# Patient Record
Sex: FEMALE | Race: BLACK OR AFRICAN AMERICAN | Employment: OTHER | URBAN - METROPOLITAN AREA
[De-identification: names, ages, dates, MRNs, and addresses within clinical notes are randomized per-mention and may not be internally consistent; named-entity substitution may affect disease eponyms.]

---

## 2017-01-04 ENCOUNTER — OFFICE VISIT (OUTPATIENT)
Dept: INTERNAL MEDICINE CLINIC | Age: 52
End: 2017-01-04

## 2017-01-04 VITALS
HEART RATE: 78 BPM | RESPIRATION RATE: 18 BRPM | SYSTOLIC BLOOD PRESSURE: 125 MMHG | WEIGHT: 202 LBS | TEMPERATURE: 98.1 F | BODY MASS INDEX: 29.92 KG/M2 | OXYGEN SATURATION: 97 % | DIASTOLIC BLOOD PRESSURE: 81 MMHG | HEIGHT: 69 IN

## 2017-01-04 DIAGNOSIS — K29.50 OTHER CHRONIC GASTRITIS WITHOUT HEMORRHAGE: ICD-10-CM

## 2017-01-04 DIAGNOSIS — R31.9 HEMATURIA: ICD-10-CM

## 2017-01-04 DIAGNOSIS — K59.01 SLOW TRANSIT CONSTIPATION: ICD-10-CM

## 2017-01-04 DIAGNOSIS — F41.9 ANXIETY: ICD-10-CM

## 2017-01-04 DIAGNOSIS — R30.0 DYSURIA: Primary | ICD-10-CM

## 2017-01-04 LAB
BILIRUB UR QL STRIP: NEGATIVE
GLUCOSE UR-MCNC: NEGATIVE MG/DL
KETONES P FAST UR STRIP-MCNC: NEGATIVE MG/DL
PH UR STRIP: 5 [PH] (ref 4.6–8)
PROT UR QL STRIP: NEGATIVE MG/DL
SP GR UR STRIP: 1.02 (ref 1–1.03)
UA UROBILINOGEN AMB POC: NORMAL (ref 0.2–1)
URINALYSIS CLARITY POC: CLEAR
URINALYSIS COLOR POC: YELLOW
URINE BLOOD POC: NORMAL
URINE LEUKOCYTES POC: NEGATIVE
URINE NITRITES POC: NEGATIVE

## 2017-01-04 RX ORDER — PANTOPRAZOLE SODIUM 40 MG/1
40 TABLET, DELAYED RELEASE ORAL DAILY
Qty: 30 TAB | Refills: 5 | Status: SHIPPED | OUTPATIENT
Start: 2017-01-04 | End: 2017-01-31

## 2017-01-04 RX ORDER — POLYETHYLENE GLYCOL 3350 17 G/17G
17 POWDER, FOR SOLUTION ORAL DAILY
Qty: 510 G | Refills: 5 | Status: SHIPPED | OUTPATIENT
Start: 2017-01-04 | End: 2017-04-03

## 2017-01-04 RX ORDER — ALPRAZOLAM 0.5 MG/1
0.5 TABLET ORAL
Qty: 15 TAB | Refills: 0 | OUTPATIENT
Start: 2017-01-04 | End: 2017-04-21

## 2017-01-04 NOTE — PROGRESS NOTES
Room # 3    Chief Complaint   Patient presents with    Abdominal Pain     burning in lower and L side of abd. Had gallbladder removed in Oct and still has \"same sx\"    Needs refill on alprazolam, request sent to Dr. Kera Almazan   protonix does not help with heartburn    There are no preventive care reminders to display for this patient. Coordination of Care Questions    1. Have you been to the ER, urgent care clinic since your last visit? No       Hospitalized since your last visit? No    2. Have you seen or consulted any other health care providers outside of the [address] [place] [address] since your last visit? Include any pap smears or colon screening.  No

## 2017-01-04 NOTE — PROGRESS NOTES
ACUTE VISIT     HPI:   Particia Celeste is a 45 y.o. female, she presents today for:     \"I had my gallbladder removed in October but I am still having the same symptoms\"  Lower abdominal apin with burning in left side. Worsened by lemonade. \"It's like acid keep building up\". Reports that she is constipated. Drinks miralax. Reports she had colonoscopy and egd doesn't remember being told to come back. (review of record shows gastritis on EGD_ Advised take PPI. And sometimes when I pee I feel a litte burning. And I have a uterine prolapse, but I saw my gynecologist about this. I stopped taking ranitidine and pantoprazole ~ 1 week ago. Has not noticed any difference since stopping medication. \"No I don't drink any alcohol, I stopped in [facility]". Denies caffeine as well    ROS: no fever, no chest pian, no dyspnea. No dizziness, no blood in stool. Current Outpatient Prescriptions on File Prior to Visit   Medication Sig    polyethylene glycol (MIRALAX) 17 gram/dose powder Take 17 g by mouth daily. Constipation    VITAMIN D3 1,000 unit cap TAKE 3 CAPSULES DAILY    azelastine (ASTELIN) 137 mcg (0.1 %) nasal spray 1 [place] by Both Nostrils route as needed for Rhinitis. Use in each nostril as directed    fluticasone (FLONASE) 50 mcg/actuation nasal spray 2 Sprays by Both Nostrils route as needed for Rhinitis.  albuterol (PROVENTIL HFA, VENTOLIN HFA, PROAIR HFA) 90 mcg/actuation inhaler Take 2 Puffs by inhalation as needed for Wheezing.  SYNTHROID 100 mcg tablet TAKE 1 TABLET BY MOUTH EVERY DAY BEFORE BREAKFAST ON A EMPTY STOMACH    pravastatin (PRAVACHOL) 20 mg tablet TAKE 1 TAB BY MOUTH DAILY. (Patient taking differently: TAKE 1 TAB BY MOUTH @HS)    ALPRAZolam (XANAX) 0.5 mg tablet Take 1 Tab by mouth two (2) times daily as needed for Anxiety.  sertraline (ZOLOFT) 100 mg tablet Take 1 Tab by mouth daily.  (Patient taking differently: Take 100 mg by mouth daily as needed.)    pantoprazole (PROTONIX) 40 mg tablet Take 1 Tab by mouth daily. No current facility-administered medications on file prior to visit. No Known Allergies    PMH/PSH/FH: reviewed and updated    Sochx:   reports that she has never smoked. She has never used smokeless tobacco. She reports that she does not drink alcohol or use illicit drugs. PE:  Blood pressure 125/81, pulse 78, temperature 98.1 °F (36.7 °C), temperature source Oral, resp. rate 18, height 5' 9\" (1.753 m), weight 202 lb (91.6 kg), last menstrual period 09/08/2016, SpO2 97 %. Body mass index is 29.83 kg/(m^2). Physical Exam   Constitutional: She is oriented to person, place, and time. She appears well-developed and well-nourished. No distress. HENT:   Head: Normocephalic. Mouth/Throat: Oropharynx is clear and moist.   Eyes: Conjunctivae and EOM are normal.   Neck: Neck supple. Cardiovascular: Normal rate, regular rhythm and normal heart sounds. Pulmonary/Chest: Effort normal and breath sounds normal.   Abdominal: Soft. She exhibits no distension and no mass. There is no tenderness. Neurological: She is alert and oriented to person, place, and time. Skin: Skin is warm and dry. Psychiatric: She has a normal mood and affect. Nursing note and vitals reviewed. Labs:  No results found for any visits on 01/04/17. 1+ blood    A/P  Shazia Soni was seen today for had concerns including Abdominal Pain. .  The diagnosis and plan was discussed including:        ICD-10-CM ICD-9-CM    1. Dysuria R30.0 788.1 AMB POC URINALYSIS DIP STICK AUTO W/O MICRO   2. Slow transit constipation K59.01 564.01 polyethylene glycol (MIRALAX) 17 gram/dose powder   3. Other chronic gastritis without hemorrhage K29.50  pantoprazole (PROTONIX) 40 mg tablet   4. Hematuria R31.9 599.70 URINALYSIS W/ RFLX MICROSCOPIC     Non-specific abdominal pain. No sign of urine infection. Will send urine to lab to verify if hematuria accurate.     - treat constipation more agressively with bid miralax   - resume ppi   - follow-up with GI for gastritis seen on EGD   - since patient has stopped drinking hopeful that gastritis is resolved. - I advised her to call back or return to office if symptoms worsen/change/persist.  - She was given AVS and expressed understanding with the diagnosis and plan as discussed.

## 2017-01-04 NOTE — MR AVS SNAPSHOT
Visit Information Date & Time Provider Department Dept. Phone Encounter #  
 1/4/2017  8:45 AM Steve Rivera MD [facility] [phone] [id number] Follow-up Instructions Return if symptoms worsen or fail to improve. And in 1-2 months with Dr. aMrk Canseco for general follow-up. Upcoming Health Maintenance Date Due  
 BREAST CANCER SCRN MAMMOGRAM 6/26/2018 PAP AKA CERVICAL CYTOLOGY 3/17/2019 DTaP/Tdap/Td series (2 - Td) 6/17/2024 COLONOSCOPY 9/13/2026 Allergies as of 1/4/2017  Review Complete On: 1/4/2017 By: Steve Rivera MD  
 No Known Allergies Current Immunizations  Reviewed on 10/27/2016 Name Date Tdap 6/17/2014 Not reviewed this visit You Were Diagnosed With   
  
 Codes Comments Dysuria    -  Primary ICD-10-CM: R30.0 ICD-9-CM: 788.1 Slow transit constipation     ICD-10-CM: K59.01 
ICD-9-CM: 564.01 Other chronic gastritis without hemorrhage     ICD-10-CM: K29.50 Hematuria     ICD-10-CM: R31.9 ICD-9-CM: 599.70 Vitals BP Pulse Temp Resp Height(growth percentile) Weight(growth percentile) 125/81 (BP 1 Location: Right arm, BP Patient Position: Sitting) 78 98.1 °F (36.7 °C) (Oral) 18 5' 9\" (1.753 m) 202 lb (91.6 kg) LMP SpO2 BMI OB Status Smoking Status 09/08/2016 (Approximate) 97% 29.83 kg/m2 Having regular periods Never Smoker Vitals History BMI and BSA Data Body Mass Index Body Surface Area  
 29.83 kg/m 2 2.11 m 2 Preferred Pharmacy [facility]acy Name Phone [id number] [address] [phone] Your Updated Medication List  
  
   
This list is accurate as of: 1/4/17  9:43 AM.  Always use your most recent med list.  
  
  
  
  
 albuterol 90 mcg/actuation inhaler Commonly known as:  PROVENTIL HFA, VENTOLIN HFA, PROAIR HFA Take 2 Puffs by inhalation as needed for Wheezing. ALPRAZolam 0.5 mg tablet Commonly known as:  Nisha Fell Take 1 Tab by mouth two (2) times daily as needed for Anxiety. ASTELIN 137 mcg (0.1 %) nasal spray Generic drug:  azelastine 1 Spray by Both Nostrils route as needed for Rhinitis. Use in each nostril as directed FLONASE 50 mcg/actuation nasal spray Generic drug:  fluticasone 2 Sprays by Both Nostrils route as needed for Rhinitis. pantoprazole 40 mg tablet Commonly known as:  PROTONIX Take 1 Tab by mouth daily. polyethylene glycol 17 gram/dose powder Commonly known as:  Tonna Renae Take 17 g by mouth daily. Constipation  
  
 pravastatin 20 mg tablet Commonly known as:  PRAVACHOL  
TAKE 1 TAB BY MOUTH DAILY. sertraline 100 mg tablet Commonly known as:  ZOLOFT Take 1 Tab by mouth daily. SYNTHROID 100 mcg tablet Generic drug:  levothyroxine TAKE 1 TABLET BY MOUTH EVERY DAY BEFORE BREAKFAST ON A EMPTY STOMACH  
  
 VITAMIN D3 1,000 unit Cap Generic drug:  cholecalciferol TAKE 3 CAPSULES DAILY Prescriptions Sent to Pharmacy Refills  
 polyethylene glycol (MIRALAX) 17 gram/dose powder 5 Sig: Take 17 g by mouth daily. Constipation Class: Normal  
 Pharmacy: [id number] Ph #: [phone] Route: Oral  
 pantoprazole (PROTONIX) 40 mg tablet 5 Sig: Take 1 Tab by mouth daily. Class: Normal  
 Pharmacy: [facility] #[id number] Ph #: [phone] Route: Oral  
  
Follow-up Instructions Return if symptoms worsen or fail to improve. And in 1-2 months with Dr. Nadine Devi for general follow-up. Patient Instructions Please call and request follow-up with Dr. Christine Clay [phone] Continue pantoprazole. Gastritis: Care Instructions Your Care Instructions Gastritis is a sore and upset stomach.  It happens when something irritates the stomach lining. Many things can cause it. These include an infection such as the flu or something you ate or drank. Medicines or a sore on the lining of the stomach (ulcer) also can cause it. Your belly may bloat and ache. You may belch, vomit, and feel sick to your stomach. You should be able to relieve the problem by taking medicine. And it may help to change your diet. If gastritis lasts, your doctor may prescribe medicine. Follow-up care is a key part of your treatment and safety. Be sure to make and go to all appointments, and call your doctor if you are having problems. It's also a good idea to know your test results and keep a list of the medicines you take. How can you care for yourself at home? · If your doctor prescribed antibiotics, take them as directed. Do not stop taking them just because you feel better. You need to take the full course of antibiotics. · Be safe with medicines. If your doctor prescribed medicine to decrease stomach acid, take it as directed. Call your doctor if you think you are having a problem with your medicine. · Do not take any other medicine, including over-the-counter pain relievers, without talking to your doctor first. 
· If your doctor recommends over-the-counter medicine to reduce stomach acid, such as Pepcid AC, Prilosec, Tagamet HB, or Zantac 75, follow the directions on the label. · Drink plenty of fluids (enough so that your urine is light yellow or clear like water) to prevent dehydration. Choose water and other caffeine-free clear liquids. If you have kidney, heart, or liver disease and have to limit fluids, talk with your doctor before you increase the amount of fluids you drink. · Limit how much alcohol you drink. · Avoid coffee, tea, cola drinks, chocolate, and other foods with caffeine. They increase stomach acid. When should you call for help? Call 911 anytime you think you may need emergency care. For example, call if: · You vomit blood or what looks like coffee grounds. · You pass maroon or very bloody stools. Call your doctor now or seek immediate medical care if: 
· You start breathing fast and have not produced urine in the last 8 hours. · You cannot keep fluids down. Watch closely for changes in your health, and be sure to contact your doctor if: 
· You do not get better as expected. Where can you learn more? Go to http://viviane-misael.info/. Enter [id number] in the search box to learn more about \"Gastritis: Care Instructions. \" Current as of: August 9, 2016 Content Version: 11.1 © 1683-9357 [employer]. Care instructions adapted under license by [employer] (which disclaims liability or warranty for this information). If you have questions about a medical condition or this instruction, always ask your healthcare professional. [place] [address] any warranty or liability for your use of this information. Introducing [facility]! Tootie Forde introduces [employer] patient portal. Now you can access parts of your medical record, email your doctor's office, and request medication refills online. 1. In your internet browser, go to [email or web address] [email or web address] 2. Click on the First Time User? Click Here link in the Sign In box. You will see the New Member Sign Up page. 3. Enter your [employer] Access Code exactly as it appears below. You will not need to use this code after youve completed the sign-up process. If you do not sign up before the expiration date, you must request a new code. · [employer] Access Code: [email or web address] Expires: 2/8/2017  8:17 PM 
 
4. Enter the last four digits of your Social Security Number (xxxx) and Date of Birth (mm/dd/yyyy) as indicated and click Submit. You will be taken to the next sign-up page. 5. Create a W[employer] ID.  This will be your [employer] login ID and cannot be changed, so think of one that is secure and easy to remember. 6. Create a O[employer]asis password. You can change your password at any time. 7. Enter your Password Reset Question and Answer. This can be used at a later time if you forget your password. 8. Enter your e-mail address. You will receive e-mail notification when new information is available in [address] 9. Click Sign Up. You can now view and download portions of your medical record. 10. Click the Download Summary menu link to download a portable copy of your medical information. If you have questions, please visit the Frequently Asked Questions section of the [employer] website. Remember, [employer] is NOT to be used for urgent needs. For medical emergencies, dial 911. Now available from your iPhone and Android! Please provide this summary of care documentation to your next provider. Your primary care clinician is listed as [address] If you have any questions after today's visit, please call [phone].

## 2017-01-04 NOTE — PATIENT INSTRUCTIONS
Please call and request follow-up with Dr. Norberto Klein [phone]    Continue pantoprazole. Gastritis: Care Instructions  Your Care Instructions    Gastritis is a sore and upset stomach. It happens when something irritates the stomach lining. Many things can cause it. These include an infection such as the flu or something you ate or drank. Medicines or a sore on the lining of the stomach (ulcer) also can cause it. Your belly may bloat and ache. You may belch, vomit, and feel sick to your stomach. You should be able to relieve the problem by taking medicine. And it may help to change your diet. If gastritis lasts, your doctor may prescribe medicine. Follow-up care is a key part of your treatment and safety. Be sure to make and go to all appointments, and call your doctor if you are having problems. It's also a good idea to know your test results and keep a list of the medicines you take. How can you care for yourself at home? · If your doctor prescribed antibiotics, take them as directed. Do not stop taking them just because you feel better. You need to take the full course of antibiotics. · Be safe with medicines. If your doctor prescribed medicine to decrease stomach acid, take it as directed. Call your doctor if you think you are having a problem with your medicine. · Do not take any other medicine, including over-the-counter pain relievers, without talking to your doctor first.  · If your doctor recommends over-the-counter medicine to reduce stomach acid, such as Pepcid AC, Prilosec, Tagamet HB, or Zantac 75, follow the directions on the label. · Drink plenty of fluids (enough so that your urine is light yellow or clear like water) to prevent dehydration. Choose water and other caffeine-free clear liquids. If you have kidney, heart, or liver disease and have to limit fluids, talk with your doctor before you increase the amount of fluids you drink. · Limit how much alcohol you drink.   · Avoid coffee, tea, cola drinks, chocolate, and other foods with caffeine. They increase stomach acid. When should you call for help? Call 911 anytime you think you may need emergency care. For example, call if:  · You vomit blood or what looks like coffee grounds. · You pass maroon or very bloody stools. Call your doctor now or seek immediate medical care if:  · You start breathing fast and have not produced urine in the last 8 hours. · You cannot keep fluids down. Watch closely for changes in your health, and be sure to contact your doctor if:  · You do not get better as expected. Where can you learn more? Go to http://viviane-misael.info/. Enter [id number] in the search box to learn more about \"Gastritis: Care Instructions. \"  Current as of: August 9, 2016  Content Version: 11.1  © 3652-7129 [employer]. Care instructions adapted under license by [employer] (which disclaims liability or warranty for this information). If you have questions about a medical condition or this instruction, always ask your healthcare professional. [address] any warranty or liability for your use of this information.

## 2017-01-05 LAB
APPEARANCE UR: ABNORMAL
BACTERIA #/AREA URNS HPF: ABNORMAL /[HPF]
BILIRUB UR QL STRIP: NEGATIVE
CASTS URNS QL MICRO: ABNORMAL /LPF
COLOR UR: YELLOW
EPI CELLS #/AREA URNS HPF: >10 /HPF
GLUCOSE UR QL: NEGATIVE
HGB UR QL STRIP: ABNORMAL
KETONES UR QL STRIP: NEGATIVE
LEUKOCYTE ESTERASE UR QL STRIP: NEGATIVE
MICRO URNS: ABNORMAL
MUCOUS THREADS URNS QL MICRO: PRESENT
NITRITE UR QL STRIP: NEGATIVE
PH UR STRIP: 5.5 [PH] (ref 5–7.5)
PROT UR QL STRIP: NEGATIVE
RBC #/AREA URNS HPF: ABNORMAL /HPF
SP GR UR: 1.02 (ref 1–1.03)
UROBILINOGEN UR STRIP-MCNC: 0.2 MG/DL (ref 0.2–1)
WBC #/AREA URNS HPF: ABNORMAL /HPF

## 2017-01-05 NOTE — PROGRESS NOTES
Follow-up test negative for blood in urine with < 2 RBC per hpf. Will send lab letter to advise of normal result.

## 2017-01-31 ENCOUNTER — TELEPHONE (OUTPATIENT)
Dept: INTERNAL MEDICINE CLINIC | Age: 52
End: 2017-01-31

## 2017-01-31 DIAGNOSIS — K27.9 PUD (PEPTIC ULCER DISEASE): Primary | ICD-10-CM

## 2017-01-31 RX ORDER — PHENOL/SODIUM PHENOLATE
20 AEROSOL, SPRAY (ML) MUCOUS MEMBRANE DAILY
Qty: 30 TAB | Refills: 2 | Status: SHIPPED | OUTPATIENT
Start: 2017-01-31 | End: 2017-02-12 | Stop reason: CLARIF

## 2017-01-31 NOTE — TELEPHONE ENCOUNTER
Pantoprazole 40mg prescribed, but requiring PA per [facility].     List of approved drugs on formulary:  GASTRIC ACID SECRETION REDUCERS  Carafate Suspension  Famotidine oral/injection  Famot/Calcium Carb/Mag  Omeprazole OTC  Lansoprazole OTC  Misoprostol (PA required)  Nexium (OTC)  Nizatidine  Prevacid 24 HRS OTC  Ranitidine  Sucralfate  Sucralfate Suspension

## 2017-01-31 NOTE — TELEPHONE ENCOUNTER
Patient advised that omeprazole rx has been sent instead, but if not covered to purchase OTC.  Pt confirmed and has already follow up with GI.

## 2017-01-31 NOTE — TELEPHONE ENCOUNTER
Please advise patient to take omeprazole OTC instead of pantoprazole. Please ask if she has scheduled follow-up with GI? If not I recommend doing this ASAP as they may need to recommend a more powerful medication.      Bailey Domínguez MD

## 2017-02-09 DIAGNOSIS — E78.5 HYPERLIPIDEMIA, UNSPECIFIED HYPERLIPIDEMIA TYPE: Primary | ICD-10-CM

## 2017-02-09 NOTE — TELEPHONE ENCOUNTER
[facility] pharmacy called stating that the patient insurance company does not cover Omeprazole rx. Per pharmacist, the insurance company will only cover Nexium over the counter. Please advise. # [phone]

## 2017-02-10 RX ORDER — ESOMEPRAZOLE MAGNESIUM 20 MG/1
1 TABLET, DELAYED RELEASE ORAL DAILY
OUTPATIENT
Start: 2017-02-10

## 2017-02-10 NOTE — TELEPHONE ENCOUNTER
Pantoprazole not covered    Per [employer] formulary:    GASTRIC ACID SECRETION REDUCERS  Carafate Suspension  Famotidine oral/injection  Famot/Calcium Carb/Mag  Omeprazole OTC  Lansoprazole OTC  Misoprostol (PA required)  Nexium (OTC)  Nizatidine  Prevacid 24 HRS OTC  Ranitidine  Sucralfate  Sucralfate Suspension

## 2017-02-12 RX ORDER — ESOMEPRAZOLE MAGNESIUM 20 MG/1
1 TABLET, DELAYED RELEASE ORAL DAILY
Qty: 30 TAB | Refills: 5 | Status: SHIPPED | OUTPATIENT
Start: 2017-02-12 | End: 2017-04-03

## 2017-03-01 ENCOUNTER — APPOINTMENT (OUTPATIENT)
Dept: ULTRASOUND IMAGING | Age: 52
End: 2017-03-01
Attending: EMERGENCY MEDICINE
Payer: MEDICAID

## 2017-03-01 ENCOUNTER — HOSPITAL ENCOUNTER (EMERGENCY)
Age: 52
Discharge: HOME | End: 2017-03-01
Attending: EMERGENCY MEDICINE
Payer: MEDICAID

## 2017-03-01 VITALS
HEART RATE: 98 BPM | BODY MASS INDEX: 29.94 KG/M2 | TEMPERATURE: 98.9 F | WEIGHT: 202.16 LBS | SYSTOLIC BLOOD PRESSURE: 111 MMHG | DIASTOLIC BLOOD PRESSURE: 84 MMHG | RESPIRATION RATE: 18 BRPM | HEIGHT: 69 IN | OXYGEN SATURATION: 98 %

## 2017-03-01 DIAGNOSIS — R10.2 ACUTE PELVIC PAIN: Primary | ICD-10-CM

## 2017-03-01 DIAGNOSIS — N30.00 ACUTE CYSTITIS WITHOUT HEMATURIA: ICD-10-CM

## 2017-03-01 LAB
ALBUMIN SERPL BCP-MCNC: 4 G/DL (ref 3.5–5)
ALBUMIN/GLOB SERPL: 1 {RATIO} (ref 1.1–2.2)
ALP SERPL-CCNC: 88 U/L (ref 45–117)
ALT SERPL-CCNC: 59 U/L (ref 12–78)
ANION GAP BLD CALC-SCNC: 8 MMOL/L (ref 5–15)
APPEARANCE UR: CLEAR
AST SERPL W P-5'-P-CCNC: 32 U/L (ref 15–37)
BACTERIA URNS QL MICRO: ABNORMAL /HPF
BASOPHILS # BLD AUTO: 0 K/UL (ref 0–0.1)
BASOPHILS # BLD: 0 % (ref 0–1)
BILIRUB SERPL-MCNC: 0.6 MG/DL (ref 0.2–1)
BILIRUB UR QL: NEGATIVE
BUN SERPL-MCNC: 10 MG/DL (ref 6–20)
BUN/CREAT SERPL: 10 (ref 12–20)
CALCIUM SERPL-MCNC: 9.1 MG/DL (ref 8.5–10.1)
CHLORIDE SERPL-SCNC: 105 MMOL/L (ref 97–108)
CLUE CELLS VAG QL WET PREP: NORMAL
CO2 SERPL-SCNC: 27 MMOL/L (ref 21–32)
COLOR UR: ABNORMAL
CREAT SERPL-MCNC: 0.96 MG/DL (ref 0.55–1.02)
EOSINOPHIL # BLD: 0.3 K/UL (ref 0–0.4)
EOSINOPHIL NFR BLD: 4 % (ref 0–7)
EPITH CASTS URNS QL MICRO: ABNORMAL /LPF
ERYTHROCYTE [DISTWIDTH] IN BLOOD BY AUTOMATED COUNT: 13.6 % (ref 11.5–14.5)
ERYTHROCYTE [DISTWIDTH] IN BLOOD BY AUTOMATED COUNT: 13.7 % (ref 11.5–14.5)
GLOBULIN SER CALC-MCNC: 4 G/DL (ref 2–4)
GLUCOSE SERPL-MCNC: 97 MG/DL (ref 65–100)
GLUCOSE UR STRIP.AUTO-MCNC: NEGATIVE MG/DL
HCT VFR BLD AUTO: 43.1 % (ref 35–47)
HCT VFR BLD AUTO: 43.3 % (ref 35–47)
HGB BLD-MCNC: 14.4 G/DL (ref 11.5–16)
HGB BLD-MCNC: 14.5 G/DL (ref 11.5–16)
HGB UR QL STRIP: ABNORMAL
KETONES UR QL STRIP.AUTO: NEGATIVE MG/DL
KOH PREP SPEC: NORMAL
LEUKOCYTE ESTERASE UR QL STRIP.AUTO: ABNORMAL
LIPASE SERPL-CCNC: 158 U/L (ref 73–393)
LYMPHOCYTES # BLD AUTO: 18 % (ref 12–49)
LYMPHOCYTES # BLD: 1.2 K/UL (ref 0.8–3.5)
MCH RBC QN AUTO: 29.9 PG (ref 26–34)
MCH RBC QN AUTO: 30 PG (ref 26–34)
MCHC RBC AUTO-ENTMCNC: 33.4 G/DL (ref 30–36.5)
MCHC RBC AUTO-ENTMCNC: 33.5 G/DL (ref 30–36.5)
MCV RBC AUTO: 89.5 FL (ref 80–99)
MCV RBC AUTO: 89.6 FL (ref 80–99)
MONOCYTES # BLD: 0.5 K/UL (ref 0–1)
MONOCYTES NFR BLD AUTO: 7 % (ref 5–13)
MUCOUS THREADS URNS QL MICRO: ABNORMAL /LPF
NEUTS SEG # BLD: 4.7 K/UL (ref 1.8–8)
NEUTS SEG NFR BLD AUTO: 71 % (ref 32–75)
NITRITE UR QL STRIP.AUTO: POSITIVE
PH UR STRIP: 5.5 [PH] (ref 5–8)
PLATELET # BLD AUTO: 186 K/UL (ref 150–400)
PLATELET # BLD AUTO: 192 K/UL (ref 150–400)
POTASSIUM SERPL-SCNC: 3.8 MMOL/L (ref 3.5–5.1)
PROT SERPL-MCNC: 8 G/DL (ref 6.4–8.2)
PROT UR STRIP-MCNC: NEGATIVE MG/DL
RBC # BLD AUTO: 4.81 M/UL (ref 3.8–5.2)
RBC # BLD AUTO: 4.84 M/UL (ref 3.8–5.2)
RBC #/AREA URNS HPF: ABNORMAL /HPF (ref 0–5)
SERVICE CMNT-IMP: NORMAL
SODIUM SERPL-SCNC: 140 MMOL/L (ref 136–145)
SP GR UR REFRACTOMETRY: 1.01 (ref 1–1.03)
T VAGINALIS VAG QL WET PREP: NORMAL
UROBILINOGEN UR QL STRIP.AUTO: 0.2 EU/DL (ref 0.2–1)
WBC # BLD AUTO: 6.7 K/UL (ref 3.6–11)
WBC # BLD AUTO: 6.8 K/UL (ref 3.6–11)
WBC URNS QL MICRO: ABNORMAL /HPF (ref 0–4)

## 2017-03-01 PROCEDURE — 81001 URINALYSIS AUTO W/SCOPE: CPT | Performed by: EMERGENCY MEDICINE

## 2017-03-01 PROCEDURE — 83690 ASSAY OF LIPASE: CPT | Performed by: EMERGENCY MEDICINE

## 2017-03-01 PROCEDURE — 85025 COMPLETE CBC W/AUTO DIFF WBC: CPT | Performed by: EMERGENCY MEDICINE

## 2017-03-01 PROCEDURE — 87210 SMEAR WET MOUNT SALINE/INK: CPT | Performed by: EMERGENCY MEDICINE

## 2017-03-01 PROCEDURE — 99284 EMERGENCY DEPT VISIT MOD MDM: CPT

## 2017-03-01 PROCEDURE — 87491 CHLMYD TRACH DNA AMP PROBE: CPT | Performed by: EMERGENCY MEDICINE

## 2017-03-01 PROCEDURE — 80053 COMPREHEN METABOLIC PANEL: CPT | Performed by: EMERGENCY MEDICINE

## 2017-03-01 PROCEDURE — 76830 TRANSVAGINAL US NON-OB: CPT

## 2017-03-01 PROCEDURE — 36415 COLL VENOUS BLD VENIPUNCTURE: CPT | Performed by: EMERGENCY MEDICINE

## 2017-03-01 PROCEDURE — 85027 COMPLETE CBC AUTOMATED: CPT | Performed by: EMERGENCY MEDICINE

## 2017-03-01 RX ORDER — CEPHALEXIN 500 MG/1
500 CAPSULE ORAL 4 TIMES DAILY
Qty: 28 CAP | Refills: 0 | Status: SHIPPED | OUTPATIENT
Start: 2017-03-01 | End: 2017-04-28 | Stop reason: ALTCHOICE

## 2017-03-01 RX ORDER — HYDROCODONE BITARTRATE AND ACETAMINOPHEN 5; 325 MG/1; MG/1
1 TABLET ORAL
Qty: 12 TAB | Refills: 0 | Status: SHIPPED | OUTPATIENT
Start: 2017-03-01 | End: 2017-04-21

## 2017-03-01 NOTE — ED PROVIDER NOTES
HPI Comments: Tyron Mcrae is a 47 y.o. Female with hx of PUD and GERD who presents ambulatory to [facility] ED with CC of B/L lower ABD pain since (6/2016), becoming progressively worse today. Pt reports her pain is exacerbated by PO intake, noting she has been unable to tolerate PO x ~3 days. She also c/o dysuria since onset of her symptoms. Pt reports she has had normal colonoscopy and normal EGD since onset of her symptoms. She notes hx of prolapsed uterus, as well as hx of cholecystectomy. Pt denies hx of kidney stones. She specifically denies fever, chills, and vaginal discharge. PCP: Rocio Adams MD    There are no other complaints, changes, or physical findings at this time. The history is provided by the patient.         Past Medical History:   Diagnosis Date    Acid reflux     Adverse effect of anesthesia     woke up coughing    Allergic rhinitis     Anxiety     Arthritis     Asthma     ENVIRONMENTAL, DUST/COLD WEATHER    GERD (gastroesophageal reflux disease)     History of nonchemical tubal occlusion     Esure devices    Hyperlipidemia 2/20/2014    Hypothyroidism     HYPO    IGT (impaired glucose tolerance)     Ill-defined condition     prolapse uterus/states thus her intestines has collpased a little bit    SHERON (obstructive sleep apnea)     off CPAP after weight loss/intentional wt loss    PUD (peptic ulcer disease)     no EGD    Routine gynecological examination     [facility]    Sinus disorder     Tinea pedis        Past Surgical History:   Procedure Laterality Date    COLONOSCOPY N/A 9/13/2016    COLONOSCOPY / EGD  performed by Ephraim Smart MD at [address] HX GYN  10/12/2012    Esure    HX LAP CHOLECYSTECTOMY  10/06/2016    AL REMOVAL OF OVARIAN CYST(S)           Family History:   Problem Relation Age of Onset    Hypertension Mother     Diabetes Mother     Heart Disease Mother     Heart Attack Mother     Cancer Father      lung    Other Sister cholecystectomy    Cancer Brother      lung    Diabetes Maternal Aunt     Cancer Paternal Uncle      lung    Diabetes Maternal Aunt     Diabetes Maternal Aunt     Diabetes Maternal Aunt     Diabetes Maternal Aunt     Diabetes Maternal Aunt     Heart Attack Daughter 32    Other Son      narcolepsy/GERD (part of esophagus removed d/t this)    Cancer Paternal Uncle      lung    Anesth Problems Neg Hx        Social History     Social History    Marital status: SINGLE     Spouse name: N/A    Number of children: N/A    Years of education: N/A     Occupational History    Not on file. Social History Main Topics    Smoking status: Never Smoker    Smokeless tobacco: Never Used    Alcohol use No    Drug use: No    Sexual activity: Yes     Partners: Male     Birth control/ protection: Implant     Other Topics Concern    Not on file     Social History Narrative         ALLERGIES: Review of patient's allergies indicates no known allergies. Review of Systems   Constitutional: Negative. Negative for chills and fever. HENT: Negative. Negative for congestion and rhinorrhea. Respiratory: Negative. Negative for cough, chest tightness and wheezing. Cardiovascular: Negative. Negative for chest pain and palpitations. Gastrointestinal: Positive for abdominal pain (B/L lower). Negative for constipation, nausea and vomiting. Endocrine: Negative. Genitourinary: Positive for dysuria. Negative for decreased urine volume, flank pain, hematuria, pelvic pain and vaginal discharge. Musculoskeletal: Negative. Negative for back pain and neck pain. Skin: Negative. Negative for color change, pallor and rash. Neurological: Negative. Negative for dizziness, seizures, weakness, numbness and headaches. Hematological: Negative. Negative for adenopathy. Psychiatric/Behavioral: Negative. All other systems reviewed and are negative.       Patient Vitals for the past 12 hrs:   Temp Pulse Resp BP SpO2   03/01/17 1547 - 98 18 111/84 98 %   03/01/17 1156 - 100 18 116/73 100 %   03/01/17 1041 98.9 °F (37.2 °C) 100 20 138/84 97 %            Physical Exam   Constitutional: She is oriented to person, place, and time. She appears well-developed and well-nourished. No distress. HENT:   Head: Normocephalic and atraumatic. Mouth/Throat: No oropharyngeal exudate. Eyes: Conjunctivae and EOM are normal. Pupils are equal, round, and reactive to light. No scleral icterus. Neck: Normal range of motion. Neck supple. Cardiovascular: Normal rate, regular rhythm, normal heart sounds and intact distal pulses. Exam reveals no gallop and no friction rub. No murmur heard. Pulmonary/Chest: Effort normal and breath sounds normal. No respiratory distress. She has no wheezes. She has no rales. She exhibits no tenderness. Abdominal: Soft. Bowel sounds are normal. She exhibits no distension and no mass. There is no hepatosplenomegaly. There is tenderness in the suprapubic area. There is no rebound, no guarding and no CVA tenderness. No hernia. Neurological: She is alert and oriented to person, place, and time. Skin: Skin is warm. No rash noted. She is not diaphoretic. No erythema. No pallor. Nursing note and vitals reviewed. MDM  Number of Diagnoses or Management Options  Acute cystitis without hematuria:   Acute pelvic pain:   Diagnosis management comments: DDx: PID, ovarian cyst, UTI, renal colic, reflux    Impression/Plan: Pt presents with 1 year on intermittent burning pelvic pain that is worsened with eating. Clinically no surgical signs. Will obtain labs, US, and pelvic exam, and make final disposition pending results.        Amount and/or Complexity of Data Reviewed  Clinical lab tests: ordered and reviewed  Tests in the radiology section of CPT®: ordered and reviewed  Obtain history from someone other than the patient: yes  Review and summarize past medical records: yes  Independent visualization of images, tracings, or specimens: yes    Risk of Complications, Morbidity, and/or Mortality  Presenting problems: moderate  Diagnostic procedures: low  Management options: low      ED Course       Pelvic Exam  Date/Time: 3/1/2017 2:15 PM  Performed by: PA  Procedure duration:  15 minutes. Documented by:  Margarito Hernández PA-C. Exam assisted by:  Mitch Alvarado RN. Type of exam performed: bimanual and speculum. External genitalia appearance: normal.    Vaginal exam:  normal.    Cervical exam:  no cervical motion tenderness and os closed. Specimen(s) collected:  chlamydia and GC. Bimanual exam:  normal.    Patient tolerance: Patient tolerated the procedure well with no immediate complications            LABORATORY TESTS:  Recent Results (from the past 12 hour(s))   CBC W/O DIFF    Collection Time: 03/01/17 11:54 AM   Result Value Ref Range    WBC 6.8 3.6 - 11.0 K/uL    RBC 4.84 3.80 - 5.20 M/uL    HGB 14.5 11.5 - 16.0 g/dL    HCT 43.3 35.0 - 47.0 %    MCV 89.5 80.0 - 99.0 FL    MCH 30.0 26.0 - 34.0 PG    MCHC 33.5 30.0 - 36.5 g/dL    RDW 13.7 11.5 - 14.5 %    PLATELET 427 556 - 347 K/uL   METABOLIC PANEL, COMPREHENSIVE    Collection Time: 03/01/17 11:54 AM   Result Value Ref Range    Sodium 140 136 - 145 mmol/L    Potassium 3.8 3.5 - 5.1 mmol/L    Chloride 105 97 - 108 mmol/L    CO2 27 21 - 32 mmol/L    Anion gap 8 5 - 15 mmol/L    Glucose 97 65 - 100 mg/dL    BUN 10 6 - 20 MG/DL    Creatinine 0.96 0.55 - 1.02 MG/DL    BUN/Creatinine ratio 10 (L) 12 - 20      GFR est AA >60 >60 ml/min/1.73m2    GFR est non-AA >60 >60 ml/min/1.73m2    Calcium 9.1 8.5 - 10.1 MG/DL    Bilirubin, total 0.6 0.2 - 1.0 MG/DL    ALT (SGPT) 59 12 - 78 U/L    AST (SGOT) 32 15 - 37 U/L    Alk.  phosphatase 88 45 - 117 U/L    Protein, total 8.0 6.4 - 8.2 g/dL    Albumin 4.0 3.5 - 5.0 g/dL    Globulin 4.0 2.0 - 4.0 g/dL    A-G Ratio 1.0 (L) 1.1 - 2.2     CBC WITH AUTOMATED DIFF    Collection Time: 03/01/17 11:54 AM   Result Value Ref Range WBC 6.7 3.6 - 11.0 K/uL    RBC 4.81 3.80 - 5.20 M/uL    HGB 14.4 11.5 - 16.0 g/dL    HCT 43.1 35.0 - 47.0 %    MCV 89.6 80.0 - 99.0 FL    MCH 29.9 26.0 - 34.0 PG    MCHC 33.4 30.0 - 36.5 g/dL    RDW 13.6 11.5 - 14.5 %    PLATELET 861 198 - 506 K/uL    NEUTROPHILS 71 32 - 75 %    LYMPHOCYTES 18 12 - 49 %    MONOCYTES 7 5 - 13 %    EOSINOPHILS 4 0 - 7 %    BASOPHILS 0 0 - 1 %    ABS. NEUTROPHILS 4.7 1.8 - 8.0 K/UL    ABS. LYMPHOCYTES 1.2 0.8 - 3.5 K/UL    ABS. MONOCYTES 0.5 0.0 - 1.0 K/UL    ABS. EOSINOPHILS 0.3 0.0 - 0.4 K/UL    ABS. BASOPHILS 0.0 0.0 - 0.1 K/UL   LIPASE    Collection Time: 03/01/17 11:54 AM   Result Value Ref Range    Lipase 158 73 - 393 U/L   URINALYSIS W/ RFLX MICROSCOPIC    Collection Time: 03/01/17 12:37 PM   Result Value Ref Range    Color DARK YELLOW      Appearance CLEAR CLEAR      Specific gravity 1.011 1.003 - 1.030      pH (UA) 5.5 5.0 - 8.0      Protein NEGATIVE  NEG mg/dL    Glucose NEGATIVE  NEG mg/dL    Ketone NEGATIVE  NEG mg/dL    Bilirubin NEGATIVE  NEG      Blood TRACE (A) NEG      Urobilinogen 0.2 0.2 - 1.0 EU/dL    Nitrites POSITIVE (A) NEG      Leukocyte Esterase TRACE (A) NEG      WBC 0-4 0 - 4 /hpf    RBC 0-5 0 - 5 /hpf    Epithelial cells MODERATE (A) FEW /lpf    Bacteria 1+ (A) NEG /hpf    Mucus TRACE (A) NEG /lpf   WET PREP    Collection Time: 03/01/17  2:22 PM   Result Value Ref Range    Clue cells CLUE CELLS ABSENT      Wet prep NO TRICHOMONAS SEEN     KOH, OTHER SOURCES    Collection Time: 03/01/17  2:22 PM   Result Value Ref Range    Special Requests: NO SPECIAL REQUESTS      KOH NO YEAST SEEN         IMAGING RESULTS:  US TRANSVAGINAL   Final Result   EXAM: US TRANSVAGINAL     INDICATION: Increasing chronic pelvic pain since July, 2016.     COMPARISON: CT pelvis on 9/26/2016.     TECHNIQUE: Endovaginal ultrasound of the female pelvis.  Transvaginal scanning  was performed for better evaluation of all structures.     FINDINGS: Transabdominal ultrasound:     Distended bladder. Visible uterus. Nondiagnostic.     Endovaginal ultrasound:      Urinary bladder: Nondistended.     Uterus: Retroverted. Measures 6.8 x 4.8 x 4.7 cm, which is within normal limits. There is no sonographic myometrial abnormality. Cervix is within normal limits.     Endometrium: 0.5 centimeters in thickness. Homogeneous.     Ovaries: Obscured by bowel gas and body habitus. No evidence of adnexal mass or  cyst.     Free fluid: Physiologic.     IMPRESSION  IMPRESSION:   Normal uterus and endometrium. No adnexal mass or cyst.       IMPRESSION:  1. Acute pelvic pain    2. Acute cystitis without hematuria        PLAN:  1. Discharge for follow up with PCP    Current Discharge Medication List      START taking these medications    Details   cephALEXin (KEFLEX) 500 mg capsule Take 1 Cap by mouth four (4) times daily for 7 days. Qty: 28 Cap, Refills: 0      HYDROcodone-acetaminophen (NORCO) 5-325 mg per tablet Take 1 Tab by mouth every four (4) hours as needed for Pain. Max Daily Amount: 6 Tabs. Qty: 12 Tab, Refills: 0         CONTINUE these medications which have NOT CHANGED    Details   polyethylene glycol (MIRALAX) 17 gram/dose powder Take 17 g by mouth daily. Constipation  Qty: 510 g, Refills: 5    Associated Diagnoses: Slow transit constipation      VITAMIN D3 1,000 unit cap TAKE 3 CAPSULES DAILY  Qty: 90 Cap, Refills: 11    Associated Diagnoses: Vitamin D deficiency      SYNTHROID 100 mcg tablet TAKE 1 TABLET BY MOUTH EVERY DAY BEFORE BREAKFAST ON A EMPTY STOMACH  Qty: 30 Tab, Refills: 5      pravastatin (PRAVACHOL) 20 mg tablet TAKE 1 TAB BY MOUTH DAILY. Qty: 30 Tab, Refills: 5      esomeprazole magnesium (NEXIUM 24HR) 20 mg TbEC Take 1 Tab by mouth daily. Qty: 30 Tab, Refills: 5      ALPRAZolam (XANAX) 0.5 mg tablet Take 1 Tab by mouth two (2) times daily as needed for Anxiety.   Qty: 15 Tab, Refills: 0    Associated Diagnoses: Anxiety      azelastine (ASTELIN) 137 mcg (0.1 %) nasal spray 1 Spray by Both Nostrils route as needed for Rhinitis. Use in each nostril as directed      fluticasone (FLONASE) 50 mcg/actuation nasal spray 2 Sprays by Both Nostrils route as needed for Rhinitis. albuterol (PROVENTIL HFA, VENTOLIN HFA, PROAIR HFA) 90 mcg/actuation inhaler Take 2 Puffs by inhalation as needed for Wheezing. sertraline (ZOLOFT) 100 mg tablet Take 1 Tab by mouth daily. Qty: 30 Tab, Refills: 5           2. Follow-up Information     Follow up With Details Comments Via Glendy Maloney MD Call in 1 day  [address] [facility] [address] [facility]  [phone]      [facility] EMERGENCY DEPT  If symptoms worsen [address]  [address] [facility]  [phone]        Return to ED if worse     DISCHARGE NOTE:  4:05 PM  The patient is ready for discharge. The patients signs, symptoms, diagnosis, and instructions for discharge have been discussed and the pt has conveyed their understanding. The patient is to follow up as recommended with PCP or return to the ER should their symptoms worsen. Plan has been discussed and patient has conveyed their agreement. This note is prepared by Jesus Templeton, acting as Scribe for Colleen Medina MD.    Colleen Medina MD: The scribe's documentation has been prepared under my direction and personally reviewed by me in its entirety. I confirm that the note above accurately reflects all work, treatment, procedures, and medical decision making performed by me.

## 2017-03-01 NOTE — ED NOTES
Pelvic exam done by ANDRESSA Yanez with this nurse Mitch Alvarado RN present as chaperone. Pt tolerated well.

## 2017-03-01 NOTE — ED NOTES
Pt resting in position of comfort with sister at bedside. Drinking diet ginger ale. Pending test results. Call bell within reach.

## 2017-03-01 NOTE — DISCHARGE INSTRUCTIONS
Pelvic Pain: Care Instructions  Your Care Instructions    Pelvic pain, or pain in the lower belly, can have many causes. Often pelvic pain is not serious and gets better in a few days. If your pain continues or gets worse, you may need tests and treatment. Tell your doctor about any new symptoms. These may be signs of a serious problem. Follow-up care is a key part of your treatment and safety. Be sure to make and go to all appointments, and call your doctor if you are having problems. It's also a good idea to know your test results and keep a list of the medicines you take. How can you care for yourself at home? · Rest until you feel better. Lie down, and raise your legs by placing a pillow under your knees. · Drink plenty of fluids. You may find that small, frequent sips are easier on your stomach than if you drink a lot at once. Avoid drinks with carbonation or caffeine, such as soda pop, tea, or coffee. · Try eating several small meals instead of 2 or 3 large ones. Eat mild foods, such as rice, dry toast or crackers, bananas, and applesauce. Avoid fatty and spicy foods, other fruits, and alcohol until 48 hours after your symptoms have gone away. · Take an over-the-counter pain medicine, such as acetaminophen (Tylenol), ibuprofen (Advil, Motrin), or naproxen (Aleve). Read and follow all instructions on the label. · Do not take two or more pain medicines at the same time unless the doctor told you to. Many pain medicines have acetaminophen, which is Tylenol. Too much acetaminophen (Tylenol) can be harmful. · You can put a heating pad, a warm cloth, or moist heat on your belly to relieve pain. When should you call for help? Call 911 anytime you think you may need emergency care. For example, call if:  · You passed out (lost consciousness). Call your doctor now or seek immediate medical care if:  · Your pain is getting worse. · Your pain becomes focused in one area of your belly.   · You have severe vaginal bleeding. Severe means that you are soaking through your usual pads or tampons every hour for 2 or more hours and passing clots of blood. · You have new symptoms such as fever, urinary problems or unexpected vaginal bleeding. · You are dizzy or lightheaded, or you feel like you may faint. Watch closely for changes in your health, and be sure to contact your doctor if:  · You do not get better as expected. Where can you learn more? Go to http://viviane-misael.info/. Enter [phone] in the search box to learn more about \"Pelvic Pain: Care Instructions. \"  Current as of: February 25, 2016  Content Version: 11.1  © 4907-6739 [employer]. Care instructions adapted under license by [employer] (which disclaims liability or warranty for this information). If you have questions about a medical condition or this instruction, always ask your healthcare professional. [place] [address] any warranty or liability for your use of this information. Urinary Tract Infection in Women: Care Instructions  Your Care Instructions    A urinary tract infection, or UTI, is a general term for an infection anywhere between the kidneys and the urethra (where urine comes out). Most UTIs are bladder infections. They often cause pain or burning when you urinate. UTIs are caused by bacteria and can be cured with antibiotics. Be sure to complete your treatment so that the infection goes away. Follow-up care is a key part of your treatment and safety. Be sure to make and go to all appointments, and call your doctor if you are having problems. It's also a good idea to know your test results and keep a list of the medicines you take. How can you care for yourself at home? · Take your antibiotics as directed. Do not stop taking them just because you feel better. You need to take the full course of antibiotics. · Drink extra water and other fluids for the next day or two.  This may help wash out the bacteria that are causing the infection. (If you have kidney, heart, or liver disease and have to limit fluids, talk with your doctor before you increase your fluid intake.)  · Avoid drinks that are carbonated or have caffeine. They can irritate the bladder. · Urinate often. Try to empty your bladder each time. · To relieve pain, take a hot bath or lay a heating pad set on low over your lower belly or genital area. Never go to sleep with a heating pad in place. To prevent UTIs  · Drink plenty of water each day. This helps you urinate often, which clears bacteria from your system. (If you have kidney, heart, or liver disease and have to limit fluids, talk with your doctor before you increase your fluid intake.)  · Consider adding cranberry juice to your diet. · Urinate when you need to. · Urinate right after you have sex. · Change sanitary pads often. · Avoid douches, bubble baths, feminine hygiene sprays, and other feminine hygiene products that have deodorants. · After going to the bathroom, wipe from front to back. When should you call for help? Call your doctor now or seek immediate medical care if:  · Symptoms such as fever, chills, nausea, or vomiting get worse or appear for the first time. · You have new pain in your back just below your rib cage. This is called flank pain. · There is new blood or pus in your urine. · You have any problems with your antibiotic medicine. Watch closely for changes in your health, and be sure to contact your doctor if:  · You are not getting better after taking an antibiotic for 2 days. · Your symptoms go away but then come back. Where can you learn more? Go to http://viviane-misael.info/. Enter G101 in the search box to learn more about \"Urinary Tract Infection in Women: Care Instructions. \"  Current as of: August 12, 2016  Content Version: 11.1  © 8355-6274 [employer].  Care instructions adapted under license by [employer] (which disclaims liability or warranty for this information). If you have questions about a medical condition or this instruction, always ask your healthcare professional. [address] any warranty or liability for your use of this information. Appoet Activation    Thank you for requesting access to App[employer]et. Please follow the instructions below to securely access and download your online medical record. App[employer]et allows you to send messages to your doctor, view your test results, renew your prescriptions, schedule appointments, and more. How Do I Sign Up? 1. In your internet browser, go to [email or web address]  2. Click on the First Time User? Click Here link in the Sign In box. You will be redirect to the New Member Sign Up page. 3. Enter your Appoet Access Code exactly as it appears below. You will not need to use this code after youve completed the sign-up process. If you do not sign up before the expiration date, you must request a new code. Appoet Access Code: [email or web address]  Expires: 2017 11:05 AM (This is the date your Appoet access code will )    4. Enter the last four digits of your Social Security Number (xxxx) and Date of Birth (mm/dd/yyyy) as indicated and click Submit. You will be taken to the next sign-up page. 5. Create a Appoet ID. This will be your Appoet login ID and cannot be changed, so think of one that is secure and easy to remember. 6. Create a Appoet password. You can change your password at any time. 7. Enter your Password Reset Question and Answer. This can be used at a later time if you forget your password. 8. Enter your e-mail address. You will receive e-mail notification when new information is available in [address] 9. Click Sign Up. You can now view and download portions of your medical record.   10. Click the Download Summary menu link to download a portable copy of your medical information. Additional Information    If you have questions, please visit the Frequently Asked Questions section of the [employer] website at [email or web address] Remember, [employer] is NOT to be used for urgent needs. For medical emergencies, dial 911.

## 2017-03-01 NOTE — ED NOTES
Dr Alfred Cedillo visited pt. Discharge orders and instructions noted. Discharge instructions, follow up care and prescriptions reviewed with pt and understanding verbalized. Opportunity for questions and clarifications provided. Pt left via ambulation with sister. In no acute distress.

## 2017-03-06 LAB
C TRACH DNA SPEC QL NAA+PROBE: NEGATIVE
N GONORRHOEA DNA SPEC QL NAA+PROBE: NEGATIVE
SAMPLE TYPE: NORMAL
SERVICE CMNT-IMP: NORMAL
SPECIMEN SOURCE: NORMAL

## 2017-03-29 RX ORDER — LEVOTHYROXINE SODIUM 100 UG/1
TABLET ORAL
Qty: 30 TAB | Refills: 5 | Status: SHIPPED | OUTPATIENT
Start: 2017-03-29 | End: 2017-05-04 | Stop reason: SDUPTHER

## 2017-04-01 ENCOUNTER — HOSPITAL ENCOUNTER (EMERGENCY)
Age: 52
Discharge: HOME | End: 2017-04-01
Attending: EMERGENCY MEDICINE
Payer: MEDICAID

## 2017-04-01 ENCOUNTER — APPOINTMENT (OUTPATIENT)
Dept: GENERAL RADIOLOGY | Age: 52
End: 2017-04-01
Attending: PHYSICIAN ASSISTANT
Payer: MEDICAID

## 2017-04-01 VITALS
OXYGEN SATURATION: 96 % | BODY MASS INDEX: 30.6 KG/M2 | WEIGHT: 206.57 LBS | TEMPERATURE: 98 F | HEIGHT: 69 IN | HEART RATE: 69 BPM | RESPIRATION RATE: 12 BRPM | SYSTOLIC BLOOD PRESSURE: 185 MMHG | DIASTOLIC BLOOD PRESSURE: 111 MMHG

## 2017-04-01 DIAGNOSIS — M51.36 DDD (DEGENERATIVE DISC DISEASE), LUMBAR: Primary | ICD-10-CM

## 2017-04-01 DIAGNOSIS — M54.31 BILATERAL SCIATICA: ICD-10-CM

## 2017-04-01 DIAGNOSIS — M54.32 BILATERAL SCIATICA: ICD-10-CM

## 2017-04-01 PROCEDURE — 99283 EMERGENCY DEPT VISIT LOW MDM: CPT

## 2017-04-01 PROCEDURE — 72100 X-RAY EXAM L-S SPINE 2/3 VWS: CPT

## 2017-04-01 PROCEDURE — 74011250637 HC RX REV CODE- 250/637: Performed by: PHYSICIAN ASSISTANT

## 2017-04-01 RX ORDER — OXYCODONE AND ACETAMINOPHEN 5; 325 MG/1; MG/1
1 TABLET ORAL
Qty: 10 TAB | Refills: 0 | Status: SHIPPED | OUTPATIENT
Start: 2017-04-01 | End: 2017-04-21

## 2017-04-01 RX ORDER — ONDANSETRON 4 MG/1
4 TABLET, ORALLY DISINTEGRATING ORAL
Status: COMPLETED | OUTPATIENT
Start: 2017-04-01 | End: 2017-04-01

## 2017-04-01 RX ORDER — OXYCODONE AND ACETAMINOPHEN 5; 325 MG/1; MG/1
1 TABLET ORAL
Status: COMPLETED | OUTPATIENT
Start: 2017-04-01 | End: 2017-04-01

## 2017-04-01 RX ORDER — DIAZEPAM 5 MG/1
5 TABLET ORAL
Status: COMPLETED | OUTPATIENT
Start: 2017-04-01 | End: 2017-04-01

## 2017-04-01 RX ORDER — DIAZEPAM 5 MG/1
5 TABLET ORAL
Qty: 10 TAB | Refills: 0 | Status: SHIPPED | OUTPATIENT
Start: 2017-04-01 | End: 2017-04-03

## 2017-04-01 RX ADMIN — OXYCODONE HYDROCHLORIDE AND ACETAMINOPHEN 1 TABLET: 5; 325 TABLET ORAL at 09:28

## 2017-04-01 RX ADMIN — DIAZEPAM 5 MG: 5 TABLET ORAL at 09:28

## 2017-04-01 RX ADMIN — ONDANSETRON 4 MG: 4 TABLET, ORALLY DISINTEGRATING ORAL at 09:28

## 2017-04-01 NOTE — ED PROVIDER NOTES
HPI Comments: Fartun Felder, 47 y.o. Female with PMHx of PUD, GERD, arthritis, and asthma presents ambulatory to the ED with cc of lower back pain radiating down LLE x 1 day. She denies any recent injuries. She has not taken anything for pain. She has no known medication allergies. Pain is exacerbated by bending legs. She does not have a hx of HTN, DM, cardiac, pulmonary or kidney issues. She denies any fevers, chills, nausea, vomiting, diarrhea, change in urinary frequency, saddle anesthesia, fecal or urinary incontinence. PCP: Darylene Re, MD    Social history significant for: - Tobacco, - EtOH, - Illicit Drug Use  PSHx: cholecystectomy     There are no other complaints, changes, or physical findings at this time. Written by ABBEY Mcmanusibchanda, as dictated by Zoila Wang. The history is provided by the patient. No  was used.         Past Medical History:   Diagnosis Date    Acid reflux     Adverse effect of anesthesia     woke up coughing    Allergic rhinitis     Anxiety     Arthritis     Asthma     ENVIRONMENTAL, DUST/COLD WEATHER    GERD (gastroesophageal reflux disease)     History of nonchemical tubal occlusion     Esure devices    Hyperlipidemia 2/20/2014    Hypothyroidism     HYPO    IGT (impaired glucose tolerance)     Ill-defined condition     prolapse uterus/states thus her intestines has collpased a little bit    SHERON (obstructive sleep apnea)     off CPAP after weight loss/intentional wt loss    PUD (peptic ulcer disease)     no EGD    Routine gynecological examination     [facility]    Sinus disorder     Tinea pedis        Past Surgical History:   Procedure Laterality Date    COLONOSCOPY N/A 9/13/2016    COLONOSCOPY / EGD  performed by Mabeline Rubinstein, MD at [address] [facility] G[facility]  10/12/2012    [facility]    HX LAP CHOLECYSTECTOMY  10/06/2016    ID REMOVAL OF OVARIAN CYST(S)           Family History:   Problem Relation Age of Onset    Hypertension Mother     Diabetes Mother     Heart Disease Mother     Heart Attack Mother     Cancer Father      lung    Other Sister      cholecystectomy    Cancer Brother      lung    Diabetes Maternal Aunt     Cancer Paternal Uncle      lung    Diabetes Maternal Aunt     Diabetes Maternal Aunt     Diabetes Maternal Aunt     Diabetes Maternal Aunt     Diabetes Maternal Aunt     Heart Attack Daughter 32    Other Son      narcolepsy/GERD (part of esophagus removed d/t this)    Cancer Paternal Uncle      lung    Anesth Problems Neg Hx        Social History     Social History    Marital status: SINGLE     Spouse name: N/A    Number of children: N/A    Years of education: N/A     Occupational History    Not on file. Social History Main Topics    Smoking status: Never Smoker    Smokeless tobacco: Never Used    Alcohol use No    Drug use: No    Sexual activity: Yes     Partners: Male     Birth control/ protection: Implant     Other Topics Concern    Not on file     Social History Narrative         ALLERGIES: Review of patient's allergies indicates no known allergies. Review of Systems   Constitutional: Negative for chills and fever. HENT: Negative for congestion, rhinorrhea and sore throat. Respiratory: Negative for cough and shortness of breath. Cardiovascular: Negative for chest pain and palpitations. Gastrointestinal: Negative for abdominal pain, diarrhea, nausea and vomiting. Genitourinary: Negative for dysuria and hematuria. (-) fecal or urinary incontinence    Musculoskeletal: Positive for back pain and myalgias (LLE). Negative for neck pain and neck stiffness. Skin: Negative for rash and wound. Neurological: Negative for dizziness and headaches. Psychiatric/Behavioral: Negative for agitation and confusion. All other systems reviewed and are negative.       Patient Vitals for the past 12 hrs:   Temp Pulse Resp BP SpO2   04/01/17 0923 98 °F (36.7 °C) 69 12 (!) 185/111 96 %       Physical Exam   Constitutional: She is oriented to person, place, and time. She appears well-developed and well-nourished. No distress. HENT:   Head: Normocephalic and atraumatic. Right Ear: External ear normal.   Left Ear: External ear normal.   Nose: Nose normal.   Mouth/Throat: Oropharynx is clear and moist. No oropharyngeal exudate. Eyes: Conjunctivae and EOM are normal. Pupils are equal, round, and reactive to light. Right eye exhibits no discharge. Left eye exhibits no discharge. No scleral icterus. Neck: Normal range of motion. Neck supple. No JVD present. No tracheal deviation present. Cardiovascular: Normal rate, regular rhythm, normal heart sounds and intact distal pulses. Exam reveals no gallop and no friction rub. No murmur heard. Pulmonary/Chest: Effort normal and breath sounds normal. No respiratory distress. She has no wheezes. She has no rales. She exhibits no tenderness. Abdominal: Soft. Bowel sounds are normal. She exhibits no distension and no mass. There is no tenderness. There is no rebound and no guarding. Musculoskeletal: She exhibits no edema. Tender lower back   (+) SLR  NVI throughout    Lymphadenopathy:     She has no cervical adenopathy. Neurological: She is alert and oriented to person, place, and time. She has normal reflexes. No cranial nerve deficit. She exhibits normal muscle tone. Coordination normal.   Skin: Skin is warm and dry. She is not diaphoretic. Psychiatric: She has a normal mood and affect. Her behavior is normal. Judgment and thought content normal.   Nursing note and vitals reviewed.        MDM  Number of Diagnoses or Management Options  Bilateral sciatica:   DDD (degenerative disc disease), lumbar:   Diagnosis management comments: DDx: sciatica, DDD, DJD       Amount and/or Complexity of Data Reviewed  Tests in the radiology section of CPT®: ordered and reviewed  Review and summarize past medical records: yes    Patient Progress  Patient progress: stable    ED Course       Procedures    9:32 AM  Pt has had multiple normal XR's and CT's of back. Written by ABBEY Escalera, as dictated by Otilia Burnham. 10:30 AM  Reviewed XR results with the pt  Written by ABBEY Escalera, as dictated by Otilia Burnham. LABORATORY TESTS:  No results found for this or any previous visit (from the past 12 hour(s)). IMAGING RESULTS:  XR SPINE LUMB 2 OR 3 V   Final Result   EXAM: XR SPINE LUMB 2 OR 3 V     INDICATION: Back Pain     COMPARISON: None.     FINDINGS: AP, lateral and spot lateral views of the lumbar spine demonstrate  normal bone mineralization and vertebral body height. There is anterolisthesis  at L4-5 measuring 7 mm. Mild L4-5 and L5-S1 disc space narrowing is shown. The  other disc heights are normal. Tiny anterior marginal osteophytes are present at  L1-2 through L3-4 as well as ossification of the anterior longitudinal ligament  in the lower thoracic spine. Moderate bilateral facet osteoarthrosis is present  at L4-5 and probably L5-S1. .      IMPRESSION  IMPRESSION: Degenerative findings in lower lumbar spine with grade 1  anterolisthesis at L4-5. MEDICATIONS GIVEN:  Medications   oxyCODONE-acetaminophen (PERCOCET) 5-325 mg per tablet 1 Tab (1 Tab Oral Given 4/1/17 0928)   diazePAM (VALIUM) tablet 5 mg (5 mg Oral Given 4/1/17 0928)   ondansetron (ZOFRAN ODT) tablet 4 mg (4 mg Oral Given 4/1/17 0928)       IMPRESSION:  1. DDD (degenerative disc disease), lumbar    2. Bilateral sciatica        PLAN:  1. Discharge Medication List as of 4/1/2017  9:29 AM      START taking these medications    Details   oxyCODONE-acetaminophen (PERCOCET) 5-325 mg per tablet Take 1 Tab by mouth every six (6) hours as needed for Pain. Max Daily Amount: 4 Tabs., Print, Disp-10 Tab, R-0      diazePAM (VALIUM) 5 mg tablet Take 1 Tab by mouth every twelve (12) hours as needed (spasm).  Max Daily Amount: 10 mg., Print, Disp-10 Tab, R-0         CONTINUE these medications which have NOT CHANGED    Details   SYNTHROID 100 mcg tablet TAKE 1 TABLET BY MOUTH EVERY DAY BEFORE BREAKFAST ON A EMPTY STOMACH, Normal, Disp-30 Tab, R-5      pravastatin (PRAVACHOL) 20 mg tablet TAKE 1 TAB BY MOUTH DAILY., Normal, Disp-30 Tab, R-5      HYDROcodone-acetaminophen (NORCO) 5-325 mg per tablet Take 1 Tab by mouth every four (4) hours as needed for Pain. Max Daily Amount: 6 Tabs., Print, Disp-12 Tab, R-0      esomeprazole magnesium (NEXIUM 24HR) 20 mg TbEC Take 1 Tab by mouth daily. , Normal, Disp-30 Tab, R-5      ALPRAZolam (XANAX) 0.5 mg tablet Take 1 Tab by mouth two (2) times daily as needed for Anxiety. , Phone In, Disp-15 Tab, R-0      polyethylene glycol (MIRALAX) 17 gram/dose powder Take 17 g by mouth daily. Constipation, Normal, Disp-510 g, R-5      VITAMIN D3 1,000 unit cap TAKE 3 CAPSULES DAILY, Normal, Disp-90 Cap, R-11      azelastine (ASTELIN) 137 mcg (0.1 %) nasal spray 1 [place] by Both Nostrils route as needed for Rhinitis. Use in each nostril as directed, Historical Med      fluticasone (FLONASE) 50 mcg/actuation nasal spray 2 Sprays by Both Nostrils route as needed for Rhinitis., Historical Med      albuterol (PROVENTIL HFA, VENTOLIN HFA, PROAIR HFA) 90 mcg/actuation inhaler Take 2 Puffs by inhalation as needed for Wheezing., Historical Med      sertraline (ZOLOFT) 100 mg tablet Take 1 Tab by mouth daily. , Normal, Disp-30 Tab, R-5           2. Follow-up Information     Follow up With Details Comments [address], [place] In 2 days As needed [id number] [facility]  [address] [place] [address]  [phone]          Return to ED if worse     Discharge Note:  10:40 AM  The pt is ready for discharge. The pt's signs, symptoms, diagnosis, and discharge instructions have been discussed and pt has conveyed their understanding.  The pt is to follow up as recommended or return to ER should their symptoms worsen. Plan has been discussed and pt is in agreement. This note is prepared by Catherne Simmonds, acting as a Scribe for [employer] LEYDA Alvarez: The scribe's documentation has been prepared under my direction and personally reviewed by me in its entirety. I confirm that the notes above accurately reflects all work, treatment, procedures, and medical decision making performed by me.

## 2017-04-01 NOTE — DISCHARGE INSTRUCTIONS
Back Pain: Care Instructions  Your Care Instructions    Back pain has many possible causes. It is often related to problems with muscles and ligaments of the back. It may also be related to problems with the nerves, discs, or bones of the back. Moving, lifting, standing, sitting, or sleeping in an awkward way can strain the back. Sometimes you don't notice the injury until later. Arthritis is another common cause of back pain. Although it may hurt a lot, back pain usually improves on its own within several weeks. Most people recover in 12 weeks or less. Using good home treatment and being careful not to stress your back can help you feel better sooner. Follow-up care is a key part of your treatment and safety. Be sure to make and go to all appointments, and call your doctor if you are having problems. Its also a good idea to know your test results and keep a list of the medicines you take. How can you care for yourself at home? · Sit or lie in positions that are most comfortable and reduce your pain. Try one of these positions when you lie down:  ¨ Lie on your back with your knees bent and supported by large pillows. ¨ Lie on the floor with your legs on the seat of a sofa or chair. Damari Men on your side with your knees and hips bent and a pillow between your legs. ¨ Lie on your stomach if it does not make pain worse. · Do not sit up in bed, and avoid soft couches and twisted positions. Bed rest can help relieve pain at first, but it delays healing. Avoid bed rest after the first day of back pain. · Change positions every 30 minutes. If you must sit for long periods of time, take breaks from sitting. Get up and walk around, or lie in a comfortable position. · Try using a heating pad on a low or medium setting for 15 to 20 minutes every 2 or 3 hours. Try a warm shower in place of one session with the heating pad. · You can also try an ice pack for 10 to 15 minutes every 2 to 3 hours.  Put a thin cloth between the ice pack and your skin. · Take pain medicines exactly as directed. ¨ If the doctor gave you a prescription medicine for pain, take it as prescribed. ¨ If you are not taking a prescription pain medicine, ask your doctor if you can take an over-the-counter medicine. · Take short walks several times a day. You can start with 5 to 10 minutes, 3 or 4 times a day, and work up to longer walks. Walk on level surfaces and avoid hills and stairs until your back is better. · Return to work and other activities as soon as you can. Continued rest without activity is usually not good for your back. · To prevent future back pain, do exercises to stretch and strengthen your back and stomach. Learn how to use good posture, safe lifting techniques, and proper body mechanics. When should you call for help? Call your doctor now or seek immediate medical care if:  · You have new or worsening numbness in your legs. · You have new or worsening weakness in your legs. (This could make it hard to stand up.)  · You lose control of your bladder or bowels. Watch closely for changes in your health, and be sure to contact your doctor if:  · Your pain gets worse. · You are not getting better after 2 weeks. Where can you learn more? Go to http://viviane-misael.info/. Enter G641 in the search box to learn more about \"Back Pain: Care Instructions. \"  Current as of: May 23, 2016  Content Version: 11.2  © 9145-2736 [email or web address]. Care instructions adapted under license by [employer] (which disclaims liability or warranty for this information). If you have questions about a medical condition or this instruction, always ask your healthcare professional. [place] [address] any warranty or liability for your use of this information.          Sciatica: Care Instructions  Your Care Instructions    Sciatica (say \"rlp-ME-qb-kuh\") is an irritation of one of the sciatic nerves, which come from the spinal cord in the lower back. The sciatic nerves and their branches extend down through the buttock to the foot. Sciatica can develop when an injured disc in the back presses against a spinal nerve root. Its main symptom is pain, numbness, or weakness that is often worse in the leg or foot than in the back. Sciatica often will improve and go away with time. Early treatment usually includes medicines and exercises to relieve pain. Follow-up care is a key part of your treatment and safety. Be sure to make and go to all appointments, and call your doctor if you are having problems. It's also a good idea to know your test results and keep a list of the medicines you take. How can you care for yourself at home? · Take pain medicines exactly as directed. ¨ If the doctor gave you a prescription medicine for pain, take it as prescribed. ¨ If you are not taking a prescription pain medicine, ask your doctor if you can take an over-the-counter medicine. · Use heat or ice to relieve pain. ¨ To apply heat, put a warm water bottle, heating pad set on low, or warm cloth on your back. Do not go to sleep with a heating pad on your skin. ¨ To use ice, put ice or a cold pack on the area for 10 to 20 minutes at a time. Put a thin cloth between the ice and your skin. · Avoid sitting if possible, unless it feels better than standing. · Alternate lying down with short walks. Increase your walking distance as you are able to without making your symptoms worse. · Do not do anything that makes your symptoms worse. When should you call for help? Call 911 anytime you think you may need emergency care. For example, call if:  · You are unable to move a leg at all. Call your doctor now or seek immediate medical care if:  · You have new or worse symptoms in your legs or buttocks. Symptoms may include:  ¨ Numbness or tingling. ¨ Weakness. ¨ Pain. · You lose bladder or bowel control.   Watch closely for changes in your health, and be sure to contact your doctor if:  · You are not getting better as expected. Where can you learn more? Go to http://viviane-misael.info/. Enter [id number] in the search box to learn more about \"Sciatica: Care Instructions. \"  Current as of: May 23, 2016  Content Version: 11.2  © 5836-5143 [employer]. Care instructions adapted under license by [employer] (which disclaims liability or warranty for this information). If you have questions about a medical condition or this instruction, always ask your healthcare professional. [place] [address] any warranty or liability for your use of this information.

## 2017-04-03 ENCOUNTER — HOSPITAL ENCOUNTER (OUTPATIENT)
Dept: PREADMISSION TESTING | Age: 52
Discharge: HOME | End: 2017-04-03
Attending: OBSTETRICS & GYNECOLOGY
Payer: MEDICAID

## 2017-04-03 VITALS
TEMPERATURE: 98.2 F | HEIGHT: 69 IN | DIASTOLIC BLOOD PRESSURE: 69 MMHG | OXYGEN SATURATION: 100 % | HEART RATE: 59 BPM | WEIGHT: 209.22 LBS | BODY MASS INDEX: 30.99 KG/M2 | RESPIRATION RATE: 18 BRPM | SYSTOLIC BLOOD PRESSURE: 116 MMHG

## 2017-04-03 LAB
ALBUMIN SERPL BCP-MCNC: 3.7 G/DL (ref 3.5–5)
ALBUMIN/GLOB SERPL: 1 {RATIO} (ref 1.1–2.2)
ALP SERPL-CCNC: 92 U/L (ref 45–117)
ALT SERPL-CCNC: 98 U/L (ref 12–78)
ANION GAP BLD CALC-SCNC: 9 MMOL/L (ref 5–15)
APPEARANCE UR: ABNORMAL
AST SERPL W P-5'-P-CCNC: 37 U/L (ref 15–37)
BACTERIA URNS QL MICRO: ABNORMAL /HPF
BASOPHILS # BLD AUTO: 0 K/UL (ref 0–0.1)
BASOPHILS # BLD: 1 % (ref 0–1)
BILIRUB SERPL-MCNC: 0.6 MG/DL (ref 0.2–1)
BILIRUB UR QL: NEGATIVE
BUN SERPL-MCNC: 8 MG/DL (ref 6–20)
BUN/CREAT SERPL: 9 (ref 12–20)
CALCIUM SERPL-MCNC: 9.3 MG/DL (ref 8.5–10.1)
CHLORIDE SERPL-SCNC: 109 MMOL/L (ref 97–108)
CO2 SERPL-SCNC: 23 MMOL/L (ref 21–32)
COLOR UR: ABNORMAL
CREAT SERPL-MCNC: 0.91 MG/DL (ref 0.55–1.02)
EOSINOPHIL # BLD: 0.2 K/UL (ref 0–0.4)
EOSINOPHIL NFR BLD: 4 % (ref 0–7)
EPITH CASTS URNS QL MICRO: ABNORMAL /LPF
ERYTHROCYTE [DISTWIDTH] IN BLOOD BY AUTOMATED COUNT: 14.5 % (ref 11.5–14.5)
GLOBULIN SER CALC-MCNC: 3.8 G/DL (ref 2–4)
GLUCOSE SERPL-MCNC: 83 MG/DL (ref 65–100)
GLUCOSE UR STRIP.AUTO-MCNC: NEGATIVE MG/DL
HCT VFR BLD AUTO: 42.1 % (ref 35–47)
HGB BLD-MCNC: 13.6 G/DL (ref 11.5–16)
HGB UR QL STRIP: NEGATIVE
HYALINE CASTS URNS QL MICRO: ABNORMAL /LPF (ref 0–5)
KETONES UR QL STRIP.AUTO: NEGATIVE MG/DL
LEUKOCYTE ESTERASE UR QL STRIP.AUTO: NEGATIVE
LYMPHOCYTES # BLD AUTO: 42 % (ref 12–49)
LYMPHOCYTES # BLD: 2.3 K/UL (ref 0.8–3.5)
MCH RBC QN AUTO: 29.3 PG (ref 26–34)
MCHC RBC AUTO-ENTMCNC: 32.3 G/DL (ref 30–36.5)
MCV RBC AUTO: 90.7 FL (ref 80–99)
MONOCYTES # BLD: 0.3 K/UL (ref 0–1)
MONOCYTES NFR BLD AUTO: 5 % (ref 5–13)
NEUTS SEG # BLD: 2.7 K/UL (ref 1.8–8)
NEUTS SEG NFR BLD AUTO: 48 % (ref 32–75)
NITRITE UR QL STRIP.AUTO: NEGATIVE
PH UR STRIP: 7 [PH] (ref 5–8)
PLATELET # BLD AUTO: 188 K/UL (ref 150–400)
POTASSIUM SERPL-SCNC: 3.9 MMOL/L (ref 3.5–5.1)
PROT SERPL-MCNC: 7.5 G/DL (ref 6.4–8.2)
PROT UR STRIP-MCNC: NEGATIVE MG/DL
RBC # BLD AUTO: 4.64 M/UL (ref 3.8–5.2)
RBC #/AREA URNS HPF: ABNORMAL /HPF (ref 0–5)
SODIUM SERPL-SCNC: 141 MMOL/L (ref 136–145)
SP GR UR REFRACTOMETRY: 1.02 (ref 1–1.03)
UA: UC IF INDICATED,UAUC: ABNORMAL
UROBILINOGEN UR QL STRIP.AUTO: 0.2 EU/DL (ref 0.2–1)
WBC # BLD AUTO: 5.6 K/UL (ref 3.6–11)
WBC URNS QL MICRO: ABNORMAL /HPF (ref 0–4)

## 2017-04-03 PROCEDURE — 36415 COLL VENOUS BLD VENIPUNCTURE: CPT | Performed by: OBSTETRICS & GYNECOLOGY

## 2017-04-03 PROCEDURE — 85025 COMPLETE CBC W/AUTO DIFF WBC: CPT | Performed by: OBSTETRICS & GYNECOLOGY

## 2017-04-03 PROCEDURE — 80053 COMPREHEN METABOLIC PANEL: CPT | Performed by: OBSTETRICS & GYNECOLOGY

## 2017-04-03 PROCEDURE — 87086 URINE CULTURE/COLONY COUNT: CPT | Performed by: OBSTETRICS & GYNECOLOGY

## 2017-04-03 PROCEDURE — 86900 BLOOD TYPING SEROLOGIC ABO: CPT | Performed by: OBSTETRICS & GYNECOLOGY

## 2017-04-03 PROCEDURE — 81001 URINALYSIS AUTO W/SCOPE: CPT | Performed by: OBSTETRICS & GYNECOLOGY

## 2017-04-03 RX ORDER — OMEPRAZOLE 20 MG/1
20 CAPSULE, DELAYED RELEASE ORAL DAILY
COMMUNITY
End: 2017-05-05 | Stop reason: SDUPTHER

## 2017-04-03 NOTE — PERIOP NOTES
[facility]  Preoperative Instructions        Surgery Date 04/17/17      Time of Arrival 730am    1. On the day of your surgery, please report to the Surgical Services Registration Desk and sign in at your designated time. The Surgery Center is located to the right of the Emergency Room. 2. You must have someone with you to drive you home. You should not drive a car for 24 hours following surgery. Please make arrangements for a friend or family member to stay with you for the first 24 hours after your surgery. 3. Do not have anything to eat or drink (including water, gum, mints, coffee, juice) after midnight 04/16/17            . This may not apply to medications prescribed by your physician. Please note special instructions, if applicable. If you are currently taking Plavix, Coumadin, or other blood-thinning agents, contact your surgeon for instructions. 4. We recommend you do not drink any alcoholic beverages for 24 hours before and after your surgery. 5. Have a list of all current medications, including vitamins, herbal supplements and any other over the counter medications. Stop all Aspirin and non-steroidal anti-inflammatory drugs (I.e. Advil, Aleve), as directed by your surgeon's office. Stop all vitamins and herbal supplements seven days prior to your surgery. 6. Wear comfortable clothes. Wear glasses instead of contacts. Do not bring any money or jewelry. Please bring picture ID, insurance card, and any prearranged co-payment or hospital payment. Do not wear make-up, particularly mascara the morning of your surgery. Do not wear nail polish, particularly if you are having foot /hand surgery. Wear your hair loose or down, no ponytails, buns, glenny pins or clips. All body piercings must be removed.   Please shower with antibacterial soap for three consecutive days before and on the morning of surgery, but do not apply any lotions, powders or deodorants after the shower on the day of surgery. Please use a fresh towels after each shower. Please sleep in clean clothes and change bed linens the night before surgery. Please do not shave for 48 hours prior to surgery. Shaving of the face is acceptable. 7. You should understand that if you do not follow these instructions your surgery may be cancelled. If your physical condition changes (I.e. fever, cold or flu) please contact your surgeon as soon as possible. 8. It is important that you be on time. If a situation occurs where you may be late, please call [phone] (OR Holding Area). 9. If you have any questions and or problems, please call [phone] (Pre-admission Testing). 10. Your surgery time may be subject to change. You will receive a phone call the evening prior if your time changes. 11.  If having outpatient surgery, you must have someone to drive you here, stay with you during the duration of your stay, and to drive you home at time of discharge. Special Instructions:Bring to hospital and use morning of surgery albuterol inhaler. May use nasal sprays morning of surgery. MEDICATIONS TO TAKE THE MORNING OF SURGERY WITH A SIP OF WATER:xanax if needed,prilosec,zoloft,synthroid. percocet if needed or norco.      I understand a pre-operative phone call will be made to verify my surgery time. In the event that I am not available, I give permission for a message to be left on my answering service and/or with another person?   Yes [phone]         ___________________      __________   _________    (Signature of Patient)             (Witness)                (Date and Time)

## 2017-04-04 LAB
BACTERIA SPEC CULT: NORMAL
CC UR VC: NORMAL
SERVICE CMNT-IMP: NORMAL

## 2017-04-06 NOTE — PERIOP NOTES
Received call from Gabi at [facility] [place] [facility]\"" office that there was no treatment for urine culture result.

## 2017-04-16 LAB
ABO + RH BLD: NORMAL
BLOOD GROUP ANTIBODIES SERPL: NORMAL
SPECIMEN EXP DATE BLD: NORMAL

## 2017-04-17 ENCOUNTER — HOSPITAL ENCOUNTER (OUTPATIENT)
Age: 52
Discharge: HOME | End: 2017-04-18
Attending: OBSTETRICS & GYNECOLOGY | Admitting: OBSTETRICS & GYNECOLOGY
Payer: MEDICAID

## 2017-04-17 ENCOUNTER — ANESTHESIA EVENT (OUTPATIENT)
Dept: SURGERY | Age: 52
End: 2017-04-17
Payer: MEDICAID

## 2017-04-17 ENCOUNTER — ANESTHESIA (OUTPATIENT)
Dept: SURGERY | Age: 52
End: 2017-04-17
Payer: MEDICAID

## 2017-04-17 LAB
GLUCOSE BLD STRIP.AUTO-MCNC: 88 MG/DL (ref 65–100)
SERVICE CMNT-IMP: NORMAL

## 2017-04-17 PROCEDURE — 74011250636 HC RX REV CODE- 250/636

## 2017-04-17 PROCEDURE — 77030003029 HC SUT VCRL J&J -B: Performed by: OBSTETRICS & GYNECOLOGY

## 2017-04-17 PROCEDURE — 51798 US URINE CAPACITY MEASURE: CPT

## 2017-04-17 PROCEDURE — 77030012961 HC IRR KT CYSTO/TUR ICUM -A: Performed by: OBSTETRICS & GYNECOLOGY

## 2017-04-17 PROCEDURE — 77030011640 HC PAD GRND REM COVD -A: Performed by: OBSTETRICS & GYNECOLOGY

## 2017-04-17 PROCEDURE — 77030002880 HC SUT CAPIO BSC -B: Performed by: OBSTETRICS & GYNECOLOGY

## 2017-04-17 PROCEDURE — 74011000272 HC RX REV CODE- 272: Performed by: OBSTETRICS & GYNECOLOGY

## 2017-04-17 PROCEDURE — 77030026438 HC STYL ET INTUB CARD -A: Performed by: ANESTHESIOLOGY

## 2017-04-17 PROCEDURE — 74011250636 HC RX REV CODE- 250/636: Performed by: ANESTHESIOLOGY

## 2017-04-17 PROCEDURE — 74011000250 HC RX REV CODE- 250

## 2017-04-17 PROCEDURE — 77030005538 HC CATH URETH FOL44 BARD -B

## 2017-04-17 PROCEDURE — 82962 GLUCOSE BLOOD TEST: CPT

## 2017-04-17 PROCEDURE — 76210000017 HC OR PH I REC 1.5 TO 2 HR: Performed by: OBSTETRICS & GYNECOLOGY

## 2017-04-17 PROCEDURE — 77030010011 HC RNG RETRCTR STAY COOP -B: Performed by: OBSTETRICS & GYNECOLOGY

## 2017-04-17 PROCEDURE — 77030018846 HC SOL IRR STRL H20 ICUM -A: Performed by: OBSTETRICS & GYNECOLOGY

## 2017-04-17 PROCEDURE — 77030019908 HC STETH ESOPH SIMS -A: Performed by: ANESTHESIOLOGY

## 2017-04-17 PROCEDURE — 77030008684 HC TU ET CUF COVD -B: Performed by: ANESTHESIOLOGY

## 2017-04-17 PROCEDURE — 74011250636 HC RX REV CODE- 250/636: Performed by: OBSTETRICS & GYNECOLOGY

## 2017-04-17 PROCEDURE — 74011250637 HC RX REV CODE- 250/637: Performed by: OBSTETRICS & GYNECOLOGY

## 2017-04-17 PROCEDURE — 76010000134 HC OR TIME 3.5 TO 4 HR: Performed by: OBSTETRICS & GYNECOLOGY

## 2017-04-17 PROCEDURE — 77030032490 HC SLV COMPR SCD KNE COVD -B: Performed by: OBSTETRICS & GYNECOLOGY

## 2017-04-17 PROCEDURE — 77030008064 HC RNG RETRCTR COOP -B: Performed by: OBSTETRICS & GYNECOLOGY

## 2017-04-17 PROCEDURE — 74011000250 HC RX REV CODE- 250: Performed by: OBSTETRICS & GYNECOLOGY

## 2017-04-17 PROCEDURE — 76060000038 HC ANESTHESIA 3.5 TO 4 HR: Performed by: OBSTETRICS & GYNECOLOGY

## 2017-04-17 PROCEDURE — 88307 TISSUE EXAM BY PATHOLOGIST: CPT | Performed by: OBSTETRICS & GYNECOLOGY

## 2017-04-17 PROCEDURE — 77030031139 HC SUT VCRL2 J&J -A: Performed by: OBSTETRICS & GYNECOLOGY

## 2017-04-17 PROCEDURE — 77030034849: Performed by: OBSTETRICS & GYNECOLOGY

## 2017-04-17 PROCEDURE — 77030020782 HC GWN BAIR PAWS FLX 3M -B

## 2017-04-17 RX ORDER — KETOROLAC TROMETHAMINE 30 MG/ML
INJECTION, SOLUTION INTRAMUSCULAR; INTRAVENOUS AS NEEDED
Status: DISCONTINUED | OUTPATIENT
Start: 2017-04-17 | End: 2017-04-17 | Stop reason: HOSPADM

## 2017-04-17 RX ORDER — HYDROMORPHONE HYDROCHLORIDE 1 MG/ML
INJECTION, SOLUTION INTRAMUSCULAR; INTRAVENOUS; SUBCUTANEOUS AS NEEDED
Status: DISCONTINUED | OUTPATIENT
Start: 2017-04-17 | End: 2017-04-17 | Stop reason: HOSPADM

## 2017-04-17 RX ORDER — FENTANYL CITRATE 50 UG/ML
INJECTION, SOLUTION INTRAMUSCULAR; INTRAVENOUS AS NEEDED
Status: DISCONTINUED | OUTPATIENT
Start: 2017-04-17 | End: 2017-04-17 | Stop reason: HOSPADM

## 2017-04-17 RX ORDER — KETOROLAC TROMETHAMINE 30 MG/ML
30 INJECTION, SOLUTION INTRAMUSCULAR; INTRAVENOUS
Status: DISCONTINUED | OUTPATIENT
Start: 2017-04-17 | End: 2017-04-18 | Stop reason: HOSPADM

## 2017-04-17 RX ORDER — HYDROMORPHONE HYDROCHLORIDE 1 MG/ML
1 INJECTION, SOLUTION INTRAMUSCULAR; INTRAVENOUS; SUBCUTANEOUS
Status: DISCONTINUED | OUTPATIENT
Start: 2017-04-17 | End: 2017-04-18 | Stop reason: HOSPADM

## 2017-04-17 RX ORDER — NALOXONE HYDROCHLORIDE 0.4 MG/ML
0.4 INJECTION, SOLUTION INTRAMUSCULAR; INTRAVENOUS; SUBCUTANEOUS AS NEEDED
Status: DISCONTINUED | OUTPATIENT
Start: 2017-04-17 | End: 2017-04-18 | Stop reason: HOSPADM

## 2017-04-17 RX ORDER — SODIUM CHLORIDE, SODIUM LACTATE, POTASSIUM CHLORIDE, CALCIUM CHLORIDE 600; 310; 30; 20 MG/100ML; MG/100ML; MG/100ML; MG/100ML
100 INJECTION, SOLUTION INTRAVENOUS CONTINUOUS
Status: DISCONTINUED | OUTPATIENT
Start: 2017-04-17 | End: 2017-04-18 | Stop reason: HOSPADM

## 2017-04-17 RX ORDER — SODIUM CHLORIDE, SODIUM LACTATE, POTASSIUM CHLORIDE, CALCIUM CHLORIDE 600; 310; 30; 20 MG/100ML; MG/100ML; MG/100ML; MG/100ML
25 INJECTION, SOLUTION INTRAVENOUS CONTINUOUS
Status: DISCONTINUED | OUTPATIENT
Start: 2017-04-17 | End: 2017-04-17 | Stop reason: HOSPADM

## 2017-04-17 RX ORDER — LIDOCAINE HYDROCHLORIDE 10 MG/ML
0.1 INJECTION, SOLUTION EPIDURAL; INFILTRATION; INTRACAUDAL; PERINEURAL AS NEEDED
Status: DISCONTINUED | OUTPATIENT
Start: 2017-04-17 | End: 2017-04-17 | Stop reason: HOSPADM

## 2017-04-17 RX ORDER — DEXAMETHASONE SODIUM PHOSPHATE 4 MG/ML
INJECTION, SOLUTION INTRA-ARTICULAR; INTRALESIONAL; INTRAMUSCULAR; INTRAVENOUS; SOFT TISSUE AS NEEDED
Status: DISCONTINUED | OUTPATIENT
Start: 2017-04-17 | End: 2017-04-17 | Stop reason: HOSPADM

## 2017-04-17 RX ORDER — LIDOCAINE HYDROCHLORIDE 20 MG/ML
INJECTION, SOLUTION EPIDURAL; INFILTRATION; INTRACAUDAL; PERINEURAL AS NEEDED
Status: DISCONTINUED | OUTPATIENT
Start: 2017-04-17 | End: 2017-04-17 | Stop reason: HOSPADM

## 2017-04-17 RX ORDER — ALBUTEROL SULFATE 90 UG/1
2 AEROSOL, METERED RESPIRATORY (INHALATION)
Status: DISCONTINUED | OUTPATIENT
Start: 2017-04-17 | End: 2017-04-18 | Stop reason: HOSPADM

## 2017-04-17 RX ORDER — GLYCOPYRROLATE 0.2 MG/ML
INJECTION INTRAMUSCULAR; INTRAVENOUS AS NEEDED
Status: DISCONTINUED | OUTPATIENT
Start: 2017-04-17 | End: 2017-04-17 | Stop reason: HOSPADM

## 2017-04-17 RX ORDER — ONDANSETRON 2 MG/ML
4 INJECTION INTRAMUSCULAR; INTRAVENOUS AS NEEDED
Status: DISCONTINUED | OUTPATIENT
Start: 2017-04-17 | End: 2017-04-17 | Stop reason: HOSPADM

## 2017-04-17 RX ORDER — DIPHENHYDRAMINE HYDROCHLORIDE 50 MG/ML
12.5 INJECTION, SOLUTION INTRAMUSCULAR; INTRAVENOUS AS NEEDED
Status: DISCONTINUED | OUTPATIENT
Start: 2017-04-17 | End: 2017-04-17 | Stop reason: HOSPADM

## 2017-04-17 RX ORDER — ONDANSETRON 2 MG/ML
4 INJECTION INTRAMUSCULAR; INTRAVENOUS
Status: DISCONTINUED | OUTPATIENT
Start: 2017-04-17 | End: 2017-04-18 | Stop reason: HOSPADM

## 2017-04-17 RX ORDER — FENTANYL CITRATE 50 UG/ML
INJECTION, SOLUTION INTRAMUSCULAR; INTRAVENOUS
Status: COMPLETED
Start: 2017-04-17 | End: 2017-04-17

## 2017-04-17 RX ORDER — NEOSTIGMINE METHYLSULFATE 1 MG/ML
INJECTION INTRAVENOUS AS NEEDED
Status: DISCONTINUED | OUTPATIENT
Start: 2017-04-17 | End: 2017-04-17 | Stop reason: HOSPADM

## 2017-04-17 RX ORDER — PANTOPRAZOLE SODIUM 40 MG/1
20 GRANULE, DELAYED RELEASE ORAL
Status: DISCONTINUED | OUTPATIENT
Start: 2017-04-18 | End: 2017-04-18 | Stop reason: HOSPADM

## 2017-04-17 RX ORDER — OXYCODONE AND ACETAMINOPHEN 5; 325 MG/1; MG/1
1 TABLET ORAL
Status: DISCONTINUED | OUTPATIENT
Start: 2017-04-17 | End: 2017-04-18 | Stop reason: HOSPADM

## 2017-04-17 RX ORDER — MIDAZOLAM HYDROCHLORIDE 1 MG/ML
INJECTION, SOLUTION INTRAMUSCULAR; INTRAVENOUS AS NEEDED
Status: DISCONTINUED | OUTPATIENT
Start: 2017-04-17 | End: 2017-04-17 | Stop reason: HOSPADM

## 2017-04-17 RX ORDER — HYDROMORPHONE HYDROCHLORIDE 1 MG/ML
.2-.5 INJECTION, SOLUTION INTRAMUSCULAR; INTRAVENOUS; SUBCUTANEOUS
Status: DISCONTINUED | OUTPATIENT
Start: 2017-04-17 | End: 2017-04-17 | Stop reason: HOSPADM

## 2017-04-17 RX ORDER — MIDAZOLAM HYDROCHLORIDE 1 MG/ML
1 INJECTION, SOLUTION INTRAMUSCULAR; INTRAVENOUS AS NEEDED
Status: DISCONTINUED | OUTPATIENT
Start: 2017-04-17 | End: 2017-04-17 | Stop reason: HOSPADM

## 2017-04-17 RX ORDER — SUCCINYLCHOLINE CHLORIDE 20 MG/ML
INJECTION INTRAMUSCULAR; INTRAVENOUS AS NEEDED
Status: DISCONTINUED | OUTPATIENT
Start: 2017-04-17 | End: 2017-04-17 | Stop reason: HOSPADM

## 2017-04-17 RX ORDER — ALPRAZOLAM 0.5 MG/1
0.5 TABLET ORAL
Status: DISCONTINUED | OUTPATIENT
Start: 2017-04-17 | End: 2017-04-18 | Stop reason: HOSPADM

## 2017-04-17 RX ORDER — SODIUM CHLORIDE 0.9 % (FLUSH) 0.9 %
5-10 SYRINGE (ML) INJECTION EVERY 8 HOURS
Status: DISCONTINUED | OUTPATIENT
Start: 2017-04-17 | End: 2017-04-18 | Stop reason: HOSPADM

## 2017-04-17 RX ORDER — FENTANYL CITRATE 50 UG/ML
25 INJECTION, SOLUTION INTRAMUSCULAR; INTRAVENOUS
Status: DISCONTINUED | OUTPATIENT
Start: 2017-04-17 | End: 2017-04-17 | Stop reason: HOSPADM

## 2017-04-17 RX ORDER — ONDANSETRON 2 MG/ML
INJECTION INTRAMUSCULAR; INTRAVENOUS AS NEEDED
Status: DISCONTINUED | OUTPATIENT
Start: 2017-04-17 | End: 2017-04-17 | Stop reason: HOSPADM

## 2017-04-17 RX ORDER — CEFAZOLIN SODIUM IN 0.9 % NACL 2 G/100 ML
2 PLASTIC BAG, INJECTION (ML) INTRAVENOUS ONCE
Status: COMPLETED | OUTPATIENT
Start: 2017-04-17 | End: 2017-04-17

## 2017-04-17 RX ORDER — FENTANYL CITRATE 50 UG/ML
50 INJECTION, SOLUTION INTRAMUSCULAR; INTRAVENOUS AS NEEDED
Status: DISCONTINUED | OUTPATIENT
Start: 2017-04-17 | End: 2017-04-17 | Stop reason: HOSPADM

## 2017-04-17 RX ORDER — SODIUM CHLORIDE 0.9 % (FLUSH) 0.9 %
5-10 SYRINGE (ML) INJECTION AS NEEDED
Status: DISCONTINUED | OUTPATIENT
Start: 2017-04-17 | End: 2017-04-18 | Stop reason: HOSPADM

## 2017-04-17 RX ORDER — FENTANYL CITRATE 50 UG/ML
INJECTION, SOLUTION INTRAMUSCULAR; INTRAVENOUS
Status: DISPENSED
Start: 2017-04-17 | End: 2017-04-18

## 2017-04-17 RX ORDER — ROCURONIUM BROMIDE 10 MG/ML
INJECTION, SOLUTION INTRAVENOUS AS NEEDED
Status: DISCONTINUED | OUTPATIENT
Start: 2017-04-17 | End: 2017-04-17 | Stop reason: HOSPADM

## 2017-04-17 RX ORDER — LEVOTHYROXINE SODIUM 100 UG/1
100 TABLET ORAL
Status: DISCONTINUED | OUTPATIENT
Start: 2017-04-18 | End: 2017-04-18 | Stop reason: HOSPADM

## 2017-04-17 RX ORDER — PROPOFOL 10 MG/ML
INJECTION, EMULSION INTRAVENOUS AS NEEDED
Status: DISCONTINUED | OUTPATIENT
Start: 2017-04-17 | End: 2017-04-17 | Stop reason: HOSPADM

## 2017-04-17 RX ORDER — BUPIVACAINE HYDROCHLORIDE 2.5 MG/ML
INJECTION, SOLUTION EPIDURAL; INFILTRATION; INTRACAUDAL AS NEEDED
Status: DISCONTINUED | OUTPATIENT
Start: 2017-04-17 | End: 2017-04-17 | Stop reason: HOSPADM

## 2017-04-17 RX ADMIN — HYDROMORPHONE HYDROCHLORIDE 1 MG: 1 INJECTION, SOLUTION INTRAMUSCULAR; INTRAVENOUS; SUBCUTANEOUS at 21:33

## 2017-04-17 RX ADMIN — DEXAMETHASONE SODIUM PHOSPHATE 5 MG: 4 INJECTION, SOLUTION INTRA-ARTICULAR; INTRALESIONAL; INTRAMUSCULAR; INTRAVENOUS; SOFT TISSUE at 09:44

## 2017-04-17 RX ADMIN — FENTANYL CITRATE 25 MCG: 50 INJECTION, SOLUTION INTRAMUSCULAR; INTRAVENOUS at 13:28

## 2017-04-17 RX ADMIN — FENTANYL CITRATE 25 MCG: 50 INJECTION, SOLUTION INTRAMUSCULAR; INTRAVENOUS at 13:23

## 2017-04-17 RX ADMIN — HYDROMORPHONE HYDROCHLORIDE 1 MG: 1 INJECTION, SOLUTION INTRAMUSCULAR; INTRAVENOUS; SUBCUTANEOUS at 11:20

## 2017-04-17 RX ADMIN — GLYCOPYRROLATE 0.2 MG: 0.2 INJECTION INTRAMUSCULAR; INTRAVENOUS at 12:27

## 2017-04-17 RX ADMIN — KETOROLAC TROMETHAMINE 30 MG: 30 INJECTION, SOLUTION INTRAMUSCULAR at 21:33

## 2017-04-17 RX ADMIN — SODIUM CHLORIDE, SODIUM LACTATE, POTASSIUM CHLORIDE, AND CALCIUM CHLORIDE 25 ML/HR: 600; 310; 30; 20 INJECTION, SOLUTION INTRAVENOUS at 08:32

## 2017-04-17 RX ADMIN — OXYCODONE HYDROCHLORIDE AND ACETAMINOPHEN 1 TABLET: 5; 325 TABLET ORAL at 15:28

## 2017-04-17 RX ADMIN — Medication 10 ML: at 15:32

## 2017-04-17 RX ADMIN — FENTANYL CITRATE 25 MCG: 50 INJECTION, SOLUTION INTRAMUSCULAR; INTRAVENOUS at 14:26

## 2017-04-17 RX ADMIN — ROCURONIUM BROMIDE 10 MG: 10 INJECTION, SOLUTION INTRAVENOUS at 10:13

## 2017-04-17 RX ADMIN — ALPRAZOLAM 0.5 MG: 0.5 TABLET ORAL at 21:31

## 2017-04-17 RX ADMIN — NEOSTIGMINE METHYLSULFATE 2.5 MG: 1 INJECTION INTRAVENOUS at 12:27

## 2017-04-17 RX ADMIN — HYDROMORPHONE HYDROCHLORIDE 1 MG: 1 INJECTION, SOLUTION INTRAMUSCULAR; INTRAVENOUS; SUBCUTANEOUS at 10:33

## 2017-04-17 RX ADMIN — MIDAZOLAM HYDROCHLORIDE 3 MG: 1 INJECTION, SOLUTION INTRAMUSCULAR; INTRAVENOUS at 09:23

## 2017-04-17 RX ADMIN — MIDAZOLAM HYDROCHLORIDE 2 MG: 1 INJECTION, SOLUTION INTRAMUSCULAR; INTRAVENOUS at 09:28

## 2017-04-17 RX ADMIN — FENTANYL CITRATE 25 MCG: 50 INJECTION, SOLUTION INTRAMUSCULAR; INTRAVENOUS at 14:10

## 2017-04-17 RX ADMIN — LIDOCAINE HYDROCHLORIDE 80 MG: 20 INJECTION, SOLUTION EPIDURAL; INFILTRATION; INTRACAUDAL; PERINEURAL at 09:32

## 2017-04-17 RX ADMIN — HYDROMORPHONE HYDROCHLORIDE 1 MG: 1 INJECTION, SOLUTION INTRAMUSCULAR; INTRAVENOUS; SUBCUTANEOUS at 17:32

## 2017-04-17 RX ADMIN — SUCCINYLCHOLINE CHLORIDE 120 MG: 20 INJECTION INTRAMUSCULAR; INTRAVENOUS at 09:32

## 2017-04-17 RX ADMIN — SODIUM CHLORIDE, SODIUM LACTATE, POTASSIUM CHLORIDE, AND CALCIUM CHLORIDE 100 ML/HR: 600; 310; 30; 20 INJECTION, SOLUTION INTRAVENOUS at 21:25

## 2017-04-17 RX ADMIN — CEFAZOLIN 2 G: 10 INJECTION, POWDER, FOR SOLUTION INTRAVENOUS; PARENTERAL at 09:44

## 2017-04-17 RX ADMIN — FENTANYL CITRATE 100 MCG: 50 INJECTION, SOLUTION INTRAMUSCULAR; INTRAVENOUS at 09:32

## 2017-04-17 RX ADMIN — ONDANSETRON 4 MG: 2 INJECTION INTRAMUSCULAR; INTRAVENOUS at 11:55

## 2017-04-17 RX ADMIN — ROCURONIUM BROMIDE 5 MG: 10 INJECTION, SOLUTION INTRAVENOUS at 10:31

## 2017-04-17 RX ADMIN — ROCURONIUM BROMIDE 30 MG: 10 INJECTION, SOLUTION INTRAVENOUS at 09:36

## 2017-04-17 RX ADMIN — ROCURONIUM BROMIDE 5 MG: 10 INJECTION, SOLUTION INTRAVENOUS at 09:32

## 2017-04-17 RX ADMIN — PROPOFOL 150 MG: 10 INJECTION, EMULSION INTRAVENOUS at 09:32

## 2017-04-17 RX ADMIN — FENTANYL CITRATE 50 MCG: 50 INJECTION, SOLUTION INTRAMUSCULAR; INTRAVENOUS at 12:12

## 2017-04-17 RX ADMIN — KETOROLAC TROMETHAMINE 30 MG: 30 INJECTION, SOLUTION INTRAMUSCULAR; INTRAVENOUS at 11:55

## 2017-04-17 RX ADMIN — FENTANYL CITRATE 25 MCG: 50 INJECTION, SOLUTION INTRAMUSCULAR; INTRAVENOUS at 13:38

## 2017-04-17 RX ADMIN — Medication 5 ML: at 22:00

## 2017-04-17 NOTE — ANESTHESIA POSTPROCEDURE EVALUATION
Post-Anesthesia Evaluation and Assessment    Patient: Satnam Gallegos MRN: [id number]  SSN: [id number]    YOB: 1965  Age: 47 y.o. Sex: female       Cardiovascular Function/Vital Signs  Visit Vitals    /72 (BP 1 Location: Right arm, BP Patient Position: At rest)    Pulse 71    Temp 36.9 °C (98.4 °F)    Resp 14    Ht 5' 9\" (1.753 m)    Wt 93.7 kg (206 lb 9.1 oz)    SpO2 99%    BMI 30.51 kg/m2       Patient is status post general anesthesia for Procedure(s):  TRANSVAGINAL TOTAL HYSTERECTOMY, LEFT SALPINGECTOMY, SACROSPINOUSL LIGAMENT SUSPENSION, PERINEAL REPAIR, CYSTOSCOPY. Nausea/Vomiting: None    Postoperative hydration reviewed and adequate. Pain:  Pain Scale 1: Numeric (0 - 10) (04/17/17 1400)  Pain Intensity 1: 4 (04/17/17 1400)   Managed    Neurological Status:   Neuro (WDL): Exceptions to WDL (04/17/17 1309)  Neuro  Neurologic State: Drowsy; Eyes open spontaneously (04/17/17 1309)  Orientation Level: Oriented to person (04/17/17 1309)  Cognition: Follows commands (04/17/17 1309)  Speech: Clear;Delayed responses (04/17/17 1309)  LUE Motor Response: Spontaneous ;Weak;Purposeful (04/17/17 1309)  LLE Motor Response: Spontaneous ;Weak;Purposeful (04/17/17 1309)  RUE Motor Response: Spontaneous ;Weak;Purposeful (04/17/17 1309)  RLE Motor Response: Spontaneous ;Weak;Purposeful (04/17/17 1309)   At baseline    Mental Status and Level of Consciousness: Arousable    Pulmonary Status:   O2 Device: Room air (04/17/17 1400)   Adequate oxygenation and airway patent    Complications related to anesthesia: None    Post-anesthesia assessment completed.  No concerns    Signed By: Yaniv Duncan MD     April 17, 2017

## 2017-04-17 NOTE — PROGRESS NOTES
Primary Nurse Flaco Abebe and Wyatt RN performed a dual skin assessment on this patient No impairment noted  Carlos score is 21  Tattoos left wrist, left buttock, and right foot

## 2017-04-17 NOTE — IP AVS SNAPSHOT
[address] [facility] 
[phone] Patient: Kev Neri MRN: [id number] :1965 You are allergic to the following No active allergies Recent Documentation Height Weight BMI OB Status Smoking Status 1.753 m 93.7 kg 30.51 kg/m2 Menopause Never Smoker Emergency Contacts Name Discharge Info Relation Home Work Mobile Virgie Fournier CAREGIVER [3] Sister [23] [phone] Tasha De  Sister [23] [phone] About your hospitalization You were admitted on:  2017 You last received care in the:  [facility] You were discharged on:  2017 Unit phone number:  [phone] Why you were hospitalized Your primary diagnosis was:  Not on File Providers Seen During Your Hospitalizations Provider Role Specialty Primary office phone Katie Ricks MD Attending Provider Female Pelvic Medicine and Reconstructive Surgery [phone] Your Primary Care Physician (PCP) Primary Care Physician Office Phone Office Fax Leonardo Parra [phone] Follow-up Information Follow up With Details Comments Contact Info Roosevelt Henry MD   [address] [facility] [address] [place] [address] [phone] Current Discharge Medication List  
  
CONTINUE these medications which have CHANGED Dose & Instructions Dispensing Information Comments Morning Noon Evening Bedtime * oxyCODONE-acetaminophen 5-325 mg per tablet Commonly known as:  PERCOCET What changed:  Another medication with the same name was added. Make sure you understand how and when to take each. Your last dose was: Your next dose is:    
   
   
 Dose:  1 Tab Take 1 Tab by mouth every six (6) hours as needed for Pain. Max Daily Amount: 4 Tabs. Quantity:  10 Tab Refills:  0  
     
   
   
   
  
 * oxyCODONE-acetaminophen 5-325 mg per tablet Commonly known as:  PERCOCET What changed: You were already taking a medication with the same name, and this prescription was added. Make sure you understand how and when to take each. Your last dose was: Your next dose is:    
   
   
 Dose:  1 Tab Take 1 Tab by mouth every four (4) hours as needed for Pain. Max Daily Amount: 6 Tabs. Quantity:  20 Tab Refills:  0  
     
   
   
   
  
 sertraline 100 mg tablet Commonly known as:  ZOLOFT What changed:   
- when to take this 
- reasons to take this Your last dose was: Your next dose is:    
   
   
 Dose:  100 mg Take 1 Tab by mouth daily. Quantity:  30 Tab Refills:  5  
     
   
   
   
  
 * Notice: This list has 2 medication(s) that are the same as other medications prescribed for you. Read the directions carefully, and ask your doctor or other care provider to review them with you. CONTINUE these medications which have NOT CHANGED Dose & Instructions Dispensing Information Comments Morning Noon Evening Bedtime  
 albuterol 90 mcg/actuation inhaler Commonly known as:  PROVENTIL HFA, VENTOLIN HFA, PROAIR HFA Your last dose was: Your next dose is:    
   
   
 Dose:  2 Puff Take 2 Puffs by inhalation as needed for Wheezing. Refills:  0 ALPRAZolam 0.5 mg tablet Commonly known as:  Sydnie Dubose Your last dose was: Your next dose is:    
   
   
 Dose:  0.5 mg Take 1 Tab by mouth two (2) times daily as needed for Anxiety. Quantity:  15 Tab Refills:  0 ASTELIN 137 mcg (0.1 %) nasal spray Generic drug:  azelastine Your last dose was: Your next dose is:    
   
   
 Dose:  1 Spray 1 [place] by Both Nostrils route as needed for Rhinitis. Use in each nostril as directed Refills:  0 FLONASE 50 mcg/actuation nasal spray Generic drug:  fluticasone Your last dose was: Your next dose is:    
   
   
 Dose:  2 Spray 2 Sprays by Both Nostrils route as needed for Rhinitis. Refills:  0 HYDROcodone-acetaminophen 5-325 mg per tablet Commonly known as:  Zander Tatah Your last dose was: Your next dose is:    
   
   
 Dose:  1 Tab Take 1 Tab by mouth every four (4) hours as needed for Pain. Max Daily Amount: 6 Tabs. Quantity:  12 Tab Refills:  0  
     
   
   
   
  
 omeprazole 20 mg capsule Commonly known as:  PRILOSEC Your last dose was: Your next dose is:    
   
   
 Dose:  20 mg Take 20 mg by mouth daily. Refills:  0  
     
   
   
   
  
 pravastatin 20 mg tablet Commonly known as:  PRAVACHOL Your last dose was: Your next dose is: TAKE 1 TAB BY MOUTH DAILY. Quantity:  30 Tab Refills:  5 SYNTHROID 100 mcg tablet Generic drug:  levothyroxine Your last dose was: Your next dose is: TAKE 1 TABLET BY MOUTH EVERY DAY BEFORE BREAKFAST ON A EMPTY STOMACH Quantity:  30 Tab Refills:  5 VITAMIN D3 1,000 unit Cap Generic drug:  cholecalciferol Your last dose was: Your next dose is: TAKE 3 CAPSULES DAILY Quantity:  90 Cap Refills:  11 Where to Get Your Medications Information on where to get these meds will be given to you by the nurse or doctor. ! Ask your nurse or doctor about these medications  
  oxyCODONE-acetaminophen 5-325 mg per tablet Discharge Instructions None Discharge Orders None Introducing [facility]! [facility] introduces [employer] patient portal. Now you can access parts of your medical record, email your doctor's office, and request medication refills online. 1. In your internet browser, go to [email or web address] 2. Click on the First Time User? Click Here link in the Sign In box. You will see the New Member Sign Up page. 3. Enter your [employer] Access Code exactly as it appears below. You will not need to use this code after youve completed the sign-up process. If you do not sign up before the expiration date, you must request a new code. · [employer] Access Code: [email or web address] Expires: 5/30/2017 12:05 PM 
 
4. Enter the last four digits of your Social Security Number (xxxx) and Date of Birth (mm/dd/yyyy) as indicated and click Submit. You will be taken to the next sign-up page. 5. Create a [employer] ID. This will be your [employer] login ID and cannot be changed, so think of one that is secure and easy to remember. 6. Create a [employer] password. You can change your password at any time. 7. Enter your Password Reset Question and Answer. This can be used at a later time if you forget your password. 8. Enter your e-mail address. You will receive e-mail notification when new information is available in [address] 9. Click Sign Up. You can now view and download portions of your medical record. 10. Click the Download Summary menu link to download a portable copy of your medical information. If you have questions, please visit the Frequently Asked Questions section of the [employer] website. Remember, [employer] is NOT to be used for urgent needs. For medical emergencies, dial 911. Now available from your iPhone and Android! General Information Please provide this summary of care documentation to your next provider. Patient Signature:  ____________________________________________________________ Date:  ____________________________________________________________  
  
Jhonny Has Provider Signature:  ____________________________________________________________ Date:  ____________________________________________________________

## 2017-04-17 NOTE — PERIOP NOTES
1408 Attempted to call family to update but no one was available at this time. \"My sister Shawn Crockett had orientation for her new job. I bet she left for that. \"    1450 TRANSFER - OUT REPORT:    Verbal report given to [address] RN(name) on Benigno You  being transferred to 213(unit) for routine post - op       Report consisted of patients Situation, Background, Assessment and   Recommendations(SBAR). Information from the following report(s) SBAR, Kardex, OR Summary, Procedure Summary, Intake/Output, MAR, Accordion, Recent Results, Med Rec Status, Cardiac Rhythm NSR and Alarm Parameters  was reviewed with the receiving nurse. Lines:   Peripheral IV 04/17/17 Right Arm (Active)   Site Assessment Clean, dry, & intact 4/17/2017  2:45 PM   Phlebitis Assessment 0 4/17/2017  2:45 PM   Infiltration Assessment 0 4/17/2017  2:45 PM   Dressing Status Clean, dry, & intact 4/17/2017  2:45 PM   Dressing Type Transparent;Tape 4/17/2017  2:45 PM   Hub Color/Line Status Pink; Infusing 4/17/2017  2:45 PM        Opportunity for questions and clarification was provided.       Patient transported with:   Other Machine

## 2017-04-17 NOTE — PERIOP NOTES
Dr Sage Garcia and Dr Arturo Brown in, pt states has been having some burning in chest over last week when lying down. States feels like heartburn. States has not had any for last 2 days.

## 2017-04-17 NOTE — IP AVS SNAPSHOT
Current Discharge Medication List  
  
CONTINUE these medications which have CHANGED Dose & Instructions Dispensing Information Comments Morning Noon Evening Bedtime * oxyCODONE-acetaminophen 5-325 mg per tablet Commonly known as:  PERCOCET What changed:  Another medication with the same name was added. Make sure you understand how and when to take each. Your last dose was: Your next dose is:    
   
   
 Dose:  1 Tab Take 1 Tab by mouth every six (6) hours as needed for Pain. Max Daily Amount: 4 Tabs. Quantity:  10 Tab Refills:  0  
     
   
   
   
  
 * oxyCODONE-acetaminophen 5-325 mg per tablet Commonly known as:  PERCOCET What changed: You were already taking a medication with the same name, and this prescription was added. Make sure you understand how and when to take each. Your last dose was: Your next dose is:    
   
   
 Dose:  1 Tab Take 1 Tab by mouth every four (4) hours as needed for Pain. Max Daily Amount: 6 Tabs. Quantity:  20 Tab Refills:  0  
     
   
   
   
  
 sertraline 100 mg tablet Commonly known as:  ZOLOFT What changed:   
- when to take this 
- reasons to take this Your last dose was: Your next dose is:    
   
   
 Dose:  100 mg Take 1 Tab by mouth daily. Quantity:  30 Tab Refills:  5  
     
   
   
   
  
 * Notice: This list has 2 medication(s) that are the same as other medications prescribed for you. Read the directions carefully, and ask your doctor or other care provider to review them with you. CONTINUE these medications which have NOT CHANGED Dose & Instructions Dispensing Information Comments Morning Noon Evening Bedtime  
 albuterol 90 mcg/actuation inhaler Commonly known as:  PROVENTIL HFA, VENTOLIN HFA, PROAIR HFA Your last dose was: Your next dose is:    
   
   
 Dose:  2 Puff Take 2 Puffs by inhalation as needed for Wheezing. Refills:  0 ALPRAZolam 0.5 mg tablet Commonly known as:  Ezzie Odilon Your last dose was: Your next dose is:    
   
   
 Dose:  0.5 mg Take 1 Tab by mouth two (2) times daily as needed for Anxiety. Quantity:  15 Tab Refills:  0 ASTELIN 137 mcg (0.1 %) nasal spray Generic drug:  azelastine Your last dose was: Your next dose is:    
   
   
 Dose:  1 Spray 1 Novelty by Both Nostrils route as needed for Rhinitis. Use in each nostril as directed Refills:  0  
     
   
   
   
  
 FLONASE 50 mcg/actuation nasal spray Generic drug:  fluticasone Your last dose was: Your next dose is:    
   
   
 Dose:  2 Spray 2 Sprays by Both Nostrils route as needed for Rhinitis. Refills:  0 HYDROcodone-acetaminophen 5-325 mg per tablet Commonly known as:  Jerl Ards Your last dose was: Your next dose is:    
   
   
 Dose:  1 Tab Take 1 Tab by mouth every four (4) hours as needed for Pain. Max Daily Amount: 6 Tabs. Quantity:  12 Tab Refills:  0  
     
   
   
   
  
 omeprazole 20 mg capsule Commonly known as:  PRILOSEC Your last dose was: Your next dose is:    
   
   
 Dose:  20 mg Take 20 mg by mouth daily. Refills:  0  
     
   
   
   
  
 pravastatin 20 mg tablet Commonly known as:  PRAVACHOL Your last dose was: Your next dose is: TAKE 1 TAB BY MOUTH DAILY. Quantity:  30 Tab Refills:  5 SYNTHROID 100 mcg tablet Generic drug:  levothyroxine Your last dose was: Your next dose is: TAKE 1 TABLET BY MOUTH EVERY DAY BEFORE BREAKFAST ON A EMPTY STOMACH Quantity:  30 Tab Refills:  5 VITAMIN D3 1,000 unit Cap Generic drug:  cholecalciferol Your last dose was: Your next dose is: TAKE 3 CAPSULES DAILY Quantity:  90 Cap Refills:  11 Where to Get Your Medications Information on where to get these meds will be given to you by the nurse or doctor. ! Ask your nurse or doctor about these medications  
  oxyCODONE-acetaminophen 5-325 mg per tablet

## 2017-04-17 NOTE — PERIOP NOTES
[address] from Operating Room to PACU    Report received from [id number] [place] Vikas  and Faustine Last regarding Leah Maynard. Surgeon(s):  MD Hien Calle MD  And Procedure(s) (LRB):  TRANSVAGINAL TOTAL HYSTERECTOMY, LEFT SALPINGECTOMY, SACROSPINOUSL LIGAMENT SUSPENSION, PERINEAL REPAIR, CYSTOSCOPY (N/A)  confirmed   with allergies and dressings discussed. Anesthesia type, drugs, patient history, complications, estimated blood loss, vital signs, intake and output, and last pain medication, lines, reversal medications and temperature were reviewed.

## 2017-04-17 NOTE — PROGRESS NOTES
End of Shift Nursing Note    [facility] by Riddhi Bernard (offgoing nurse). Report included the following information SBAR. [facility] Phone:   [phone]    Significant changes during shift:    Bland removed 1545   Non-emergent issues for physician to address:        Number times ambulated in hallway past shift: 0      Number of times OOB to chair past shift: 0    POD #:      Vital Signs:    Temp: 97.7 °F (36.5 °C)     Pulse (Heart Rate): 97     BP: 148/85     Resp Rate: 16     O2 Sat (%): 99 %    Lines & Drains:     Urinary Catheter? No   Placement Date:    Medical Necessity:   Central Line? No   Placement Date:    Medical Necessity:   PICC Line? No   Placement Date:    Medical Necessity:     NG tube [] in [] removed [] not applicable   Drains [] in [] removed [] not applicable     Skin Integrity:      Wounds: no   Dressings Present: no    Wound Concerns: no      GI:    Current diet:  DIET REGULAR    Nausea: NO  Vomiting: NO  Bowel Sounds: YES  Flatus: YES  Last Bowel Movement: today   Appearance:     Respiratory:  Supplemental O2: No      Device:    via  Liters/min     Incentive Spirometer: YES  Volume: 600    Coughing and Deep Breathing: NO  Oral Care: YES  Understanding (patient/family education): YES   Getting out of bed: YES  Head of bed elevation: YES    Patient Safety:    Falls Score: 1  Mobility Score: 1  Bed Alarm On? No  Sitter? No      Opportunity for questions and clarification was given to oncoming nurse. Patient bed is in low position, side rails are up x 2, door & observation blinds open as needed, call bell within reach and patient not in distress.     Levon Dias

## 2017-04-17 NOTE — PERIOP NOTES
1334: Call placed to Dr. Prince Riggins office for admission order and IV fluid orders, awaiting callback.

## 2017-04-17 NOTE — PROGRESS NOTES
TRANSFER - IN REPORT:    Verbal report received from [facility] RN(name) on Parag Cabrera  being received from PACU(unit) for routine progression of care      Report consisted of patients Situation, Background, Assessment and   Recommendations(SBAR). Information from the following report(s) SBAR was reviewed with the receiving nurse. Opportunity for questions and clarification was provided. Assessment completed upon patients arrival to unit and care assumed.

## 2017-04-17 NOTE — OP NOTES
Gynecologic Procedure Note    Patient ID:     Name: Sharalyn Kehr    Medical Record Number: [id number]    YOB: 1965      Indications: This is a 47 y.o. female who presents with signs and symptoms of uterovaginal prolapse. She was positive for Stage 3 uterovaginal prolapse and desires surgical management     Preoperative Diagnosis:   PROLAPSE    Postoperative Diagnosis: PROLAPSE    Surgeon: Brenda Regan MD    Assistant: Ascencion Spurling, MD    Anesthesia: General    Procedure: 1. Sacrospinous ligament suspension 2. Anterior colporrhaphy 3. Cystourethroscopy 4. Perineorrhaphy      Procedure in Detail:  The patient was taken to the operating room where she was placed in the supine position. After undergoing adequate general endotracheal anesthesia, she was placed up in the [facility] laparoscopy stirrups in the dorsal lithotomy position. The patient was then prepped and draped in the usual fashion and the castellanos catheter was placed into the bladder. Please see Dr. Ascencion Spurling operative note for total vaginal hysterectomy portion of the procedure. The anterior vaginal all was grasped with allis clamps at the most proximal and distal aspects of the prolapse. The anterior vaginal wall was infiltrated with marcaine for hystodissection and hemostasis. A vertical incision was darshan between the Allis clamps and the underlying fibromucular tissue was dissected from the overlying mucosa. The dissection on the right was carried down to the ischial spine and the right uterosacral ligament was cleared off bluntly. Two sutures, on 0 PDS and one 0 Prolene, were placed through the ligament using the Capio needle . The anterior vaginal fibromucular tissue was plicaed using 2-0 vicryl, reducing the prolapse. The Sacrospinous sutures were then brought through the proximal fibromuscular tissue and tied down, drawing the apex of the vagina to the sacrospinous ligament.  The vaginal mucosa was closed with 2-0 vicryl in a running fashion. Cystoscopy was performed with bilateral ureteral efflux noted and no suture within the bladder. Two Allis clamps were placed on the posterior hymenal remnant and a stephie of tissue was excised from the posterior vaginal wall and perineum. The perineal body was rebuilt using 2-0 PDS in an interrupted fashion. The perineal skin was closed with 2-0 vicryl in a subcuticular fashion. . All instruments were removed from the vagina. The patient was awakened, extubated and taken to the recovery room in good condition. Estimated Blood Loss: 250mL    Pathology /Specimens: uterus, cervix, left fallopian tube     Complications: none    Findings: Excellent reduction of prolapse, bilateral ureteral efflux and no suture noted on cystoscopy.      Signed By: Katie Ricks MD

## 2017-04-17 NOTE — ANESTHESIA PREPROCEDURE EVALUATION
Anesthetic History   No history of anesthetic complications            Review of Systems / Medical History  Patient summary reviewed, nursing notes reviewed and pertinent labs reviewed    Pulmonary        Sleep apnea (resolved with weight loss)    Asthma : well controlled       Neuro/Psych         Psychiatric history     Cardiovascular  Within defined limits                Exercise tolerance: >4 METS     GI/Hepatic/Renal     GERD      PUD     Endo/Other    Diabetes: type 2  Hypothyroidism  Arthritis     Other Findings              Physical Exam    Airway  Mallampati: II  TM Distance: 4 - 6 cm  Neck ROM: normal range of motion   Mouth opening: Normal     Cardiovascular    Rhythm: regular  Rate: normal         Dental    Dentition: Lower dentition intact and Upper dentition intact     Pulmonary  Breath sounds clear to auscultation               Abdominal  GI exam deferred       Other Findings            Anesthetic Plan    ASA: 2  Anesthesia type: general          Induction: Intravenous  Anesthetic plan and risks discussed with: Patient

## 2017-04-18 VITALS
TEMPERATURE: 98.1 F | WEIGHT: 206.57 LBS | HEART RATE: 81 BPM | RESPIRATION RATE: 18 BRPM | BODY MASS INDEX: 30.6 KG/M2 | OXYGEN SATURATION: 99 % | HEIGHT: 69 IN | SYSTOLIC BLOOD PRESSURE: 123 MMHG | DIASTOLIC BLOOD PRESSURE: 66 MMHG

## 2017-04-18 LAB
HCT VFR BLD AUTO: 31.6 % (ref 35–47)
HGB BLD-MCNC: 10.6 G/DL (ref 11.5–16)

## 2017-04-18 PROCEDURE — 74011250637 HC RX REV CODE- 250/637: Performed by: OBSTETRICS & GYNECOLOGY

## 2017-04-18 PROCEDURE — 36415 COLL VENOUS BLD VENIPUNCTURE: CPT | Performed by: OBSTETRICS & GYNECOLOGY

## 2017-04-18 PROCEDURE — 85018 HEMOGLOBIN: CPT | Performed by: OBSTETRICS & GYNECOLOGY

## 2017-04-18 PROCEDURE — 74011250636 HC RX REV CODE- 250/636: Performed by: OBSTETRICS & GYNECOLOGY

## 2017-04-18 RX ORDER — IBUPROFEN 600 MG/1
600 TABLET ORAL
Qty: 40 TAB | Refills: 1 | Status: SHIPPED | OUTPATIENT
Start: 2017-04-18 | End: 2017-05-23 | Stop reason: CLARIF

## 2017-04-18 RX ORDER — OXYCODONE AND ACETAMINOPHEN 5; 325 MG/1; MG/1
1 TABLET ORAL
Qty: 20 TAB | Refills: 0 | Status: SHIPPED | OUTPATIENT
Start: 2017-04-18 | End: 2017-05-23 | Stop reason: CLARIF

## 2017-04-18 RX ADMIN — Medication 10 ML: at 06:36

## 2017-04-18 RX ADMIN — LEVOTHYROXINE SODIUM 100 MCG: 100 TABLET ORAL at 06:36

## 2017-04-18 RX ADMIN — OXYCODONE HYDROCHLORIDE AND ACETAMINOPHEN 1 TABLET: 5; 325 TABLET ORAL at 03:39

## 2017-04-18 RX ADMIN — SODIUM CHLORIDE, SODIUM LACTATE, POTASSIUM CHLORIDE, AND CALCIUM CHLORIDE 100 ML/HR: 600; 310; 30; 20 INJECTION, SOLUTION INTRAVENOUS at 06:37

## 2017-04-18 NOTE — OP NOTES
[address]   OP NOTE       Name:  Job Jacob   MR#:  [id number]   :  1965   Account #:  [de-identified]    Surgery Date:  2017   Date of Adm:  2017       PREOPERATIVE DIAGNOSIS:   Pelvic prolapse. POSTOPERATIVE DIAGNOSIS:   Pelvic prolapse. PROCEDURES PERFORMED:     1. Transvaginal hysterectomy,left salpingectomy ,anterior repair,sacrospinous ligament suspention. ESTIMATED BLOOD LOSS: 200 mL. SPECIMENS REMOVED:  Cervix, uterus, left fallopian tube. ANESTHESIA:   general anesthesia. SURGEON:  Stewart Esqueda MD    ASSISTANT:Dr Denisse Benjamin     . PRESENTATION: The patient is a 67-year-old lady with a grade 3 uterine   prolapse,  unable to hold a pessary. DESCRIPTION OF PROCEDURE: The patient was taken to the   operating room where she was prepped and draped in sterile fashion. Bland catheter was placed. Initially unable to get in to the cul-de sac posteriorly ,we     ligated the cardinal ligament lateraly . Eventually the bladder reflection was reached and posterior cul-de-sac   reached. The uterosacral ligament were greatly attenuated and a decision was made to perform a   sacrospinous ligament fixation rather than USLF. The rest of the hysterectomy   was completed  and securing each pedicle   with a 0 Vicryl. The ovaries and tubes were visibly normal. As she had   requested that the fallopian tubes be removed if possible, we were   able to get the patient's left fallopian tube out, but not the right. The   posterior cul-de-sac was then closed with a pursestring suture, vaginal cuff   closed with a series of figure-of-eight sutures. The case was then   handed over to Dr. Denisse Benjamin and I assisted for the remainder of the case   where she did an anterior repair, sacrospinous ligament fixation and   perineoplasty with my assistance. The patient returned to the recovery   room in good condition. All instruments, sponge and needles were accounted for.         MD Hema Limon   D:  04/17/2017   16:41   T:  04/18/2017   01:46   Job #:  [id number]

## 2017-04-18 NOTE — PROGRESS NOTES
Dr Aurelia Perez notified of urinary retention after removal of castellanos catheter. Castellanos reinserted per MD order. Pt tolerated procedure well.

## 2017-04-18 NOTE — DISCHARGE INSTRUCTIONS
SURGERY DISCHARGE ORDERS    PATIENT NAME:    Lucio Chávez           PROCEDURE: total vaginal hysterectomy, left salpingectomy, sacrospinous ligament suspension, anterior colporrhaphy, cystoscopy, perineorrhaphy               After discharge, please follow these guidelines:  1. You may shower. Please avoid baths for 4 weeks. 2. Avoid sexual intercourse for 6 weeks. 3.  Avoid driving for 3 days. You may resume driving at that point if you are no longer taking prescription pain medications and feel that you are physically able to react appropriately to potential road hazards. 4. You may resume a regular diet. 5. You may resume your regular medications. 6. You may return to work in 1-2 weeks, if no lifting or physical activity is required. Some patients may require more time prior to returning to work. 7.  A pain medication (Name Percocet ). Please take this if needed. 8. If you experience constipation, please use a stool softener, which may be purchased at your pharmacy. Please ask your pharmacist for help if you have any questions. After your surgery, you may experience:  1. Small amounts of vaginal bleeding. This may persist intermittently for up to several weeks after surgery. 2. Small discomfort and stiffness to inner groin. These are normal and should resolve. If any of the following occur, please contact you physician:  1. Fever of 100.5 or greater. 2. Pain unrelieved by medication. 3. Persistent bleeding that does not resolve within 72 hours or a large presence of blood in your urine. FOLLOW-UP:  1. At 6-weeks after surgery. Dr. Meagan Alexander will discuss with you how you are doing and perform a pelvic exam to check the surgery site. Please call Dr. Aimee Bond office if you have any questions regarding your appointments ([phone]). After regular office hours and on weekends, there is always a [facility] physician available on call.     You can reach the physician by calling [phone]. If you feel that you are experiencing an emergency, or you are unable to reach the physician on call, please go to the nearest emergency department for evaluation.

## 2017-04-18 NOTE — DISCHARGE SUMMARY
Gynecology Discharge Summary     Patient ID:  Shiloh Clayton  [id number]  21 y.o.  1965    Admit date: 4/17/2017    Discharge date: 4/18/17    Admission Diagnoses:    Patient Active Problem List   Diagnosis Code    Hypothyroidism E03.9    Osteoarthritis M19.90    Anxiety F41.9    Tinnitus H93.19    PUD (peptic ulcer disease) K27.9    IGT (impaired glucose tolerance) R73.02    Vitamin D deficiency E55.9    Lumbar radiculopathy M54.16    Hyperlipidemia E78.5    Gallstones K80.20       Discharge Diagnoses: Pelvic organ prolapse   Patient Active Problem List   Diagnosis Code    Hypothyroidism E03.9    Osteoarthritis M19.90    Anxiety F41.9    Tinnitus H93.19    PUD (peptic ulcer disease) K27.9    IGT (impaired glucose tolerance) R73.02    Vitamin D deficiency E55.9    Lumbar radiculopathy M54.16    Hyperlipidemia E78.5    Gallstones K80.20       Procedures for this admission: Procedure(s):  TRANSVAGINAL TOTAL HYSTERECTOMY, LEFT SALPINGECTOMY, SACROSPINOUSL LIGAMENT SUSPENSION, PERINEAL REPAIR, CYSTOSCOPY    Disposition: Home or self care    Discharged Condition: stable    Active Problems:    * No active hospital problems.  *    Past Medical History:   Diagnosis Date    Acid reflux     Adverse effect of anesthesia     woke up coughing    Allergic rhinitis     Anxiety     Arthritis     Asthma     ENVIRONMENTAL, DUST/COLD WEATHER    GERD (gastroesophageal reflux disease)     History of nonchemical tubal occlusion     Esure devices    Hyperlipidemia 2/20/2014    Hypothyroidism     HYPO    IGT (impaired glucose tolerance)     Ill-defined condition     prolapse uterus/states thus her intestines has collpased a little bit    SHERON (obstructive sleep apnea)     off CPAP after weight loss/intentional wt loss    Psychiatric disorder     anxiety and depression    PUD (peptic ulcer disease)     no EGD    [facility] gynecological [facility]     [facility]    Sinus disorder     Tinea pedis       Family History   Problem Relation Age of Onset    Hypertension Mother     Diabetes Mother     Heart Disease Mother     Heart Attack Mother     Cancer Father      lung    Other Sister      cholecystectomy    Cancer Brother      lung    Diabetes Maternal Aunt     Cancer Paternal Uncle      lung    Diabetes Maternal Aunt     Diabetes Maternal Aunt     Diabetes Maternal Aunt     Diabetes Maternal Aunt     Diabetes Maternal Aunt     Heart Attack Daughter 32    Other Son      narcolepsy/GERD (part of esophagus removed d/t this)    Cancer Paternal Uncle      lung    Anesth Problems Neg Hx       Social History   Substance Use Topics    Smoking status: Never Smoker    Smokeless tobacco: Never Used    Alcohol use No     Past Surgical History:   Procedure Laterality Date    COLONOSCOPY N/A 9/13/2016    COLONOSCOPY / EGD  performed by Zackery Wise MD at [address] [facility]  10/12/2012    [facility]    HX LAP CHOLECYSTECTOMY  10/06/2016    MT REMOVAL OF OVARIAN CYST(S)        Prior to Admission medications    Medication Sig Start Date End Date Taking? Authorizing Provider   oxyCODONE-acetaminophen (PERCOCET) 5-325 mg per tablet Take 1 Tab by mouth every four (4) hours as needed for Pain. Max Daily Amount: 6 Tabs. 4/18/17  Yes Kris Dia MD   ibuprofen (MOTRIN) 600 mg tablet Take 1 Tab by mouth every six (6) hours as needed for Pain. 4/18/17  Yes Kris Dia MD   omeprazole (PRILOSEC) 20 mg capsule Take 20 mg by mouth daily. Yes Historical Provider   SYNTHROID 100 mcg tablet TAKE 1 TABLET BY MOUTH EVERY DAY BEFORE BREAKFAST ON A EMPTY STOMACH 3/29/17  Yes Dontrell Chan MD   pravastatin (PRAVACHOL) 20 mg tablet TAKE 1 TAB BY MOUTH DAILY. 3/6/17  Yes Dontrell Chan MD   oxyCODONE-acetaminophen (PERCOCET) 5-325 mg per tablet Take 1 Tab by mouth every six (6) hours as needed for Pain. Max Daily Amount: 4 Tabs.  4/1/17   ANDRESSA Judge HYDROcodone-acetaminophen (NORCO) 5-325 mg per tablet Take 1 Tab by mouth every four (4) hours as needed for Pain. Max Daily Amount: 6 Tabs. 3/1/17   Bridget Montoya MD   ALPRAZolam Donnel Motto) 0.5 mg tablet Take 1 Tab by mouth two (2) times daily as needed for Anxiety. 1/4/17   Amanda Ruiz MD   VITAMIN D3 1,000 unit cap TAKE 3 CAPSULES DAILY 9/29/16   Amanda Ruiz MD   azelastine (ASTELIN) 137 mcg (0.1 %) nasal spray 1 [place] by Both Nostrils route as needed for Rhinitis. Use in each nostril as directed    Historical Provider   fluticasone (FLONASE) 50 mcg/actuation nasal spray 2 Sprays by Both Nostrils route as needed for Rhinitis. Historical Provider   albuterol (PROVENTIL HFA, VENTOLIN HFA, PROAIR HFA) 90 mcg/actuation inhaler Take 2 Puffs by inhalation as needed for Wheezing. Historical Provider   sertraline (ZOLOFT) 100 mg tablet Take 1 Tab by mouth daily. Patient taking differently: Take 100 mg by mouth daily as needed. 7/20/16   Amanda Ruiz MD     No Known Allergies     Discharge Exam:  alert, cooperative, no distress, appears stated age  Abdomen soft and nontender without distention or masses    Recent Results (from the past 24 hour(s))   GLUCOSE, POC    Collection Time: 04/17/17  8:39 AM   Result Value Ref Range    Glucose (POC) 88 65 - 100 mg/dL    Performed by Ila Han    HGB & HCT    Collection Time: 04/18/17  4:09 AM   Result Value Ref Range    HGB 10.6 (L) 11.5 - 16.0 g/dL    HCT 31.6 (L) 35.0 - 47.0 %         Patient Instructions:   Current Discharge Medication List      START taking these medications    Details   !! oxyCODONE-acetaminophen (PERCOCET) 5-325 mg per tablet Take 1 Tab by mouth every four (4) hours as needed for Pain. Max Daily Amount: 6 Tabs. Qty: 20 Tab, Refills: 0      ibuprofen (MOTRIN) 600 mg tablet Take 1 Tab by mouth every six (6) hours as needed for Pain. Qty: 40 Tab, Refills: 1       !! - Potential duplicate medications found.  Please discuss with provider. CONTINUE these medications which have NOT CHANGED    Details   omeprazole (PRILOSEC) 20 mg capsule Take 20 mg by mouth daily. SYNTHROID 100 mcg tablet TAKE 1 TABLET BY MOUTH EVERY DAY BEFORE BREAKFAST ON A EMPTY STOMACH  Qty: 30 Tab, Refills: 5      pravastatin (PRAVACHOL) 20 mg tablet TAKE 1 TAB BY MOUTH DAILY. Qty: 30 Tab, Refills: 5      !! oxyCODONE-acetaminophen (PERCOCET) 5-325 mg per tablet Take 1 Tab by mouth every six (6) hours as needed for Pain. Max Daily Amount: 4 Tabs. Qty: 10 Tab, Refills: 0      HYDROcodone-acetaminophen (NORCO) 5-325 mg per tablet Take 1 Tab by mouth every four (4) hours as needed for Pain. Max Daily Amount: 6 Tabs. Qty: 12 Tab, Refills: 0      ALPRAZolam (XANAX) 0.5 mg tablet Take 1 Tab by mouth two (2) times daily as needed for Anxiety. Qty: 15 Tab, Refills: 0    Associated Diagnoses: Anxiety      VITAMIN D3 1,000 unit cap TAKE 3 CAPSULES DAILY  Qty: 90 Cap, Refills: 11    Associated Diagnoses: Vitamin D deficiency      azelastine (ASTELIN) 137 mcg (0.1 %) nasal spray 1 [place] by Both Nostrils route as needed for Rhinitis. Use in each nostril as directed      fluticasone (FLONASE) 50 mcg/actuation nasal spray 2 Sprays by Both Nostrils route as needed for Rhinitis. albuterol (PROVENTIL HFA, VENTOLIN HFA, PROAIR HFA) 90 mcg/actuation inhaler Take 2 Puffs by inhalation as needed for Wheezing. sertraline (ZOLOFT) 100 mg tablet Take 1 Tab by mouth daily. Qty: 30 Tab, Refills: 5       !! - Potential duplicate medications found. Please discuss with provider. Activity: Activity as tolerated  Diet: Regular Diet  Wound Care: Keep wound clean and dry and None needed    Follow-up with Dr. Pati Jimenez in 6 weeks.     Signed:  Herbie Estevez MD  4/18/2017  7:29 AM

## 2017-04-18 NOTE — PROGRESS NOTES
General Daily Progress Note    Admit Date: 4/17/2017    Subjective:    Patient was without subjective complaint. Minimal pain controlled with oral pain medicines. No extremity neuropathic complaints. Reported ambulating without difficulty. Reported tolerating a diet without issue. Denies chest pain, shortness of breath, neurologic symptoms, vaginal bleeding. Doing well. Objective:     Patient Vitals for the past 8 hrs:   BP Temp Pulse Resp SpO2   04/18/17 0401 122/75 98 °F (36.7 °C) 72 18 100 %   04/17/17 2342 114/56 99.1 °F (37.3 °C) 80 16 96 %        04/16 1901 - 04/18 0700  In: 2721.7 [I.V.:2721.7]  Out: 0512 [Urine:2400]    Physical Exam:   Gen: NAD, A+O  Resp: Breathing easily  Abdomen: Soft, appropriate post-surgical tenderness, non-distended, no peritoneal symptoms  : Bland catheter out previously by nursing, no significant vaginal bleeding            Assessment:     1. POD #1 s/p TVH/SSLS/left salpingectomy/cystoscopy/perineorrhaphy    Plan:   1. DC Planning. Patient doing well POD1. Patient is due to void. Full discharge instructions, appointments, and prescriptions are detailed to patient and provided in chart.

## 2017-04-18 NOTE — PROGRESS NOTES
End of Shift Nursing Note    Bedside shift change report given to [facility] RN (oncoming nurse) by Dori Pantoja RN (offgoing nurse). Report included the following information SBAR, Kardex, OR Summary, Intake/Output, MAR and Recent Results. Zone Phone:       Significant changes during shift:    Bland reinserted, 2300cc urine drained over 2 hour period after bladder scanner showed 426ml   Non-emergent issues for physician to address:   none     Number times ambulated in hallway past shift: 0      Number of times OOB to chair past shift: 0    POD #: 1     Vital Signs:    Temp: 99.1 °F (37.3 °C)     Pulse (Heart Rate): 80     BP: 114/56     Resp Rate: 16     O2 Sat (%): 96 %    Lines & Drains:     Urinary Catheter? Yes   Placement Date: 4/17   Medical Necessity: needs to be d/c'd this am POD # 1      NG tube [] in [] removed [x] not applicable   Drains [] in [] removed [x] not applicable     Skin Integrity:      Wounds: yes   Dressings Present: yes  peripad  Wound Concerns: no      GI:    Current diet:  DIET REGULAR    Nausea: NO  Vomiting: NO  Bowel Sounds: YES  Flatus:no  Last Bowel Movement: preop     Appearance:     Respiratory:  Supplemental O2: No       Incentive Spirometer: YES  Volume: 1200  Coughing and Deep Breathing: YES  Oral Care: NO  Understanding (patient/family education): YES   Getting out of bed: YES  Head of bed elevation: YES    Patient Safety:    Falls Score: 2  Mobility Score: 1  Bed Alarm On? No  Sitter? No      Opportunity for questions and clarification was given to oncoming nurse. Patient bed is in low position, side rails are up x 3, door & observation blinds open as needed, call bell within reach and patient not in distress.     Ashley Burnett RN

## 2017-04-21 ENCOUNTER — HOSPITAL ENCOUNTER (EMERGENCY)
Age: 52
Discharge: HOME | End: 2017-04-21
Attending: EMERGENCY MEDICINE
Payer: MEDICAID

## 2017-04-21 VITALS
SYSTOLIC BLOOD PRESSURE: 157 MMHG | DIASTOLIC BLOOD PRESSURE: 92 MMHG | RESPIRATION RATE: 20 BRPM | TEMPERATURE: 97.8 F | OXYGEN SATURATION: 91 %

## 2017-04-21 DIAGNOSIS — Z90.710 STATUS POST HYSTERECTOMY: Primary | ICD-10-CM

## 2017-04-21 DIAGNOSIS — Z76.0 MEDICATION REFILL: ICD-10-CM

## 2017-04-21 DIAGNOSIS — E87.6 HYPOKALEMIA: ICD-10-CM

## 2017-04-21 DIAGNOSIS — F41.9 ANXIETY: ICD-10-CM

## 2017-04-21 LAB
ALBUMIN SERPL BCP-MCNC: 3.8 G/DL (ref 3.5–5)
ALBUMIN/GLOB SERPL: 1 {RATIO} (ref 1.1–2.2)
ALP SERPL-CCNC: 77 U/L (ref 45–117)
ALT SERPL-CCNC: 45 U/L (ref 12–78)
ANION GAP BLD CALC-SCNC: 11 MMOL/L (ref 5–15)
APPEARANCE UR: ABNORMAL
AST SERPL W P-5'-P-CCNC: 12 U/L (ref 15–37)
BACTERIA URNS QL MICRO: ABNORMAL /HPF
BASOPHILS # BLD AUTO: 0 K/UL (ref 0–0.1)
BASOPHILS # BLD: 0 % (ref 0–1)
BILIRUB SERPL-MCNC: 0.6 MG/DL (ref 0.2–1)
BILIRUB UR QL: NEGATIVE
BUN SERPL-MCNC: 7 MG/DL (ref 6–20)
BUN/CREAT SERPL: 8 (ref 12–20)
CALCIUM SERPL-MCNC: 9.9 MG/DL (ref 8.5–10.1)
CHLORIDE SERPL-SCNC: 107 MMOL/L (ref 97–108)
CO2 SERPL-SCNC: 25 MMOL/L (ref 21–32)
COLOR UR: ABNORMAL
CREAT SERPL-MCNC: 0.89 MG/DL (ref 0.55–1.02)
EOSINOPHIL # BLD: 0.3 K/UL (ref 0–0.4)
EOSINOPHIL NFR BLD: 4 % (ref 0–7)
EPITH CASTS URNS QL MICRO: ABNORMAL /LPF
ERYTHROCYTE [DISTWIDTH] IN BLOOD BY AUTOMATED COUNT: 14.4 % (ref 11.5–14.5)
GLOBULIN SER CALC-MCNC: 3.8 G/DL (ref 2–4)
GLUCOSE SERPL-MCNC: 107 MG/DL (ref 65–100)
GLUCOSE UR STRIP.AUTO-MCNC: NEGATIVE MG/DL
HCT VFR BLD AUTO: 33.7 % (ref 35–47)
HGB BLD-MCNC: 11.2 G/DL (ref 11.5–16)
HGB UR QL STRIP: ABNORMAL
KETONES UR QL STRIP.AUTO: NEGATIVE MG/DL
LEUKOCYTE ESTERASE UR QL STRIP.AUTO: ABNORMAL
LYMPHOCYTES # BLD AUTO: 22 % (ref 12–49)
LYMPHOCYTES # BLD: 1.7 K/UL (ref 0.8–3.5)
MCH RBC QN AUTO: 29.6 PG (ref 26–34)
MCHC RBC AUTO-ENTMCNC: 33.2 G/DL (ref 30–36.5)
MCV RBC AUTO: 89.2 FL (ref 80–99)
MONOCYTES # BLD: 0.4 K/UL (ref 0–1)
MONOCYTES NFR BLD AUTO: 5 % (ref 5–13)
NEUTS SEG # BLD: 5.2 K/UL (ref 1.8–8)
NEUTS SEG NFR BLD AUTO: 69 % (ref 32–75)
NITRITE UR QL STRIP.AUTO: NEGATIVE
PH UR STRIP: 6.5 [PH] (ref 5–8)
PLATELET # BLD AUTO: 190 K/UL (ref 150–400)
POTASSIUM SERPL-SCNC: 3.2 MMOL/L (ref 3.5–5.1)
PROT SERPL-MCNC: 7.6 G/DL (ref 6.4–8.2)
PROT UR STRIP-MCNC: NEGATIVE MG/DL
RBC # BLD AUTO: 3.78 M/UL (ref 3.8–5.2)
RBC #/AREA URNS HPF: ABNORMAL /HPF (ref 0–5)
SODIUM SERPL-SCNC: 143 MMOL/L (ref 136–145)
SP GR UR REFRACTOMETRY: <1.005 (ref 1–1.03)
UA: UC IF INDICATED,UAUC: ABNORMAL
UROBILINOGEN UR QL STRIP.AUTO: 0.2 EU/DL (ref 0.2–1)
WBC # BLD AUTO: 7.6 K/UL (ref 3.6–11)
WBC URNS QL MICRO: ABNORMAL /HPF (ref 0–4)

## 2017-04-21 PROCEDURE — 85025 COMPLETE CBC W/AUTO DIFF WBC: CPT | Performed by: PHYSICIAN ASSISTANT

## 2017-04-21 PROCEDURE — 87186 SC STD MICRODIL/AGAR DIL: CPT | Performed by: PHYSICIAN ASSISTANT

## 2017-04-21 PROCEDURE — 96374 THER/PROPH/DIAG INJ IV PUSH: CPT

## 2017-04-21 PROCEDURE — 80053 COMPREHEN METABOLIC PANEL: CPT | Performed by: PHYSICIAN ASSISTANT

## 2017-04-21 PROCEDURE — 74011250637 HC RX REV CODE- 250/637: Performed by: PHYSICIAN ASSISTANT

## 2017-04-21 PROCEDURE — 96361 HYDRATE IV INFUSION ADD-ON: CPT

## 2017-04-21 PROCEDURE — 87077 CULTURE AEROBIC IDENTIFY: CPT | Performed by: PHYSICIAN ASSISTANT

## 2017-04-21 PROCEDURE — 99284 EMERGENCY DEPT VISIT MOD MDM: CPT

## 2017-04-21 PROCEDURE — 36415 COLL VENOUS BLD VENIPUNCTURE: CPT | Performed by: PHYSICIAN ASSISTANT

## 2017-04-21 PROCEDURE — 81001 URINALYSIS AUTO W/SCOPE: CPT | Performed by: PHYSICIAN ASSISTANT

## 2017-04-21 PROCEDURE — 74011250636 HC RX REV CODE- 250/636: Performed by: PHYSICIAN ASSISTANT

## 2017-04-21 PROCEDURE — 87086 URINE CULTURE/COLONY COUNT: CPT | Performed by: PHYSICIAN ASSISTANT

## 2017-04-21 RX ORDER — LORAZEPAM 2 MG/ML
1 INJECTION INTRAMUSCULAR
Status: COMPLETED | OUTPATIENT
Start: 2017-04-21 | End: 2017-04-21

## 2017-04-21 RX ORDER — POTASSIUM CHLORIDE 20 MEQ/1
40 TABLET, EXTENDED RELEASE ORAL
Status: COMPLETED | OUTPATIENT
Start: 2017-04-21 | End: 2017-04-21

## 2017-04-21 RX ORDER — ALPRAZOLAM 0.5 MG/1
0.5 TABLET ORAL
Qty: 15 TAB | Refills: 0 | Status: SHIPPED | OUTPATIENT
Start: 2017-04-21 | End: 2017-04-28 | Stop reason: SDUPTHER

## 2017-04-21 RX ORDER — POTASSIUM CHLORIDE 750 MG/1
10 TABLET, FILM COATED, EXTENDED RELEASE ORAL DAILY
Qty: 10 TAB | Refills: 0 | Status: SHIPPED | OUTPATIENT
Start: 2017-04-21 | End: 2017-05-01

## 2017-04-21 RX ORDER — SODIUM CHLORIDE 0.9 % (FLUSH) 0.9 %
5-10 SYRINGE (ML) INJECTION EVERY 8 HOURS
Status: DISCONTINUED | OUTPATIENT
Start: 2017-04-21 | End: 2017-04-21 | Stop reason: HOSPADM

## 2017-04-21 RX ORDER — SODIUM CHLORIDE 0.9 % (FLUSH) 0.9 %
5-10 SYRINGE (ML) INJECTION AS NEEDED
Status: DISCONTINUED | OUTPATIENT
Start: 2017-04-21 | End: 2017-04-21 | Stop reason: HOSPADM

## 2017-04-21 RX ADMIN — LORAZEPAM 1 MG: 2 INJECTION INTRAMUSCULAR; INTRAVENOUS at 11:21

## 2017-04-21 RX ADMIN — POTASSIUM CHLORIDE 40 MEQ: 20 TABLET, EXTENDED RELEASE ORAL at 12:18

## 2017-04-21 RX ADMIN — SODIUM CHLORIDE 1000 ML: 900 INJECTION, SOLUTION INTRAVENOUS at 11:20

## 2017-04-21 NOTE — DISCHARGE INSTRUCTIONS
Hypokalemia: Care Instructions  Your Care Instructions  Hypokalemia (say \"nq-px-ols-CHELSIE-darshana-uh\") is a low level of potassium. The heart, muscles, kidneys, and nervous system all need potassium to work well. This problem has many different causes. Kidney problems, diet, and medicines like diuretics and laxatives can cause it. So can vomiting or diarrhea. In some cases, cancer is the cause. Your doctor may do tests to find the cause of your low potassium levels. You may need medicines to bring your potassium levels back to normal. You may also need regular blood tests to check your potassium. If you have very low potassium, you may need intravenous (IV) medicines. You also may need tests to check the electrical activity of your heart. Heart problems caused by low potassium levels can be very serious. Follow-up care is a key part of your treatment and safety. Be sure to make and go to all appointments, and call your doctor if you are having problems. It's also a good idea to know your test results and keep a list of the medicines you take. How can you care for yourself at home? · If your doctor recommends it, eat foods that have a lot of potassium. These include fresh fruits, juices, and vegetables. They also include nuts, beans, and milk. · Be safe with medicines. If your doctor prescribes medicines or potassium supplements, take them exactly as directed. Call your doctor if you have any problems with your medicines. · Get your potassium levels tested as often as your doctor tells you. When should you call for help? Call 911 anytime you think you may need emergency care. For example, call if:  · You feel like your heart is missing beats. Heart problems caused by low potassium can cause death. · You passed out (lost consciousness). · You have a seizure. Call your doctor now or seek immediate medical care if:  · You feel weak or unusually tired. · You have severe arm or leg cramps.   · You have tingling or numbness. · You feel sick to your stomach, or you vomit. · You have belly cramps. · You feel bloated or constipated. · You have to urinate a lot. · You feel very thirsty most of the time. · You are dizzy or lightheaded, or you feel like you may faint. · You feel depressed, or you lose touch with reality. Watch closely for changes in your health, and be sure to contact your doctor if:  · You do not get better as expected. Where can you learn more? Go to http://vivianeCodeRytemisael.info/. Enter G358 in the search box to learn more about \"Hypokalemia: Care Instructions. \"  Current as of: July 28, 2016  Content Version: 11.2  © 9559-3589 [employer]. Care instructions adapted under license by [employer] (which disclaims liability or warranty for this information). If you have questions about a medical condition or this instruction, always ask your healthcare professional. [place] [address] any warranty or liability for your use of this information. Learning About Anxiety Disorders  What are anxiety disorders? Anxiety disorders are a type of medical problem. They cause severe anxiety. When you feel anxious, you feel that something bad is about to happen. This feeling interferes with your life. These disorders include:  · Generalized anxiety disorder. You feel worried and stressed about many everyday events and activities. This goes on for several months and disrupts your life on most days. · Panic disorder. You have repeated panic attacks. A panic attack is a sudden, intense fear or anxiety. It may make you feel short of breath. Your heart may pound. · Social anxiety disorder. You feel very anxious about what you will say or do in front of people. For example, you may be scared to talk or eat in public. This problem affects your daily life. · Phobias. You are very scared of a specific object, situation, or activity.  For example, you may fear spiders, high places, or small spaces. What are the symptoms? Generalized anxiety disorder  Symptoms may include:  · Feeling worried and stressed about many things almost every day. · Feeling tired or irritable. You may have a hard time concentrating. · Having headaches or muscle aches. · Having a hard time swallowing. · Feeling shaky, sweating, or having hot flashes. Panic disorder  You may have repeated panic attacks when there is no reason for feeling afraid. You may change your daily activities because you worry that you will have another attack. Symptoms may include:  · Intense fear, terror, or anxiety. · Trouble breathing or very fast breathing. · Chest pain or tightness. · A heartbeat that races or is not regular. Social anxiety disorder  Symptoms may include:  · Fear about a social situation, such as eating in front of others or speaking in public. You may worry a lot. Or you may be afraid that something bad will happen. · Anxiety that can cause you to blush, sweat, and feel shaky. · A heartbeat that is faster than normal.  · A hard time focusing. Phobias  Symptoms may include:  · More fear than most people of being around an object, being in a situation, or doing an activity. You might also be stressed about the chance of being around the thing you fear. · Worry about losing control, panicking, fainting, or having physical symptoms like a faster heartbeat when you are around the situation or object. How are these disorders treated? Anxiety disorders can be treated with medicines or counseling. A combination of both may be used. Medicines may include:  · Antidepressants. These may help your symptoms by keeping chemicals in your brain in balance. · Benzodiazepines. These may give you short-term relief of your symptoms. Some people use cognitive-behavioral therapy. A therapist helps you learn to change stressful or bad thoughts into helpful thoughts.   Lead a healthy lifestyle  A healthy lifestyle may help you feel better. · Get at least 30 minutes of exercise on most days of the week. Walking is a good choice. · Eat a healthy diet. Include fruits, vegetables, lean proteins, and whole grains in your diet each day. · Try to go to bed at the same time every night. Try for 8 hours of sleep a night. · Find ways to manage stress. Try relaxation exercises. · Avoid alcohol and illegal drugs. Follow-up care is a key part of your treatment and safety. Be sure to make and go to all appointments, and call your doctor if you are having problems. It's also a good idea to know your test results and keep a list of the medicines you take. Where can you learn more? Go to http://viviane-misael.info/. Enter U203 in the search box to learn more about \"Learning About Anxiety Disorders. \"  Current as of: July 26, 2016  Content Version: 11.2  © 0725-9599 [employer]. Care instructions adapted under license by [employer] (which disclaims liability or warranty for this information). If you have questions about a medical condition or this instruction, always ask your healthcare professional. [place] [address] any warranty or liability for your use of this information.

## 2017-04-21 NOTE — ED NOTES
Pt arrives via EMS for \"anxious\" and \"lightheaded. \" Pt states she ran out of her anxiety medications 1 week ago States she had vaginal hysterectomy on Monday and states post her surgery she began having \"anxiety\" Pt states she stayed overnight Monday night and was discharged home Tues and states she just \"hasn't felt right. \" Pt reports lightheaded and \"anxious\" Pt denies any nausea/vomiting Denies any abd pain Denies any vaginal pain/bleeding/discharge. Pt placed on monitor x 2 Updated on plan with pending evaluation by provider.

## 2017-04-21 NOTE — Clinical Note
Rest, push fluids, follow up with your surgeon for recheck. Follow up with your primary care for recheck/refill of anxiety medications. Return to the Emergency Dept for any concerns.

## 2017-04-21 NOTE — ED NOTES
Pt discharged by ANDRESSA Yanez. Pt provided with discharge instructions Rx and instructions on follow up care. Pt out of ED under own power with steady gait accompanied by family member.

## 2017-04-21 NOTE — ED PROVIDER NOTES
HPI Comments: Ricky Cota is a 47 y.o. female, pmhx significant for anxiety/depression, adverse effects of anesthesia, asthma, and SHERON, who presents ambulatory with friend to the ED c/o feeling anxious, and lightheaded s/p total hysterectomy x 5 days. Pt states that she awoke from anesthesia feeling anxious, stating that she could not fall asleep after waking up from anesthesia. Pt states that she feels her \"feet and hands sweating,\" and has \"not felt right. \" Pt states that she has been on xanex, prescribed by her PCP, for her anxiety, but has ran out of medication x 1 week. Pt additionally endorses concern of constipation, but reports taking Miralax as directed and having regular bowel movements. Pt reports taht she was discharged with Percocet and 600mg ibuprofen, and states that her last dose of Percocet was last night. Pt denies any complications during her hysterectomy. Pt also denies vaginal discharge/bleeding, fever, chills, N/V/D, abdominal pain, CP, and SOB. PCP: Abhijeet Oliver MD  Hysterectomy surgeon: Angel Jackman. Tyesha Mcdaniels MD    PSHx: Significant for removal of ovarian cysts, cholecystectomy, total hysterectomy  Social Hx: (-) tobacco, (-) EtOH,  (-) Illicit Drugs    There are no other complaints, changes, or physical findings at this time. The history is provided by the patient. No  was used.         Past Medical History:   Diagnosis Date    Acid reflux     Adverse effect of anesthesia     woke up coughing    Allergic rhinitis     Anxiety     Arthritis     Asthma     ENVIRONMENTAL, DUST/COLD WEATHER    GERD (gastroesophageal reflux disease)     History of nonchemical tubal occlusion     Esure devices    Hyperlipidemia 2/20/2014    Hypothyroidism     HYPO    IGT (impaired glucose tolerance)     Ill-defined condition     prolapse uterus/states thus her intestines has collpased a little bit    SHERON (obstructive sleep apnea)     off CPAP after weight loss/intentional wt loss    Psychiatric disorder     anxiety and depression    PUD (peptic ulcer disease)     no EGD    Routine gynecological examination     [facility]    Sinus disorder     Tinea pedis        Past Surgical History:   Procedure Laterality Date    COLONOSCOPY N/A 9/13/2016    COLONOSCOPY / EGD  performed by Bulmaro Garcia MD at [address] HX GYN  10/12/2012    [facility]    HX LAP CHOLECYSTECTOMY  10/06/2016    HX TOTAL VAGINAL HYSTERECTOMY      [place] REMOVAL OF OVARIAN CYST(S)           Family History:   Problem Relation Age of Onset    Hypertension Mother     Diabetes Mother     Heart Disease Mother     Heart Attack Mother    Alleen Royalty Father      lung    Other Sister      cholecystectomy    Cancer Brother      lung    Diabetes Maternal Aunt     Cancer Paternal Uncle      lung    Diabetes Maternal Aunt     Diabetes Maternal Aunt     Diabetes Maternal Aunt     Diabetes Maternal Aunt     Diabetes Maternal Aunt     Heart Attack Daughter 32    Other Son      narcolepsy/GERD (part of esophagus removed d/t this)    Cancer Paternal Uncle      lung    Anesth Problems Neg Hx        Social History     Social History    Marital status: SINGLE     Spouse name: N/A    Number of children: N/A    Years of education: N/A     Occupational History    Not on file. Social History Main Topics    Smoking status: Never Smoker    Smokeless tobacco: Never Used    Alcohol use No    Drug use: No    Sexual activity: Yes     Partners: Male     Birth control/ protection: Implant     Other Topics Concern    Not on file     Social History Narrative         ALLERGIES: Review of patient's allergies indicates no known allergies. Review of Systems   Constitutional: Negative for chills and fever. HENT: Negative for congestion, rhinorrhea and sore throat. Respiratory: Negative for cough and shortness of breath. Cardiovascular: Negative for chest pain and palpitations. Gastrointestinal: Negative for abdominal pain, diarrhea, nausea and vomiting. Genitourinary: Negative for dysuria and hematuria. Musculoskeletal: Negative for neck pain and neck stiffness. Skin: Negative for rash and wound. Neurological: Positive for light-headedness. Negative for dizziness and headaches. Psychiatric/Behavioral: Negative for agitation and confusion. The patient is nervous/anxious. Patient Vitals for the past 12 hrs:   Temp Resp BP SpO2   04/21/17 1102 - 20 (!) 157/92 91 %   04/21/17 1021 97.8 °F (36.6 °C) 22 172/75 100 %     Physical Exam   Constitutional: She is oriented to person, place, and time. She appears well-developed and well-nourished. No distress. HENT:   Head: Normocephalic and atraumatic. Nose: Nose normal.   Mouth/Throat: Mucous membranes are dry. No oropharyngeal exudate. Eyes: Conjunctivae and EOM are normal. Right eye exhibits no discharge. Left eye exhibits no discharge. No scleral icterus. Neck: Normal range of motion. Neck supple. No JVD present. No tracheal deviation present. No thyromegaly present. Cardiovascular: Normal rate, regular rhythm and normal heart sounds. Pulmonary/Chest: Effort normal and breath sounds normal. No respiratory distress. She has no wheezes. Abdominal: Soft. There is no tenderness. Musculoskeletal: Normal range of motion. She exhibits no edema. Lymphadenopathy:     She has no cervical adenopathy. Neurological: She is alert and oriented to person, place, and time. She exhibits normal muscle tone. Coordination normal.   Skin: Skin is warm and dry. She is not diaphoretic. Psychiatric: Her behavior is normal. Judgment normal. Her mood appears anxious. Constantly pulling at hair  Talking fast   Nursing note and vitals reviewed.        MDM  Number of Diagnoses or Management Options  Diagnosis management comments:   DDx: dehydration, electrolyte dysfunction, anxiety, medication refill       Amount and/or Complexity of Data Reviewed  Clinical lab tests: reviewed and ordered  Review and summarize past medical records: yes    Patient Progress  Patient progress: stable    ED Course       Procedures    PROGRESS NOTE   11:09 AM  Will obtain baseline labs to reassure pt. Will also give pt IV fluids as she has dry oral mucosa. Written by Wilton Wan ED Scribe, as dictated by [employer]. PROGRESS NOTE:  12:02 PM  Discussed workup results/findings, and counseled pt regarding her diagnosis. Pt has been encouraged will follow-up as instructed. All questions have been answered, and pt conveyed understanding. Written by Wilton Wan ED Scribe, as dictated by Griselda Matias. LABORATORY TESTS:  Recent Results (from the past 12 hour(s))   CBC WITH AUTOMATED DIFF    Collection Time: 04/21/17 11:09 AM   Result Value Ref Range    WBC 7.6 3.6 - 11.0 K/uL    RBC 3.78 (L) 3.80 - 5.20 M/uL    HGB 11.2 (L) 11.5 - 16.0 g/dL    HCT 33.7 (L) 35.0 - 47.0 %    MCV 89.2 80.0 - 99.0 FL    MCH 29.6 26.0 - 34.0 PG    MCHC 33.2 30.0 - 36.5 g/dL    RDW 14.4 11.5 - 14.5 %    PLATELET 790 416 - 365 K/uL    NEUTROPHILS 69 32 - 75 %    LYMPHOCYTES 22 12 - 49 %    MONOCYTES 5 5 - 13 %    EOSINOPHILS 4 0 - 7 %    BASOPHILS 0 0 - 1 %    ABS. NEUTROPHILS 5.2 1.8 - 8.0 K/UL    ABS. LYMPHOCYTES 1.7 0.8 - 3.5 K/UL    ABS. MONOCYTES 0.4 0.0 - 1.0 K/UL    ABS. EOSINOPHILS 0.3 0.0 - 0.4 K/UL    ABS.  BASOPHILS 0.0 0.0 - 0.1 K/UL   METABOLIC PANEL, COMPREHENSIVE    Collection Time: 04/21/17 11:09 AM   Result Value Ref Range    Sodium 143 136 - 145 mmol/L    Potassium 3.2 (L) 3.5 - 5.1 mmol/L    Chloride 107 97 - 108 mmol/L    CO2 25 21 - 32 mmol/L    Anion gap 11 5 - 15 mmol/L    Glucose 107 (H) 65 - 100 mg/dL    BUN 7 6 - 20 MG/DL    Creatinine 0.89 0.55 - 1.02 MG/DL    BUN/Creatinine ratio 8 (L) 12 - 20      GFR est AA >60 >60 ml/min/1.73m2    GFR est non-AA >60 >60 ml/min/1.73m2    Calcium 9.9 8.5 - 10.1 MG/DL    Bilirubin, total 0.6 0.2 - 1.0 MG/DL    ALT (SGPT) 45 12 - 78 U/L    AST (SGOT) 12 (L) 15 - 37 U/L    Alk. phosphatase 77 45 - 117 U/L    Protein, total 7.6 6.4 - 8.2 g/dL    Albumin 3.8 3.5 - 5.0 g/dL    Globulin 3.8 2.0 - 4.0 g/dL    A-G Ratio 1.0 (L) 1.1 - 2.2         MEDICATIONS GIVEN:  Medications   sodium chloride (NS) flush 5-10 mL (not administered)   sodium chloride (NS) flush 5-10 mL (not administered)   sodium chloride 0.9 % bolus infusion 1,000 mL (1,000 mL IntraVENous New Bag 4/21/17 1120)   potassium chloride (K-DUR, KLOR-CON) SR tablet 40 mEq (not administered)   LORazepam (ATIVAN) injection 1 mg (1 mg IntraVENous Given 4/21/17 1121)       IMPRESSION:  1. Status post hysterectomy    2. Hypokalemia    3. Anxiety    4. Medication refill        PLAN:  1. Discharge home. Current Discharge Medication List      START taking these medications    Details   potassium chloride SR (KLOR-CON 10) 10 mEq tablet Take 1 Tab by mouth daily for 10 days. Qty: 10 Tab, Refills: 0      ALPRAZolam (XANAX) 0.5 mg tablet Take 1 Tab by mouth every eight (8) hours as needed for Anxiety for up to 15 doses. Max Daily Amount: 1.5 mg.  Qty: 15 Tab, Refills: 0           2. Follow-up Information     Follow up With Details Comments Via [address], [address]  [address] [facility]  [phone]      [facility] EMERGENCY DEPT  If symptoms worsen [address]  [address]  [phone]        3. Return to ED if worse       DISCHARGE NOTE:  12:02 PM  Patient's results have been reviewed with them. Patient and/or family have verbally conveyed their understanding and agreement of the patient's signs, symptoms, diagnosis, treatment and prognosis and additionally agree to follow up as recommended or return to the Emergency Room should their condition change prior to follow-up.  Discharge instructions have also been provided to the patient with some educational information regarding their diagnosis as well a list of reasons why they would want to return to the ER prior to their follow-up appointment should their condition change. This note is prepared by Patrick Easley, acting as Scribe for [employer]: The scribe's documentation has been prepared under my direction and personally reviewed by me in its entirety. I confirm that the note above accurately reflects all work, treatment, procedures, and medical decision making performed by me. This note will not be viewable in [address]

## 2017-04-23 LAB
BACTERIA SPEC CULT: ABNORMAL
CC UR VC: ABNORMAL
SERVICE CMNT-IMP: ABNORMAL

## 2017-04-28 ENCOUNTER — OFFICE VISIT (OUTPATIENT)
Dept: INTERNAL MEDICINE CLINIC | Age: 52
End: 2017-04-28

## 2017-04-28 VITALS
HEIGHT: 69 IN | SYSTOLIC BLOOD PRESSURE: 106 MMHG | HEART RATE: 65 BPM | TEMPERATURE: 97.8 F | WEIGHT: 208 LBS | DIASTOLIC BLOOD PRESSURE: 71 MMHG | BODY MASS INDEX: 30.81 KG/M2 | RESPIRATION RATE: 16 BRPM | OXYGEN SATURATION: 100 %

## 2017-04-28 DIAGNOSIS — T83.511A URINARY TRACT INFECTION ASSOCIATED WITH CATHETERIZATION OF URINARY TRACT, UNSPECIFIED INDWELLING URINARY CATHETER TYPE, INITIAL ENCOUNTER (HCC): Primary | ICD-10-CM

## 2017-04-28 DIAGNOSIS — E78.5 HYPERLIPIDEMIA, UNSPECIFIED HYPERLIPIDEMIA TYPE: ICD-10-CM

## 2017-04-28 DIAGNOSIS — R73.02 IGT (IMPAIRED GLUCOSE TOLERANCE): ICD-10-CM

## 2017-04-28 DIAGNOSIS — E03.4 HYPOTHYROIDISM DUE TO ACQUIRED ATROPHY OF THYROID: ICD-10-CM

## 2017-04-28 DIAGNOSIS — R42 DIZZINESS: ICD-10-CM

## 2017-04-28 DIAGNOSIS — E87.6 HYPOKALEMIA: ICD-10-CM

## 2017-04-28 DIAGNOSIS — N39.0 URINARY TRACT INFECTION ASSOCIATED WITH CATHETERIZATION OF URINARY TRACT, UNSPECIFIED INDWELLING URINARY CATHETER TYPE, INITIAL ENCOUNTER (HCC): Primary | ICD-10-CM

## 2017-04-28 DIAGNOSIS — E55.9 VITAMIN D DEFICIENCY: ICD-10-CM

## 2017-04-28 DIAGNOSIS — F41.9 ANXIETY: ICD-10-CM

## 2017-04-28 DIAGNOSIS — M54.16 LUMBAR RADICULOPATHY: ICD-10-CM

## 2017-04-28 RX ORDER — NITROFURANTOIN 25; 75 MG/1; MG/1
100 CAPSULE ORAL 2 TIMES DAILY
Qty: 14 CAP | Refills: 0 | Status: SHIPPED | OUTPATIENT
Start: 2017-04-28 | End: 2017-05-05

## 2017-04-28 RX ORDER — ALPRAZOLAM 0.5 MG/1
0.5 TABLET ORAL
Qty: 15 TAB | Refills: 0 | Status: SHIPPED | OUTPATIENT
Start: 2017-04-28 | End: 2017-11-20 | Stop reason: SDUPTHER

## 2017-04-28 RX ORDER — SERTRALINE HYDROCHLORIDE 100 MG/1
100 TABLET, FILM COATED ORAL DAILY
Qty: 30 TAB | Refills: 5 | Status: SHIPPED | OUTPATIENT
Start: 2017-04-28 | End: 2017-10-03 | Stop reason: SDUPTHER

## 2017-04-28 NOTE — PATIENT INSTRUCTIONS
Learning About Living [place]  What is a living will? A living will is a legal form you use to write down the kind of care you want at the end of your life. It is used by the health professionals who will treat you if you aren't able to decide for yourself. If you put your wishes in writing, your loved ones and others will know what kind of care you want. They won't need to guess. This can ease your mind and be helpful to others. A living will is not the same as an estate or property will. An estate will explains what you want to happen with your money and property after you die. Is a living will a legal document? A living will is a legal document. Each state has its own laws about living [place]. If you move to another state, make sure that your living will is legal in the state where you now live. Or you might use a universal form that has been approved by many states. This kind of form can sometimes be completed and stored online. Your electronic copy will then be available wherever you have a connection to the Internet. In most cases, doctors will respect your wishes even if you have a form from a different state. · You don't need an  to complete a living will. But legal advice can be helpful if your state's laws are unclear, your health history is complicated, or your family can't agree on what should be in your living will. · You can change your living will at any time. Some people find that their wishes about end-of-life care change as their health changes. · In addition to making a living will, think about completing a medical power of  form. This form lets you name the person you want to make end-of-life treatment decisions for you (your \"health care agent\") if you're not able to. Many hospitals and nursing homes will give you the forms you need to complete a living will and a medical power of .   · Your living will is used only if you can't make or communicate decisions for yourself anymore. If you become able to make decisions again, you can accept or refuse any treatment, no matter what you wrote in your living will. · Your state may offer an online registry. This is a place where you can store your living will online so the doctors and nurses who need to treat you can find it right away. What should you think about when creating a living will? Talk about your end-of-life wishes with your family members and your doctor. Let them know what you want. That way the people making decisions for you won't be surprised by your choices. Think about these questions as you make your living will:  · Do you know enough about life support methods that might be used? If not, talk to your doctor so you know what might be done if you can't breathe on your own, your heart stops, or you're unable to swallow. · What things would you still want to be able to do after you receive life-support methods? Would you want to be able to walk? To speak? To eat on your own? To live without the help of machines? · If you have a choice, where do you want to be cared for? In your home? At a hospital or nursing home? · Do you want certain [employer] practices performed if you become very ill? · If you have a choice at the end of your life, where would you prefer to die? At home? In a hospital or nursing home? Somewhere else? · Would you prefer to be buried or cremated? · Do you want your organs to be donated after you die? What should you do with your living will? · Make sure that your family members and your health care agent have copies of your living will. · Give your doctor a copy of your living will to keep in your medical record. If you have more than one doctor, make sure that each one has a copy. · You may want to put a copy of your living will where it can be easily found. Where can you learn more? Go to http://viviane-misael.info/.   Enter O395 in the search box to learn more about \"Learning About Living [place] [employer]. \"  Current as of: February 24, 2016  Content Version: 11.2  © 8313-8716 [employer]. Care instructions adapted under license by [employer] (which disclaims liability or warranty for this information). If you have questions about a medical condition or this instruction, always ask your healthcare professional. [address] any warranty or liability for your use of this information.

## 2017-04-28 NOTE — PROGRESS NOTES
HISTORY OF PRESENT ILLNESS  Paul Marie is a 47 y.o. female. HPI  Presents for f/u ER visit    Pt reports anxiety after hysterectomy    Then, felt dizziness, faint, and weak  Seen in ER and dx dehydration, hypokalemia, and anxiety  Given IVF, KCl    No known bleeding    GYN surgeon Rx'd flagyl yest for +/- post surgical BV    Pt acknowledged mild urinary sx at this time  Mild dysuria    Pt had back pain  Seen in the ER  Referred to back specialist - scheduled May 9th     Off zoloft x 1 yr  But, increased anxiety since surgery  Took xanax bid in the past week  But, restarted zoloft since surgery  Mood a little better at this point    Past medical, Social, and Family history reviewed  Medications reviewed and updated. ROS  Complete ROS reviewed and negative or stable except as noted in HPI. Physical Exam   Constitutional: She is oriented to person, place, and time. She appears well-nourished. No distress. HENT:   Head: Normocephalic and atraumatic. Eyes: EOM are normal. Pupils are equal, round, and reactive to light. No scleral icterus. Neck: Normal range of motion. Neck supple. No JVD present. Cardiovascular: Normal rate, regular rhythm and normal heart sounds. Exam reveals no gallop and no friction rub. No murmur heard. Pulmonary/Chest: Effort normal and breath sounds normal. No respiratory distress. She has no wheezes. She has no rales. Abdominal: Soft. She exhibits no distension. There is no tenderness. Musculoskeletal: Normal range of motion. She exhibits no edema. Lymphadenopathy:     She has no cervical adenopathy. Neurological: She is alert and oriented to person, place, and time. She exhibits normal muscle tone. Skin: Skin is warm. No rash noted. Psychiatric: She has a normal mood and affect. Nursing note and vitals reviewed. Prior labs reviewed. Reviewed ER notes  Reviewed prior imaging reports    ASSESSMENT and PLAN    ICD-10-CM ICD-9-CM    1.  Urinary tract infection associated with catheterization of urinary tract, unspecified indwelling urinary catheter type, initial encounter ([facility] [address] T83.511A 996.64 nitrofurantoin, macrocrystal-monohydrate, (MACROBID) 100 mg capsule    N39.0 599.0 CBC WITH AUTOMATED DIFF   2. Dizziness R42 780.4    3. Vitamin D deficiency E55.9 268.9 VITAMIN D, 25 HYDROXY   4. IGT (impaired glucose tolerance) R73.02 790.22 HEMOGLOBIN A1C WITH EAG      METABOLIC PANEL, COMPREHENSIVE   5. Hypothyroidism due to acquired atrophy of thyroid E03.4 244.8 T4, FREE     246.8 TSH 3RD GENERATION   6. Anxiety F41.9 300.00    7. Hyperlipidemia, unspecified hyperlipidemia type E78.5 272.4 CK      LIPID PANEL      METABOLIC PANEL, COMPREHENSIVE   8. Lumbar radiculopathy M54.16 724.4    9. Hypokalemia E87.6 276.8 MAGNESIUM      METABOLIC PANEL, COMPREHENSIVE     Follow-up Disposition:  Return in about 1 month (around 5/28/2017) for mood, cholesterol.   results and schedule of future studies reviewed with patient  reviewed diet, exercise and weight    cardiovascular risk and specific lipid/LDL goals reviewed  reviewed medications and side effects in detail   macrobid x 7 days  Complete flagyl too  Encouraged to continue zoloft  Limit xanax  Return for fasting labs to include K and mag measurement

## 2017-04-28 NOTE — PROGRESS NOTES
RM 13    Chief Complaint   Patient presents with   [facility] Follow Up     ED [facility] 4/21/17       1. Have you been to the ER, urgent care clinic since your last visit? Hospitalized since your last visit? Yes Where: ED [facility]    2. Have you seen or consulted any other health care providers outside of the [address] [place] [address] since your last visit? Include any pap smears or colon screening. There are no preventive care reminders to display for this patient.   Living Will sent to pt [employer]

## 2017-04-28 NOTE — MR AVS SNAPSHOT
Visit Information Date & Time Provider Department Dept. Phone Encounter #  
 4/28/2017  4:15 PM Mikayla Peter, [address] [place] [address] and Internal Medicine [phone] [id number] Follow-up Instructions Return in about 1 month (around 5/28/2017) for mood, cholesterol. Upcoming Health Maintenance Date Due  
 BREAST CANCER SCRN MAMMOGRAM 6/26/2018 PAP AKA CERVICAL CYTOLOGY 3/17/2019 DTaP/Tdap/Td series (2 - Td) 6/17/2024 COLONOSCOPY 9/13/2026 Allergies as of 4/28/2017  Review Complete On: 4/28/2017 By: Mikayla Peter MD  
 No Known Allergies Current Immunizations  Reviewed on 10/27/2016 Name Date Tdap 6/17/2014 Not reviewed this visit You Were Diagnosed With   
  
 Codes Comments Urinary tract infection associated with catheterization of urinary tract, unspecified indwelling urinary catheter type, initial encounter ([facility]    -  Primary ICD-10-CM: T83.511A, N39.0 ICD-9-CM: 996.64, 599.0 Dizziness     ICD-10-CM: W48 ICD-9-CM: 780.4 Vitamin D deficiency     ICD-10-CM: E55.9 ICD-9-CM: 268.9 IGT (impaired glucose tolerance)     ICD-10-CM: R73.02 
ICD-9-CM: 790.22 Hypothyroidism due to acquired atrophy of thyroid     ICD-10-CM: E03.4 ICD-9-CM: 244.8, 246.8 Anxiety     ICD-10-CM: F41.9 ICD-9-CM: 300.00 Hyperlipidemia, unspecified hyperlipidemia type     ICD-10-CM: E78.5 ICD-9-CM: 272.4 Lumbar radiculopathy     ICD-10-CM: M54.16 
ICD-9-CM: 724.4 Hypokalemia     ICD-10-CM: E87.6 ICD-9-CM: 276.8 Vitals BP Pulse Temp Resp Height(growth percentile) Weight(growth percentile) 106/71 (BP 1 Location: Left arm, BP Patient Position: Sitting) 65 97.8 °F (36.6 °C) (Oral) 16 5' 9\" (1.753 m) 208 lb (94.3 kg) LMP SpO2 BMI OB Status Smoking Status 09/08/2016 (Approximate) 100% 30.72 kg/m2 Hysterectomy Never Smoker BMI and BSA Data Body Mass Index Body Surface Area  30.72 kg/m 2 2.14 m 2  
  
  
 Preferred Pharmacy Pharmacy Name Phone [facility]/PHARMACY #[id number] [address] [phone] Your Updated Medication List  
  
   
This list is accurate as of: 4/28/17  5:36 PM.  Always use your most recent med list.  
  
  
  
  
 albuterol 90 mcg/actuation inhaler Commonly known as:  PROVENTIL HFA, VENTOLIN HFA, PROAIR HFA Take 2 Puffs by inhalation as needed for Wheezing. ALPRAZolam 0.5 mg tablet Commonly known as:  Sheldon Barks Take 1 Tab by mouth every eight (8) hours as needed for Anxiety for up to 15 doses. Max Daily Amount: 1.5 mg. ASTELIN 137 mcg (0.1 %) nasal spray Generic drug:  azelastine 1 Spray by Both Nostrils route as needed for Rhinitis. Use in each nostril as directed FLONASE 50 mcg/actuation nasal spray Generic drug:  fluticasone 2 Sprays by Both Nostrils route as needed for Rhinitis. ibuprofen 600 mg tablet Commonly known as:  MOTRIN Take 1 Tab by mouth every six (6) hours as needed for Pain. nitrofurantoin (macrocrystal-monohydrate) 100 mg capsule Commonly known as:  MACROBID Take 1 Cap by mouth two (2) times a day for 7 days. omeprazole 20 mg capsule Commonly known as:  PRILOSEC Take 20 mg by mouth daily. oxyCODONE-acetaminophen 5-325 mg per tablet Commonly known as:  PERCOCET Take 1 Tab by mouth every four (4) hours as needed for Pain. Max Daily Amount: 6 Tabs. potassium chloride SR 10 mEq tablet Commonly known as:  KLOR-CON 10 Take 1 Tab by mouth daily for 10 days. pravastatin 20 mg tablet Commonly known as:  PRAVACHOL  
TAKE 1 TAB BY MOUTH DAILY. sertraline 100 mg tablet Commonly known as:  ZOLOFT Take 1 Tab by mouth daily. SYNTHROID 100 mcg tablet Generic drug:  levothyroxine TAKE 1 TABLET BY MOUTH EVERY DAY BEFORE BREAKFAST ON A EMPTY STOMACH  
  
 VITAMIN D3 1,000 unit Cap Generic drug:  cholecalciferol TAKE 3 CAPSULES DAILY Prescriptions Printed Refills ALPRAZolam (XANAX) 0.5 mg tablet 0 Sig: Take 1 Tab by mouth every eight (8) hours as needed for Anxiety for up to 15 doses. Max Daily Amount: 1.5 mg.  
 Class: Print Route: Oral  
  
Prescriptions Sent to Pharmacy Refills  
 nitrofurantoin, macrocrystal-monohydrate, (MACROBID) 100 mg capsule 0 Sig: Take 1 Cap by mouth two (2) times a day for 7 days. Class: Normal  
 Pharmacy: [facility]/pharmacy #[id number] Ph #: [phone] Route: Oral  
 sertraline (ZOLOFT) 100 mg tablet 5 Sig: Take 1 Tab by mouth daily. Class: Normal  
 [facility] Ph #: [phone] Route: Oral  
  
Follow-up Instructions Return in about 1 month (around 5/28/2017) for mood, cholesterol. To-Do List   
 Around 05/01/2017 Lab:  CBC WITH AUTOMATED DIFF Around 05/01/2017 Lab:  CK Around 05/01/2017 Lab:  HEMOGLOBIN A1C WITH EAG Around 05/01/2017 Lab:  LIPID PANEL Around 05/01/2017 Lab:  MAGNESIUM Around 05/01/2017 Lab:  METABOLIC PANEL, COMPREHENSIVE Around 05/01/2017 Lab:  T4, FREE Around 05/01/2017 Lab:  TSH 3RD GENERATION Around 05/01/2017 Lab:  VITAMIN D, 25 HYDROXY Patient Instructions Barbara Eagle 4[address] What is a living will? A living will is a legal form you use to write down the kind of care you want at the end of your life. It is used by the health professionals who will treat you if you aren't able to decide for yourself. If you put your wishes in writing, your loved ones and others will know what kind of care you want. They won't need to guess. This can ease your mind and be helpful to others. A living will is not the same as an estate or property will.  An estate will explains what you want to happen with your money and property after you die. Is a living will a legal document? A living will is a legal document. Each state has its own laws about living [place]. If you move to another state, make sure that your living will is legal in the state where you now live. Or you might use a universal form that has been approved by many states. This kind of form can sometimes be completed and stored online. Your electronic copy will then be available wherever you have a connection to the Internet. In most cases, doctors will respect your wishes even if you have a form from a different state. · You don't need an  to complete a living will. But legal advice can be helpful if your state's laws are unclear, your health history is complicated, or your family can't agree on what should be in your living will. · You can change your living will at any time. Some people find that their wishes about end-of-life care change as their health changes. · In addition to making a living will, think about completing a medical power of  form. This form lets you name the person you want to make end-of-life treatment decisions for you (your \"health care agent\") if you're not able to. Many hospitals and nursing homes will give you the forms you need to complete a living will and a medical power of . · Your living will is used only if you can't make or communicate decisions for yourself anymore. If you become able to make decisions again, you can accept or refuse any treatment, no matter what you wrote in your living will. · Your state may offer an online registry. This is a place where you can store your living will online so the doctors and nurses who need to treat you can find it right away. What should you think about when creating a living will?  
Talk about your end-of-life wishes with your family members and your doctor. Let them know what you want. That way the people making decisions for you won't be surprised by your choices. Think about these questions as you make your living will: · Do you know enough about life support methods that might be used? If not, talk to your doctor so you know what might be done if you can't breathe on your own, your heart stops, or you're unable to swallow. · What things would you still want to be able to do after you receive life-support methods? Would you want to be able to walk? To speak? To eat on your own? To live without the help of machines? · If you have a choice, where do you want to be cared for? In your home? At a hospital or nursing home? · Do you want certain Jainism practices performed if you become very ill? · If you have a choice at the end of your life, where would you prefer to die? At home? In a hospital or nursing home? Somewhere else? · Would you prefer to be buried or cremated? · Do you want your organs to be donated after you die? What should you do with your living will? · Make sure that your family members and your health care agent have copies of your living will. · Give your doctor a copy of your living will to keep in your medical record. If you have more than one doctor, make sure that each one has a copy. · You may want to put a copy of your living will where it can be easily found. Where can you learn more? Go to http://viviane-misael.info/. Enter F030 in the search box to learn more about \"Learning About Living [place]. \" Current as of: February 24, 2016 Content Version: 11.2 © 1658-3758 [employer]. Care instructions adapted under license by [employer] (which disclaims liability or warranty for this information).  If you have questions about a medical condition or this instruction, always ask your healthcare professional. [address]  any warranty or liability for your use of this information. Introducing [facility]! Jose Luis Ruiz introduces VYou patient portal. Now you can access parts of your medical record, email your doctor's office, and request medication refills online. 1. In your internet browser, go to [email or web address] 2. Click on the First Time User? Click Here link in the Sign In box. You will see the New Member Sign Up page. 3. Enter your VYou Access Code exactly as it appears below. You will not need to use this code after youve completed the sign-up process. If you do not sign up before the expiration date, you must request a new code. · VYou Access Code: [email or web address] Expires: 5/30/2017 12:05 PM 
 
4. Enter the last four digits of your Social Security Number (xxxx) and Date of Birth (mm/dd/yyyy) as indicated and click Submit. You will be taken to the next sign-up page. 5. Create a VYou ID. This will be your VYou login ID and cannot be changed, so think of one that is secure and easy to remember. 6. Create a VYou password. You can change your password at any time. 7. Enter your Password Reset Question and Answer. This can be used at a later time if you forget your password. 8. Enter your e-mail address. You will receive e-mail notification when new information is available in [address] 9. Click Sign Up. You can now view and download portions of your medical record. 10. Click the Download Summary menu link to download a portable copy of your medical information. If you have questions, please visit the Frequently Asked Questions section of the VYou website. Remember, VYou is NOT to be used for urgent needs. For medical emergencies, dial 911. Now available from your iPhone and Android! Please provide this summary of care documentation to your next provider. Your primary care clinician is listed as [address]  If you have any questions after today's visit, please call [phone].

## 2017-05-05 DIAGNOSIS — K27.9 PUD (PEPTIC ULCER DISEASE): ICD-10-CM

## 2017-05-05 RX ORDER — OMEPRAZOLE 20 MG/1
CAPSULE, DELAYED RELEASE ORAL
Qty: 30 CAP | Refills: 2 | Status: SHIPPED | OUTPATIENT
Start: 2017-05-05 | End: 2017-05-23

## 2017-05-23 ENCOUNTER — HOSPITAL ENCOUNTER (EMERGENCY)
Age: 52
Discharge: HOME | End: 2017-05-23
Attending: EMERGENCY MEDICINE
Payer: MEDICAID

## 2017-05-23 VITALS
WEIGHT: 200.4 LBS | RESPIRATION RATE: 16 BRPM | BODY MASS INDEX: 29.68 KG/M2 | SYSTOLIC BLOOD PRESSURE: 130 MMHG | HEIGHT: 69 IN | OXYGEN SATURATION: 97 % | HEART RATE: 105 BPM | DIASTOLIC BLOOD PRESSURE: 79 MMHG | TEMPERATURE: 97.5 F

## 2017-05-23 DIAGNOSIS — R10.13 ABDOMINAL PAIN, EPIGASTRIC: Primary | ICD-10-CM

## 2017-05-23 LAB
ALBUMIN SERPL BCP-MCNC: 4.2 G/DL (ref 3.5–5)
ALBUMIN/GLOB SERPL: 1.1 {RATIO} (ref 1.1–2.2)
ALP SERPL-CCNC: 82 U/L (ref 45–117)
ALT SERPL-CCNC: 27 U/L (ref 12–78)
ANION GAP BLD CALC-SCNC: 9 MMOL/L (ref 5–15)
APPEARANCE UR: CLEAR
AST SERPL W P-5'-P-CCNC: 14 U/L (ref 15–37)
BACTERIA URNS QL MICRO: NEGATIVE /HPF
BASOPHILS # BLD AUTO: 0.1 K/UL (ref 0–0.1)
BASOPHILS # BLD: 1 % (ref 0–1)
BILIRUB SERPL-MCNC: 0.5 MG/DL (ref 0.2–1)
BILIRUB UR QL: NEGATIVE
BUN SERPL-MCNC: 8 MG/DL (ref 6–20)
BUN/CREAT SERPL: 7 (ref 12–20)
CALCIUM SERPL-MCNC: 9.6 MG/DL (ref 8.5–10.1)
CHLORIDE SERPL-SCNC: 107 MMOL/L (ref 97–108)
CO2 SERPL-SCNC: 26 MMOL/L (ref 21–32)
COLOR UR: ABNORMAL
CREAT SERPL-MCNC: 1.09 MG/DL (ref 0.55–1.02)
DIFFERENTIAL METHOD BLD: NORMAL
EOSINOPHIL # BLD: 0.1 K/UL (ref 0–0.4)
EOSINOPHIL NFR BLD: 1 % (ref 0–7)
EPITH CASTS URNS QL MICRO: ABNORMAL /LPF
ERYTHROCYTE [DISTWIDTH] IN BLOOD BY AUTOMATED COUNT: 14.2 % (ref 11.5–14.5)
GLOBULIN SER CALC-MCNC: 3.8 G/DL (ref 2–4)
GLUCOSE SERPL-MCNC: 92 MG/DL (ref 65–100)
GLUCOSE UR STRIP.AUTO-MCNC: NEGATIVE MG/DL
HCT VFR BLD AUTO: 38.2 % (ref 35–47)
HGB BLD-MCNC: 13 G/DL (ref 11.5–16)
HGB UR QL STRIP: ABNORMAL
HYALINE CASTS URNS QL MICRO: ABNORMAL /LPF (ref 0–5)
KETONES UR QL STRIP.AUTO: NEGATIVE MG/DL
LEUKOCYTE ESTERASE UR QL STRIP.AUTO: ABNORMAL
LIPASE SERPL-CCNC: 211 U/L (ref 73–393)
LYMPHOCYTES # BLD AUTO: 22 % (ref 12–49)
LYMPHOCYTES # BLD: 1.2 K/UL (ref 0.8–3.5)
MAGNESIUM SERPL-MCNC: 2.4 MG/DL (ref 1.6–2.4)
MCH RBC QN AUTO: 30.4 PG (ref 26–34)
MCHC RBC AUTO-ENTMCNC: 34 G/DL (ref 30–36.5)
MCV RBC AUTO: 89.5 FL (ref 80–99)
MONOCYTES # BLD: 0.5 K/UL (ref 0–1)
MONOCYTES NFR BLD AUTO: 9 % (ref 5–13)
NEUTS SEG # BLD: 3.7 K/UL (ref 1.8–8)
NEUTS SEG NFR BLD AUTO: 67 % (ref 32–75)
NITRITE UR QL STRIP.AUTO: NEGATIVE
PH UR STRIP: 5.5 [PH] (ref 5–8)
PLATELET # BLD AUTO: 221 K/UL (ref 150–400)
PLATELET COMMENTS,PCOM: NORMAL
POTASSIUM SERPL-SCNC: 3.8 MMOL/L (ref 3.5–5.1)
PROT SERPL-MCNC: 8 G/DL (ref 6.4–8.2)
PROT UR STRIP-MCNC: NEGATIVE MG/DL
RBC # BLD AUTO: 4.27 M/UL (ref 3.8–5.2)
RBC #/AREA URNS HPF: ABNORMAL /HPF (ref 0–5)
RBC MORPH BLD: NORMAL
SODIUM SERPL-SCNC: 142 MMOL/L (ref 136–145)
SP GR UR REFRACTOMETRY: 1.01 (ref 1–1.03)
UA: UC IF INDICATED,UAUC: ABNORMAL
UROBILINOGEN UR QL STRIP.AUTO: 0.2 EU/DL (ref 0.2–1)
WBC # BLD AUTO: 5.6 K/UL (ref 3.6–11)
WBC URNS QL MICRO: ABNORMAL /HPF (ref 0–4)

## 2017-05-23 PROCEDURE — 80053 COMPREHEN METABOLIC PANEL: CPT | Performed by: EMERGENCY MEDICINE

## 2017-05-23 PROCEDURE — 96360 HYDRATION IV INFUSION INIT: CPT

## 2017-05-23 PROCEDURE — 74011250636 HC RX REV CODE- 250/636: Performed by: EMERGENCY MEDICINE

## 2017-05-23 PROCEDURE — 85025 COMPLETE CBC W/AUTO DIFF WBC: CPT | Performed by: EMERGENCY MEDICINE

## 2017-05-23 PROCEDURE — 83735 ASSAY OF MAGNESIUM: CPT | Performed by: EMERGENCY MEDICINE

## 2017-05-23 PROCEDURE — 83690 ASSAY OF LIPASE: CPT | Performed by: EMERGENCY MEDICINE

## 2017-05-23 PROCEDURE — 87086 URINE CULTURE/COLONY COUNT: CPT | Performed by: EMERGENCY MEDICINE

## 2017-05-23 PROCEDURE — 87186 SC STD MICRODIL/AGAR DIL: CPT | Performed by: EMERGENCY MEDICINE

## 2017-05-23 PROCEDURE — 99283 EMERGENCY DEPT VISIT LOW MDM: CPT

## 2017-05-23 PROCEDURE — 81001 URINALYSIS AUTO W/SCOPE: CPT | Performed by: EMERGENCY MEDICINE

## 2017-05-23 PROCEDURE — 36415 COLL VENOUS BLD VENIPUNCTURE: CPT | Performed by: EMERGENCY MEDICINE

## 2017-05-23 PROCEDURE — 87077 CULTURE AEROBIC IDENTIFY: CPT | Performed by: EMERGENCY MEDICINE

## 2017-05-23 RX ORDER — HYDROGEN PEROXIDE 3 %
20 SOLUTION, NON-ORAL MISCELLANEOUS DAILY
Qty: 20 CAP | Refills: 0 | Status: SHIPPED | OUTPATIENT
Start: 2017-05-23 | End: 2017-05-24 | Stop reason: SDUPTHER

## 2017-05-23 RX ORDER — SUCRALFATE 1 G/1
1 TABLET ORAL 4 TIMES DAILY
Qty: 28 TAB | Refills: 0 | Status: SHIPPED | OUTPATIENT
Start: 2017-05-23 | End: 2017-08-07

## 2017-05-23 RX ADMIN — SODIUM CHLORIDE 500 ML: 900 INJECTION, SOLUTION INTRAVENOUS at 03:24

## 2017-05-23 NOTE — ED NOTES
No apparent distress, resting quietly, family member at bedside. Pt reports no needs or complaints at this time. Will continue to monitor. Call bell in reach.

## 2017-05-23 NOTE — ED NOTES
Patient presents to ED with C/O abd burning and dysuria that started last night. The pt denies N/V/D, fever, chills. Patient is A&Ox3, call bell w/in reach, and aware of plan of care. The patient is in NAD. Male  at the bedside.

## 2017-05-23 NOTE — DISCHARGE INSTRUCTIONS

## 2017-05-23 NOTE — ED NOTES
Verbal shift change report given to Abdi Ricketts (oncoming nurse) by Lynda Fields RN (offgoing nurse). Report included the following information SBAR, Kardex, ED Summary and MAR.

## 2017-05-23 NOTE — ED PROVIDER NOTES
HPI Comments: Luis Romeo is a 47 y.o. female with PMhx significant for PUD, anxiety/depression, HLD, GERD, and hypothyroidism who presents ambulatory to the ED c/o an acute onset of intermittent, 7/10, burning, cramping diffuse abd pain greater on the left side than the right x yesterday afternoon. She reports associated sx of dysuria and constipation noting her LBM was yesterday. The pt discloses that she had an endoscopy done in the recent past with negative etiology and was placed on Omeprazole for her sx which she notes has not helped and she would like to try something different. She reports a h/o similar sx but denies any known h/o diverticulitis. She specifically denies any nausea, vomiting, fever, hematochezia, or hematuria. PCP: Brenda Mcrae MD  GI: Sofiya Espinal MD    Surgical Hx: (+) cholecystectomy, partial hysterectomy      There are no other complaints, changes or physical findings at this time. Written by Titus Gill ED Scribchanda, as dictated by Mackenzie Penn MD     The history is provided by the patient. No  was used.         Past Medical History:   Diagnosis Date    Acid reflux     Adverse effect of anesthesia     woke up coughing    Allergic rhinitis     Anxiety     Arthritis     GERD (gastroesophageal reflux disease)     History of nonchemical tubal occlusion     Esure devices    Hyperlipidemia 2/20/2014    Hypothyroidism     HYPO    IGT (impaired glucose tolerance)     Ill-defined condition     prolapse uterus/states thus her intestines has collpased a little bit    Psychiatric disorder     anxiety and depression    PUD (peptic ulcer disease)     no EGD    Routine gynecological examination     [facility]    Sinus disorder     Tinea pedis        Past Surgical History:   Procedure Laterality Date    COLONOSCOPY N/A 9/13/2016    COLONOSCOPY / EGD  performed by Sofiya Espinal MD at [address] HX GYN  10/12/2012    [facility] LAP CHOLECYSTECTOMY  10/06/2016    HX TOTAL VAGINAL HYSTERECTOMY      DC REMOVAL OF OVARIAN CYST(S)           Family History:   Problem Relation Age of Onset    Hypertension Mother     Diabetes Mother     Heart Disease Mother     Heart Attack Mother     Cancer Father      lung    Other Sister      cholecystectomy    Cancer Brother      lung    Diabetes Maternal Aunt     Cancer Paternal Uncle      lung    Diabetes Maternal Aunt     Diabetes Maternal Aunt     Diabetes Maternal Aunt     Diabetes Maternal Aunt     Diabetes Maternal Aunt     Heart Attack Daughter 32    Other Son      narcolepsy/GERD (part of esophagus removed d/t this)    Cancer Paternal Uncle      lung    Anesth Problems Neg Hx        Social History     Social History    Marital status: SINGLE     Spouse name: N/A    Number of children: N/A    Years of education: N/A     Occupational History    Not on file. Social History Main Topics    Smoking status: Never Smoker    Smokeless tobacco: Never Used    Alcohol use No    Drug use: No    Sexual activity: Yes     Partners: Male     Birth control/ protection: Implant     Other Topics Concern    Not on file     Social History Narrative         ALLERGIES: Review of patient's allergies indicates no known allergies. Review of Systems   Constitutional: Negative for appetite change, chills, diaphoresis, fever and unexpected weight change. HENT: Negative for rhinorrhea and sore throat. Eyes: Negative for pain. Respiratory: Negative for cough, shortness of breath, wheezing and stridor. Cardiovascular: Negative for chest pain, palpitations and leg swelling. Gastrointestinal: Positive for abdominal pain (cramping ) and constipation. Negative for blood in stool, diarrhea, nausea and vomiting. Genitourinary: Positive for dysuria. Negative for difficulty urinating, flank pain and hematuria. Musculoskeletal: Negative for myalgias and neck stiffness.    Skin: Negative for rash.   Neurological: Negative for seizures, syncope, weakness and light-headedness. Psychiatric/Behavioral: Negative for confusion. Patient Vitals for the past 12 hrs:   Temp Pulse Resp BP SpO2   05/23/17 0400 - - - 130/79 97 %   05/23/17 0315 - - - 137/78 99 %   05/23/17 0257 - - - - 100 %   05/23/17 0255 - - - 153/83 -   05/23/17 0030 97.5 °F (36.4 °C) (!) 105 16 (!) 180/98 100 %        Physical Exam   Nursing note and vitals reviewed. General appearance - well nourished, well appearing, and in no distress  Eyes - pupils equal and reactive, extraocular eye movements intact  ENT - mucous membranes moist, pharynx normal without lesions  Neck - supple, no significant adenopathy; non-tender to palpation  Chest - clear to auscultation, no wheezes, rales or rhonchi; non-tender to palpation  Heart - normal rate and regular rhythm, S1 and S2 normal, no murmurs noted  Abdomen - soft, nondistended, no masses or organomegaly, mild lower abdominal TTP   Musculoskeletal - no joint tenderness, deformity or swelling; normal ROM  Extremities - peripheral pulses normal, no pedal edema  Skin - normal coloration and turgor, no rashes  Neurological - alert, oriented x3, normal speech, no focal findings or movement disorder noted        MDM  Number of Diagnoses or Management Options  Abdominal pain, epigastric:   Diagnosis management comments: DDx: Gastritis, Pancreatitis, UTI, Constipation.         Amount and/or Complexity of Data Reviewed  Clinical lab tests: ordered and reviewed  Review and summarize past medical records: yes    Patient Progress  Patient progress: stable    ED Course       Procedures    LABORATORY TESTS:  Recent Results (from the past 12 hour(s))   URINALYSIS W/ REFLEX CULTURE    Collection Time: 05/23/17  3:00 AM   Result Value Ref Range    Color YELLOW/STRAW      Appearance CLEAR CLEAR      Specific gravity 1.008 1.003 - 1.030      pH (UA) 5.5 5.0 - 8.0      Protein NEGATIVE  NEG mg/dL    Glucose NEGATIVE  NEG mg/dL    Ketone NEGATIVE  NEG mg/dL    Bilirubin NEGATIVE  NEG      Blood TRACE (A) NEG      Urobilinogen 0.2 0.2 - 1.0 EU/dL    Nitrites NEGATIVE  NEG      Leukocyte Esterase SMALL (A) NEG      WBC 5-10 0 - 4 /hpf    RBC 0-5 0 - 5 /hpf    Epithelial cells FEW FEW /lpf    Bacteria NEGATIVE  NEG /hpf    UA:UC IF INDICATED URINE CULTURE ORDERED (A) CNI      Hyaline cast 0-2 0 - 5 /lpf   CBC WITH AUTOMATED DIFF    Collection Time: 05/23/17  3:00 AM   Result Value Ref Range    WBC 5.6 3.6 - 11.0 K/uL    RBC 4.27 3.80 - 5.20 M/uL    HGB 13.0 11.5 - 16.0 g/dL    HCT 38.2 35.0 - 47.0 %    MCV 89.5 80.0 - 99.0 FL    MCH 30.4 26.0 - 34.0 PG    MCHC 34.0 30.0 - 36.5 g/dL    RDW 14.2 11.5 - 14.5 %    PLATELET 780 609 - 167 K/uL    NEUTROPHILS 67 32 - 75 %    LYMPHOCYTES 22 12 - 49 %    MONOCYTES 9 5 - 13 %    EOSINOPHILS 1 0 - 7 %    BASOPHILS 1 0 - 1 %    ABS. NEUTROPHILS 3.7 1.8 - 8.0 K/UL    ABS. LYMPHOCYTES 1.2 0.8 - 3.5 K/UL    ABS. MONOCYTES 0.5 0.0 - 1.0 K/UL    ABS. EOSINOPHILS 0.1 0.0 - 0.4 K/UL    ABS. BASOPHILS 0.1 0.0 - 0.1 K/UL    PLATELET COMMENTS LARGE PLATELETS      RBC COMMENTS NORMOCYTIC, NORMOCHROMIC      DF SMEAR SCANNED     LIPASE    Collection Time: 05/23/17  3:00 AM   Result Value Ref Range    Lipase 211 73 - 155 U/L   METABOLIC PANEL, COMPREHENSIVE    Collection Time: 05/23/17  3:00 AM   Result Value Ref Range    Sodium 142 136 - 145 mmol/L    Potassium 3.8 3.5 - 5.1 mmol/L    Chloride 107 97 - 108 mmol/L    CO2 26 21 - 32 mmol/L    Anion gap 9 5 - 15 mmol/L    Glucose 92 65 - 100 mg/dL    BUN 8 6 - 20 MG/DL    Creatinine 1.09 (H) 0.55 - 1.02 MG/DL    BUN/Creatinine ratio 7 (L) 12 - 20      GFR est AA >60 >60 ml/min/1.73m2    GFR est non-AA 53 (L) >60 ml/min/1.73m2    Calcium 9.6 8.5 - 10.1 MG/DL    Bilirubin, total 0.5 0.2 - 1.0 MG/DL    ALT (SGPT) 27 12 - 78 U/L    AST (SGOT) 14 (L) 15 - 37 U/L    Alk.  phosphatase 82 45 - 117 U/L    Protein, total 8.0 6.4 - 8.2 g/dL    Albumin 4.2 3.5 - 5.0 g/dL    Globulin 3.8 2.0 - 4.0 g/dL    A-G Ratio 1.1 1.1 - 2.2     MAGNESIUM    Collection Time: 17  3:00 AM   Result Value Ref Range    Magnesium 2.4 1.6 - 2.4 mg/dL       MEDICATIONS GIVEN:  Medications   sodium chloride 0.9 % bolus infusion 500 mL (500 mL IntraVENous New Bag 17 0324)       IMPRESSION:  1. Abdominal pain, epigastric        PLAN:  1. Current Discharge Medication List      START taking these medications    Details   esomeprazole (NEXIUM) 20 mg capsule Take 1 Cap by mouth daily for 20 days. Qty: 20 Cap, Refills: 0      sucralfate (CARAFATE) 1 gram tablet Take 1 Tab by mouth four (4) times daily. Qty: 28 Tab, Refills: 0         STOP taking these medications       omeprazole (PRILOSEC) 20 mg capsule Comments:   Reason for Stoppin.   Follow-up Information     Follow up With Details Comments Contact Info    [facility] EMERGENCY DEPT  If symptoms worsen [address] [facility] [address] [facility] [address] Chasity Vogt MD Schedule an appointment as soon as possible for a visit  [address] [facility] [id number]  [address], [address]  [phone]          3. Return to ED if worse   Discharge Note:  4:35 AM  The patient is ready for discharge. The patient's signs, symptoms, diagnosis, and discharge instruction have been discussed and the patient has conveyed their understanding. The patient is to follow up as recommended or return to the ER should their symptoms worsen. Plan has been discussed and the patient is in agreement. Written by Dereck Gill ED Scribe, as dictated by Werner Fairchild MD    Attestation: This note is prepared by Paul Allen. Jairo, acting as Scribe for Jessi Davidson MD.      Werner Fairchild MD: The scribe's documentation has been prepared under my direction and personally reviewed by me in its entirety.  I confirm that the note above accurately reflects all work, treatment, procedures, and medical decision making performed by me. This note will not be viewable in [address]

## 2017-05-24 ENCOUNTER — OFFICE VISIT (OUTPATIENT)
Dept: INTERNAL MEDICINE CLINIC | Age: 52
End: 2017-05-24

## 2017-05-24 VITALS
RESPIRATION RATE: 20 BRPM | HEART RATE: 77 BPM | DIASTOLIC BLOOD PRESSURE: 77 MMHG | HEIGHT: 69 IN | SYSTOLIC BLOOD PRESSURE: 115 MMHG | TEMPERATURE: 98.3 F | OXYGEN SATURATION: 99 % | WEIGHT: 198.4 LBS | BODY MASS INDEX: 29.38 KG/M2

## 2017-05-24 DIAGNOSIS — E55.9 VITAMIN D DEFICIENCY: ICD-10-CM

## 2017-05-24 DIAGNOSIS — E03.4 HYPOTHYROIDISM DUE TO ACQUIRED ATROPHY OF THYROID: ICD-10-CM

## 2017-05-24 DIAGNOSIS — E78.5 HYPERLIPIDEMIA, UNSPECIFIED HYPERLIPIDEMIA TYPE: ICD-10-CM

## 2017-05-24 DIAGNOSIS — K29.70 GASTRITIS WITHOUT BLEEDING, UNSPECIFIED CHRONICITY, UNSPECIFIED GASTRITIS TYPE: ICD-10-CM

## 2017-05-24 DIAGNOSIS — F41.9 ANXIETY: Primary | ICD-10-CM

## 2017-05-24 DIAGNOSIS — R73.02 IGT (IMPAIRED GLUCOSE TOLERANCE): ICD-10-CM

## 2017-05-24 RX ORDER — HYDROGEN PEROXIDE 3 %
20 SOLUTION, NON-ORAL MISCELLANEOUS DAILY
Qty: 30 CAP | Refills: 5 | Status: SHIPPED | OUTPATIENT
Start: 2017-05-24 | End: 2017-08-14 | Stop reason: ALTCHOICE

## 2017-05-24 NOTE — PROGRESS NOTES
Room 13    Chief Complaint   Patient presents with    Anxiety     patient states that since she started taking levothyroxine she has been very anxious    Abdominal Pain     patient c/o burning in stomach     1. Have you been to the ER, urgent care clinic since your last visit? Hospitalized since your last visit? Yes When: 5/22 & 5/23   DX: [facility] on 5/22, [facility] on 5/23    2. Have you seen or consulted any other health care providers outside of the [address] [place] [address] since your last visit? Include any pap smears or colon screening.  No

## 2017-05-24 NOTE — PROGRESS NOTES
HISTORY OF PRESENT ILLNESS  Chandler Douglas is a 47 y.o. female. HPI  Presents for f/u anxiety, ER visit    Seen in ER  Dx gastritis  Changed to nexium - pt feels a little better already    Pt reports anxiety related to thyroid med dosing    Pt reports jitteriness, clammy - 3-4 x per day  Occurs at rest  Only taking zoloft 100mg sporadically   Xanax used every other day - now out of med    Past medical, Social, and Family history reviewed  Medications reviewed and updated. ROS  Complete ROS reviewed and negative or stable except as noted in HPI. Physical Exam   Constitutional: She is oriented to person, place, and time. She appears well-nourished. No distress. HENT:   Head: Normocephalic and atraumatic. Eyes: EOM are normal. Pupils are equal, round, and reactive to light. No scleral icterus. Neck: Normal range of motion. Neck supple. No JVD present. Cardiovascular: Normal rate, regular rhythm and normal heart sounds. Exam reveals no gallop and no friction rub. No murmur heard. Pulmonary/Chest: Effort normal and breath sounds normal. No respiratory distress. She has no wheezes. She has no rales. Abdominal: Soft. She exhibits no distension. There is no tenderness. Musculoskeletal: Normal range of motion. She exhibits no edema. Lymphadenopathy:     She has no cervical adenopathy. Neurological: She is alert and oriented to person, place, and time. She exhibits normal muscle tone. Skin: Skin is warm. No rash noted. Psychiatric: She has a normal mood and affect. Nursing note and vitals reviewed. Prior labs reviewed. ASSESSMENT and PLAN  More likely the absence of zoloft rather than levothyroxine causing anxiety    ICD-10-CM ICD-9-CM    1. Anxiety F41.9 300.00    2. Gastritis without bleeding, unspecified chronicity, unspecified gastritis type K29.70 535.50 esomeprazole (NEXIUM) 20 mg capsule   3. Hypothyroidism due to acquired atrophy of thyroid E03.4 244.8      246.8    4.  IGT (impaired glucose tolerance) R73.02 790.22    5. Vitamin D deficiency E55.9 268.9    6. Hyperlipidemia, unspecified hyperlipidemia type E78.5 272.4      Follow-up Disposition:  Return in about 1 month (around 6/24/2017) for thyroid, mood.    results and schedule of future studies reviewed with patient  reviewed diet, exercise and weight    cardiovascular risk and specific lipid/LDL goals reviewed  reviewed medications and side effects in detail   Refill nexium   Resume daily zoloft

## 2017-05-25 LAB
BACTERIA SPEC CULT: ABNORMAL
CC UR VC: ABNORMAL
SERVICE CMNT-IMP: ABNORMAL

## 2017-05-28 RX ORDER — RANITIDINE 150 MG/1
TABLET, FILM COATED ORAL
Qty: 60 TAB | Refills: 5 | Status: SHIPPED | OUTPATIENT
Start: 2017-05-28 | End: 2017-08-07

## 2017-07-10 ENCOUNTER — OFFICE VISIT (OUTPATIENT)
Dept: INTERNAL MEDICINE CLINIC | Age: 52
End: 2017-07-10

## 2017-07-10 VITALS
HEART RATE: 52 BPM | DIASTOLIC BLOOD PRESSURE: 71 MMHG | OXYGEN SATURATION: 100 % | BODY MASS INDEX: 30.01 KG/M2 | HEIGHT: 69 IN | TEMPERATURE: 97.7 F | RESPIRATION RATE: 18 BRPM | WEIGHT: 202.6 LBS | SYSTOLIC BLOOD PRESSURE: 119 MMHG

## 2017-07-10 DIAGNOSIS — E03.4 HYPOTHYROIDISM DUE TO ACQUIRED ATROPHY OF THYROID: Primary | ICD-10-CM

## 2017-07-10 DIAGNOSIS — E78.5 HYPERLIPIDEMIA, UNSPECIFIED HYPERLIPIDEMIA TYPE: ICD-10-CM

## 2017-07-10 DIAGNOSIS — K27.9 PUD (PEPTIC ULCER DISEASE): ICD-10-CM

## 2017-07-10 DIAGNOSIS — F41.9 ANXIETY: ICD-10-CM

## 2017-07-10 DIAGNOSIS — E55.9 VITAMIN D DEFICIENCY: ICD-10-CM

## 2017-07-10 DIAGNOSIS — R73.02 IGT (IMPAIRED GLUCOSE TOLERANCE): ICD-10-CM

## 2017-07-10 NOTE — MR AVS SNAPSHOT
Visit Information Date & Time Provider Department Dept. Phone Encounter #  
 7/10/2017  8:45 AM Patrice Hubbard MD [address] [facility] [phone] [id number] Follow-up Instructions Return in about 4 months (around 11/10/2017), or if symptoms worsen or fail to improve, for cholesterol, thyroid. Upcoming Health Maintenance Date Due INFLUENZA AGE 9 TO ADULT 8/1/2017 BREAST CANCER SCRN MAMMOGRAM 6/26/2018 PAP AKA CERVICAL CYTOLOGY 3/17/2019 DTaP/Tdap/Td series (2 - Td) 6/17/2024 COLONOSCOPY 9/13/2026 Allergies as of 7/10/2017  Review Complete On: 7/10/2017 By: Patrice Hubbard MD  
 No Known Allergies Current Immunizations  Reviewed on 10/27/2016 Name Date Tdap 6/17/2014 Not reviewed this visit You Were Diagnosed With   
  
 Codes Comments Hypothyroidism due to acquired atrophy of thyroid    -  Primary ICD-10-CM: E03.4 ICD-9-CM: 244.8, 246.8 Anxiety     ICD-10-CM: F41.9 ICD-9-CM: 300.00   
 PUD (peptic ulcer disease)     ICD-10-CM: K27.9 ICD-9-CM: 533.90 IGT (impaired glucose tolerance)     ICD-10-CM: R73.02 
ICD-9-CM: 790.22 Vitamin D deficiency     ICD-10-CM: E55.9 ICD-9-CM: 268.9 Hyperlipidemia, unspecified hyperlipidemia type     ICD-10-CM: E78.5 ICD-9-CM: 272.4 Vitals BP Pulse Temp Resp Height(growth percentile) Weight(growth percentile) 119/71 (BP 1 Location: Left arm, BP Patient Position: Sitting) (!) 52 97.7 °F (36.5 °C) (Oral) 18 5' 9.02\" (1.753 m) 202 lb 9.6 oz (91.9 kg) LMP SpO2 BMI OB Status Smoking Status 09/08/2016 (Approximate) 100% 29.9 kg/m2 Hysterectomy Never Smoker BMI and BSA Data Body Mass Index Body Surface Area  
 29.9 kg/m 2 2.12 m 2 Preferred Pharmacy Pharmacy Name Phone [id number] [address] [phone] Your Updated Medication List  
  
   
 This list is accurate as of: 7/10/17  9:23 AM.  Always use your most recent med list.  
  
  
  
  
 albuterol 90 mcg/actuation inhaler Commonly known as:  PROVENTIL HFA, VENTOLIN HFA, PROAIR HFA Take 2 Puffs by inhalation as needed for Wheezing. ALPRAZolam 0.5 mg tablet Commonly known as:  Layjamia Clever Take 1 Tab by mouth every eight (8) hours as needed for Anxiety for up to 15 doses. Max Daily Amount: 1.5 mg.  
  
 esomeprazole 20 mg capsule Commonly known as:  NexIUM Take 1 Cap by mouth daily. levothyroxine 88 mcg tablet Commonly known as:  SYNTHROID  
TAKE 1 TABLET BY MOUTH EVERY DAY BEFORE BREAKFAST ON A EMPTY STOMACH  
  
 pravastatin 20 mg tablet Commonly known as:  PRAVACHOL  
TAKE 1 TAB BY MOUTH DAILY.  
  
 raNITIdine 150 mg tablet Commonly known as:  ZANTAC TAKE 1 TAB BY MOUTH TWO (2) TIMES A DAY. INDICATIONS: GASTRIC ULCER  
  
 sertraline 100 mg tablet Commonly known as:  ZOLOFT Take 1 Tab by mouth daily. sucralfate 1 gram tablet Commonly known as:  Evonnie Bane Take 1 Tab by mouth four (4) times daily. VITAMIN D3 1,000 unit Cap Generic drug:  cholecalciferol TAKE 3 CAPSULES DAILY We Performed the Following CBC WITH AUTOMATED DIFF [84973 CPT(R)] METABOLIC PANEL, COMPREHENSIVE [77580 CPT(R)] T4, FREE L4759449 CPT(R)] TSH 3RD GENERATION [36040 CPT(R)] VITAMIN D, 25 HYDROXY F6107136 CPT(R)] Follow-up Instructions Return in about 4 months (around 11/10/2017), or if symptoms worsen or fail to improve, for cholesterol, thyroid. Introducing [facility]! Thom Morrow introduces [employer] patient portal. Now you can access parts of your medical record, email your doctor's office, and request medication refills online. 1. In your internet browser, go to [email or web address] 2. Click on the First Time User? Click Here link in the Sign In box. You will see the New Member Sign Up page. 3. Enter your [employer] Access Code exactly as it appears below. You will not need to use this code after youve completed the sign-up process. If you do not sign up before the expiration date, you must request a new code. · [employer] Access Code: [email or web address] Expires: 10/8/2017  9:21 AM 
 
4. Enter the last four digits of your Social Security Number (xxxx) and Date of Birth (mm/dd/yyyy) as indicated and click Submit. You will be taken to the next sign-up page. 5. Create a [employer] ID. This will be your [employer] login ID and cannot be changed, so think of one that is secure and easy to remember. 6. Create a [employer] password. You can change your password at any time. 7. Enter your Password Reset Question and Answer. This can be used at a later time if you forget your password. 8. Enter your e-mail address. You will receive e-mail notification when new information is available in [address] 9. Click Sign Up. You can now view and download portions of your medical record. 10. Click the Download Summary menu link to download a portable copy of your medical information. If you have questions, please visit the Frequently Asked Questions section of the [employer] website. Remember, [employer] is NOT to be used for urgent needs. For medical emergencies, dial 911. Now available from your iPhone and Android! Please provide this summary of care documentation to your next provider. Your primary care clinician is listed as [address] If you have any questions after today's visit, please call [phone].

## 2017-07-10 NOTE — PROGRESS NOTES
RM#[id number]  Chief Complaint   Patient presents with    Follow-up     f/u for mood, cholesterol and labs     1. Have you been to the ER, urgent care clinic since your last visit? Hospitalized since your last visit? No    2. Have you seen or consulted any other health care providers outside of the [address] [place] [address] since your last visit? Include any pap smears or colon screening. No  There are no preventive care reminders to display for this patient.

## 2017-07-10 NOTE — PROGRESS NOTES
HISTORY OF PRESENT ILLNESS  Liliana Torres is a 47 y.o. female. HPI  Presents for f/u thyroid, anxiety, etc    Pt feeling better and doing well with daily dosing of thyroid and zoloft    GI sx much improved on nexium  Pt inquires whether still needs nexium    Past medical, Social, and Family history reviewed  Medications reviewed and updated. ROS  Complete ROS reviewed and negative or stable except as noted in HPI. Physical Exam   Constitutional: She is oriented to person, place, and time. She appears well-nourished. No distress. HENT:   Head: Normocephalic and atraumatic. Eyes: EOM are normal. Pupils are equal, round, and reactive to light. No scleral icterus. Neck: Normal range of motion. Neck supple. No JVD present. Cardiovascular: Normal rate, regular rhythm and normal heart sounds. Exam reveals no gallop and no friction rub. No murmur heard. Pulmonary/Chest: Effort normal and breath sounds normal. No respiratory distress. She has no wheezes. She has no rales. Abdominal: Soft. She exhibits no distension. There is no tenderness. Musculoskeletal: Normal range of motion. She exhibits no edema. Lymphadenopathy:     She has no cervical adenopathy. Neurological: She is alert and oriented to person, place, and time. She exhibits normal muscle tone. Skin: Skin is warm. No rash noted. Psychiatric: She has a normal mood and affect. Nursing note and vitals reviewed. Prior labs reviewed. ASSESSMENT and PLAN    ICD-10-CM ICD-9-CM    1. Hypothyroidism due to acquired atrophy of thyroid E03.4 244.8 T4, FREE     246.8 TSH 3RD GENERATION   2. Anxiety F41.9 300.00    3. PUD (peptic ulcer disease) K27.9 533.90 CBC WITH AUTOMATED DIFF      METABOLIC PANEL, COMPREHENSIVE   4. IGT (impaired glucose tolerance) R73.02 790.22    5. Vitamin D deficiency E55.9 268.9 VITAMIN D, 25 HYDROXY   6.  Hyperlipidemia, unspecified hyperlipidemia type E78.5 272.4      Follow-up Disposition:  Return in about 4 months (around 11/10/2017), or if symptoms worsen or fail to improve, for cholesterol, thyroid.    results and schedule of future studies reviewed with patient  reviewed diet, exercise and weight    cardiovascular risk and specific lipid/LDL goals reviewed  reviewed medications and side effects in detail   Encouraged to complete about 3 months of nexium - 1 more month - to allow for full healing   Then, monitor GI sx when dc PPI  Continue current medications

## 2017-07-11 LAB
25(OH)D3+25(OH)D2 SERPL-MCNC: 23.8 NG/ML (ref 30–100)
ALBUMIN SERPL-MCNC: 4.4 G/DL (ref 3.5–5.5)
ALBUMIN/GLOB SERPL: 1.7 {RATIO} (ref 1.2–2.2)
ALP SERPL-CCNC: 75 IU/L (ref 39–117)
ALT SERPL-CCNC: 13 IU/L (ref 0–32)
AST SERPL-CCNC: 13 IU/L (ref 0–40)
BASOPHILS # BLD AUTO: 0 X10E3/UL (ref 0–0.2)
BASOPHILS NFR BLD AUTO: 1 %
BILIRUB SERPL-MCNC: 0.5 MG/DL (ref 0–1.2)
BUN SERPL-MCNC: 12 MG/DL (ref 6–24)
BUN/CREAT SERPL: 15 (ref 9–23)
CALCIUM SERPL-MCNC: 9.6 MG/DL (ref 8.7–10.2)
CHLORIDE SERPL-SCNC: 101 MMOL/L (ref 96–106)
CO2 SERPL-SCNC: 22 MMOL/L (ref 18–29)
CREAT SERPL-MCNC: 0.79 MG/DL (ref 0.57–1)
EOSINOPHIL # BLD AUTO: 0.2 X10E3/UL (ref 0–0.4)
EOSINOPHIL NFR BLD AUTO: 3 %
ERYTHROCYTE [DISTWIDTH] IN BLOOD BY AUTOMATED COUNT: 14.3 % (ref 12.3–15.4)
GLOBULIN SER CALC-MCNC: 2.6 G/DL (ref 1.5–4.5)
GLUCOSE SERPL-MCNC: 90 MG/DL (ref 65–99)
HCT VFR BLD AUTO: 41.4 % (ref 34–46.6)
HGB BLD-MCNC: 13.3 G/DL (ref 11.1–15.9)
IMM GRANULOCYTES # BLD: 0 X10E3/UL (ref 0–0.1)
IMM GRANULOCYTES NFR BLD: 0 %
LYMPHOCYTES # BLD AUTO: 2.3 X10E3/UL (ref 0.7–3.1)
LYMPHOCYTES NFR BLD AUTO: 38 %
MCH RBC QN AUTO: 29.1 PG (ref 26.6–33)
MCHC RBC AUTO-ENTMCNC: 32.1 G/DL (ref 31.5–35.7)
MCV RBC AUTO: 91 FL (ref 79–97)
MONOCYTES # BLD AUTO: 0.4 X10E3/UL (ref 0.1–0.9)
MONOCYTES NFR BLD AUTO: 6 %
NEUTROPHILS # BLD AUTO: 3.2 X10E3/UL (ref 1.4–7)
NEUTROPHILS NFR BLD AUTO: 52 %
PLATELET # BLD AUTO: 202 X10E3/UL (ref 150–379)
POTASSIUM SERPL-SCNC: 4.4 MMOL/L (ref 3.5–5.2)
PROT SERPL-MCNC: 7 G/DL (ref 6–8.5)
RBC # BLD AUTO: 4.57 X10E6/UL (ref 3.77–5.28)
SODIUM SERPL-SCNC: 138 MMOL/L (ref 134–144)
T4 FREE SERPL-MCNC: 1.15 NG/DL (ref 0.82–1.77)
TSH SERPL DL<=0.005 MIU/L-ACNC: 0.95 UIU/ML (ref 0.45–4.5)
WBC # BLD AUTO: 6.1 X10E3/UL (ref 3.4–10.8)

## 2017-08-04 RX ORDER — PRAVASTATIN SODIUM 20 MG/1
TABLET ORAL
Qty: 30 TAB | Refills: 5 | Status: SHIPPED | OUTPATIENT
Start: 2017-08-04 | End: 2017-08-07

## 2017-08-07 ENCOUNTER — HOSPITAL ENCOUNTER (EMERGENCY)
Age: 52
Discharge: HOME | End: 2017-08-07
Attending: EMERGENCY MEDICINE | Admitting: EMERGENCY MEDICINE
Payer: MEDICAID

## 2017-08-07 ENCOUNTER — APPOINTMENT (OUTPATIENT)
Dept: GENERAL RADIOLOGY | Age: 52
End: 2017-08-07
Attending: PHYSICIAN ASSISTANT
Payer: MEDICAID

## 2017-08-07 VITALS
OXYGEN SATURATION: 99 % | BODY MASS INDEX: 28.79 KG/M2 | HEIGHT: 68 IN | SYSTOLIC BLOOD PRESSURE: 124 MMHG | WEIGHT: 190 LBS | DIASTOLIC BLOOD PRESSURE: 89 MMHG | HEART RATE: 67 BPM | TEMPERATURE: 98.2 F | RESPIRATION RATE: 18 BRPM

## 2017-08-07 DIAGNOSIS — M51.36 LUMBAR DEGENERATIVE DISC DISEASE: ICD-10-CM

## 2017-08-07 DIAGNOSIS — M54.50 ACUTE BILATERAL LOW BACK PAIN WITHOUT SCIATICA: Primary | ICD-10-CM

## 2017-08-07 PROCEDURE — 72100 X-RAY EXAM L-S SPINE 2/3 VWS: CPT

## 2017-08-07 PROCEDURE — 99282 EMERGENCY DEPT VISIT SF MDM: CPT

## 2017-08-07 RX ORDER — ACETAMINOPHEN 500 MG
500 TABLET ORAL
COMMUNITY
End: 2018-12-29

## 2017-08-07 RX ORDER — TRAMADOL HYDROCHLORIDE 50 MG/1
50 TABLET ORAL
Qty: 20 TAB | Refills: 0 | Status: SHIPPED | OUTPATIENT
Start: 2017-08-07 | End: 2018-04-11

## 2017-08-07 RX ORDER — METHYLPREDNISOLONE 4 MG/1
TABLET ORAL
Qty: 1 DOSE PACK | Refills: 0 | Status: SHIPPED | OUTPATIENT
Start: 2017-08-07 | End: 2017-08-13

## 2017-08-07 RX ORDER — DEXTROMETHORPHAN HYDROBROMIDE, GUAIFENESIN 5; 100 MG/5ML; MG/5ML
650 LIQUID ORAL
COMMUNITY
End: 2018-04-12

## 2017-08-07 NOTE — ED PROVIDER NOTES
HPI Comments: Nomi Serrato is a 47 y.o. female with PMhx significant for anxiety, hyperlipidemia, depression, and arthritis who presents ambulatory to the ED with cc of progressively worsening lower back pain that is worse with movement. Pt notes that her pain is predominately in the right side of her lower back and migrates. She states her pain radiates down her bilateral lower extremities. Pt notes that she was pushing a linen cart 3 weeks ago at work and \"heard a snap in her back\". She also c/o left leg numbness. Pt indicates that she has chronic back pain, but notes that her pain is not generally this frequent. Pt took Tylenol this morning with no relief. She denies having an orthopedist or having any surgeries on her back. Pt denies loss of bowel or bladder control, abdominal pain, fever, and saddle paraesthesia. Social Hx: - Tobacco, - EtOH, - Illicit Drugs    PCP: Freida Ferrera MD    There are no other complaints, changes or physical findings at this time. The history is provided by the patient. No  was used.         Past Medical History:   Diagnosis Date    Acid reflux     Adverse effect of anesthesia     woke up coughing    Allergic rhinitis     Anxiety     Arthritis     GERD (gastroesophageal reflux disease)     History of nonchemical tubal occlusion     Esure devices    Hyperlipidemia 2/20/2014    Hypothyroidism     HYPO    IGT (impaired glucose tolerance)     Ill-defined condition     prolapse uterus/states thus her intestines has collpased a little bit    Psychiatric disorder     anxiety and depression    PUD (peptic ulcer disease)     no EGD    Routine gynecological examination     [facility]    Sinus disorder     Tinea pedis        Past Surgical History:   Procedure Laterality Date    COLONOSCOPY N/A 9/13/2016    COLONOSCOPY / EGD  performed by Austin Ferrer MD at [address] HX GYN  10/12/2012    Esure    HX LAP CHOLECYSTECTOMY  10/06/2016    HX TOTAL VAGINAL HYSTERECTOMY      OR REMOVAL OF OVARIAN CYST(S)           Family History:   Problem Relation Age of Onset    Hypertension Mother     Diabetes Mother     Heart Disease Mother     Heart Attack Mother     Cancer Father      lung    Other Sister      cholecystectomy    Cancer Brother      lung    Diabetes Maternal Aunt     Cancer Paternal Uncle      lung    Diabetes Maternal Aunt     Diabetes Maternal Aunt     Diabetes Maternal Aunt     Diabetes Maternal Aunt     Diabetes Maternal Aunt     Heart Attack Daughter 32    Other Son      narcolepsy/GERD (part of esophagus removed d/t this)    Cancer Paternal Uncle      lung    Anesth Problems Neg Hx        Social History     Social History    Marital status: SINGLE     Spouse name: N/A    Number of children: N/A    Years of education: N/A     Occupational History    Not on file. Social History Main Topics    Smoking status: Never Smoker    Smokeless tobacco: Never Used    Alcohol use No    Drug use: No    Sexual activity: Yes     Partners: Male     Birth control/ protection: Implant     Other Topics Concern    Not on file     Social History Narrative         ALLERGIES: Review of patient's allergies indicates no known allergies. Review of Systems   Constitutional: Negative for fever. HENT: Negative. Eyes: Negative. Respiratory: Negative. Cardiovascular: Negative. Gastrointestinal: Negative. Negative for abdominal pain.        -loss of bowel control     Genitourinary: Negative. - loss of bladder control     Musculoskeletal: Positive for back pain (lower, right). Skin: Negative. Neurological: Negative. Negative for numbness (saddle paresthesia). All other systems reviewed and are negative.       Vitals:    08/07/17 0700   BP: 124/89   Pulse: 67   Resp: 18   Temp: 98.2 °F (36.8 °C)   SpO2: 99%   Weight: 86.2 kg (190 lb)   Height: 5' 8\" (1.727 m)            Physical Exam   Constitutional: She is oriented to person, place, and time. She appears well-developed and well-nourished. No distress. 48 yo -American female   HENT:   Head: Normocephalic and atraumatic. Eyes: Conjunctivae are normal. Right eye exhibits no discharge. Left eye exhibits no discharge. Neck: Normal range of motion. Neck supple. Cardiovascular: Normal rate, regular rhythm, normal heart sounds and intact distal pulses. No murmur heard. Pulmonary/Chest: Effort normal and breath sounds normal. No respiratory distress. She has no wheezes. Musculoskeletal: Normal range of motion. She exhibits no edema, tenderness or deformity. BACK: Normal spinal curvatures. No step off or deformity. NT to palpation. Negative seated SLR bilaterally. Strength of the LE 5/5 and equal bilaterally. Patellar DTR's 2+ bilaterally. Ambulatory without difficulty. Neurological: She is alert and oriented to person, place, and time. Skin: Skin is warm and dry. She is not diaphoretic. Psychiatric: She has a normal mood and affect. Her behavior is normal.   Nursing note and vitals reviewed. MDM  Number of Diagnoses or Management Options  Diagnosis management comments:   DDx: DDD, DJD, herniated disk, sprain, strain, spasm       Amount and/or Complexity of Data Reviewed  Tests in the radiology section of CPT®: ordered and reviewed  Review and summarize past medical records: yes  Independent visualization of images, tracings, or specimens: yes    Patient Progress  Patient progress: stable    ED Course       Procedures    7:00 AM   reviewed. Pt's last narcotic rx was filled on April 14th for Percocet 5mg (#20). Since then she has filled several benzo Rx but no narcotics. IMAGING RESULTS:  XR SPINE LUMB 2 OR 3 V   Final Result   INDICATION:  Chronic back pain increased x2 weeks.     COMPARISON:  4/1/2017.     FINDINGS:  Three AP and lateral views were obtained of the lumbosacral spine.     The vertebral bodies show satisfactory height. Small vertebral body spurs are present. Grade 1 spondylolisthesis and facet arthropathy at L4-5 and a narrowed disc and  facet arthropathy at L5-S1 are seen. Right upper quadrant surgical clips and Essure tubal ligation apparatus iare  noted.        IMPRESSION  IMPRESSION:    Lumbar degenerative disease with grade 1 spondylolisthesis of L4-L5       IMPRESSION:  1. Acute bilateral low back pain without sciatica    2. Lumbar degenerative disc disease        PLAN:  1. Current Discharge Medication List      START taking these medications    Details   traMADol (ULTRAM) 50 mg tablet Take 1 Tab by mouth every six (6) hours as needed for Pain. Max Daily Amount: 200 mg. Qty: 20 Tab, Refills: 0      methylPREDNISolone (MEDROL, PATRICA,) 4 mg tablet As directed  Qty: 1 Dose Pack, Refills: 0         CONTINUE these medications which have NOT CHANGED    Details   acetaminophen (TYLENOL ARTHRITIS PAIN) 650 mg CR tablet Take 650 mg by mouth every six (6) hours as needed for Pain. acetaminophen (TYLENOL EXTRA STRENGTH) 500 mg tablet Take 500 mg by mouth every six (6) hours as needed for Pain.      esomeprazole (NEXIUM) 20 mg capsule Take 1 Cap by mouth daily. Qty: 30 Cap, Refills: 5    Associated Diagnoses: Gastritis without bleeding, unspecified chronicity, unspecified gastritis type      levothyroxine (SYNTHROID) 88 mcg tablet TAKE 1 TABLET BY MOUTH EVERY DAY BEFORE BREAKFAST ON A EMPTY STOMACH  Qty: 30 Tab, Refills: 5    Associated Diagnoses: Hypothyroidism due to acquired atrophy of thyroid      sertraline (ZOLOFT) 100 mg tablet Take 1 Tab by mouth daily. Qty: 30 Tab, Refills: 5      VITAMIN D3 1,000 unit cap TAKE 3 CAPSULES DAILY  Qty: 90 Cap, Refills: 11    Associated Diagnoses: Vitamin D deficiency      albuterol (PROVENTIL HFA, VENTOLIN HFA, PROAIR HFA) 90 mcg/actuation inhaler Take 2 Puffs by inhalation as needed for Wheezing.       ALPRAZolam (XANAX) 0.5 mg tablet Take 1 Tab by mouth every eight (8) hours as needed for Anxiety for up to 15 doses. Max Daily Amount: 1.5 mg.  Qty: 15 Tab, Refills: 0           2. Follow-up Information     Follow up With Details Comments Contact Chintan Bocanegra MD In 2 days As needed [id number] [facility]  [phone]        3. Rest, heat, massage and gentle stretches  Return to ED if worse   DISCHARGE NOTE  8:07 AM  The patient has been re-evaluated and is ready for discharge. Reviewed available results with patient. Counseled patient on diagnosis and care plan. Patient has expressed understanding, and all questions have been answered. Patient agrees with plan and agrees to follow up as recommended, or return to the ED if their symptoms worsen. Discharge instructions have been provided and explained to the patient, along with reasons to return to the ED. Attestation Note:  This note is prepared by SILVIA [place], acting as Scribe for [place] : The scribe's documentation has been prepared under my direction and personally reviewed by me in its entirety. I confirm that the note above accurately reflects all work, treatment, procedures, and medical decision making performed by me.

## 2017-08-07 NOTE — ED NOTES
[facility] reviewed discharge instructions with the patient. The patient verbalized understanding. Ambulatory on discharge.

## 2017-08-07 NOTE — ED NOTES
Pt was pushing a heavy cart at work and pt stats \" something felt like it snapped and it didn't get better. \" it worse when she first wakes up and at night when she sits. Pt states numbness and tingling in left leg and pain shooting down both legs that comes and goes.

## 2017-08-07 NOTE — DISCHARGE INSTRUCTIONS
Getting Back to Normal After Low Back Pain: Care Instructions  Your Care Instructions  Almost everyone has low back pain at some time. The good news is that most low back pain will go away in a few days or weeks with some basic self-care. Some people are afraid that doing too much may make their pain worse. In the past, people stayed in bed, thinking this would help their backs. Now doctors think that, in most cases, getting back to your normal activities is good for your back, as long as you avoid doing things that make your pain worse. Follow-up care is a key part of your treatment and safety. Be sure to make and go to all appointments, and call your doctor if you are having problems. It's also a good idea to know your test results and keep a list of the medicines you take. How can you care for yourself at home? Ease back into daily activities  · For the first day or two of pain, take it easy. But as soon as possible, get back to your normal daily life and activities. · Get gentle exercise, such as walking. Movement keeps your spine flexible and helps your muscles stay strong. · If you are an athlete, return to your activity carefully. Choose a low-impact option until your pain is under control. Avoid or change activities that cause pain  · Try to avoid too much bending, heavy lifting, or reaching. These movements put extra stress on your back. · In bed, try lying on your side with a pillow between your knees. Or lie on your back on the floor with a pillow under your knees. · When you sit, place a small pillow, a rolled-up towel, or a lumbar roll in the curve of your back for extra support. · Try putting one foot up on a stool or changing positions every few minutes if you have to stand still for a period of time. Pay attention to body mechanics and posture  Body mechanics are the way you use your body. Posture is the way you sit or stand. · Take extra care when you lift.  When you must lift, bend your knees and keep your back straight. Avoid twisting, and keep the load close to your body. · Stand or sit tall, with your shoulders back and your stomach pulled in to support your back. Get support when you need it  · Let people know when you need a helping hand. Get family members or friends to help out with tasks you cannot do right now. · Be honest with your doctor about how the pain affects you. · If you have had to take time off work, talk to your doctor and boss about a gradual [id number] plan. Find out if there are other ways you could do your job to avoid hurting your back again. Reduce stress  Worrying about the pain can cause you to tense the muscles in your lower back. This in turn causes more pain. Here are a few things you can do to relax your mind and your muscles:  · Take 10 to 15 minutes to sit quietly and breathe deeply. Try to focus only on your breathing. If you cannot keep thoughts away, think about things that make you feel good. · Get involved in your favorite hobby, or try something new. · Talk to a friend, read a book, or listen to your favorite music. · Find a counselor you like and trust. Talk openly and honestly about your problems. Be willing to make some changes. When should you call for help? Call 911 anytime you think you may need emergency care. For example, call if:  · You are unable to move a leg at all. Call your doctor now or seek immediate medical care if:  · You have new or worse symptoms in your legs, belly, or buttocks. Symptoms may include:  ¨ Numbness or tingling. ¨ Weakness. ¨ Pain. · You lose bladder or bowel control. Watch closely for changes in your health, and be sure to contact your doctor if:  · You are not getting better as expected. Where can you learn more? Go to http://viviane-misael.info/. Enter G5 in the search box to learn more about \"Getting Back to Normal After Low Back Pain: Care Instructions. \"  Current as of: March 21, 2017  Content Version: 11.3  © 7162-2976 [employer]. Care instructions adapted under license by [employer] (which disclaims liability or warranty for this information). If you have questions about a medical condition or this instruction, always ask your healthcare professional. [place] [address] any warranty or liability for your use of this information.

## 2017-08-14 ENCOUNTER — DOCUMENTATION ONLY (OUTPATIENT)
Dept: INTERNAL MEDICINE CLINIC | Age: 52
End: 2017-08-14

## 2017-08-14 ENCOUNTER — OFFICE VISIT (OUTPATIENT)
Dept: INTERNAL MEDICINE CLINIC | Age: 52
End: 2017-08-14

## 2017-08-14 VITALS
OXYGEN SATURATION: 97 % | DIASTOLIC BLOOD PRESSURE: 78 MMHG | BODY MASS INDEX: 30.71 KG/M2 | RESPIRATION RATE: 16 BRPM | WEIGHT: 202.6 LBS | SYSTOLIC BLOOD PRESSURE: 116 MMHG | HEIGHT: 68 IN | HEART RATE: 75 BPM | TEMPERATURE: 98.5 F

## 2017-08-14 DIAGNOSIS — K59.01 SLOW TRANSIT CONSTIPATION: ICD-10-CM

## 2017-08-14 DIAGNOSIS — R10.9 ABDOMINAL PAIN, UNSPECIFIED LOCATION: Primary | ICD-10-CM

## 2017-08-14 DIAGNOSIS — M17.12 PRIMARY OSTEOARTHRITIS OF LEFT KNEE: ICD-10-CM

## 2017-08-14 DIAGNOSIS — E03.4 HYPOTHYROIDISM DUE TO ACQUIRED ATROPHY OF THYROID: ICD-10-CM

## 2017-08-14 DIAGNOSIS — K59.00 CONSTIPATION, UNSPECIFIED CONSTIPATION TYPE: ICD-10-CM

## 2017-08-14 DIAGNOSIS — K27.9 PUD (PEPTIC ULCER DISEASE): ICD-10-CM

## 2017-08-14 RX ORDER — SUCRALFATE 1 G/10ML
1 SUSPENSION ORAL 4 TIMES DAILY
Qty: 414 ML | Refills: 2 | Status: ON HOLD | OUTPATIENT
Start: 2017-08-14 | End: 2018-04-09

## 2017-08-14 RX ORDER — POLYETHYLENE GLYCOL 3350 17 G/17G
17 POWDER, FOR SOLUTION ORAL DAILY
Qty: 510 G | Refills: 5 | Status: SHIPPED | OUTPATIENT
Start: 2017-08-14 | End: 2018-12-29

## 2017-08-14 RX ORDER — PANTOPRAZOLE SODIUM 40 MG/1
40 TABLET, DELAYED RELEASE ORAL DAILY
Qty: 30 TAB | Refills: 5 | Status: SHIPPED | OUTPATIENT
Start: 2017-08-14 | End: 2018-02-18 | Stop reason: SDUPTHER

## 2017-08-14 NOTE — PROGRESS NOTES
Rm 15    Chief Complaint   Patient presents with    Epigastric Pain     acid reflux, ongoing since october 2016    Medication Evaluation     nexium     1. Have you been to the ER, urgent care clinic since your last visit? Hospitalized since your last visit? ED [facility], 8/7/17, back pain    2. Have you seen or consulted any other health care providers outside of the [address] [place] [address] since your last visit? Include any pap smears or colon screening.  No    Health Maintenance Due   Topic Date Due    INFLUENZA AGE 9 TO ADULT  08/01/2017     PHQ over the last two weeks 8/14/2017   Little interest or pleasure in doing things Not at all   Feeling down, depressed or hopeless Not at all   Total Score PHQ 2 0   Trouble falling or staying asleep, or sleeping too much -   Feeling tired or having little energy -   Poor appetite or overeating -   Feeling bad about yourself - or that you are a failure or have let yourself or your family down -   Trouble concentrating on things such as school, work, reading or watching TV -   Moving or speaking so slowly that other people could have noticed; or the opposite being so fidgety that others notice -   Thoughts of being better off dead, or hurting yourself in some way -   PHQ 9 Score -   How difficult have these problems made it for you to do your work, take care of your home and get along with others -

## 2017-08-14 NOTE — PROGRESS NOTES
HISTORY OF PRESENT ILLNESS  Delaney Darden is a 47 y.o. female. HPI  Presents for f/u abd pain    Lower abd pain   Pt reports pain despite nexium and prior use of omeprazole  Described as burning sensation  Sometimes improved by TUMS or Rolaids    Using colace at least daily  Pt reports regular BMs    \"burning\" is better following a BM  Pt also reports gassiness    Recent back pain - seen in ER - dx arthritis  Taking tylenol  Rx'd tramadol and medrol dose pack    Past medical, Social, and Family history reviewed  Medications reviewed and updated. ROS  Complete ROS reviewed and negative or stable except as noted in HPI. Physical Exam   Constitutional: She is oriented to person, place, and time. She appears well-nourished. No distress. HENT:   Head: Normocephalic and atraumatic. Eyes: EOM are normal. Pupils are equal, round, and reactive to light. No scleral icterus. Neck: Normal range of motion. Neck supple. No JVD present. Cardiovascular: Normal rate, regular rhythm and normal heart sounds. Exam reveals no gallop and no friction rub. No murmur heard. Pulmonary/Chest: Effort normal and breath sounds normal. No respiratory distress. She has no wheezes. She has no rales. Abdominal: Soft. Bowel sounds are normal. She exhibits no distension and no mass. There is no tenderness. There is no rebound and no guarding. Musculoskeletal: Normal range of motion. She exhibits no edema. Lymphadenopathy:     She has no cervical adenopathy. Neurological: She is alert and oriented to person, place, and time. She exhibits normal muscle tone. Skin: Skin is warm. No rash noted. Psychiatric: She has a normal mood and affect. Nursing note and vitals reviewed. Prior labs reviewed. Reviewed prior imaging reports  L spine films reveal a fair amount of stool  EGD Fall 2016 - +gastritis    ASSESSMENT and PLAN  ? constipation contributing  ?persistent gastritis    ICD-10-CM ICD-9-CM    1.  Abdominal pain, unspecified location R10.9 789.00 CBC WITH AUTOMATED DIFF      AMYLASE      LIPASE      METABOLIC PANEL, COMPREHENSIVE      AMB POC URINALYSIS DIP STICK AUTO W/O MICRO      CULTURE, URINE      URINALYSIS W/ RFLX MICROSCOPIC   2. PUD (peptic ulcer disease) K27.9 533.90 pantoprazole (PROTONIX) 40 mg tablet      sucralfate (CARAFATE) 100 mg/mL suspension   3. Constipation, unspecified constipation type K59.00 564.00 polyethylene glycol (MIRALAX) 17 gram/dose powder   4. Slow transit constipation K59.01 564.01 polyethylene glycol (MIRALAX) 17 gram/dose powder   5. Primary osteoarthritis of left knee M17.12 715.16    6. Hypothyroidism due to acquired atrophy of thyroid E03.4 244.8      246.8      Follow-up Disposition:  Return in about 1 month (around 9/14/2017), or if symptoms worsen or fail to improve, for abd pain.    results and schedule of future studies reviewed with patient  reviewed diet, exercise and weight    reviewed medications and side effects in detail   Resume miralax  Trial of carafate again  Try different PPI - protonix  Check labs, urine  Continue other current medications

## 2017-08-14 NOTE — MR AVS SNAPSHOT
Visit Information Date & Time Provider Department Dept. Phone Encounter #  
 8/14/2017  9:45 AM Luna Davalos MD [facility] Pediatric[facility] and Internal Medicine [phone] [id number] Follow-up Instructions Return in about 1 month (around 9/14/2017), or if symptoms worsen or fail to improve, for abd pain. Upcoming Health Maintenance Date Due INFLUENZA AGE 9 TO ADULT 8/1/2017 BREAST CANCER SCRN MAMMOGRAM 6/26/2018 PAP AKA CERVICAL CYTOLOGY 3/17/2019 DTaP/Tdap/Td series (2 - Td) 6/17/2024 COLONOSCOPY 9/13/2026 Allergies as of 8/14/2017  Review Complete On: 8/14/2017 By: Luna Davalos MD  
 No Known Allergies Current Immunizations  Reviewed on 10/27/2016 Name Date Tdap 6/17/2014 Not reviewed this visit You Were Diagnosed With   
  
 Codes Comments Abdominal pain, unspecified location    -  Primary ICD-10-CM: R10.9 ICD-9-CM: 789.00   
 PUD (peptic ulcer disease)     ICD-10-CM: K27.9 ICD-9-CM: 533.90 Constipation, unspecified constipation type     ICD-10-CM: K59.00 ICD-9-CM: 564.00 Slow transit constipation     ICD-10-CM: K59.01 
ICD-9-CM: 564.01 Primary osteoarthritis of left knee     ICD-10-CM: M17.12 
ICD-9-CM: 715.16 Hypothyroidism due to acquired atrophy of thyroid     ICD-10-CM: E03.4 ICD-9-CM: 244.8, 246.8 Vitals BP Pulse Temp Resp Height(growth percentile) Weight(growth percentile) 116/78 (BP 1 Location: Left arm, BP Patient Position: Sitting) 75 98.5 °F (36.9 °C) (Oral) 16 5' 8\" (1.727 m) 202 lb 9.6 oz (91.9 kg) LMP SpO2 BMI OB Status Smoking Status 09/08/2016 (Approximate) 97% 30.81 kg/m2 Hysterectomy Never Smoker BMI and BSA Data Body Mass Index Body Surface Area  
 30.81 kg/m 2 2.1 m 2 Preferred Pharmacy Pharmacy Name Phone [id number] [address] [phone] Your Updated Medication List  
  
   
 This list is accurate as of: 8/14/17 10:50 AM.  Always use your most recent med list.  
  
  
  
  
 albuterol 90 mcg/actuation inhaler Commonly known as:  PROVENTIL HFA, VENTOLIN HFA, PROAIR HFA Take 2 Puffs by inhalation as needed for Wheezing. ALPRAZolam 0.5 mg tablet Commonly known as:  Elizalde Dariel Take 1 Tab by mouth every eight (8) hours as needed for Anxiety for up to 15 doses. Max Daily Amount: 1.5 mg.  
  
 levothyroxine 88 mcg tablet Commonly known as:  SYNTHROID  
TAKE 1 TABLET BY MOUTH EVERY DAY BEFORE BREAKFAST ON A EMPTY STOMACH  
  
 pantoprazole 40 mg tablet Commonly known as:  PROTONIX Take 1 Tab by mouth daily. polyethylene glycol 17 gram/dose powder Commonly known as:  Zaid Spaniel Take 17 g by mouth daily. Constipation  
  
 sertraline 100 mg tablet Commonly known as:  ZOLOFT Take 1 Tab by mouth daily. sucralfate 100 mg/mL suspension Commonly known as:  Demarcus Dec Take 10 mL by mouth four (4) times daily. traMADol 50 mg tablet Commonly known as:  ULTRAM  
Take 1 Tab by mouth every six (6) hours as needed for Pain. Max Daily Amount: 200 mg.  
  
 * TYLENOL ARTHRITIS PAIN 650 mg CR tablet Generic drug:  acetaminophen Take 650 mg by mouth every six (6) hours as needed for Pain. * TYLENOL EXTRA STRENGTH 500 mg tablet Generic drug:  acetaminophen Take 500 mg by mouth every six (6) hours as needed for Pain. VITAMIN D3 1,000 unit Cap Generic drug:  cholecalciferol TAKE 3 CAPSULES DAILY * Notice: This list has 2 medication(s) that are the same as other medications prescribed for you. Read the directions carefully, and ask your doctor or other care provider to review them with you. Prescriptions Sent to Pharmacy Refills  
 pantoprazole (PROTONIX) 40 mg tablet 5 Sig: Take 1 Tab by mouth daily.   
 Class: Normal  
 Pharmacy: [id number] [address] [facility] Ph #: [phone] Route: Oral  
 sucralfate (CARAFATE) 100 mg/mL suspension 2 Sig: Take 10 mL by mouth four (4) times daily. Class: Normal  
 Pharmacy: [facility]/pharmacy #[id number] Ph #: [phone] Route: Oral  
 polyethylene glycol (MIRALAX) 17 gram/dose powder 5 Sig: Take 17 g by mouth daily. Constipation Class: Normal  
 Pharmacy: [facility]/pharmacy #[id number] Ph #: [phone] Route: Oral  
  
We Performed the Following AMB POC URINALYSIS DIP STICK AUTO W/O MICRO [34661 CPT(R)] AMYLASE [id number] CPT(R)] CBC WITH AUTOMATED DIFF [38128 CPT(R)] CULTURE, URINE [id number] CPT(R)] LIPASE [id number] CPT(R)] METABOLIC PANEL, COMPREHENSIVE [36675 CPT(R)] URINALYSIS W/ RFLX MICROSCOPIC [14774 CPT(R)] Follow-up Instructions Return in about 1 month (around 9/14/2017), or if symptoms worsen or fail to improve, for abd pain. Introducing [facility]! Jamie Champion introduces [employer] patient portal. Now you can access parts of your medical record, email your doctor's office, and request medication refills online. 1. In your internet browser, go to [email or web address] 2. Click on the First Time User? Click Here link in the Sign In box. You will see the New Member Sign Up page. 3. Enter your [employer] Access Code exactly as it appears below. You will not need to use this code after youve completed the sign-up process. If you do not sign up before the expiration date, you must request a new code. · [employer] Access Code: [email or web address] Expires: 10/8/2017  9:21 AM 
 
4. Enter the last four digits of your Social Security Number (xxxx) and Date of Birth (mm/dd/yyyy) as indicated and click Submit. You will be taken to the next sign-up page. 5. Create a MyChart ID.  This will be your [employer] login ID and cannot be changed, so think of one that is secure and easy to remember. 6. Create a [employer] password. You can change your password at any time. 7. Enter your Password Reset Question and Answer. This can be used at a later time if you forget your password. 8. Enter your e-mail address. You will receive e-mail notification when new information is available in [address] 9. Click Sign Up. You can now view and download portions of your medical record. 10. Click the Download Summary menu link to download a portable copy of your medical information. If you have questions, please visit the Frequently Asked Questions section of the [employer] website. Remember, [employer] is NOT to be used for urgent needs. For medical emergencies, dial 911. Now available from your iPhone and Android! Please provide this summary of care documentation to your next provider. Your primary care clinician is listed as [address] If you have any questions after today's visit, please call [phone].

## 2017-08-14 NOTE — PROGRESS NOTES
Spoke with patient and informed her that PA had been started for Protonix with [employer] (key#[id number]). I explained that it may take 5 days before I get an answer. Verbal order from Dr. Kerry Mcdonald have patient to continue taking Nexium as prescribed. Patient did not totally agree to take Nexium, she said she had taken Zantac and may continue.

## 2017-08-15 LAB
ALBUMIN SERPL-MCNC: 4.6 G/DL (ref 3.5–5.5)
ALBUMIN/GLOB SERPL: 1.5 {RATIO} (ref 1.2–2.2)
ALP SERPL-CCNC: 74 IU/L (ref 39–117)
ALT SERPL-CCNC: 19 IU/L (ref 0–32)
AMYLASE SERPL-CCNC: 44 U/L (ref 31–124)
AST SERPL-CCNC: 17 IU/L (ref 0–40)
BASOPHILS # BLD AUTO: 0 X10E3/UL (ref 0–0.2)
BASOPHILS NFR BLD AUTO: 1 %
BILIRUB SERPL-MCNC: 0.7 MG/DL (ref 0–1.2)
BUN SERPL-MCNC: 9 MG/DL (ref 6–24)
BUN/CREAT SERPL: 9 (ref 9–23)
CALCIUM SERPL-MCNC: 10.1 MG/DL (ref 8.7–10.2)
CHLORIDE SERPL-SCNC: 102 MMOL/L (ref 96–106)
CO2 SERPL-SCNC: 25 MMOL/L (ref 18–29)
CREAT SERPL-MCNC: 0.95 MG/DL (ref 0.57–1)
EOSINOPHIL # BLD AUTO: 0.2 X10E3/UL (ref 0–0.4)
EOSINOPHIL NFR BLD AUTO: 3 %
ERYTHROCYTE [DISTWIDTH] IN BLOOD BY AUTOMATED COUNT: 15.3 % (ref 12.3–15.4)
GLOBULIN SER CALC-MCNC: 3 G/DL (ref 1.5–4.5)
GLUCOSE SERPL-MCNC: 87 MG/DL (ref 65–99)
HCT VFR BLD AUTO: 42.5 % (ref 34–46.6)
HGB BLD-MCNC: 13.9 G/DL (ref 11.1–15.9)
IMM GRANULOCYTES # BLD: 0 X10E3/UL (ref 0–0.1)
IMM GRANULOCYTES NFR BLD: 0 %
LIPASE SERPL-CCNC: 30 U/L (ref 0–59)
LYMPHOCYTES # BLD AUTO: 2.6 X10E3/UL (ref 0.7–3.1)
LYMPHOCYTES NFR BLD AUTO: 42 %
MCH RBC QN AUTO: 28.8 PG (ref 26.6–33)
MCHC RBC AUTO-ENTMCNC: 32.7 G/DL (ref 31.5–35.7)
MCV RBC AUTO: 88 FL (ref 79–97)
MONOCYTES # BLD AUTO: 0.4 X10E3/UL (ref 0.1–0.9)
MONOCYTES NFR BLD AUTO: 6 %
NEUTROPHILS # BLD AUTO: 2.9 X10E3/UL (ref 1.4–7)
NEUTROPHILS NFR BLD AUTO: 48 %
PLATELET # BLD AUTO: 241 X10E3/UL (ref 150–379)
POTASSIUM SERPL-SCNC: 4 MMOL/L (ref 3.5–5.2)
PROT SERPL-MCNC: 7.6 G/DL (ref 6–8.5)
RBC # BLD AUTO: 4.82 X10E6/UL (ref 3.77–5.28)
SODIUM SERPL-SCNC: 143 MMOL/L (ref 134–144)
WBC # BLD AUTO: 6 X10E3/UL (ref 3.4–10.8)

## 2017-08-16 LAB
APPEARANCE UR: CLEAR
BACTERIA UR CULT: ABNORMAL
BILIRUB UR QL STRIP: NEGATIVE
COLOR UR: YELLOW
GLUCOSE UR QL: NEGATIVE
HGB UR QL STRIP: NEGATIVE
KETONES UR QL STRIP: NEGATIVE
LEUKOCYTE ESTERASE UR QL STRIP: NEGATIVE
MICRO URNS: NORMAL
NITRITE UR QL STRIP: NEGATIVE
PH UR STRIP: 6 [PH] (ref 5–7.5)
PROT UR QL STRIP: NEGATIVE
SP GR UR: 1.02 (ref 1–1.03)
UROBILINOGEN UR STRIP-MCNC: 0.2 MG/DL (ref 0.2–1)

## 2017-09-07 ENCOUNTER — TELEPHONE (OUTPATIENT)
Dept: INTERNAL MEDICINE CLINIC | Age: 52
End: 2017-09-07

## 2017-09-07 NOTE — TELEPHONE ENCOUNTER
Patient called and stated that she needs a letter informing her facility, [facility], about her bad back  so that she is able to move to the 1st floor.  [phone]

## 2017-09-07 NOTE — LETTER
9/7/2017 [facility] [address] [facility] [address] To whom it may concern: 
 
I am writing as Ms. Maisha Smith' primary care physician. She has been diagnosed with degenerative disc disease in her lumbar spine with some associated radiculopathy pain and dysfunction. This may be exacerbated by repeated climbing or descending of stairs. Ideally, she should reside in a single or first floor residence to limit difficulty with her back. Please consider making accommodations to allow her to live in a first floor unit. Thank you for your consideration. Sincerely, Simran Parson MD

## 2017-09-07 NOTE — TELEPHONE ENCOUNTER
Spoke with patient after verifying name and  regarding requested letter from Dr. Dinorah Sanders. Writer informed patient letter was ready for pickup at the . Patient given an opportunity to ask questions, repeated information, and verbalized understanding.

## 2017-09-11 ENCOUNTER — TELEPHONE (OUTPATIENT)
Dept: INTERNAL MEDICINE CLINIC | Age: 52
End: 2017-09-11

## 2017-09-11 DIAGNOSIS — M54.50 ACUTE BILATERAL LOW BACK PAIN WITHOUT SCIATICA: Primary | ICD-10-CM

## 2017-09-11 DIAGNOSIS — M54.16 LUMBAR RADICULOPATHY: ICD-10-CM

## 2017-09-11 DIAGNOSIS — M47.817 DJD (DEGENERATIVE JOINT DISEASE), LUMBOSACRAL: ICD-10-CM

## 2017-09-11 NOTE — TELEPHONE ENCOUNTER
Patient called requesting a order for her back to do physical therapy at [address] [place] [address]. Patient is unable to make an appointment until it is sent. Please fax order to: [phone].  Patient can be reached: [phone]

## 2017-09-18 ENCOUNTER — OFFICE VISIT (OUTPATIENT)
Dept: INTERNAL MEDICINE CLINIC | Age: 52
End: 2017-09-18

## 2017-09-18 VITALS
HEART RATE: 61 BPM | HEIGHT: 68 IN | OXYGEN SATURATION: 99 % | DIASTOLIC BLOOD PRESSURE: 69 MMHG | RESPIRATION RATE: 16 BRPM | BODY MASS INDEX: 31.07 KG/M2 | TEMPERATURE: 97.9 F | SYSTOLIC BLOOD PRESSURE: 115 MMHG | WEIGHT: 205 LBS

## 2017-09-18 DIAGNOSIS — K59.00 CONSTIPATION, UNSPECIFIED CONSTIPATION TYPE: ICD-10-CM

## 2017-09-18 DIAGNOSIS — E78.5 HYPERLIPIDEMIA, UNSPECIFIED HYPERLIPIDEMIA TYPE: ICD-10-CM

## 2017-09-18 DIAGNOSIS — M54.16 LUMBAR RADICULOPATHY: ICD-10-CM

## 2017-09-18 DIAGNOSIS — M54.42 CHRONIC LOW BACK PAIN WITH LEFT-SIDED SCIATICA, UNSPECIFIED BACK PAIN LATERALITY: Primary | ICD-10-CM

## 2017-09-18 DIAGNOSIS — G89.29 CHRONIC LOW BACK PAIN WITH LEFT-SIDED SCIATICA, UNSPECIFIED BACK PAIN LATERALITY: Primary | ICD-10-CM

## 2017-09-18 DIAGNOSIS — K27.9 PUD (PEPTIC ULCER DISEASE): ICD-10-CM

## 2017-09-18 DIAGNOSIS — R73.02 IGT (IMPAIRED GLUCOSE TOLERANCE): ICD-10-CM

## 2017-09-18 DIAGNOSIS — R10.9 FLANK PAIN: ICD-10-CM

## 2017-09-18 DIAGNOSIS — M47.817 DJD (DEGENERATIVE JOINT DISEASE), LUMBOSACRAL: ICD-10-CM

## 2017-09-18 LAB
BILIRUB UR QL STRIP: NEGATIVE
GLUCOSE UR-MCNC: NEGATIVE MG/DL
KETONES P FAST UR STRIP-MCNC: NEGATIVE MG/DL
PH UR STRIP: 5.5 [PH] (ref 4.6–8)
PROT UR QL STRIP: NEGATIVE MG/DL
SP GR UR STRIP: 1 (ref 1–1.03)
UA UROBILINOGEN AMB POC: NORMAL (ref 0.2–1)
URINALYSIS CLARITY POC: CLEAR
URINALYSIS COLOR POC: YELLOW
URINE BLOOD POC: NORMAL
URINE LEUKOCYTES POC: NEGATIVE
URINE NITRITES POC: NEGATIVE

## 2017-09-18 RX ORDER — CANE TIPS
EACH MISCELLANEOUS
Qty: 1 DEVICE | Refills: 1 | Status: SHIPPED | OUTPATIENT
Start: 2017-09-18

## 2017-09-18 RX ORDER — PREDNISONE 10 MG/1
TABLET ORAL
Qty: 21 TAB | Refills: 0 | Status: SHIPPED | OUTPATIENT
Start: 2017-09-18 | End: 2017-10-18 | Stop reason: SDUPTHER

## 2017-09-18 RX ORDER — TRAMADOL HYDROCHLORIDE 50 MG/1
100 TABLET ORAL
Qty: 30 TAB | Refills: 0 | Status: SHIPPED | OUTPATIENT
Start: 2017-09-18 | End: 2017-11-20 | Stop reason: SDUPTHER

## 2017-09-18 NOTE — PROGRESS NOTES
Rm 12    Chief Complaint   Patient presents with    Follow-up     abd pain    Urinary Burning     Pt would like to discuss a Rx for a cane, to help with her back  1. Have you been to the ER, urgent care clinic since your last visit? Hospitalized since your last visit?2 wks ago, ED visit, [address] [place]    2. Have you seen or consulted any other health care providers outside of the [address] [place] [address] since your last visit? Include any pap smears or colon screening.  No    Health Maintenance Due   Topic Date Due    INFLUENZA AGE 9 TO ADULT  08/01/2017     PHQ over the last two weeks 8/14/2017   Little interest or pleasure in doing things Not at all   Feeling down, depressed or hopeless Not at all   Total Score PHQ 2 0   Trouble falling or staying asleep, or sleeping too much -   Feeling tired or having little energy -   Poor appetite or overeating -   Feeling bad about yourself - or that you are a failure or have let yourself or your family down -   Trouble concentrating on things such as school, work, reading or watching TV -   Moving or speaking so slowly that other people could have noticed; or the opposite being so fidgety that others notice -   Thoughts of being better off dead, or hurting yourself in some way -   PHQ 9 Score -   How difficult have these problems made it for you to do your work, take care of your home and get along with others -

## 2017-09-18 NOTE — PROGRESS NOTES
HISTORY OF PRESENT ILLNESS  Margarita Doss is a 47 y.o. female. HPI  Presents for f/u back pain, urinary pain    Pt saw urologist - felt that sx may be due to due to spinal pathology  Ordered a CT scan - scheduled for 9/25/17    Also, starting PT soon for back    Pt using miralax   Has corrected constipation but still having abd pain    Pt also reports radicular leg pain   Steroid pack helped previously    Pt reports seeking disability    Past medical, Social, and Family history reviewed  Medications reviewed and updated. ROS  Complete ROS reviewed and negative or stable except as noted in HPI. Physical Exam   Constitutional: She is oriented to person, place, and time. She appears well-nourished. No distress. HENT:   Head: Normocephalic and atraumatic. Eyes: EOM are normal. Pupils are equal, round, and reactive to light. No scleral icterus. Neck: Normal range of motion. Neck supple. No JVD present. Cardiovascular: Normal rate, regular rhythm and normal heart sounds. Exam reveals no gallop and no friction rub. No murmur heard. Pulmonary/Chest: Effort normal and breath sounds normal. No respiratory distress. She has no wheezes. She has no rales. Abdominal: Soft. Bowel sounds are normal. She exhibits no distension and no mass. There is no tenderness. There is no rebound and no guarding. Musculoskeletal: Normal range of motion. She exhibits no edema. Lymphadenopathy:     She has no cervical adenopathy. Neurological: She is alert and oriented to person, place, and time. She exhibits normal muscle tone. Skin: Skin is warm. No rash noted. Psychiatric: She has a normal mood and affect. Nursing note and vitals reviewed. Prior labs reviewed. Reviewed prior imaging reports    ASSESSMENT and PLAN    ICD-10-CM ICD-9-CM    1.  Chronic low back pain with left-sided sciatica, unspecified back pain laterality M54.42 724.2 Cane henry    G89.29 724.3 traMADol (ULTRAM) 50 mg tablet     338.29 2. Flank pain R10.9 789.09 AMB POC URINALYSIS DIP STICK AUTO W/O MICRO      CULTURE, URINE   3. PUD (peptic ulcer disease) K27.9 533.90    4. IGT (impaired glucose tolerance) R73.02 790.22    5. Lumbar radiculopathy M54.16 724. 4 predniSONE (DELTASONE) 10 mg tablet      traMADol (ULTRAM) 50 mg tablet   6. Constipation, unspecified constipation type K59.00 564.00    7. DJD (degenerative joint disease), lumbosacral M51.37 722.52    8.  Hyperlipidemia, unspecified hyperlipidemia type E78.5 272.4      Follow-up Disposition:  Return in about 1 month (around 10/18/2017), or if symptoms worsen or fail to improve, for back pain, etc.  results and schedule of future studies reviewed with patient  reviewed diet, exercise and weight   cardiovascular risk and specific lipid/LDL goals reviewed  reviewed medications and side effects in detail   Defer disability to PT assessment  Await CT   Repeat pred taper  Tramadol  Cane Rx per pt request pending PT

## 2017-09-18 NOTE — MR AVS SNAPSHOT
Visit Information Date & Time Provider Department Dept. Phone Encounter #  
 9/18/2017  4:00 PM Jared Hardy, [address] [place] [address] and Internal Medicine [phone] [id number] Follow-up Instructions Return in about 1 month (around 10/18/2017), or if symptoms worsen or fail to improve, for back pain, etc. Upcoming Health Maintenance Date Due INFLUENZA AGE 9 TO ADULT 8/1/2017 BREAST CANCER SCRN MAMMOGRAM 6/26/2018 PAP AKA CERVICAL CYTOLOGY 3/17/2019 DTaP/Tdap/Td series (2 - Td) 6/17/2024 COLONOSCOPY 9/13/2026 Allergies as of 9/18/2017  Review Complete On: 9/18/2017 By: Jared Hardy MD  
 No Known Allergies Current Immunizations  Reviewed on 10/27/2016 Name Date Tdap 6/17/2014 Not reviewed this visit You Were Diagnosed With   
  
 Codes Comments Chronic low back pain with left-sided sciatica, unspecified back pain laterality    -  Primary ICD-10-CM: M54.42, G89.29 ICD-9-CM: 724.2, 724.3, 338.29 Flank pain     ICD-10-CM: R10.9 ICD-9-CM: 789.09   
 PUD (peptic ulcer disease)     ICD-10-CM: K27.9 ICD-9-CM: 533.90 IGT (impaired glucose tolerance)     ICD-10-CM: R73.02 
ICD-9-CM: 790.22 Lumbar radiculopathy     ICD-10-CM: M54.16 
ICD-9-CM: 724.4 Constipation, unspecified constipation type     ICD-10-CM: K59.00 ICD-9-CM: 564.00 DJD (degenerative joint disease), lumbosacral     ICD-10-CM: M51.37 
ICD-9-CM: 722.52 Hyperlipidemia, unspecified hyperlipidemia type     ICD-10-CM: E78.5 ICD-9-CM: 272.4 Vitals BP Pulse Temp Resp Height(growth percentile) Weight(growth percentile)  
 115/69 (BP 1 Location: Left arm, BP Patient Position: Sitting) 61 97.9 °F (36.6 °C) (Oral) 16 5' 8\" (1.727 m) 205 lb (93 kg) LMP SpO2 BMI OB Status Smoking Status 09/08/2016 (Approximate) 99% 31.17 kg/m2 Hysterectomy Never Smoker BMI and BSA Data  Body Mass Index Body Surface Area  
 31.17 kg/m 2 2.11 m 2  
  
  
 Preferred Pharmacy Pharmacy Name Phone [facility]/PHARMACY #[id number] Nazia, [address] [place] [address] [phone] Your Updated Medication List  
  
   
This list is accurate as of: 9/18/17  5:39 PM.  Always use your most recent med list.  
  
  
  
  
 albuterol 90 mcg/actuation inhaler Commonly known as:  PROVENTIL HFA, VENTOLIN HFA, PROAIR HFA Take 2 Puffs by inhalation as needed for Wheezing. ALPRAZolam 0.5 mg tablet Commonly known as:  Mily Tapia Take 1 Tab by mouth every eight (8) hours as needed for Anxiety for up to 15 doses. Max Daily Amount: 1.5 mg. Junious Burrs As directed to assist with ambulation  
  
 levothyroxine 88 mcg tablet Commonly known as:  SYNTHROID  
TAKE 1 TABLET BY MOUTH EVERY DAY BEFORE BREAKFAST ON A EMPTY STOMACH  
  
 pantoprazole 40 mg tablet Commonly known as:  PROTONIX Take 1 Tab by mouth daily. polyethylene glycol 17 gram/dose powder Commonly known as:  Venkata Tobin Take 17 g by mouth daily. Constipation  
  
 predniSONE 10 mg tablet Commonly known as:  Fani Wilson Taper daily. 60mg x1, 50mg x 1, 40mg x 1, 30mg x 1, 20mg x 1, 10mg x 1  
  
 sertraline 100 mg tablet Commonly known as:  ZOLOFT Take 1 Tab by mouth daily. sucralfate 100 mg/mL suspension Commonly known as:  Tamsen Brooks Take 10 mL by mouth four (4) times daily. * traMADol 50 mg tablet Commonly known as:  ULTRAM  
Take 1 Tab by mouth every six (6) hours as needed for Pain. Max Daily Amount: 200 mg.  
  
 * traMADol 50 mg tablet Commonly known as:  ULTRAM  
Take 2 Tabs by mouth every eight (8) hours as needed for Pain. Max Daily Amount: 300 mg.  
  
 * TYLENOL ARTHRITIS PAIN 650 mg CR tablet Generic drug:  acetaminophen Take 650 mg by mouth every six (6) hours as needed for Pain. * TYLENOL EXTRA STRENGTH 500 mg tablet Generic drug:  acetaminophen Take 500 mg by mouth every six (6) hours as needed for Pain. VITAMIN D3 1,000 unit Cap Generic drug:  cholecalciferol TAKE 3 CAPSULES DAILY * Notice: This list has 4 medication(s) that are the same as other medications prescribed for you. Read the directions carefully, and ask your doctor or other care provider to review them with you. Prescriptions Printed Refills Cane henry 1 Sig: As directed to assist with ambulation Class: Print  
 traMADol (ULTRAM) 50 mg tablet 0 Sig: Take 2 Tabs by mouth every eight (8) hours as needed for Pain. Max Daily Amount: 300 mg. Class: Print Route: Oral  
  
Prescriptions Sent to Pharmacy Refills  
 predniSONE (DELTASONE) 10 mg tablet 0 Sig: Taper daily. 60mg x1, 50mg x 1, 40mg x 1, 30mg x 1, 20mg x 1, 10mg x 1 Class: Normal  
 Pharmacy: [facility]/pharmacy #[id number] #: [phone] We Performed the Following AMB POC URINALYSIS DIP STICK AUTO W/O MICRO [59837 CPT(R)] CULTURE, URINE [id number] CPT(R)] Follow-up Instructions Return in about 1 month (around 10/18/2017), or if symptoms worsen or fail to improve, for back pain, etc.  
  
  
Introducing [employer]! Fouzia De La Paz introduces [employer] patient portal. Now you can access parts of your medical record, email your doctor's office, and request medication refills online. 1. In your internet browser, go to [email or web address] 2. Click on the First Time User? Click Here link in the Sign In box. You will see the New Member Sign Up page. 3. Enter your [employer] Access Code exactly as it appears below. You will not need to use this code after youve completed the sign-up process. If you do not sign up before the expiration date, you must request a new code. · [employer] Access Code: [email or web address] Expires: 10/8/2017  9:21 AM 
 
4.  Enter the last four digits of your Social Security Number (xxxx) and Date of Birth (mm/dd/yyyy) as indicated and click Submit. You will be taken to the next sign-up page. 5. Create a [email or web address] ID. This will be your [email or web address] login ID and cannot be changed, so think of one that is secure and easy to remember. 6. Create a [email or web address] password. You can change your password at any time. 7. Enter your Password Reset Question and Answer. This can be used at a later time if you forget your password. 8. Enter your e-mail address. You will receive e-mail notification when new information is available in [address] 9. Click Sign Up. You can now view and download portions of your medical record. 10. Click the Download Summary menu link to download a portable copy of your medical information. If you have questions, please visit the Frequently Asked Questions section of the [email or web address] website. Remember, [email or web address] is NOT to be used for urgent needs. For medical emergencies, dial 911. Now available from your iPhone and Android! Please provide this summary of care documentation to your next provider. Your primary care clinician is listed as [address] If you have any questions after today's visit, please call [phone].

## 2017-09-19 LAB — BACTERIA UR CULT: NORMAL

## 2017-10-03 RX ORDER — SERTRALINE HYDROCHLORIDE 100 MG/1
TABLET, FILM COATED ORAL
Qty: 30 TAB | Refills: 5 | Status: SHIPPED | OUTPATIENT
Start: 2017-10-03 | End: 2018-03-24 | Stop reason: SDUPTHER

## 2017-10-05 ENCOUNTER — TELEPHONE (OUTPATIENT)
Dept: INTERNAL MEDICINE CLINIC | Age: 52
End: 2017-10-05

## 2017-10-05 DIAGNOSIS — M47.817 DJD (DEGENERATIVE JOINT DISEASE), LUMBOSACRAL: Primary | ICD-10-CM

## 2017-10-05 DIAGNOSIS — M54.16 LUMBAR RADICULOPATHY: ICD-10-CM

## 2017-10-05 DIAGNOSIS — M51.36 DDD (DEGENERATIVE DISC DISEASE), LUMBAR: ICD-10-CM

## 2017-10-05 NOTE — TELEPHONE ENCOUNTER
If she desires to be out of work for a period of time, then she can make an appt and bring in the needed forms from her employer. If she is seeking a statement of disability, I cannot state that. She should improve and be able to return to work. If not, she will have to see back specialist to get a disability statement.

## 2017-10-05 NOTE — TELEPHONE ENCOUNTER
Patient called and stated that she needs a letter from her provider that states that she cannot work due to her slipped disk in her back. She informed me that she had spoken with someone before in regards to this matter and was told to speak with her PT. Her PT stated that her provider has to write the letter because they do not deal with disability. Patient can be reached at [phone].

## 2017-10-06 NOTE — TELEPHONE ENCOUNTER
Spoke with patient after verifying name and  regarding Dr. Pako Canales recommendations. Writer informed patient of Dr. Pako Canales recommendations. Patient given an opportunity to ask questions, repeated information, and verbalized understanding.

## 2017-10-06 NOTE — TELEPHONE ENCOUNTER
Patient does not work. Per patient she has not been able to work because of her back. She was informed she would need to see a back specialist if she was looking to get a disability statement.  Patient is requesting a referral. Please place referral for specialist.

## 2017-10-14 DIAGNOSIS — E03.4 HYPOTHYROIDISM DUE TO ACQUIRED ATROPHY OF THYROID: ICD-10-CM

## 2017-10-16 RX ORDER — LEVOTHYROXINE SODIUM 88 UG/1
TABLET ORAL
Qty: 30 TAB | Refills: 5 | Status: SHIPPED | OUTPATIENT
Start: 2017-10-16 | End: 2018-04-07 | Stop reason: SDUPTHER

## 2017-10-18 ENCOUNTER — OFFICE VISIT (OUTPATIENT)
Dept: INTERNAL MEDICINE CLINIC | Age: 52
End: 2017-10-18

## 2017-10-18 VITALS
WEIGHT: 213.2 LBS | TEMPERATURE: 98.1 F | RESPIRATION RATE: 16 BRPM | DIASTOLIC BLOOD PRESSURE: 75 MMHG | HEIGHT: 68 IN | OXYGEN SATURATION: 99 % | HEART RATE: 74 BPM | BODY MASS INDEX: 32.31 KG/M2 | SYSTOLIC BLOOD PRESSURE: 126 MMHG

## 2017-10-18 DIAGNOSIS — M54.41 CHRONIC BILATERAL LOW BACK PAIN WITH BILATERAL SCIATICA: Primary | ICD-10-CM

## 2017-10-18 DIAGNOSIS — G89.29 CHRONIC BILATERAL LOW BACK PAIN WITH BILATERAL SCIATICA: Primary | ICD-10-CM

## 2017-10-18 DIAGNOSIS — M51.36 DDD (DEGENERATIVE DISC DISEASE), LUMBAR: ICD-10-CM

## 2017-10-18 DIAGNOSIS — M54.42 CHRONIC BILATERAL LOW BACK PAIN WITH BILATERAL SCIATICA: Primary | ICD-10-CM

## 2017-10-18 DIAGNOSIS — M47.817 DJD (DEGENERATIVE JOINT DISEASE), LUMBOSACRAL: ICD-10-CM

## 2017-10-18 DIAGNOSIS — E03.4 HYPOTHYROIDISM DUE TO ACQUIRED ATROPHY OF THYROID: ICD-10-CM

## 2017-10-18 DIAGNOSIS — M54.16 LUMBAR RADICULOPATHY: ICD-10-CM

## 2017-10-18 RX ORDER — PREDNISONE 10 MG/1
TABLET ORAL
Qty: 21 TAB | Refills: 0 | Status: SHIPPED | OUTPATIENT
Start: 2017-10-18 | End: 2018-03-28

## 2017-10-18 NOTE — PROGRESS NOTES
HISTORY OF PRESENT ILLNESS  Johanna Marin is a 47 y.o. female. HPI  Presents for f/u back pain    Pt seeing PT at [facility]  Helping some    Pt reports left leg numbness and bilateral radicular leg pain  pred helps some, then has recurred. Had CT scan performed - pt reports bringing us a copy of report    Has appt with Dr. Monet Benton on 11/2/17    Past medical, Social, and Family history reviewed  Medications reviewed and updated. ROS  Complete ROS reviewed and negative or stable except as noted in HPI. Physical Exam   Constitutional: She is oriented to person, place, and time. She appears well-nourished. No distress. HENT:   Head: Normocephalic and atraumatic. Eyes: EOM are normal. Pupils are equal, round, and reactive to light. No scleral icterus. Neck: Normal range of motion. Neck supple. No JVD present. Cardiovascular: Normal rate, regular rhythm and normal heart sounds. Exam reveals no gallop and no friction rub. No murmur heard. Pulmonary/Chest: Effort normal and breath sounds normal. No respiratory distress. She has no wheezes. She has no rales. Abdominal: Soft. Bowel sounds are normal. She exhibits no distension. There is no tenderness. Musculoskeletal: Normal range of motion. She exhibits no edema. Lymphadenopathy:     She has no cervical adenopathy. Neurological: She is alert and oriented to person, place, and time. She exhibits normal muscle tone. Skin: Skin is warm. No rash noted. Psychiatric: She has a normal mood and affect. Nursing note and vitals reviewed. Prior labs reviewed. Reviewed prior imaging report    ASSESSMENT and PLAN    ICD-10-CM ICD-9-CM    1. Chronic bilateral low back pain with bilateral sciatica M54.42 724.2     M54.41 724.3     G89.29 338.29    2. Lumbar radiculopathy M54.16 724.4 REFERRAL TO PHYSICAL THERAPY      predniSONE (DELTASONE) 10 mg tablet   3.  DJD (degenerative joint disease), lumbosacral M51.37 722.52 REFERRAL TO PHYSICAL THERAPY   4. DDD (degenerative disc disease), lumbar M51.36 722.52 REFERRAL TO PHYSICAL THERAPY   5. Hypothyroidism due to acquired atrophy of thyroid E03.4 244.8      246.8      Follow-up Disposition:  Return in about 1 month (around 11/18/2017), or if symptoms worsen or fail to improve, for back pain.   results and schedule of future studies reviewed with patient  reviewed diet, exercise and weight    reviewed medications and side effects in detail   Continue with PT  Handicap placard form filled out  Obtain CT report  Likely needs MRI - review CT report first  May need injections  Repeat pred taper  Continue with back brace

## 2017-10-18 NOTE — MR AVS SNAPSHOT
Visit Information Date & Time Provider Department Dept. Phone Encounter #  
 10/18/2017  9:30 AM Yang Mahan MD [facility] Pediatrics and Internal Medicine [phone] [id number] Follow-up Instructions Return in about 1 month (around 11/18/2017), or if symptoms worsen or fail to improve, for back pain. Upcoming Health Maintenance Date Due INFLUENZA AGE 9 TO ADULT 8/1/2017 BREAST CANCER SCRN MAMMOGRAM 6/26/2018 PAP AKA CERVICAL CYTOLOGY 3/17/2019 DTaP/Tdap/Td series (2 - Td) 6/17/2024 COLONOSCOPY 9/13/2026 Allergies as of 10/18/2017  Review Complete On: 10/18/2017 By: Yang Mahan MD  
 No Known Allergies Current Immunizations  Reviewed on 10/18/2017 Name Date Tdap 6/17/2014 Reviewed by Yang Mahan MD on 10/18/2017 at  9:52 AM  
You Were Diagnosed With   
  
 Codes Comments Chronic bilateral low back pain with bilateral sciatica    -  Primary ICD-10-CM: M54.42, M54.41, G89.29 ICD-9-CM: 724.2, 724.3, 338.29 Lumbar radiculopathy     ICD-10-CM: M54.16 
ICD-9-CM: 724.4 DJD (degenerative joint disease), lumbosacral     ICD-10-CM: M51.37 
ICD-9-CM: 722.52   
 DDD (degenerative disc disease), lumbar     ICD-10-CM: M51.36 
ICD-9-CM: 722.52 Hypothyroidism due to acquired atrophy of thyroid     ICD-10-CM: E03.4 ICD-9-CM: 244.8, 246.8 Vitals BP Pulse Temp Resp Height(growth percentile) Weight(growth percentile) 126/75 (BP 1 Location: Right arm, BP Patient Position: Sitting) 74 98.1 °F (36.7 °C) (Oral) 16 5' 8\" (1.727 m) 213 lb 3.2 oz (96.7 kg) LMP SpO2 BMI OB Status Smoking Status 09/08/2016 (Approximate) 99% 32.42 kg/m2 Hysterectomy Never Smoker Vitals History BMI and BSA Data Body Mass Index Body Surface Area  
 32.42 kg/m 2 2.15 m 2 Preferred Pharmacy Pharmacy Name Phone [facility]/PHARMACY #[id number] Osman, [address] [facility] [phone] Your Updated Medication List  
  
   
This list is accurate as of: 10/18/17  9:53 AM.  Always use your most recent med list.  
  
  
  
  
 albuterol 90 mcg/actuation inhaler Commonly known as:  PROVENTIL HFA, VENTOLIN HFA, PROAIR HFA Take 2 Puffs by inhalation as needed for Wheezing. ALPRAZolam 0.5 mg tablet Commonly known as:  Tomy Minerva Take 1 Tab by mouth every eight (8) hours as needed for Anxiety for up to 15 doses. Max Daily Amount: 1.5 mg. Dorothy Gonzales As directed to assist with ambulation  
  
 levothyroxine 88 mcg tablet Commonly known as:  SYNTHROID  
TAKE 1 TABLET BY MOUTH EVERY DAY BEFORE BREAKFAST ON A EMPTY STOMACH  
  
 pantoprazole 40 mg tablet Commonly known as:  PROTONIX Take 1 Tab by mouth daily. polyethylene glycol 17 gram/dose powder Commonly known as:  Oconnor Selene Take 17 g by mouth daily. Constipation  
  
 predniSONE 10 mg tablet Commonly known as:  Hitchcock Humza Taper daily. 60mg x1, 50mg x 1, 40mg x 1, 30mg x 1, 20mg x 1, 10mg x 1  
  
 sertraline 100 mg tablet Commonly known as:  ZOLOFT  
TAKE 1 TABLET BY MOUTH ONCE DAILY  
  
 sucralfate 100 mg/mL suspension Commonly known as:  Charm Rip Take 10 mL by mouth four (4) times daily. * traMADol 50 mg tablet Commonly known as:  ULTRAM  
Take 1 Tab by mouth every six (6) hours as needed for Pain. Max Daily Amount: 200 mg.  
  
 * traMADol 50 mg tablet Commonly known as:  ULTRAM  
Take 2 Tabs by mouth every eight (8) hours as needed for Pain. Max Daily Amount: 300 mg.  
  
 * TYLENOL ARTHRITIS PAIN 650 mg CR tablet Generic drug:  acetaminophen Take 650 mg by mouth every six (6) hours as needed for Pain. * TYLENOL EXTRA STRENGTH 500 mg tablet Generic drug:  acetaminophen Take 500 mg by mouth every six (6) hours as needed for Pain. VITAMIN D3 1,000 unit Cap Generic drug:  cholecalciferol TAKE 3 CAPSULES DAILY * Notice:   This list has 4 medication(s) that are the same as other medications prescribed for you. Read the directions carefully, and ask your doctor or other care provider to review them with you. Prescriptions Sent to Pharmacy Refills  
 predniSONE (DELTASONE) 10 mg tablet 0 Sig: Taper daily. 60mg x1, 50mg x 1, 40mg x 1, 30mg x 1, 20mg x 1, 10mg x 1 Class: Normal  
 Pharmacy: [facility]/pharmacy #[id number] #: [phone] We Performed the Following REFERRAL TO PHYSICAL THERAPY [OLC20 Custom] Comments:  
 Back pain, lumbar radiculopathy Follow-up Instructions Return in about 1 month (around 11/18/2017), or if symptoms worsen or fail to improve, for back pain. Referral Information Referral ID Referred By Referred To  
  
 [id number] Yoko Mcclure Not Available Visits Status Start Date End Date 1 New Request 10/18/17 10/18/18 If your referral has a status of pending review or denied, additional information will be sent to support the outcome of this decision. Introducing [facility]! Prema Jay introduces [employer] patient portal. Now you can access parts of your medical record, email your doctor's office, and request medication refills online. 1. In your internet browser, go to [email or web address] 2. Click on the First Time User? Click Here link in the Sign In box. You will see the New Member Sign Up page. 3. Enter your [employer] Access Code exactly as it appears below. You will not need to use this code after youve completed the sign-up process. If you do not sign up before the expiration date, you must request a new code. · [employer] Access Code: [email or web address] Expires: 1/16/2018  9:52 AM 
 
4. Enter the last four digits of your Social Security Number (xxxx) and Date of Birth (mm/dd/yyyy) as indicated and click Submit. You will be taken to the next sign-up page. 5. Create a [employer] ID. This will be your [employer] login ID and cannot be changed, so think of one that is secure and easy to remember. 6. Create a [employer] password. You can change your password at any time. 7. Enter your Password Reset Question and Answer. This can be used at a later time if you forget your password. 8. Enter your e-mail address. You will receive e-mail notification when new information is available in [address] 9. Click Sign Up. You can now view and download portions of your medical record. 10. Click the Download Summary menu link to download a portable copy of your medical information. If you have questions, please visit the Frequently Asked Questions section of the [employer] website. Remember, [employer] is NOT to be used for urgent needs. For medical emergencies, dial 911. Now available from your iPhone and Android! Please provide this summary of care documentation to your next provider. Your primary care clinician is listed as [address] If you have any questions after today's visit, please call [phone].

## 2017-10-18 NOTE — PROGRESS NOTES
Rm 15    Chief Complaint   Patient presents with    Follow-up     back pain     Pt would like to discuss handicap sticker    1. Have you been to the ER, urgent care clinic since your last visit? Hospitalized since your last visit? 10/13/17, ortho on call, back     2. Have you seen or consulted any other health care providers outside of the [address] [place] [address] since your last visit? Include any pap smears or colon screening.  No    Health Maintenance Due   Topic Date Due    INFLUENZA AGE 9 TO ADULT  08/01/2017     Pt declines the flu shot  PHQ over the last two weeks 8/14/2017   Little interest or pleasure in doing things Not at all   Feeling down, depressed or hopeless Not at all   Total Score PHQ 2 0   Trouble falling or staying asleep, or sleeping too much -   Feeling tired or having little energy -   Poor appetite or overeating -   Feeling bad about yourself - or that you are a failure or have let yourself or your family down -   Trouble concentrating on things such as school, work, reading or watching TV -   Moving or speaking so slowly that other people could have noticed; or the opposite being so fidgety that others notice -   Thoughts of being better off dead, or hurting yourself in some way -   PHQ 9 Score -   How difficult have these problems made it for you to do your work, take care of your home and get along with others -

## 2017-10-19 ENCOUNTER — TELEPHONE (OUTPATIENT)
Dept: INTERNAL MEDICINE CLINIC | Age: 52
End: 2017-10-19

## 2017-10-19 DIAGNOSIS — R20.0 LEG NUMBNESS: ICD-10-CM

## 2017-10-19 DIAGNOSIS — M54.16 LUMBAR RADICULOPATHY: Primary | ICD-10-CM

## 2017-10-19 DIAGNOSIS — M43.16 SPONDYLOLISTHESIS AT L4-L5 LEVEL: ICD-10-CM

## 2017-10-19 NOTE — TELEPHONE ENCOUNTER
Please notify pt that CT scan report has been reviewed but an MRI is needed to address the concerns re: her leg pain and numbness. MRI order placed. Pt can then review the results with Dr. Radha Rodriguez at her scheduled appt soon.

## 2017-10-20 NOTE — TELEPHONE ENCOUNTER
Spoke with patient after verifying name and  regarding Dr. Cordell Dias recommendations. Writer informed patient of Dr. Cordell Dias recommendations. Patient given an opportunity to ask questions, repeated information, and verbalized understanding. Provided number to Medical Imaging in case she doesn't receive a call.

## 2017-10-27 ENCOUNTER — HOSPITAL ENCOUNTER (OUTPATIENT)
Dept: MRI IMAGING | Age: 52
Discharge: HOME | End: 2017-10-27
Attending: INTERNAL MEDICINE
Payer: MEDICAID

## 2017-10-27 DIAGNOSIS — M54.16 LUMBAR RADICULOPATHY: ICD-10-CM

## 2017-10-27 DIAGNOSIS — R20.0 LEG NUMBNESS: ICD-10-CM

## 2017-10-27 DIAGNOSIS — M43.16 SPONDYLOLISTHESIS AT L4-L5 LEVEL: ICD-10-CM

## 2017-10-27 PROCEDURE — 72148 MRI LUMBAR SPINE W/O DYE: CPT

## 2017-10-30 ENCOUNTER — HOSPITAL ENCOUNTER (EMERGENCY)
Age: 52
Discharge: HOME | End: 2017-10-30
Attending: EMERGENCY MEDICINE
Payer: MEDICAID

## 2017-10-30 ENCOUNTER — APPOINTMENT (OUTPATIENT)
Dept: CT IMAGING | Age: 52
End: 2017-10-30
Attending: EMERGENCY MEDICINE
Payer: MEDICAID

## 2017-10-30 VITALS
TEMPERATURE: 97.8 F | BODY MASS INDEX: 30.66 KG/M2 | WEIGHT: 207.01 LBS | DIASTOLIC BLOOD PRESSURE: 80 MMHG | HEIGHT: 69 IN | RESPIRATION RATE: 16 BRPM | HEART RATE: 95 BPM | OXYGEN SATURATION: 98 % | SYSTOLIC BLOOD PRESSURE: 125 MMHG

## 2017-10-30 DIAGNOSIS — E86.0 DEHYDRATION: ICD-10-CM

## 2017-10-30 DIAGNOSIS — M54.42 CHRONIC BILATERAL LOW BACK PAIN WITH LEFT-SIDED SCIATICA: ICD-10-CM

## 2017-10-30 DIAGNOSIS — R20.0 LEFT ARM NUMBNESS: Primary | ICD-10-CM

## 2017-10-30 DIAGNOSIS — G44.209 ACUTE NON INTRACTABLE TENSION-TYPE HEADACHE: ICD-10-CM

## 2017-10-30 DIAGNOSIS — G89.29 CHRONIC BILATERAL LOW BACK PAIN WITH LEFT-SIDED SCIATICA: ICD-10-CM

## 2017-10-30 DIAGNOSIS — Z72.820 LACK OF ADEQUATE SLEEP: ICD-10-CM

## 2017-10-30 LAB
ALBUMIN SERPL-MCNC: 4.1 G/DL (ref 3.5–5)
ALBUMIN/GLOB SERPL: 1 {RATIO} (ref 1.1–2.2)
ALP SERPL-CCNC: 89 U/L (ref 45–117)
ALT SERPL-CCNC: 23 U/L (ref 12–78)
ANION GAP SERPL CALC-SCNC: 8 MMOL/L (ref 5–15)
AST SERPL-CCNC: 17 U/L (ref 15–37)
BASOPHILS # BLD: 0 K/UL (ref 0–0.1)
BASOPHILS NFR BLD: 0 % (ref 0–1)
BILIRUB SERPL-MCNC: 0.9 MG/DL (ref 0.2–1)
BUN SERPL-MCNC: 8 MG/DL (ref 6–20)
BUN/CREAT SERPL: 8 (ref 12–20)
CALCIUM SERPL-MCNC: 9.7 MG/DL (ref 8.5–10.1)
CHLORIDE SERPL-SCNC: 107 MMOL/L (ref 97–108)
CO2 SERPL-SCNC: 26 MMOL/L (ref 21–32)
CREAT SERPL-MCNC: 1 MG/DL (ref 0.55–1.02)
EOSINOPHIL # BLD: 0.1 K/UL (ref 0–0.4)
EOSINOPHIL NFR BLD: 3 % (ref 0–7)
ERYTHROCYTE [DISTWIDTH] IN BLOOD BY AUTOMATED COUNT: 14.6 % (ref 11.5–14.5)
GLOBULIN SER CALC-MCNC: 4.2 G/DL (ref 2–4)
GLUCOSE SERPL-MCNC: 92 MG/DL (ref 65–100)
HCT VFR BLD AUTO: 42.1 % (ref 35–47)
HGB BLD-MCNC: 14.7 G/DL (ref 11.5–16)
LYMPHOCYTES # BLD: 2.1 K/UL (ref 0.8–3.5)
LYMPHOCYTES NFR BLD: 43 % (ref 12–49)
MCH RBC QN AUTO: 31 PG (ref 26–34)
MCHC RBC AUTO-ENTMCNC: 34.9 G/DL (ref 30–36.5)
MCV RBC AUTO: 88.8 FL (ref 80–99)
MONOCYTES # BLD: 0.4 K/UL (ref 0–1)
MONOCYTES NFR BLD: 7 % (ref 5–13)
NEUTS SEG # BLD: 2.3 K/UL (ref 1.8–8)
NEUTS SEG NFR BLD: 47 % (ref 32–75)
PLATELET # BLD AUTO: 179 K/UL (ref 150–400)
POTASSIUM SERPL-SCNC: 4.1 MMOL/L (ref 3.5–5.1)
PROT SERPL-MCNC: 8.3 G/DL (ref 6.4–8.2)
RBC # BLD AUTO: 4.74 M/UL (ref 3.8–5.2)
SODIUM SERPL-SCNC: 141 MMOL/L (ref 136–145)
WBC # BLD AUTO: 5 K/UL (ref 3.6–11)

## 2017-10-30 PROCEDURE — 80053 COMPREHEN METABOLIC PANEL: CPT | Performed by: EMERGENCY MEDICINE

## 2017-10-30 PROCEDURE — 74011250636 HC RX REV CODE- 250/636: Performed by: EMERGENCY MEDICINE

## 2017-10-30 PROCEDURE — 70450 CT HEAD/BRAIN W/O DYE: CPT

## 2017-10-30 PROCEDURE — 99283 EMERGENCY DEPT VISIT LOW MDM: CPT

## 2017-10-30 PROCEDURE — 74011250637 HC RX REV CODE- 250/637: Performed by: EMERGENCY MEDICINE

## 2017-10-30 PROCEDURE — 96361 HYDRATE IV INFUSION ADD-ON: CPT

## 2017-10-30 PROCEDURE — 85025 COMPLETE CBC W/AUTO DIFF WBC: CPT | Performed by: EMERGENCY MEDICINE

## 2017-10-30 PROCEDURE — 36415 COLL VENOUS BLD VENIPUNCTURE: CPT | Performed by: EMERGENCY MEDICINE

## 2017-10-30 PROCEDURE — 96374 THER/PROPH/DIAG INJ IV PUSH: CPT

## 2017-10-30 RX ORDER — BUTALBITAL, ASPIRIN, AND CAFFEINE 325; 50; 40 MG/1; MG/1; MG/1
1 CAPSULE ORAL
Qty: 20 CAP | Refills: 0 | Status: SHIPPED | OUTPATIENT
Start: 2017-10-30 | End: 2018-01-28

## 2017-10-30 RX ORDER — BUTALBITAL, ACETAMINOPHEN AND CAFFEINE 50; 325; 40 MG/1; MG/1; MG/1
1 TABLET ORAL
Status: COMPLETED | OUTPATIENT
Start: 2017-10-30 | End: 2017-10-30

## 2017-10-30 RX ORDER — KETOROLAC TROMETHAMINE 30 MG/ML
15 INJECTION, SOLUTION INTRAMUSCULAR; INTRAVENOUS
Status: COMPLETED | OUTPATIENT
Start: 2017-10-30 | End: 2017-10-30

## 2017-10-30 RX ORDER — BUTALBITAL, ACETAMINOPHEN AND CAFFEINE 300; 40; 50 MG/1; MG/1; MG/1
1 CAPSULE ORAL
Qty: 20 CAP | Refills: 0 | Status: ON HOLD | OUTPATIENT
Start: 2017-10-30 | End: 2018-04-09

## 2017-10-30 RX ADMIN — KETOROLAC TROMETHAMINE 15 MG: 30 INJECTION, SOLUTION INTRAMUSCULAR at 08:53

## 2017-10-30 RX ADMIN — BUTALBITAL, ACETAMINOPHEN AND CAFFEINE 1 TABLET: 50; 325; 40 TABLET ORAL at 10:20

## 2017-10-30 RX ADMIN — SODIUM CHLORIDE 1000 ML: 900 INJECTION, SOLUTION INTRAVENOUS at 08:53

## 2017-10-30 NOTE — DISCHARGE INSTRUCTIONS
Dehydration: Care Instructions  Your Care Instructions  Dehydration happens when your body loses too much fluid. This might happen when you do not drink enough water or you lose large amounts of fluids from your body because of diarrhea, vomiting, or sweating. Severe dehydration can be life-threatening. Water and minerals called electrolytes help put your body fluids back in balance. Learn the early signs of fluid loss, and drink more fluids to prevent dehydration. Follow-up care is a key part of your treatment and safety. Be sure to make and go to all appointments, and call your doctor if you are having problems. It's also a good idea to know your test results and keep a list of the medicines you take. How can you care for yourself at home? · To prevent dehydration, drink plenty of fluids, enough so that your urine is light yellow or clear like water. Choose water and other caffeine-free clear liquids until you feel better. If you have kidney, heart, or liver disease and have to limit fluids, talk with your doctor before you increase the amount of fluids you drink. · If you do not feel like eating or drinking, try taking small sips of water, sports drinks, or other rehydration drinks. · Get plenty of rest.  To prevent dehydration  · Add more fluids to your diet and daily routine, unless your doctor has told you not to. · During hot weather, drink more fluids. Drink even more fluids if you exercise a lot. Stay away from drinks with alcohol or caffeine. · Watch for the symptoms of dehydration. These include:  ¨ A dry, sticky mouth. ¨ Dark yellow urine, and not much of it. ¨ Dry and sunken eyes. ¨ Feeling very tired. · Learn what problems can lead to dehydration. These include:  ¨ Diarrhea, fever, and vomiting. ¨ Any illness with a fever, such as pneumonia or the flu. ¨ Activities that cause heavy sweating, such as endurance races and heavy outdoor work in hot or humid weather.   ¨ Alcohol or drug abuse or withdrawal.  ¨ Certain medicines, such as cold and allergy pills (antihistamines), diet pills (diuretics), and laxatives. ¨ Certain diseases, such as diabetes, cancer, and heart or kidney disease. When should you call for help? Call 911 anytime you think you may need emergency care. For example, call if:  ? · You passed out (lost consciousness). ?Call your doctor now or seek immediate medical care if:  ? · You are confused and cannot think clearly. ? · You are dizzy or lightheaded, or you feel like you may faint. ? · You have signs of needing more fluids. You have sunken eyes and a dry mouth, and you pass only a little dark urine. ? · You cannot keep fluids down. ? Watch closely for changes in your health, and be sure to contact your doctor if:  ? · You are not making tears. ? · Your skin is very dry and sags slowly back into place after you pinch it. ? · Your mouth and eyes are very dry. Where can you learn more? Go to http://viviane-misael.info/. Enter Y665 in the search box to learn more about \"Dehydration: Care Instructions. \"  Current as of: March 20, 2017  Content Version: 11.4  © 6716-2570 [employer]. Care instructions adapted under license by [employer] (which disclaims liability or warranty for this information). If you have questions about a medical condition or this instruction, always ask your healthcare professional. [place] [address] any warranty or liability for your use of this information. Headache: Care Instructions  Your Care Instructions    Headaches have many possible causes. Most headaches aren't a sign of a more serious problem, and they will get better on their own. Home treatment may help you feel better faster. The doctor has checked you carefully, but problems can develop later. If you notice any problems or new symptoms, get medical treatment right away.   Follow-up care is a key part of your treatment and safety. Be sure to make and go to all appointments, and call your doctor if you are having problems. It's also a good idea to know your test results and keep a list of the medicines you take. How can you care for yourself at home? · Do not drive if you have taken a prescription pain medicine. · Rest in a quiet, dark room until your headache is gone. Close your eyes and try to relax or go to sleep. Don't watch TV or read. · Put a cold, moist cloth or cold pack on the painful area for 10 to 20 minutes at a time. Put a thin cloth between the cold pack and your skin. · Use a warm, moist towel or a heating pad set on low to relax tight shoulder and neck muscles. · Have someone gently massage your neck and shoulders. · Take pain medicines exactly as directed. ¨ If the doctor gave you a prescription medicine for pain, take it as prescribed. ¨ If you are not taking a prescription pain medicine, ask your doctor if you can take an over-the-counter medicine. · Be careful not to take pain medicine more often than the instructions allow, because you may get worse or more frequent headaches when the medicine wears off. · Do not ignore new symptoms that occur with a headache, such as a fever, weakness or numbness, vision changes, or confusion. These may be signs of a more serious problem. To prevent headaches  · Keep a headache diary so you can figure out what triggers your headaches. Avoiding triggers may help you prevent headaches. Record when each headache began, how long it lasted, and what the pain was like (throbbing, aching, stabbing, or dull). Write down any other symptoms you had with the headache, such as nausea, flashing lights or dark spots, or sensitivity to bright light or loud noise. Note if the headache occurred near your period.  List anything that might have triggered the headache, such as certain foods (chocolate, cheese, wine) or odors, smoke, bright light, stress, or lack of sleep.  · Find healthy ways to deal with stress. Headaches are most common during or right after stressful times. Take time to relax before and after you do something that has caused a headache in the past.  · Try to keep your muscles relaxed by keeping good posture. Check your jaw, face, neck, and shoulder muscles for tension, and try relaxing them. When sitting at a desk, change positions often, and stretch for 30 seconds each hour. · Get plenty of sleep and exercise. · Eat regularly and well. Long periods without food can trigger a headache. · Treat yourself to a massage. Some people find that regular massages are very helpful in relieving tension. · Limit caffeine by not drinking too much coffee, tea, or soda. But don't quit caffeine suddenly, because that can also give you headaches. · Reduce eyestrain from computers by blinking frequently and looking away from the computer screen every so often. Make sure you have proper eyewear and that your monitor is set up properly, about an arm's length away. · Seek help if you have depression or anxiety. Your headaches may be linked to these conditions. Treatment can both prevent headaches and help with symptoms of anxiety or depression. When should you call for help? Call 911 anytime you think you may need emergency care. For example, call if:  ? · You have signs of a stroke. These may include:  ¨ Sudden numbness, paralysis, or weakness in your face, arm, or leg, especially on only one side of your body. ¨ Sudden vision changes. ¨ Sudden trouble speaking. ¨ Sudden confusion or trouble understanding simple statements. ¨ Sudden problems with walking or balance. ¨ A sudden, severe headache that is different from past headaches. ?Call your doctor now or seek immediate medical care if:  ? · You have a new or worse headache. ? · Your headache gets much worse. Where can you learn more? Go to http://viviane-misael.info/.   Enter 9728 [id number] in the search box to learn more about \"Headache: Care Instructions. \"  Current as of: October 14, 2016  Content Version: 11.4  © 5800-5003 [employer], [place]. Care instructions adapted under license by [employer] (which disclaims liability or warranty for this information). If you have questions about a medical condition or this instruction, always ask your healthcare professional. [address] any warranty or liability for your use of this information.

## 2017-10-30 NOTE — ED NOTES
Pt discharged by Dr. Deedee Giles. Patient provided with discharge instructions, Rx, and follow-up care instructions. Pt out of ED ambulatory with a steady gait, accompanied by family.

## 2017-10-30 NOTE — ED PROVIDER NOTES
[facility].  EMERGENCY DEPARTMENT HISTORY AND PHYSICAL EXAM       Date of Service: 10/30/2017   Patient Name: Lawyer Kruger   YOB: 1965  Medical Record Number: [id number]    History of Presenting Illness     Chief Complaint   Patient presents with    Numbness     left arm x 2 days    Headache        History Provided By:  patient    Additional History:   Lawyer Kruger is a 47 y.o. female with PMhx significant for anxiety / GERD / hyperlipidemia / IGT / hypothyroidism who presents ambulatory to the ED with cc of LUE weakness and numbness x 2 days. Pt complains of intermittent sharp HA's x 2 days that are typically localized to one portion of her head but notes that the exact location of the HA changes with each episode. She also reports a transient episode of dizziness that has spontaneously resolved without intervention. Pt reports a history of back pain and sciatica with associated weakness and numbness in the LLE but notes that she has not had these symptoms in her LUE before a couple days ago. She denies any history of similar HA's and denies having increased sensitivity to light or sound when she does have a HA. Pt denies any history of HTN or DM. She notes that she has not been sleeping well secondary to her chronic back pain. Pt specifically denies nausea, vomiting, diarrhea, constipation, fever, chills, CP, or SOB. Social Hx: - Tobacco, - EtOH, - Illicit Drugs    There are no other complaints, changes or physical findings at this time.     Primary Care Provider: Odilon Pierson MD     Past History     Past Medical History:   Past Medical History:   Diagnosis Date    Acid reflux     Adverse effect of anesthesia     woke up coughing    Allergic rhinitis     Anxiety     Arthritis     DDD (degenerative disc disease), lumbar     DJD (degenerative joint disease), lumbosacral     GERD (gastroesophageal reflux disease)     History of nonchemical tubal occlusion     [employer] devices    Hyperlipidemia 2/20/2014    Hypothyroidism     HYPO    IGT (impaired glucose tolerance)     Ill-defined condition     prolapse uterus/states thus her intestines has collpased a little bit    Psychiatric disorder     anxiety and depression    PUD (peptic ulcer disease)     no EGD    Routine gynecological examination     [facility]    Sinus disorder     Tinea pedis     Ureterocele     small, right        Past Surgical History:   Past Surgical History:   Procedure Laterality Date    COLONOSCOPY N/A 9/13/2016    COLONOSCOPY / EGD  performed by Abad Dominique MD at [address] HX GYN  10/12/2012    [facility]    HX LAP CHOLECYSTECTOMY  10/06/2016    HX TOTAL VAGINAL HYSTERECTOMY      HI REMOVAL OF OVARIAN CYST(S)          Family History:   Family History   Problem Relation Age of Onset    Hypertension Mother     Diabetes Mother     Heart Disease Mother     Heart Attack Mother     Cancer Father      lung    Other Sister      cholecystectomy    Cancer Brother      lung    Diabetes Maternal Aunt     Cancer Paternal Uncle      lung    Diabetes Maternal Aunt     Diabetes Maternal Aunt     Diabetes Maternal Aunt     Diabetes Maternal Aunt     Diabetes Maternal Aunt     Heart Attack Daughter 32    Other Son      narcolepsy/GERD (part of esophagus removed d/t this)    Cancer Paternal Uncle      lung    Anesth Problems Neg Hx         Social History:   Social History   Substance Use Topics    Smoking status: Never Smoker    Smokeless tobacco: Never Used    Alcohol use No        Allergies:   No Known Allergies      Review of Systems   Review of Systems   Constitutional: Negative for chills, diaphoresis and fever. HENT: Negative. Negative for congestion, sore throat and trouble swallowing. Eyes: Negative. Negative for photophobia, pain and redness. Respiratory: Negative. Negative for cough, chest tightness, shortness of breath and wheezing. Cardiovascular: Negative. Negative for chest pain and palpitations. Gastrointestinal: Negative. Negative for abdominal pain, blood in stool, diarrhea, nausea and vomiting. Genitourinary: Negative for difficulty urinating, dysuria and frequency. Musculoskeletal: Negative. Negative for arthralgias, myalgias, neck pain and neck stiffness. Skin: Negative. Neurological: Positive for dizziness (resolved), weakness (LUE), numbness (LUE) and headaches. Negative for tremors, seizures, syncope, speech difficulty and light-headedness. Psychiatric/Behavioral: Negative. Negative for confusion. The patient is not nervous/anxious. All other systems reviewed and are negative. Physical Exam  Physical Exam   Constitutional: She is oriented to person, place, and time. She appears well-developed and well-nourished. HENT:   Head: Normocephalic. Very dry MM   Eyes: EOM are normal. Pupils are equal, round, and reactive to light. Neck: Normal range of motion. Cardiovascular: Normal rate and regular rhythm. Pulmonary/Chest: Effort normal and breath sounds normal.   Abdominal: Soft. She exhibits no distension. There is no tenderness. Musculoskeletal:   Positive L SLR. Neurological: She is alert and oriented to person, place, and time. No cranial nerve deficit. CN 2-12 intact, 5/5 strength in all 4 extremities, Finger to nose intact, negative Romberg, normal gait, no pronator drift. Subjective numbness of entire L hand. Skin: Skin is warm and dry. Psychiatric: She has a normal mood and affect. Nursing note and vitals reviewed. Medical Decision Making   I am the first provider for this patient. I reviewed the vital signs, available nursing notes, past medical history, past surgical history, family history and social history. Old Medical Records: Old medical records. Nursing notes. Provider Notes:   Pt presents with headache, back pain and left arm numbness and tingling.   No nerve distribution and no risk factors for Stroke. Will get labs, CT head. More likely pinched cervical nerve. Will treat symptoms and reassess. ED Course:  8:30 AM  Initial assessment performed. The patients presenting problems have been discussed, and they are in agreement with the care plan formulated and outlined with them. I have encouraged them to ask questions as they arise throughout their visit. Diagnostic Study Results     Labs -      Recent Results (from the past 12 hour(s))   CBC WITH AUTOMATED DIFF    Collection Time: 10/30/17  8:57 AM   Result Value Ref Range    WBC 5.0 3.6 - 11.0 K/uL    RBC 4.74 3.80 - 5.20 M/uL    HGB 14.7 11.5 - 16.0 g/dL    HCT 42.1 35.0 - 47.0 %    MCV 88.8 80.0 - 99.0 FL    MCH 31.0 26.0 - 34.0 PG    MCHC 34.9 30.0 - 36.5 g/dL    RDW 14.6 (H) 11.5 - 14.5 %    PLATELET 428 004 - 173 K/uL    NEUTROPHILS 47 32 - 75 %    LYMPHOCYTES 43 12 - 49 %    MONOCYTES 7 5 - 13 %    EOSINOPHILS 3 0 - 7 %    BASOPHILS 0 0 - 1 %    ABS. NEUTROPHILS 2.3 1.8 - 8.0 K/UL    ABS. LYMPHOCYTES 2.1 0.8 - 3.5 K/UL    ABS. MONOCYTES 0.4 0.0 - 1.0 K/UL    ABS. EOSINOPHILS 0.1 0.0 - 0.4 K/UL    ABS. BASOPHILS 0.0 0.0 - 0.1 K/UL   METABOLIC PANEL, COMPREHENSIVE    Collection Time: 10/30/17  8:57 AM   Result Value Ref Range    Sodium 141 136 - 145 mmol/L    Potassium 4.1 3.5 - 5.1 mmol/L    Chloride 107 97 - 108 mmol/L    CO2 26 21 - 32 mmol/L    Anion gap 8 5 - 15 mmol/L    Glucose 92 65 - 100 mg/dL    BUN 8 6 - 20 MG/DL    Creatinine 1.00 0.55 - 1.02 MG/DL    BUN/Creatinine ratio 8 (L) 12 - 20      GFR est AA >60 >60 ml/min/1.73m2    GFR est non-AA 58 (L) >60 ml/min/1.73m2    Calcium 9.7 8.5 - 10.1 MG/DL    Bilirubin, total 0.9 0.2 - 1.0 MG/DL    ALT (SGPT) 23 12 - 78 U/L    AST (SGOT) 17 15 - 37 U/L    Alk.  phosphatase 89 45 - 117 U/L    Protein, total 8.3 (H) 6.4 - 8.2 g/dL    Albumin 4.1 3.5 - 5.0 g/dL    Globulin 4.2 (H) 2.0 - 4.0 g/dL    A-G Ratio 1.0 (L) 1.1 - 2.2         Radiologic Studies -  The following have been ordered and reviewed:  CT HEAD WO CONT   Final Result        CT Results  (Last 48 hours)               10/30/17 0910  CT HEAD WO CONT Final result    Impression:  IMPRESSION: Normal CT scan of the head without contrast                   Narrative:  EXAM:  CT HEAD WO CONT       INDICATION:   Headache, chronic, no new features, norm neuro exam       COMPARISON: None. CONTRAST:  None. TECHNIQUE: Unenhanced CT of the head was performed using 5 mm images. Brain and   bone windows were generated. CT dose reduction was achieved through use of a   standardized protocol tailored for this examination and automatic exposure   control for dose modulation. FINDINGS:   The ventricles and sulci are normal in size, shape and configuration and   midline. There is no significant white matter disease. There is no intracranial   hemorrhage, extra-axial collection, mass, mass effect or midline shift. The   basilar cisterns are open. No acute infarct is identified. The bone windows   demonstrate no abnormalities. The visualized portions of the paranasal sinuses   and mastoid air cells are clear. Vital Signs-Reviewed the patient's vital signs. Patient Vitals for the past 12 hrs:   Temp Pulse Resp BP SpO2   10/30/17 1000 - - - 125/80 98 %   10/30/17 0833 97.8 °F (36.6 °C) 95 16 (!) 163/100 100 %       Medications Given in the ED:  Medications   ketorolac (TORADOL) injection 15 mg (15 mg IntraVENous Given 10/30/17 0853)   sodium chloride 0.9 % bolus infusion 1,000 mL (0 mL IntraVENous IV Completed 10/30/17 0953)   butalbital-acetaminophen-caffeine (FIORICET, ESGIC) -40 mg per tablet 1 Tab (1 Tab Oral Given 10/30/17 1020)       Pulse Oximetry Analysis - Normal 100% on RA     Diagnosis   Clinical Impression:   1. Left arm numbness    2. Dehydration    3. Lack of adequate sleep    4. Acute non intractable tension-type headache    5.  Chronic bilateral low back pain with left-sided sciatica         Plan:  1:   Follow-up Information     Follow up With Details Comments Contact Info    Fay Denver, MD Schedule an appointment as soon as possible for a visit  [address]  [phone]          2:   Discharge Medication List as of 10/30/2017 10:05 AM      START taking these medications    Details   butalbital-aspirin-caffeine (FIORINAL) capsule Take 1 Cap by mouth every four (4) hours as needed for Pain for up to 90 days. Max Daily Amount: 6 Caps. Maximum dose 6 capsules daily, Print, Disp-20 Cap, R-0         CONTINUE these medications which have NOT CHANGED    Details   predniSONE (DELTASONE) 10 mg tablet Taper daily. 60mg x1, 50mg x 1, 40mg x 1, 30mg x 1, 20mg x 1, 10mg x 1, Normal, Disp-21 Tab, R-0      levothyroxine (SYNTHROID) 88 mcg tablet TAKE 1 TABLET BY MOUTH EVERY DAY BEFORE BREAKFAST ON A EMPTY STOMACH, Normal, Disp-30 Tab, R-5      sertraline (ZOLOFT) 100 mg tablet TAKE 1 TABLET BY MOUTH ONCE DAILY, Normal, Disp-30 Tab, R-5      Cane henry As directed to assist with ambulation, Print, Disp-1 Device, R-1      !! traMADol (ULTRAM) 50 mg tablet Take 2 Tabs by mouth every eight (8) hours as needed for Pain. Max Daily Amount: 300 mg., Print, Disp-30 Tab, R-0      pantoprazole (PROTONIX) 40 mg tablet Take 1 Tab by mouth daily. , Normal, Disp-30 Tab, R-5      sucralfate (CARAFATE) 100 mg/mL suspension Take 10 mL by mouth four (4) times daily. , Normal, Disp-414 mL, R-2      polyethylene glycol (MIRALAX) 17 gram/dose powder Take 17 g by mouth daily.  Constipation, Normal, Disp-510 g, R-5      acetaminophen (TYLENOL ARTHRITIS PAIN) 650 mg CR tablet Take 650 mg by mouth every six (6) hours as needed for Pain., Historical Med      acetaminophen (TYLENOL EXTRA STRENGTH) 500 mg tablet Take 500 mg by mouth every six (6) hours as needed for Pain., Historical Med      !! traMADol (ULTRAM) 50 mg tablet Take 1 Tab by mouth every six (6) hours as needed for Pain. Max Daily Amount: 200 mg., Print, Disp-20 Tab, R-0      ALPRAZolam (XANAX) 0.5 mg tablet Take 1 Tab by mouth every eight (8) hours as needed for Anxiety for up to 15 doses. Max Daily Amount: 1.5 mg., Print, Disp-15 Tab, R-0      VITAMIN D3 1,000 unit cap TAKE 3 CAPSULES DAILY, Normal, Disp-90 Cap, R-11      albuterol (PROVENTIL HFA, VENTOLIN HFA, PROAIR HFA) 90 mcg/actuation inhaler Take 2 Puffs by inhalation as needed for Wheezing., Historical Med       !! - Potential duplicate medications found. Please discuss with provider. Return to ED if Worse    Disposition Note:    DISCHARGE NOTE  10:10 AM  The patient has been re-evaluated and is ready for discharge. Reviewed available results with patient. Counseled pt on diagnosis and care plan. Pt has expressed understanding, and all questions have been answered. Pt agrees with plan and agrees to F/U as recommended, or return to the ED if their sxs worsen. Discharge instructions have been provided and explained to the pt, along with reasons to return to the ED. This note is prepared by Modesto Van, acting as Scribe for Joesph Sorto M.D. Joesph Sorto M.D: The scribe's documentation has been prepared under my direction and personally reviewed by me in its entirety. I confirm that the note above accurately reflects all work, treatment, procedures, and medical decision making performed by me.

## 2017-11-02 ENCOUNTER — TELEPHONE (OUTPATIENT)
Dept: INTERNAL MEDICINE CLINIC | Age: 52
End: 2017-11-02

## 2017-11-13 ENCOUNTER — HOSPITAL ENCOUNTER (OUTPATIENT)
Dept: INTERVENTIONAL RADIOLOGY/VASCULAR | Age: 52
Discharge: HOME | End: 2017-11-13
Attending: ORTHOPAEDIC SURGERY | Admitting: ORTHOPAEDIC SURGERY
Payer: MEDICAID

## 2017-11-13 VITALS
HEART RATE: 77 BPM | DIASTOLIC BLOOD PRESSURE: 78 MMHG | HEIGHT: 68 IN | RESPIRATION RATE: 20 BRPM | TEMPERATURE: 98.6 F | BODY MASS INDEX: 28.79 KG/M2 | OXYGEN SATURATION: 99 % | WEIGHT: 190 LBS | SYSTOLIC BLOOD PRESSURE: 135 MMHG

## 2017-11-13 DIAGNOSIS — M43.10 SPONDYLOLISTHESIS, ACQUIRED: ICD-10-CM

## 2017-11-13 PROCEDURE — 74011636320 HC RX REV CODE- 636/320: Performed by: RADIOLOGY

## 2017-11-13 PROCEDURE — 64483 NJX AA&/STRD TFRM EPI L/S 1: CPT

## 2017-11-13 PROCEDURE — 74011000250 HC RX REV CODE- 250: Performed by: RADIOLOGY

## 2017-11-13 PROCEDURE — 74011250636 HC RX REV CODE- 250/636: Performed by: RADIOLOGY

## 2017-11-13 RX ORDER — LIDOCAINE HYDROCHLORIDE 10 MG/ML
2 INJECTION, SOLUTION EPIDURAL; INFILTRATION; INTRACAUDAL; PERINEURAL ONCE
Status: COMPLETED | OUTPATIENT
Start: 2017-11-13 | End: 2017-11-13

## 2017-11-13 RX ORDER — DEXAMETHASONE SODIUM PHOSPHATE 10 MG/ML
10 INJECTION INTRAMUSCULAR; INTRAVENOUS ONCE
Status: COMPLETED | OUTPATIENT
Start: 2017-11-13 | End: 2017-11-13

## 2017-11-13 RX ORDER — SODIUM CHLORIDE 9 MG/ML
3 INJECTION INTRAMUSCULAR; INTRAVENOUS; SUBCUTANEOUS ONCE
Status: DISCONTINUED | OUTPATIENT
Start: 2017-11-13 | End: 2017-11-13

## 2017-11-13 RX ORDER — LIDOCAINE HYDROCHLORIDE 20 MG/ML
10 INJECTION, SOLUTION INFILTRATION; PERINEURAL ONCE
Status: COMPLETED | OUTPATIENT
Start: 2017-11-13 | End: 2017-11-13

## 2017-11-13 RX ORDER — DEXAMETHASONE SODIUM PHOSPHATE 10 MG/ML
10 INJECTION INTRAMUSCULAR; INTRAVENOUS ONCE
Status: DISCONTINUED | OUTPATIENT
Start: 2017-11-13 | End: 2017-11-13

## 2017-11-13 RX ADMIN — LIDOCAINE HYDROCHLORIDE 2 ML: 10 INJECTION, SOLUTION EPIDURAL; INFILTRATION; INTRACAUDAL; PERINEURAL at 10:30

## 2017-11-13 RX ADMIN — LIDOCAINE HYDROCHLORIDE 200 MG: 20 INJECTION, SOLUTION INFILTRATION; PERINEURAL at 10:30

## 2017-11-13 RX ADMIN — IOPAMIDOL 2 ML: 408 INJECTION, SOLUTION INTRATHECAL at 10:31

## 2017-11-13 RX ADMIN — DEXAMETHASONE SODIUM PHOSPHATE 7.5 MG: 10 INJECTION, SOLUTION INTRAMUSCULAR; INTRAVENOUS at 10:31

## 2017-11-13 NOTE — IP AVS SNAPSHOT
Summary of Care Report The Summary of Care report has been created to help improve care coordination. Users with access to User Replay or [employer] [facility] (Web-based application) may access additional patient information including the Discharge Summary. If you are not currently a [address] user and need more information, please call the number listed below in the [place] [address] section and ask to be connected with Medical Records. Facility Information Name Address Phone [address] [address] [phone] Patient Information Patient Name Sex ANEUDY Montero ([id number]) Female 1965 Discharge Information Admitting Provider Service Area Unit  
 (none) [facility] / [phone] Discharge Provider Discharge Date/Time Discharge Disposition Destination (none) (none) (none) (none) Patient Language Language ENGLISH [13] Hospital Problems as of 2017  Reviewed: 10/18/2017  9:28 AM by Rox Thomas MD  
 None Non-Hospital Problems as of 2017  Reviewed: 10/18/2017  9:28 AM by Rox Thomas MD  
  
  
  
 Class Noted - Resolved Last Modified Active Problems Hypothyroidism  2010 - Present 2010 by Ronny Ceballos Entered by Ronny Ceballos Osteoarthritis  2010 - Present 2010 by Deonte Morales NP Entered by Deonte Morales NP Anxiety  2011 - Present 2011 by Ronny Ceballos Entered by Ronny Ceballos Tinnitus  2012 - Present 2012 by Deonte Morales NP   Entered by Deonte Morales NP  
  PUD (peptic ulcer disease)  Unknown - Present 2/15/2013 by Rox Thomas MD  
  Entered by Rox Thomas MD  
  IGT (impaired glucose tolerance)  Unknown - Present 2/15/2013 by Rox Thomas MD  
 Entered by Donald Carrion MD  
  Vitamin D deficiency  10/4/2013 - Present 10/4/2013 by Donald Carrion MD  
  Entered by Donald Carrion MD  
  Lumbar radiculopathy  11/13/2013 - Present 11/13/2013 by Donald Carrion MD  
  Entered by Donald Carrion MD  
  Hyperlipidemia  2/20/2014 - Present 2/20/2014 by Donald Carrion MD  
  Entered by Donald Carrion MD  
  Gallstones  8/1/2016 - Present 8/1/2016 by Donald Carrion MD  
  Entered by Donald Carrion MD  
  DJD (degenerative joint disease), lumbosacral  Unknown - Present 8/8/2017 by Donald Carrion MD  
  Entered by Donald Carrion MD  
  DDD (degenerative disc disease), lumbar  Unknown - Present 8/8/2017 by Donald Carrion MD  
  Entered by Donald Carrion MD  
  Ureterocele  Unknown - Present 10/10/2017 by Donald Carrion MD  
  Entered by Donald Carrion MD  
  Overview Signed 10/10/2017  6:09 PM by Donald Carrion MD  
   small, right You are allergic to the following No active allergies Current Discharge Medication List  
  
ASK your doctor about these medications Dose & Instructions Dispensing Information Comments  
 albuterol 90 mcg/actuation inhaler Commonly known as:  PROVENTIL HFA, VENTOLIN HFA, PROAIR HFA Dose:  2 Puff Take 2 Puffs by inhalation as needed for Wheezing. Refills:  0 ALPRAZolam 0.5 mg tablet Commonly known as:  Ginger Kanwal Dose:  0.5 mg Take 1 Tab by mouth every eight (8) hours as needed for Anxiety for up to 15 doses. Max Daily Amount: 1.5 mg.  
 Quantity:  15 Tab Refills:  0  
   
 butalbital-acetaminophen-caff -40 mg per capsule Commonly known as:  [employer] Dose:  1 Cap Take 1 Cap by mouth every six (6) hours as needed for Pain. Max Daily Amount: 4 Caps. Quantity:  20 Cap Refills:  0  
   
 butalbital-aspirin-caffeine capsule Commonly known as:  Manas Mouse Dose:  1 Cap Take 1 Cap by mouth every four (4) hours as needed for Pain for up to 90 days. Max Daily Amount: 6 Caps. Maximum dose 6 capsules daily Quantity:  20 Cap Refills:  0 Beth Yanci As directed to assist with ambulation Quantity:  1 Device Refills:  1  
   
 levothyroxine 88 mcg tablet Commonly known as:  SYNTHROID  
 TAKE 1 TABLET BY MOUTH EVERY DAY BEFORE BREAKFAST ON A EMPTY STOMACH Quantity:  30 Tab Refills:  5  
   
 pantoprazole 40 mg tablet Commonly known as:  PROTONIX Dose:  40 mg Take 1 Tab by mouth daily. Quantity:  30 Tab Refills:  5  
   
 polyethylene glycol 17 gram/dose powder Commonly known as:  Nathalie Naga Dose:  17 g Take 17 g by mouth daily. Constipation Quantity:  510 g Refills:  5  
   
 predniSONE 10 mg tablet Commonly known as:  Bosie Spittle Taper daily. 60mg x1, 50mg x 1, 40mg x 1, 30mg x 1, 20mg x 1, 10mg x 1 Quantity:  21 Tab Refills:  0  
   
 sertraline 100 mg tablet Commonly known as:  ZOLOFT  
 TAKE 1 TABLET BY MOUTH ONCE DAILY Quantity:  30 Tab Refills:  5  
   
 sucralfate 100 mg/mL suspension Commonly known as:  Kathy Deep Dose:  1 g Take 10 mL by mouth four (4) times daily. Quantity:  414 mL Refills:  2  
   
 * traMADol 50 mg tablet Commonly known as:  ULTRAM  
 Dose:  50 mg Take 1 Tab by mouth every six (6) hours as needed for Pain. Max Daily Amount: 200 mg. Quantity:  20 Tab Refills:  0  
   
 * traMADol 50 mg tablet Commonly known as:  ULTRAM  
 Dose:  100 mg Take 2 Tabs by mouth every eight (8) hours as needed for Pain. Max Daily Amount: 300 mg. Quantity:  30 Tab Refills:  0  
   
 * TYLENOL ARTHRITIS PAIN 650 mg Tber Generic drug:  acetaminophen Dose:  650 mg Take 650 mg by mouth every six (6) hours as needed for Pain. Refills:  0  
   
 * TYLENOL EXTRA STRENGTH 500 mg tablet Generic drug:  acetaminophen Dose:  500 mg Take 500 mg by mouth every six (6) hours as needed for Pain.

## 2017-11-13 NOTE — IP AVS SNAPSHOT
[address] [facility] 
[phone] Patient: Patricia Celeste MRN: [id number] :1965 About your hospitalization You were admitted on:  2017 You last received care in the:  [facility] RAD ANGIO IR You were discharged on:  2017 Why you were hospitalized Your primary diagnosis was:  Not on File Things You Need To Do (next 8 weeks)  ROUTINE CARE with Tyra Lutz MD at  8:45 AM  
Where:  [facility] and Internal Medicine [facility] Discharge Orders None A check curly indicates which time of day the medication should be taken. My Medications ASK your physician about these medications Instructions Each Dose to Equal  
 Morning Noon Evening Bedtime  
 albuterol 90 mcg/actuation inhaler Commonly known as:  PROVENTIL HFA, VENTOLIN HFA, PROAIR HFA Your last dose was: Your next dose is: Take 2 Puffs by inhalation as needed for Wheezing. 2 Puff ALPRAZolam 0.5 mg tablet Commonly known as:  Klarissa Sorto Your last dose was: Your next dose is: Take 1 Tab by mouth every eight (8) hours as needed for Anxiety for up to 15 doses. Max Daily Amount: 1.5 mg.  
 0.5 mg  
    
   
   
   
  
 butalbital-acetaminophen-caff -40 mg per capsule Commonly known as:  [employer] Your last dose was: Your next dose is: Take 1 Cap by mouth every six (6) hours as needed for Pain. Max Daily Amount: 4 Caps. 1 Cap  
    
   
   
   
  
 butalbital-aspirin-caffeine capsule Commonly known as:  Freada Ni Your last dose was: Your next dose is: Take 1 Cap by mouth every four (4) hours as needed for Pain for up to 90 days. Max Daily Amount: 6 Caps. Maximum dose 6 capsules daily 1 Cap Marcelino Tamayo Your last dose was: Your next dose is: As directed to assist with ambulation  
     
   
   
   
  
 levothyroxine 88 mcg tablet Commonly known as:  SYNTHROID Your last dose was: Your next dose is: TAKE 1 TABLET BY MOUTH EVERY DAY BEFORE BREAKFAST ON A EMPTY STOMACH  
     
   
   
   
  
 pantoprazole 40 mg tablet Commonly known as:  PROTONIX Your last dose was: Your next dose is: Take 1 Tab by mouth daily. 40 mg  
    
   
   
   
  
 polyethylene glycol 17 gram/dose powder Commonly known as:  Joselyn Carl Your last dose was: Your next dose is: Take 17 g by mouth daily. Constipation 17 g  
    
   
   
   
  
 predniSONE 10 mg tablet Commonly known as:  Aline Carcamo Your last dose was: Your next dose is:    
   
   
 Taper daily. 60mg x1, 50mg x 1, 40mg x 1, 30mg x 1, 20mg x 1, 10mg x 1  
     
   
   
   
  
 sertraline 100 mg tablet Commonly known as:  ZOLOFT Your last dose was: Your next dose is: TAKE 1 TABLET BY MOUTH ONCE DAILY  
     
   
   
   
  
 sucralfate 100 mg/mL suspension Commonly known as:  Gladystine Angie Your last dose was: Your next dose is: Take 10 mL by mouth four (4) times daily. 1 g  
    
   
   
   
  
 * traMADol 50 mg tablet Commonly known as:  ULTRAM  
   
Your last dose was: Your next dose is: Take 1 Tab by mouth every six (6) hours as needed for Pain. Max Daily Amount: 200 mg.  
 50 mg  
    
   
   
   
  
 * traMADol 50 mg tablet Commonly known as:  ULTRAM  
   
Your last dose was: Your next dose is: Take 2 Tabs by mouth every eight (8) hours as needed for Pain. Max Daily Amount: 300 mg.  
 100 mg  
    
   
   
   
  
 * TYLENOL ARTHRITIS PAIN 650 mg Scar Pipe Generic drug:  acetaminophen Your last dose was: Your next dose is: Take 650 mg by mouth every six (6) hours as needed for Pain. 650 mg  
    
   
   
   
  
 * TYLENOL EXTRA STRENGTH 500 mg tablet Generic drug:  acetaminophen Your last dose was: Your next dose is: Take 500 mg by mouth every six (6) hours as needed for Pain. 500 mg  
    
   
   
   
  
 VITAMIN D3 1,000 unit Cap Generic drug:  cholecalciferol Your last dose was: Your next dose is: TAKE 3 CAPSULES DAILY * Notice: This list has 4 medication(s) that are the same as other medications prescribed for you. Read the directions carefully, and ask your doctor or other care provider to review them with you. Discharge Instructions Steroid Injection Discharge Instructions General Information: A steroid injection was performed today, placing a combination of a steroid and an anesthetic (numbing medicine) into the space around the nerves of your spine. This is done to treat back pain. It may take 7-10 days for the injection to reach its full potential.  This procedure can be done at any level of the spinal column, depending on where your pain is. Your doctor will have ordered the appropriate level to be treated prior to your coming in for the procedure. Home Care Instructions: You can resume your regular diet. Do not drink alcohol. You may notice that you have to use your pain medications less after your injection. Some people do not notice much of a change in their pain after the first injection. If that is the case, it is worth your time to have a second one done. This is why these injections are sometimes ordered in a series of three. Keep the puncture site clean and dry for 24 hours, and then you may remove the dressing. Showering is acceptable after the bandage is removed.  
 
Call If: 
 You should call your Physician and/or the Radiology Nurse if you have any bleeding other than a small spot on your bandage. Call if you have any signs of infection, fever, increased pain at the puncture site, confusion, or a headache that worsens when you stand and eases when lying flat. Follow-Up Instructions:  Please see your ordering doctor as he/she has requested. Let you doctor know if you have relief from your pain so they may schedule another injection for you if it is indicated. To Reach Us:  [phone] Dr. Sunny Mckenna Patient Signature: 
Date: 11/13/2017 Discharging Nurse: Winda Schlatter, RN Introducing [facility]! Princess Soni introduces [email or web address] patient portal. Now you can access parts of your medical record, email your doctor's office, and request medication refills online. 1. In your internet browser, go to [email or web address] 2. Click on the First Time User? Click Here link in the Sign In box. You will see the New Member Sign Up page. 3. Enter your [email or web address] Access Code exactly as it appears below. You will not need to use this code after youve completed the sign-up process. If you do not sign up before the expiration date, you must request a new code. · [email or web address] Access Code: [email or web address] Expires: 1/16/2018  8:52 AM 
 
4. Enter the last four digits of your Social Security Number (xxxx) and Date of Birth (mm/dd/yyyy) as indicated and click Submit. You will be taken to the next sign-up page. 5. Create a [email or web address] ID. This will be your [email or web address] login ID and cannot be changed, so think of one that is secure and easy to remember. 6. Create a [email or web address] password. You can change your password at any time. 7. Enter your Password Reset Question and Answer. This can be used at a later time if you forget your password. 8. Enter your e-mail address. You will receive e-mail notification when new information is available in [address] 9. Click Sign Up. You can now view and download portions of your medical record. 10. Click the Download Summary menu link to download a portable copy of your medical information. If you have questions, please visit the Frequently Asked Questions section of the [employer] website. Remember, [employer] is NOT to be used for urgent needs. For medical emergencies, dial 911. Now available from your iPhone and Android! Providers Seen During Your Hospitalization Provider Specialty Primary office phone Sheila Kearney MD Orthopedic Surgery [phone] Your Primary Care Physician (PCP) Primary Care Physician Office Phone Office Fax Fanta Yoni [phone] You are allergic to the following No active allergies Recent Documentation Height Weight BMI OB Status Smoking Status 1.727 m 86.2 kg 28.89 kg/m2 Hysterectomy Never Smoker Emergency Contacts Name Discharge Info Relation Home Work Mobile [employer] CAREGIVER [3] Sister [23] [phone] [facility] DISCHARGE CAREGIVER [3] Sister [23] [phone] Patient Belongings The following personal items are in your possession at time of discharge: 
                             
 
  
  
 Please provide this summary of care documentation to your next provider. Signatures-by signing, you are acknowledging that this After Visit Summary has been reviewed with you and you have received a copy. Patient Signature:  ____________________________________________________________ Date:  ____________________________________________________________  
  
Haresh Messina Provider Signature:  ____________________________________________________________ Date:  ____________________________________________________________

## 2017-11-15 NOTE — TELEPHONE ENCOUNTER
Apartment form completed and faxed back. Awaiting completion of disability form from provider.  Forms placed on Dr. Bam Bob desk for completion

## 2017-11-15 NOTE — TELEPHONE ENCOUNTER
Disability forms must be filled out in the context of an office visit. Please encourage follow up for office visit appt.

## 2017-11-15 NOTE — TELEPHONE ENCOUNTER
Dr. Rosemarie Larsen: Carin Short                     Pt requests disability form to be completed and can be reached back at [phone].

## 2017-11-15 NOTE — TELEPHONE ENCOUNTER
/telephone  Received: Today       Misti Pierce [facility] Front Office Pool                     Pt would like a call back from the nurs as quickly as possible regarding some forms she dropped for Dr. Bryce Alvarenga to sign.  Pt can be reached at [phone].

## 2017-11-16 NOTE — TELEPHONE ENCOUNTER
Spoke with patient after verifying name and  regarding Dr. Venkat Chowdary recommendations. Writer informed patient of Dr. Venkat Chowdary recommendations. Patient given an opportunity to ask questions, repeated information, and verbalized understanding.

## 2017-11-20 ENCOUNTER — OFFICE VISIT (OUTPATIENT)
Dept: INTERNAL MEDICINE CLINIC | Age: 52
End: 2017-11-20

## 2017-11-20 VITALS
DIASTOLIC BLOOD PRESSURE: 79 MMHG | WEIGHT: 215.2 LBS | HEIGHT: 68 IN | SYSTOLIC BLOOD PRESSURE: 120 MMHG | OXYGEN SATURATION: 98 % | BODY MASS INDEX: 32.61 KG/M2 | HEART RATE: 63 BPM | RESPIRATION RATE: 16 BRPM | TEMPERATURE: 98 F

## 2017-11-20 DIAGNOSIS — M54.42 CHRONIC LOW BACK PAIN WITH LEFT-SIDED SCIATICA, UNSPECIFIED BACK PAIN LATERALITY: ICD-10-CM

## 2017-11-20 DIAGNOSIS — M54.16 LUMBAR RADICULOPATHY: Primary | ICD-10-CM

## 2017-11-20 DIAGNOSIS — G89.29 CHRONIC LOW BACK PAIN WITH LEFT-SIDED SCIATICA, UNSPECIFIED BACK PAIN LATERALITY: ICD-10-CM

## 2017-11-20 DIAGNOSIS — R73.02 IGT (IMPAIRED GLUCOSE TOLERANCE): ICD-10-CM

## 2017-11-20 DIAGNOSIS — E55.9 VITAMIN D DEFICIENCY: ICD-10-CM

## 2017-11-20 DIAGNOSIS — E03.4 HYPOTHYROIDISM DUE TO ACQUIRED ATROPHY OF THYROID: ICD-10-CM

## 2017-11-20 DIAGNOSIS — F41.9 ANXIETY: ICD-10-CM

## 2017-11-20 DIAGNOSIS — E78.5 HYPERLIPIDEMIA, UNSPECIFIED HYPERLIPIDEMIA TYPE: ICD-10-CM

## 2017-11-20 DIAGNOSIS — M47.817 DJD (DEGENERATIVE JOINT DISEASE), LUMBOSACRAL: ICD-10-CM

## 2017-11-20 RX ORDER — IBUPROFEN 600 MG/1
600 TABLET ORAL
COMMUNITY
End: 2018-03-28

## 2017-11-20 RX ORDER — PREDNISONE 10 MG/1
TABLET ORAL
Qty: 21 TAB | Refills: 0 | Status: SHIPPED | OUTPATIENT
Start: 2017-11-20 | End: 2018-03-28

## 2017-11-20 RX ORDER — PRAVASTATIN SODIUM 20 MG/1
TABLET ORAL
Qty: 30 TAB | Refills: 5 | Status: SHIPPED | OUTPATIENT
Start: 2017-11-20 | End: 2018-05-15 | Stop reason: SDUPTHER

## 2017-11-20 RX ORDER — TRAMADOL HYDROCHLORIDE 50 MG/1
100 TABLET ORAL
Qty: 60 TAB | Refills: 0 | Status: SHIPPED | OUTPATIENT
Start: 2017-11-20 | End: 2018-01-05 | Stop reason: SDUPTHER

## 2017-11-20 RX ORDER — ALPRAZOLAM 0.5 MG/1
0.5 TABLET ORAL
Qty: 15 TAB | Refills: 0 | Status: SHIPPED | OUTPATIENT
Start: 2017-11-20 | End: 2018-02-28 | Stop reason: SDUPTHER

## 2017-11-20 NOTE — MR AVS SNAPSHOT
Visit Information Date & Time Provider Department Dept. Phone Encounter #  
 11/20/2017  3:30 PM Blanka Linda, [address] [place] [address] and Internal Medicine [phone] [id number] Follow-up Instructions Return in about 3 months (around 2/20/2018), or if symptoms worsen or fail to improve, for back pain, thyroid. Upcoming Health Maintenance Date Due  
 BREAST CANCER SCRN MAMMOGRAM 6/26/2018 PAP AKA CERVICAL CYTOLOGY 3/17/2019 DTaP/Tdap/Td series (2 - Td) 6/17/2024 COLONOSCOPY 9/13/2026 Allergies as of 11/20/2017  Review Complete On: 11/20/2017 By: Blanka Linda MD  
 No Known Allergies Current Immunizations  Reviewed on 10/18/2017 Name Date Tdap 6/17/2014 Not reviewed this visit You Were Diagnosed With   
  
 Codes Comments Lumbar radiculopathy    -  Primary ICD-10-CM: M54.16 
ICD-9-CM: 724.4 DJD (degenerative joint disease), lumbosacral     ICD-10-CM: M51.37 
ICD-9-CM: 722.52 Chronic low back pain with left-sided sciatica, unspecified back pain laterality     ICD-10-CM: M54.42, G89.29 ICD-9-CM: 724.2, 724.3, 338.29 Anxiety     ICD-10-CM: F41.9 ICD-9-CM: 300.00 Hypothyroidism due to acquired atrophy of thyroid     ICD-10-CM: E03.4 ICD-9-CM: 244.8, 246.8 IGT (impaired glucose tolerance)     ICD-10-CM: R73.02 
ICD-9-CM: 790.22 Vitamin D deficiency     ICD-10-CM: E55.9 ICD-9-CM: 268.9 Hyperlipidemia, unspecified hyperlipidemia type     ICD-10-CM: E78.5 ICD-9-CM: 272.4 Vitals BP Pulse Temp Resp Height(growth percentile) Weight(growth percentile) 120/79 (BP 1 Location: Right arm, BP Patient Position: Sitting) 63 98 °F (36.7 °C) (Oral) 16 5' 8\" (1.727 m) 215 lb 3.2 oz (97.6 kg) LMP SpO2 BMI OB Status Smoking Status 09/08/2016 (Approximate) 98% 32.72 kg/m2 Hysterectomy Never Smoker Vitals History BMI and BSA Data Body Mass Index Body Surface Area  32.72 kg/m 2 2.16 m 2  
  
  
 Preferred Pharmacy Pharmacy Name Phone [facility]Y [id number] [address] [place] [address] [phone] Your Updated Medication List  
  
   
This list is accurate as of: 11/20/17  4:57 PM.  Always use your most recent med list.  
  
  
  
  
 albuterol 90 mcg/actuation inhaler Commonly known as:  PROVENTIL HFA, VENTOLIN HFA, PROAIR HFA Take 2 Puffs by inhalation as needed for Wheezing. ALPRAZolam 0.5 mg tablet Commonly known as:  Ruben Beecham Take 1 Tab by mouth every eight (8) hours as needed for Anxiety for up to 15 doses. Max Daily Amount: 1.5 mg.  
  
 butalbital-acetaminophen-caff -40 mg per capsule Commonly known as:  [employer] Take 1 Cap by mouth every six (6) hours as needed for Pain. Max Daily Amount: 4 Caps. butalbital-aspirin-caffeine capsule Commonly known as:  Waneta Terence Take 1 Cap by mouth every four (4) hours as needed for Pain for up to 90 days. Max Daily Amount: 6 Caps. Maximum dose 6 capsules daily Calderon Yanez As directed to assist with ambulation  
  
 ibuprofen 600 mg tablet Commonly known as:  MOTRIN Take 600 mg by mouth.  
  
 levothyroxine 88 mcg tablet Commonly known as:  SYNTHROID  
TAKE 1 TABLET BY MOUTH EVERY DAY BEFORE BREAKFAST ON A EMPTY STOMACH  
  
 pantoprazole 40 mg tablet Commonly known as:  PROTONIX Take 1 Tab by mouth daily. polyethylene glycol 17 gram/dose powder Commonly known as:  Duane [place] Take 17 g by mouth daily. Constipation  
  
 pravastatin 20 mg tablet Commonly known as:  PRAVACHOL  
TAKE 1 TAB BY MOUTH DAILY. * predniSONE 10 mg tablet Commonly known as:  Oneida Hawks Taper daily. 60mg x1, 50mg x 1, 40mg x 1, 30mg x 1, 20mg x 1, 10mg x 1 * predniSONE 10 mg tablet Commonly known as:  Oneida Hawks Taper daily. 60mg x1, 50mg x 1, 40mg x 1, 30mg x 1, 20mg x 1, 10mg x 1  
  
 sertraline 100 mg tablet Commonly known as:  ZOLOFT  
TAKE 1 TABLET BY MOUTH ONCE DAILY sucralfate 100 mg/mL suspension Commonly known as:  Benna Reece Take 10 mL by mouth four (4) times daily. * traMADol 50 mg tablet Commonly known as:  ULTRAM  
Take 1 Tab by mouth every six (6) hours as needed for Pain. Max Daily Amount: 200 mg.  
  
 * traMADol 50 mg tablet Commonly known as:  ULTRAM  
Take 2 Tabs by mouth every eight (8) hours as needed for Pain. Max Daily Amount: 300 mg.  
  
 * TYLENOL ARTHRITIS PAIN 650 mg Rebeca  Generic drug:  acetaminophen Take 650 mg by mouth every six (6) hours as needed for Pain. * TYLENOL EXTRA STRENGTH 500 mg tablet Generic drug:  acetaminophen Take 500 mg by mouth every six (6) hours as needed for Pain. VITAMIN D3 1,000 unit Cap Generic drug:  cholecalciferol TAKE 3 CAPSULES DAILY * Notice: This list has 6 medication(s) that are the same as other medications prescribed for you. Read the directions carefully, and ask your doctor or other care provider to review them with you. Prescriptions Printed Refills ALPRAZolam (XANAX) 0.5 mg tablet 0 Sig: Take 1 Tab by mouth every eight (8) hours as needed for Anxiety for up to 15 doses. Max Daily Amount: 1.5 mg.  
 Class: Print Route: Oral  
 traMADol (ULTRAM) 50 mg tablet 0 Sig: Take 2 Tabs by mouth every eight (8) hours as needed for Pain. Max Daily Amount: 300 mg. Class: Print Route: Oral  
  
Prescriptions Sent to Pharmacy Refills  
 predniSONE (DELTASONE) 10 mg tablet 0 Sig: Taper daily. 60mg x1, 50mg x 1, 40mg x 1, 30mg x 1, 20mg x 1, 10mg x 1 Class: Normal  
 Pharmacy: [facility]/pharmacy #[id number] Ph #: [phone]  
 pravastatin (PRAVACHOL) 20 mg tablet 5 Sig: TAKE 1 TAB BY MOUTH DAILY. Class: Normal  
 Pharmacy: [facility]/pharmacy #[id number] Ph #: [phone] Follow-up Instructions Return in about 3 months (around 2/20/2018), or if symptoms worsen or fail to improve, for back pain, thyroid. To-Do List   
 Around 11/22/2017 Lab:  CBC WITH AUTOMATED DIFF Around 11/22/2017 Lab:  HEMOGLOBIN A1C WITH EAG Around 11/22/2017 Lab:  LIPID PANEL Around 11/22/2017 Lab:  METABOLIC PANEL, COMPREHENSIVE Around 11/22/2017 Lab:  SED RATE (ESR) Around 11/22/2017 Lab:  T4, FREE Around 11/22/2017 Lab:  TSH 3RD GENERATION Around 11/22/2017 Lab:  VITAMIN D, 25 HYDROXY Introducing [facility]! Romayne Duster introduces [employer] patient portal. Now you can access parts of your medical record, email your doctor's office, and request medication refills online. 1. In your internet browser, go to [email or web address] 2. Click on the First Time User? Click Here link in the Sign In box. You will see the New Member Sign Up page. 3. Enter your [employer]ema Access Code exactly as it appears below. You will not need to use this code after youve completed the sign-up process. If you do not sign up before the expiration date, you must request a new code. · [employer]ema Access Code: [email or web address] Expires: 1/16/2018  8:52 AM 
 
4. Enter the last four digits of your Social Security Number (xxxx) and Date of Birth (mm/dd/yyyy) as indicated and click Submit. You will be taken to the next sign-up page. 5. Create a Neema ID. This will be your Neema login ID and cannot be changed, so think of one that is secure and easy to remember. 6. Create a Neema password. You can change your password at any time. 7. Enter your Password Reset Question and Answer. This can be used at a later time if you forget your password. 8. Enter your e-mail address. You will receive e-mail notification when new information is available in [address] 9. Click Sign Up. You can now view and download portions of your medical record. 10. Click the Download Summary menu link to download a portable copy of your medical information. If you have questions, please visit the Frequently Asked Questions section of the [employer] website. Remember, [employer] is NOT to be used for urgent needs. For medical emergencies, dial 911. Now available from your iPhone and Android! Please provide this summary of care documentation to your next provider. Your primary care clinician is listed as [address] If you have any questions after today's visit, please call [phone].

## 2017-11-20 NOTE — PROGRESS NOTES
HISTORY OF PRESENT ILLNESS  Fatuma Castillo is a 47 y.o. female. HPI  Presents for f/u back pain, radiculopathy    Pt has seen ortho spine provider - Dr Adolfo Garland  Attempted lumbar epidural injection without relief  Dr. Adolfo Garland has offered pt a spinal fusion, decompression procedure. pred tapers have helped her pain - last was > 1 month ago    Limited benefit from 50 mg tramadol    Increased anxiety since increased back and leg pain  Taking zoloft daily    Past medical, Social, and Family history reviewed  Medications reviewed and updated. ROS  Complete ROS reviewed and negative or stable except as noted in HPI. Physical Exam   Constitutional: She is oriented to person, place, and time. She appears well-nourished. No distress. HENT:   Head: Normocephalic and atraumatic. Eyes: EOM are normal. Pupils are equal, round, and reactive to light. No scleral icterus. Neck: Normal range of motion. Neck supple. No JVD present. Cardiovascular: Normal rate, regular rhythm and normal heart sounds. Exam reveals no gallop and no friction rub. No murmur heard. Pulmonary/Chest: Effort normal and breath sounds normal. No respiratory distress. She has no wheezes. She has no rales. Abdominal: Soft. Bowel sounds are normal. She exhibits no distension. There is no tenderness. Musculoskeletal: She exhibits no edema. Limited ROM at L spine, ambulating with cane and wearing back brace per ortho. Lymphadenopathy:     She has no cervical adenopathy. Neurological: She is alert and oriented to person, place, and time. She exhibits normal muscle tone. Skin: Skin is warm. No rash noted. Psychiatric: She has a normal mood and affect. Nursing note and vitals reviewed. Prior labs reviewed. Reviewed prior imaging reports    ASSESSMENT and PLAN    ICD-10-CM ICD-9-CM    1. Lumbar radiculopathy M54.16 724.4 traMADol (ULTRAM) 50 mg tablet      predniSONE (DELTASONE) 10 mg tablet   2.  DJD (degenerative joint disease), lumbosacral M51.37 722.52    3. Chronic low back pain with left-sided sciatica, unspecified back pain laterality M54.42 724.2 traMADol (ULTRAM) 50 mg tablet    G89.29 724.3 CBC WITH AUTOMATED DIFF     338.29 SED RATE (ESR)   4. Anxiety F41.9 300.00 ALPRAZolam (XANAX) 0.5 mg tablet   5. Hypothyroidism due to acquired atrophy of thyroid E03.4 244.8 T4, FREE     246.8 TSH 3RD GENERATION   6. IGT (impaired glucose tolerance) R73.02 790.22 HEMOGLOBIN A1C WITH EAG   7. Vitamin D deficiency E55.9 268.9 VITAMIN D, 25 HYDROXY   8. Hyperlipidemia, unspecified hyperlipidemia type E78.5 272.4 LIPID PANEL      METABOLIC PANEL, COMPREHENSIVE     Follow-up Disposition:  Return in about 3 months (around 2/20/2018), or if symptoms worsen or fail to improve, for back pain, thyroid. results and schedule of future studies reviewed with patient  reviewed diet, exercise and weight   cardiovascular risk and specific lipid/LDL goals reviewed  reviewed medications and side effects in detail   Refill tramadol - pt may take 100 mg per dose  Repeat pred taper  See ortho spine for rec's  Refill xanax  Continue zoloft  Filled out disability paper work based on our discussion of her current functional status - see scanned.

## 2017-11-20 NOTE — PROGRESS NOTES
Rm 15    Chief Complaint   Patient presents with    Follow-up     back   would like to discuss disability paperwork    1. Have you been to the ER, urgent care clinic since your last visit? Hospitalized since your last visit? No    2. Have you seen or consulted any other health care providers outside of the [address] [place] [address] since your last visit? Include any pap smears or colon screening.  No    Health Maintenance Due   Topic Date Due    Influenza Age 5 to Adult  08/01/2017   pt declines flu vaccine    PHQ over the last two weeks 8/14/2017   Little interest or pleasure in doing things Not at all   Feeling down, depressed or hopeless Not at all   Total Score PHQ 2 0   Trouble falling or staying asleep, or sleeping too much -   Feeling tired or having little energy -   Poor appetite or overeating -   Feeling bad about yourself - or that you are a failure or have let yourself or your family down -   Trouble concentrating on things such as school, work, reading or watching TV -   Moving or speaking so slowly that other people could have noticed; or the opposite being so fidgety that others notice -   Thoughts of being better off dead, or hurting yourself in some way -   PHQ 9 Score -   How difficult have these problems made it for you to do your work, take care of your home and get along with others -

## 2017-12-14 ENCOUNTER — TELEPHONE (OUTPATIENT)
Dept: INTERNAL MEDICINE CLINIC | Age: 52
End: 2017-12-14

## 2017-12-14 NOTE — TELEPHONE ENCOUNTER
Spoke with patient after verifying name and  regarding Dr. Cecil Alvarez recommendations. Writer informed patient of Dr. Cecil Alvarez recommendations. Patient given an opportunity to ask questions, repeated information, and verbalized understanding.

## 2018-01-08 ENCOUNTER — HOSPITAL ENCOUNTER (EMERGENCY)
Age: 53
Discharge: HOME | End: 2018-01-08
Attending: FAMILY MEDICINE

## 2018-01-08 VITALS
BODY MASS INDEX: 31.84 KG/M2 | OXYGEN SATURATION: 100 % | HEART RATE: 81 BPM | DIASTOLIC BLOOD PRESSURE: 63 MMHG | HEIGHT: 69 IN | SYSTOLIC BLOOD PRESSURE: 134 MMHG | WEIGHT: 215 LBS | RESPIRATION RATE: 16 BRPM | TEMPERATURE: 97.6 F

## 2018-01-08 DIAGNOSIS — N30.90 CYSTITIS: Primary | ICD-10-CM

## 2018-01-08 LAB
BILIRUB UR QL: NEGATIVE
GLUCOSE UR QL STRIP.AUTO: NEGATIVE MG/DL
KETONES UR-MCNC: NEGATIVE MG/DL
LEUKOCYTE ESTERASE UR QL STRIP: NEGATIVE
NITRITE UR QL: NEGATIVE
PH UR: 6 [PH] (ref 5–8)
PROT UR QL: NEGATIVE MG/DL
RBC # UR STRIP: ABNORMAL /UL
SP GR UR: 1.01 (ref 1–1.03)
UROBILINOGEN UR QL: 0.2 EU/DL (ref 0.2–1)

## 2018-01-08 RX ORDER — PHENAZOPYRIDINE HYDROCHLORIDE 200 MG/1
200 TABLET, FILM COATED ORAL
Qty: 10 TAB | Refills: 0 | Status: ON HOLD | OUTPATIENT
Start: 2018-01-08 | End: 2018-04-09

## 2018-01-08 RX ORDER — SULFAMETHOXAZOLE AND TRIMETHOPRIM 800; 160 MG/1; MG/1
1 TABLET ORAL 2 TIMES DAILY
Qty: 10 TAB | Refills: 0 | Status: SHIPPED | OUTPATIENT
Start: 2018-01-08 | End: 2018-01-13

## 2018-01-08 NOTE — DISCHARGE INSTRUCTIONS

## 2018-01-08 NOTE — UC PROVIDER NOTE
Patient is a 47 y.o. female presenting with urinary pain. The history is provided by the patient. Urinary Pain    This is a new problem. The current episode started more than 2 days ago. The problem occurs every urination. The problem has not changed since onset. The quality of the pain is described as aching and burning. The pain is at a severity of 8/10. The pain is moderate. There has been no fever. Associated symptoms include frequency, urgency and abdominal pain. Pertinent negatives include no hematuria, no flank pain, no vaginal discharge and no back pain. She has tried nothing for the symptoms. Her past medical history does not include recurrent UTIs.         Past Medical History:   Diagnosis Date    Acid reflux     Adverse effect of anesthesia     woke up coughing    Allergic rhinitis     Anxiety     Arthritis     DDD (degenerative disc disease), lumbar     DJD (degenerative joint disease), lumbosacral     GERD (gastroesophageal reflux disease)     History of nonchemical tubal occlusion     [employer] devices    Hyperlipidemia 2/20/2014    Hypothyroidism     HYPO    IGT (impaired glucose tolerance)     Ill-defined condition     prolapse uterus/states thus her intestines has collpased a little bit    Psychiatric disorder     anxiety and depression    PUD (peptic ulcer disease)     no EGD    Routine gynecological examination     [facility]    Sinus disorder     Tinea pedis     Ureterocele     small, right        Past Surgical History:   Procedure Laterality Date    COLONOSCOPY N/A 9/13/2016    COLONOSCOPY / EGD  performed by Acosta Sutton MD at [address] HX GYN  10/12/2012    [employer]    HX LAP CHOLECYSTECTOMY  10/06/2016    HX TOTAL VAGINAL HYSTERECTOMY      LA REMOVAL OF OVARIAN CYST(S)           Family History   Problem Relation Age of Onset    Hypertension Mother     Diabetes Mother     Heart Disease Mother     Heart Attack Mother     Cancer Father      lung    Other Sister      cholecystectomy    Cancer Brother      lung    Diabetes Maternal Aunt     Cancer Paternal Uncle      lung    Diabetes Maternal Aunt     Diabetes Maternal Aunt     Diabetes Maternal Aunt     Diabetes Maternal Aunt     Diabetes Maternal Aunt     Heart Attack Daughter 32    Other Son      narcolepsy/GERD (part of esophagus removed d/t this)    Cancer Paternal Uncle      lung    Anesth Problems Neg Hx         Social History     Social History    Marital status: SINGLE     Spouse name: N/A    Number of children: N/A    Years of education: N/A     Occupational History    Not on file. Social History Main Topics    Smoking status: Never Smoker    Smokeless tobacco: Never Used    Alcohol use No    Drug use: No    Sexual activity: Yes     Partners: Male     Birth control/ protection: Implant     Other Topics Concern    Not on file     Social History Narrative                ALLERGIES: Review of patient's allergies indicates no known allergies. Review of Systems   Gastrointestinal: Positive for abdominal pain. Genitourinary: Positive for frequency and urgency. Negative for flank pain, hematuria and vaginal discharge. Musculoskeletal: Negative for back pain. All other systems reviewed and are negative. Vitals:    01/08/18 0922   BP: 134/63   Pulse: 81   Resp: 16   Temp: 97.6 °F (36.4 °C)   SpO2: 100%   Weight: 97.5 kg (215 lb)   Height: 5' 9\" (1.753 m)       Physical Exam   Constitutional: No distress. Abdominal: Normal appearance and bowel sounds are normal. There is no hepatosplenomegaly. There is tenderness in the suprapubic area. There is no rigidity, no rebound, no guarding and no CVA tenderness. Nursing note and vitals reviewed.       MDM     Differential Diagnosis; Clinical Impression; Plan:     CLINICAL IMPRESSION:  Cystitis  (primary encounter diagnosis)      DDX    Plan:    UA- no LE/ Nitrate  Start bactrim DS bid and pyridium   Amount and/or Complexity of Data Reviewed:    Review and summarize past medical records:  Yes  Risk of Significant Complications, Morbidity, and/or Mortality:   Presenting problems: Moderate  Management options:   Moderate  Progress:   Patient progress:  Stable      Procedures

## 2018-01-18 ENCOUNTER — OFFICE VISIT (OUTPATIENT)
Dept: INTERNAL MEDICINE CLINIC | Age: 53
End: 2018-01-18

## 2018-01-18 VITALS
SYSTOLIC BLOOD PRESSURE: 124 MMHG | WEIGHT: 224 LBS | TEMPERATURE: 98 F | RESPIRATION RATE: 16 BRPM | DIASTOLIC BLOOD PRESSURE: 82 MMHG | HEIGHT: 69 IN | BODY MASS INDEX: 33.18 KG/M2 | OXYGEN SATURATION: 98 % | HEART RATE: 61 BPM

## 2018-01-18 DIAGNOSIS — M54.16 LUMBAR RADICULOPATHY: ICD-10-CM

## 2018-01-18 DIAGNOSIS — E03.4 HYPOTHYROIDISM DUE TO ACQUIRED ATROPHY OF THYROID: ICD-10-CM

## 2018-01-18 DIAGNOSIS — R30.0 DYSURIA: ICD-10-CM

## 2018-01-18 DIAGNOSIS — N28.89 URETEROCELE: ICD-10-CM

## 2018-01-18 DIAGNOSIS — E55.9 VITAMIN D DEFICIENCY: ICD-10-CM

## 2018-01-18 DIAGNOSIS — R73.02 IGT (IMPAIRED GLUCOSE TOLERANCE): ICD-10-CM

## 2018-01-18 DIAGNOSIS — N32.89 BLADDER IRRITATION: Primary | ICD-10-CM

## 2018-01-18 DIAGNOSIS — E78.5 HYPERLIPIDEMIA, UNSPECIFIED HYPERLIPIDEMIA TYPE: ICD-10-CM

## 2018-01-18 LAB
BILIRUB UR QL STRIP: NEGATIVE
GLUCOSE UR-MCNC: NEGATIVE MG/DL
KETONES P FAST UR STRIP-MCNC: NEGATIVE MG/DL
PH UR STRIP: 7 [PH] (ref 4.6–8)
PROT UR QL STRIP: NEGATIVE
SP GR UR STRIP: 1.01 (ref 1–1.03)
UA UROBILINOGEN AMB POC: NORMAL (ref 0.2–1)
URINALYSIS CLARITY POC: CLEAR
URINALYSIS COLOR POC: YELLOW
URINE BLOOD POC: NORMAL
URINE LEUKOCYTES POC: NEGATIVE
URINE NITRITES POC: NEGATIVE

## 2018-01-18 RX ORDER — AMITRIPTYLINE HYDROCHLORIDE 25 MG/1
25 TABLET, FILM COATED ORAL
Qty: 30 TAB | Refills: 5 | Status: SHIPPED | OUTPATIENT
Start: 2018-01-18 | End: 2018-02-28 | Stop reason: SDUPTHER

## 2018-01-18 NOTE — PROGRESS NOTES
HISTORY OF PRESENT ILLNESS  Yasir Byrd is a 46 y.o. female. HPI  Presents for f/u urinary sx    Pt reports persistent dysuria and bladder, pelvic pain post void  No blood in urine  +urinary frequency  No urgency    Pt has seen Urology and had w/u to include CT, IVP, and cystoscopy  Pt reports NL cystoscopy    Past medical, Social, and Family history reviewed  Medications reviewed and updated. ROS  Complete ROS reviewed and negative or stable except as noted in HPI. Physical Exam   Constitutional: She is oriented to person, place, and time. She appears well-nourished. No distress. HENT:   Head: Normocephalic and atraumatic. Eyes: EOM are normal. Pupils are equal, round, and reactive to light. No scleral icterus. Neck: Normal range of motion. Neck supple. No JVD present. Cardiovascular: Normal rate, regular rhythm and normal heart sounds. Exam reveals no gallop and no friction rub. No murmur heard. Pulmonary/Chest: Effort normal and breath sounds normal. No respiratory distress. She has no wheezes. She has no rales. Abdominal: Soft. Bowel sounds are normal. She exhibits no distension. There is no tenderness. Musculoskeletal: Normal range of motion. She exhibits no edema. Lymphadenopathy:     She has no cervical adenopathy. Neurological: She is alert and oriented to person, place, and time. She exhibits normal muscle tone. Skin: Skin is warm. No rash noted. Psychiatric: She has a normal mood and affect. Nursing note and vitals reviewed. Prior labs reviewed. UA POC today is NL  No cx from [facility] 1/8/18    ASSESSMENT and PLAN  ?non-infectious cystitis    ICD-10-CM ICD-9-CM    1. Bladder irritation N32.89 596.89    2. Dysuria R30.0 788.1 AMB POC URINALYSIS DIP STICK AUTO W/O MICRO      CULTURE, URINE      URINALYSIS W/ RFLX MICROSCOPIC   3. Hypothyroidism due to acquired atrophy of thyroid E03.4 244.8      246.8    4. IGT (impaired glucose tolerance) R73.02 790.22    5.  Vitamin D deficiency E55.9 268.9    6. Lumbar radiculopathy M54.16 724.4    7. Hyperlipidemia, unspecified hyperlipidemia type E78.5 272.4    8. Ureterocele N28.89 593.89      Follow-up Disposition:  Return in about 6 weeks (around 2/28/2018) for as scheduled - back pain and bladder.    results and schedule of future studies reviewed with patient  reviewed diet, exercise and weight   cardiovascular risk and specific lipid/LDL goals reviewed  reviewed medications and side effects in detail   Send urine  Trial elavil for bladder and radiculopathy/pain  Continue other current medications

## 2018-01-18 NOTE — PROGRESS NOTES
Rm 15    Chief Complaint   Patient presents with    Urinary Frequency     and burning, finished ABT last week     1. Have you been to the ER, urgent care clinic since your last visit? Hospitalized since your last visit?last week, UTI symptoms    2. Have you seen or consulted any other health care providers outside of the [address] [place] [address] since your last visit? Include any pap smears or colon screening. No    There are no preventive care reminders to display for this patient.     PHQ over the last two weeks 8/14/2017   Little interest or pleasure in doing things Not at all   Feeling down, depressed or hopeless Not at all   Total Score PHQ 2 0   Trouble falling or staying asleep, or sleeping too much -   Feeling tired or having little energy -   Poor appetite or overeating -   Feeling bad about yourself - or that you are a failure or have let yourself or your family down -   Trouble concentrating on things such as school, work, reading or watching TV -   Moving or speaking so slowly that other people could have noticed; or the opposite being so fidgety that others notice -   Thoughts of being better off dead, or hurting yourself in some way -   PHQ 9 Score -   How difficult have these problems made it for you to do your work, take care of your home and get along with others -

## 2018-01-18 NOTE — MR AVS SNAPSHOT
[address]  [address] [place] [facility] [address] 
[phone] Patient: Jigna Junior MRN: [id number] :1965 Visit Information Date & Time Provider Department Dept. Phone Encounter #  
 2018 11:30 AM Soraya Ott MD [address] [facility] [phone] [id number] Follow-up Instructions Return in about 6 weeks (around 2018) for as scheduled - back pain and bladder. Your Appointments 2018 11:45 AM  
ROUTINE CARE with Soraya Ott MD  
[facility] [address] [facility]) Appt Note: f/up $0CP [facility] 12/15/2017 [address] [place] [address] [address] [facility] [address]  
[facility] [address] [facility] [address] Upcoming Health Maintenance Date Due  
 BREAST CANCER SCRN MAMMOGRAM 2018 PAP AKA CERVICAL CYTOLOGY 3/17/2019 DTaP/Tdap/Td series (2 - Td) 2024 COLONOSCOPY 2026 Allergies as of 2018  Review Complete On: 2018 By: Soraya Ott MD  
 No Known Allergies Current Immunizations  Reviewed on 10/18/2017 Name Date Tdap 2014 Not reviewed this visit You Were Diagnosed With   
  
 Codes Comments Bladder irritation    -  Primary ICD-10-CM: N32.89 ICD-9-CM: 596.89 Dysuria     ICD-10-CM: R30.0 ICD-9-CM: 788.1 Hypothyroidism due to acquired atrophy of thyroid     ICD-10-CM: E03.4 ICD-9-CM: 244.8, 246.8 IGT (impaired glucose tolerance)     ICD-10-CM: R73.02 
ICD-9-CM: 790.22 Vitamin D deficiency     ICD-10-CM: E55.9 ICD-9-CM: 268.9 Lumbar radiculopathy     ICD-10-CM: M54.16 
ICD-9-CM: 724.4 Hyperlipidemia, unspecified hyperlipidemia type     ICD-10-CM: E78.5 ICD-9-CM: 272.4 Ureterocele     ICD-10-CM: N28.89 ICD-9-CM: 593.89 Vitals BP Pulse Temp Resp Height(growth percentile) Weight(growth percentile) 124/82 (BP 1 Location: Left arm, BP Patient Position: Sitting) 61 98 °F (36.7 °C) 16 5' 9\" (1.753 m) 224 lb (101.6 kg) LMP SpO2 BMI OB Status Smoking Status 10/23/2016 (Approximate) 98% 33.08 kg/m2 Hysterectomy Never Smoker Vitals History BMI and BSA Data Body Mass Index Body Surface Area 33.08 kg/m 2 2.22 m 2 Preferred Pharmacy Pharmacy Name Phone [facility]/PHARMACY [id number] [address] [phone] Your Updated Medication List  
  
   
This list is accurate as of: 1/18/18 12:43 PM.  Always use your most recent med list.  
  
  
  
  
 albuterol 90 mcg/actuation inhaler Commonly known as:  PROVENTIL HFA, VENTOLIN HFA, PROAIR HFA Take 2 Puffs by inhalation as needed for Wheezing. ALPRAZolam 0.5 mg tablet Commonly known as:  Inell Waller Take 1 Tab by mouth every eight (8) hours as needed for Anxiety for up to 15 doses. Max Daily Amount: 1.5 mg.  
  
 amitriptyline 25 mg tablet Commonly known as:  ELAVIL Take 1 Tab by mouth nightly. butalbital-acetaminophen-caff -40 mg per capsule Commonly known as:  [employer] Take 1 Cap by mouth every six (6) hours as needed for Pain. Max Daily Amount: 4 Caps. butalbital-aspirin-caffeine capsule Commonly known as:  Giana Gess Take 1 Cap by mouth every four (4) hours as needed for Pain for up to 90 days. Max Daily Amount: 6 Caps. Maximum dose 6 capsules daily Beatrice Lai As directed to assist with ambulation  
  
 cholecalciferol 1,000 unit Cap Commonly known as:  VITAMIN D3  
TAKE 3 CAPSULES DAILY  
  
 ibuprofen 600 mg tablet Commonly known as:  MOTRIN Take 600 mg by mouth.  
  
 levothyroxine 88 mcg tablet Commonly known as:  SYNTHROID  
TAKE 1 TABLET BY MOUTH EVERY DAY BEFORE BREAKFAST ON A EMPTY STOMACH  
  
 pantoprazole 40 mg tablet Commonly known as:  PROTONIX Take 1 Tab by mouth daily. phenazopyridine 200 mg tablet Commonly known as:  PYRIDIUM Take 1 Tab by mouth three (3) times daily as needed for Pain.  
  
 polyethylene glycol 17 gram/dose powder Commonly known as:  Nelly Nance Take 17 g by mouth daily. Constipation  
  
 pravastatin 20 mg tablet Commonly known as:  PRAVACHOL  
TAKE 1 TAB BY MOUTH DAILY. * predniSONE 10 mg tablet Commonly known as:  Sobia Hare Taper daily. 60mg x1, 50mg x 1, 40mg x 1, 30mg x 1, 20mg x 1, 10mg x 1 * predniSONE 10 mg tablet Commonly known as:  Sobia Hare Taper daily. 60mg x1, 50mg x 1, 40mg x 1, 30mg x 1, 20mg x 1, 10mg x 1  
  
 sertraline 100 mg tablet Commonly known as:  ZOLOFT  
TAKE 1 TABLET BY MOUTH ONCE DAILY  
  
 sucralfate 100 mg/mL suspension Commonly known as:  Lindia Cork Take 10 mL by mouth four (4) times daily. * traMADol 50 mg tablet Commonly known as:  ULTRAM  
Take 1 Tab by mouth every six (6) hours as needed for Pain. Max Daily Amount: 200 mg.  
  
 * traMADol 50 mg tablet Commonly known as:  ULTRAM  
TAKE 2 TABLETS BY MOUTH EVERY 8 HOURS AS NEEDED FOR PAIN  
  
 * TYLENOL ARTHRITIS PAIN 650 mg Tber Generic drug:  acetaminophen Take 650 mg by mouth every six (6) hours as needed for Pain. * TYLENOL EXTRA STRENGTH 500 mg tablet Generic drug:  acetaminophen Take 500 mg by mouth every six (6) hours as needed for Pain. * Notice: This list has 6 medication(s) that are the same as other medications prescribed for you. Read the directions carefully, and ask your doctor or other care provider to review them with you. Prescriptions Sent to Pharmacy Refills  
 amitriptyline (ELAVIL) 25 mg tablet 5 Sig: Take 1 Tab by mouth nightly. Class: Normal  
 Pharmacy: [facility]/pharmacy #[address] [place] [address] Ph #: [phone] Route: Oral  
  
We Performed the Following AMB POC URINALYSIS DIP STICK AUTO W/O MICRO [49018 CPT(R)] CULTURE, URINE [id number] CPT(R)] URINALYSIS W/ RFLX MICROSCOPIC [07276 CPT(R)] Follow-up Instructions Return in about 6 weeks (around 2/28/2018) for as scheduled - back pain and bladder. Introducing [facility] SERVICES! Dominique Oleary introduces [employer] patient portal. Now you can access parts of your medical record, email your doctor's office, and request medication refills online. 1. In your internet browser, go to [email or web address] 2. Click on the First Time User? Click Here link in the Sign In box. You will see the New Member Sign Up page. 3. Enter your [employer] Access Code exactly as it appears below. You will not need to use this code after youve completed the sign-up process. If you do not sign up before the expiration date, you must request a new code. · [employer] Access Code: [email or web address] Expires: 4/18/2018 11:25 AM 
 
4. Enter the last four digits of your Social Security Number (xxxx) and Date of Birth (mm/dd/yyyy) as indicated and click Submit. You will be taken to the next sign-up page. 5. Create a [employer] ID. This will be your [employer] login ID and cannot be changed, so think of one that is secure and easy to remember. 6. Create a [employer] password. You can change your password at any time. 7. Enter your Password Reset Question and Answer. This can be used at a later time if you forget your password. 8. Enter your e-mail address. You will receive e-mail notification when new information is available in [address] 9. Click Sign Up. You can now view and download portions of your medical record. 10. Click the Download Summary menu link to download a portable copy of your medical information. If you have questions, please visit the Frequently Asked Questions section of the [employer] website. Remember, [employer] is NOT to be used for urgent needs. For medical emergencies, dial 911. Now available from your iPhone and Android! Please provide this summary of care documentation to your next provider. Your primary care clinician is listed as [address] If you have any questions after today's visit, please call [phone].

## 2018-01-20 LAB
APPEARANCE UR: CLEAR
BACTERIA UR CULT: NORMAL
BILIRUB UR QL STRIP: NEGATIVE
COLOR UR: YELLOW
GLUCOSE UR QL: NEGATIVE
HGB UR QL STRIP: NEGATIVE
KETONES UR QL STRIP: NEGATIVE
LEUKOCYTE ESTERASE UR QL STRIP: NEGATIVE
MICRO URNS: NORMAL
NITRITE UR QL STRIP: NEGATIVE
PH UR STRIP: 6.5 [PH] (ref 5–7.5)
PROT UR QL STRIP: NEGATIVE
SP GR UR: 1.02 (ref 1–1.03)
UROBILINOGEN UR STRIP-MCNC: 0.2 MG/DL (ref 0.2–1)

## 2018-02-18 DIAGNOSIS — K27.9 PUD (PEPTIC ULCER DISEASE): ICD-10-CM

## 2018-02-19 RX ORDER — PANTOPRAZOLE SODIUM 40 MG/1
TABLET, DELAYED RELEASE ORAL
Qty: 30 TAB | Refills: 5 | Status: SHIPPED | OUTPATIENT
Start: 2018-02-19 | End: 2018-08-19 | Stop reason: SDUPTHER

## 2018-02-28 ENCOUNTER — OFFICE VISIT (OUTPATIENT)
Dept: INTERNAL MEDICINE CLINIC | Age: 53
End: 2018-02-28

## 2018-02-28 VITALS
HEART RATE: 65 BPM | SYSTOLIC BLOOD PRESSURE: 117 MMHG | RESPIRATION RATE: 16 BRPM | WEIGHT: 218 LBS | OXYGEN SATURATION: 98 % | DIASTOLIC BLOOD PRESSURE: 82 MMHG | HEIGHT: 69 IN | TEMPERATURE: 98 F | BODY MASS INDEX: 32.29 KG/M2

## 2018-02-28 DIAGNOSIS — F41.9 ANXIETY: ICD-10-CM

## 2018-02-28 DIAGNOSIS — E55.9 VITAMIN D DEFICIENCY: ICD-10-CM

## 2018-02-28 DIAGNOSIS — G89.29 CHRONIC LOW BACK PAIN WITH LEFT-SIDED SCIATICA, UNSPECIFIED BACK PAIN LATERALITY: ICD-10-CM

## 2018-02-28 DIAGNOSIS — E03.4 HYPOTHYROIDISM DUE TO ACQUIRED ATROPHY OF THYROID: ICD-10-CM

## 2018-02-28 DIAGNOSIS — M54.16 LUMBAR RADICULOPATHY: ICD-10-CM

## 2018-02-28 DIAGNOSIS — M54.42 CHRONIC LOW BACK PAIN WITH LEFT-SIDED SCIATICA, UNSPECIFIED BACK PAIN LATERALITY: ICD-10-CM

## 2018-02-28 DIAGNOSIS — N32.89 IRRITABLE BLADDER: Primary | ICD-10-CM

## 2018-02-28 DIAGNOSIS — R73.02 IGT (IMPAIRED GLUCOSE TOLERANCE): ICD-10-CM

## 2018-02-28 LAB
BILIRUB UR QL STRIP: NEGATIVE
GLUCOSE UR-MCNC: NEGATIVE MG/DL
KETONES P FAST UR STRIP-MCNC: NEGATIVE MG/DL
PH UR STRIP: 5 [PH] (ref 4.6–8)
PROT UR QL STRIP: NEGATIVE
SP GR UR STRIP: 1.01 (ref 1–1.03)
UA UROBILINOGEN AMB POC: NORMAL (ref 0.2–1)
URINALYSIS CLARITY POC: CLEAR
URINALYSIS COLOR POC: YELLOW
URINE BLOOD POC: NORMAL
URINE LEUKOCYTES POC: NEGATIVE
URINE NITRITES POC: NEGATIVE

## 2018-02-28 RX ORDER — AMITRIPTYLINE HYDROCHLORIDE 50 MG/1
50 TABLET, FILM COATED ORAL
Qty: 30 TAB | Refills: 5 | Status: SHIPPED | OUTPATIENT
Start: 2018-02-28 | End: 2018-03-28

## 2018-02-28 RX ORDER — TRAMADOL HYDROCHLORIDE 50 MG/1
TABLET ORAL
Qty: 60 TAB | Refills: 0 | Status: SHIPPED | OUTPATIENT
Start: 2018-02-28 | End: 2018-03-21 | Stop reason: SDUPTHER

## 2018-02-28 RX ORDER — AMOXICILLIN 500 MG/1
CAPSULE ORAL
COMMUNITY
Start: 2018-02-26 | End: 2018-03-28

## 2018-02-28 RX ORDER — ALPRAZOLAM 0.5 MG/1
0.5 TABLET ORAL
Qty: 15 TAB | Refills: 0 | Status: SHIPPED | OUTPATIENT
Start: 2018-02-28 | End: 2018-07-10 | Stop reason: SDUPTHER

## 2018-02-28 NOTE — MR AVS SNAPSHOT
[address] [place] [address] [address] [place] [address] 
[phone] Patient: Viktoriya Skelton MRN: [id number] :1965 Visit Information Date & Time Provider Department Dept. Phone Encounter #  
 2018 11:45 AM José Savage MD [facility] Pediatrics and Internal Medicine [phone] [id number] Follow-up Instructions Return in about 1 month (around 3/28/2018) for pre-op for back surgery 18. Upcoming Health Maintenance Date Due  
 BREAST CANCER SCRN MAMMOGRAM 2018 PAP AKA CERVICAL CYTOLOGY 3/17/2019 DTaP/Tdap/Td series (2 - Td) 2024 COLONOSCOPY 2026 Allergies as of 2018  Review Complete On: 2018 By: José Savage MD  
 No Known Allergies Current Immunizations  Reviewed on 10/18/2017 Name Date Tdap 2014 Not reviewed this visit You Were Diagnosed With   
  
 Codes Comments Irritable bladder    -  Primary ICD-10-CM: N32.89 ICD-9-CM: 596.89 Chronic low back pain with left-sided sciatica, unspecified back pain laterality     ICD-10-CM: M54.42, G89.29 ICD-9-CM: 724.2, 724.3, 338.29 Lumbar radiculopathy     ICD-10-CM: M54.16 
ICD-9-CM: 724.4 Anxiety     ICD-10-CM: F41.9 ICD-9-CM: 300.00 IGT (impaired glucose tolerance)     ICD-10-CM: R73.02 
ICD-9-CM: 790.22 Vitamin D deficiency     ICD-10-CM: E55.9 ICD-9-CM: 268.9 Hypothyroidism due to acquired atrophy of thyroid     ICD-10-CM: E03.4 ICD-9-CM: 244.8, 246.8 Vitals BP Pulse Temp Resp Height(growth percentile) Weight(growth percentile) 117/82 (BP 1 Location: Left arm, BP Patient Position: Sitting) 65 98 °F (36.7 °C) (Oral) 16 5' 9\" (1.753 m) 218 lb (98.9 kg) LMP SpO2 BMI OB Status Smoking Status 10/23/2016 (Approximate) 98% 32.19 kg/m2 Hysterectomy Never Smoker BMI and BSA Data  Body Mass Index Body Surface Area  
 32.19 kg/m 2 2.19 m 2  
  
  
 Preferred Pharmacy Pharmacy Name Phone [id number] Kayser, [address] [facility] [phone] Your Updated Medication List  
  
   
This list is accurate as of 2/28/18  1:00 PM.  Always use your most recent med list.  
  
  
  
  
 albuterol 90 mcg/actuation inhaler Commonly known as:  PROVENTIL HFA, VENTOLIN HFA, PROAIR HFA Take 2 Puffs by inhalation as needed for Wheezing. ALPRAZolam 0.5 mg tablet Commonly known as:  Jayro Cuencauffer Take 1 Tab by mouth every eight (8) hours as needed for Anxiety for up to 15 doses. Max Daily Amount: 1.5 mg.  
  
 amitriptyline 50 mg tablet Commonly known as:  ELAVIL Take 1 Tab by mouth nightly. amoxicillin 500 mg capsule Commonly known as:  AMOXIL  
  
 butalbital-acetaminophen-caff -40 mg per capsule Commonly known as:  [employer] Take 1 Cap by mouth every six (6) hours as needed for Pain. Max Daily Amount: 4 Caps. Estevan Mccall As directed to assist with ambulation  
  
 cholecalciferol 1,000 unit Cap Commonly known as:  VITAMIN D3  
TAKE 3 CAPSULES DAILY  
  
 ibuprofen 600 mg tablet Commonly known as:  MOTRIN Take 600 mg by mouth.  
  
 levothyroxine 88 mcg tablet Commonly known as:  SYNTHROID  
TAKE 1 TABLET BY MOUTH EVERY DAY BEFORE BREAKFAST ON A EMPTY STOMACH  
  
 pantoprazole 40 mg tablet Commonly known as:  PROTONIX  
TAKE 1 TAB BY MOUTH DAILY. phenazopyridine 200 mg tablet Commonly known as:  PYRIDIUM Take 1 Tab by mouth three (3) times daily as needed for Pain.  
  
 polyethylene glycol 17 gram/dose powder Commonly known as:  Stephanie Gt Take 17 g by mouth daily. Constipation  
  
 pravastatin 20 mg tablet Commonly known as:  PRAVACHOL  
TAKE 1 TAB BY MOUTH DAILY. * predniSONE 10 mg tablet Commonly known as:  Meagan Cruz Taper daily. 60mg x1, 50mg x 1, 40mg x 1, 30mg x 1, 20mg x 1, 10mg x 1 * predniSONE 10 mg tablet Commonly known as:  Meagan Cruz  
 Taper daily. 60mg x1, 50mg x 1, 40mg x 1, 30mg x 1, 20mg x 1, 10mg x 1  
  
 sertraline 100 mg tablet Commonly known as:  ZOLOFT  
TAKE 1 TABLET BY MOUTH ONCE DAILY  
  
 sucralfate 100 mg/mL suspension Commonly known as:  Richelle Massing Take 10 mL by mouth four (4) times daily. * traMADol 50 mg tablet Commonly known as:  ULTRAM  
Take 1 Tab by mouth every six (6) hours as needed for Pain. Max Daily Amount: 200 mg.  
  
 * traMADol 50 mg tablet Commonly known as:  ULTRAM  
TAKE 2 TABLETS BY MOUTH EVERY 8 HOURS AS NEEDED FOR PAIN  
  
 * TYLENOL ARTHRITIS PAIN 650 mg Tber Generic drug:  acetaminophen Take 650 mg by mouth every six (6) hours as needed for Pain. * TYLENOL EXTRA STRENGTH 500 mg tablet Generic drug:  acetaminophen Take 500 mg by mouth every six (6) hours as needed for Pain. * Notice: This list has 6 medication(s) that are the same as other medications prescribed for you. Read the directions carefully, and ask your doctor or other care provider to review them with you. Prescriptions Printed Refills  
 traMADol (ULTRAM) 50 mg tablet 0 Sig: TAKE 2 TABLETS BY MOUTH EVERY 8 HOURS AS NEEDED FOR PAIN Class: Print ALPRAZolam (XANAX) 0.5 mg tablet 0 Sig: Take 1 Tab by mouth every eight (8) hours as needed for Anxiety for up to 15 doses. Max Daily Amount: 1.5 mg.  
 Class: Print Route: Oral  
  
Prescriptions Sent to Pharmacy Refills  
 amitriptyline (ELAVIL) 50 mg tablet 5 Sig: Take 1 Tab by mouth nightly. Class: Normal  
 Pharmacy: [facility]/pharmacy #[id number] [address] #: [phone] Route: Oral  
  
We Performed the Following AMB POC URINALYSIS DIP STICK AUTO W/O MICRO [91500 CPT(R)] Follow-up Instructions Return in about 1 month (around 3/28/2018) for pre-op for back surgery 4/9/18. Introducing [facility]! Nenita Okeefe introduces [employer] patient portal. Now you can access parts of your medical record, email your doctor's office, and request medication refills online. 1. In your internet browser, go to [email or web address] 2. Click on the First Time User? Click Here link in the Sign In box. You will see the New Member Sign Up page. 3. Enter your [employer] Access Code exactly as it appears below. You will not need to use this code after youve completed the sign-up process. If you do not sign up before the expiration date, you must request a new code. · [employer] Access Code: [email or web address] Expires: 4/18/2018 11:25 AM 
 
4. Enter the last four digits of your Social Security Number (xxxx) and Date of Birth (mm/dd/yyyy) as indicated and click Submit. You will be taken to the next sign-up page. 5. Create a [employer] ID. This will be your [employer] login ID and cannot be changed, so think of one that is secure and easy to remember. 6. Create a [employer] password. You can change your password at any time. 7. Enter your Password Reset Question and Answer. This can be used at a later time if you forget your password. 8. Enter your e-mail address. You will receive e-mail notification when new information is available in [address] 9. Click Sign Up. You can now view and download portions of your medical record. 10. Click the Download Summary menu link to download a portable copy of your medical information. If you have questions, please visit the Frequently Asked Questions section of the [employer] website. Remember, [employer] is NOT to be used for urgent needs. For medical emergencies, dial 911. Now available from your iPhone and Android! Please provide this summary of care documentation to your next provider. Your primary care clinician is listed as [address] If you have any questions after today's visit, please call [phone].

## 2018-02-28 NOTE — PROGRESS NOTES
HPI:  Presents for f/u urinary symptoms    Pt found elavil helpful, though urinary symptoms have not resolved    Seeing urogyn - Dr Melody Kelly  Ref to pelvic PT - Priscilla Mosher  urogyn concerned that symptoms could be related to her back    Plan for back surgery 4/9/18 - Dr. Luis Poe    Rare xanax use  Last filled 11/20/17    Past medical, Social, and Family history reviewed    Prior to Admission medications    Medication Sig Start Date End Date Taking? Authorizing Provider   amoxicillin (AMOXIL) 500 mg capsule  2/26/18  Yes Historical Provider   pantoprazole (PROTONIX) 40 mg tablet TAKE 1 TAB BY MOUTH DAILY. 2/19/18  Yes Guy Cisneros MD   amitriptyline (ELAVIL) 25 mg tablet Take 1 Tab by mouth nightly. 1/18/18  Yes Guy Cisneros MD   phenazopyridine (PYRIDIUM) 200 mg tablet Take 1 Tab by mouth three (3) times daily as needed for Pain. 1/8/18  Yes Zully Granger MD   traMADol (ULTRAM) 50 mg tablet TAKE 2 TABLETS BY MOUTH EVERY 8 HOURS AS NEEDED FOR PAIN 1/5/18  Yes Guy Cisneros MD   cholecalciferol (VITAMIN D3) 1,000 unit cap TAKE 3 CAPSULES DAILY 1/5/18  Yes Guy Cisneros MD   ALPRAZolam Camillo Ear) 0.5 mg tablet Take 1 Tab by mouth every eight (8) hours as needed for Anxiety for up to 15 doses. Max Daily Amount: 1.5 mg. 11/20/17  Yes Guy Cisneros MD   ibuprofen (MOTRIN) 600 mg tablet Take 600 mg by mouth. Yes Historical Provider   predniSONE (DELTASONE) 10 mg tablet Taper daily. 60mg x1, 50mg x 1, 40mg x 1, 30mg x 1, 20mg x 1, 10mg x 1 11/20/17  Yes Guy Cisneros MD   pravastatin (PRAVACHOL) 20 mg tablet TAKE 1 TAB BY MOUTH DAILY. 11/20/17  Yes Guy Cisneros MD   butalbital-acetaminophen-caff (FIORICET) -40 mg per capsule Take 1 Cap by mouth every six (6) hours as needed for Pain. Max Daily Amount: 4 Caps. 10/30/17  Yes Myron Traore MD   predniSONE (DELTASONE) 10 mg tablet Taper daily.   60mg x1, 50mg x 1, 40mg x 1, 30mg x 1, 20mg x 1, 10mg x 1 10/18/17  Yes Guy Cisneros MD levothyroxine (SYNTHROID) 88 mcg tablet TAKE 1 TABLET BY MOUTH EVERY DAY BEFORE BREAKFAST ON A EMPTY STOMACH 10/16/17  Yes Meera Callejas MD   sertraline (ZOLOFT) 100 mg tablet TAKE 1 TABLET BY MOUTH ONCE DAILY 10/3/17  Yes Meera Callejas MD   Jose R figueroa As directed to assist with ambulation 9/18/17  Yes Meera Callejas MD   sucralfate (CARAFATE) 100 mg/mL suspension Take 10 mL by mouth four (4) times daily. 8/14/17  Yes Meera Callejas MD   polyethylene glycol [facility]) 17 gram/dose powder Take 17 g by mouth daily. Constipation 8/14/17  Yes Meera Callejas MD   acetaminophen (TYLENOL ARTHRITIS PAIN) 650 mg CR tablet Take 650 mg by mouth every six (6) hours as needed for Pain. Yes Sudhir Corral MD   acetaminophen (TYLENOL EXTRA STRENGTH) 500 mg tablet Take 500 mg by mouth every six (6) hours as needed for Pain. Yes Sudhir Corral MD   traMADol (ULTRAM) 50 mg tablet Take 1 Tab by mouth every six (6) hours as needed for Pain. Max Daily Amount: 200 mg. 8/7/17  Yes ANDRESAS Sosa   albuterol (PROVENTIL HFA, VENTOLIN HFA, PROAIR HFA) 90 mcg/actuation inhaler Take 2 Puffs by inhalation as needed for Wheezing. Yes Historical Provider          ROS  Complete ROS reviewed and negative or stable except as noted in HPI. Physical Exam   Constitutional: She is oriented to person, place, and time. She appears well-nourished. No distress. HENT:   Head: Normocephalic and atraumatic. Eyes: EOM are normal. Pupils are equal, round, and reactive to light. No scleral icterus. Neck: Normal range of motion. Neck supple. No JVD present. Cardiovascular: Normal rate, regular rhythm and normal heart sounds. Exam reveals no gallop and no friction rub. No murmur heard. Pulmonary/Chest: Effort normal and breath sounds normal. No respiratory distress. She has no wheezes. She has no rales. Abdominal: Soft. Bowel sounds are normal. She exhibits no distension. There is no tenderness.    Musculoskeletal: Normal range of motion. She exhibits no edema. Lymphadenopathy:     She has no cervical adenopathy. Neurological: She is alert and oriented to person, place, and time. She exhibits normal muscle tone. Skin: Skin is warm. No rash noted. Psychiatric: She has a normal mood and affect. Nursing note and vitals reviewed. Prior labs reviewed. POC UA - +blood    Assessment/Plan:    ICD-10-CM ICD-9-CM    1. Irritable bladder N32.89 596.89 AMB POC URINALYSIS DIP STICK AUTO W/O MICRO      amitriptyline (ELAVIL) 50 mg tablet      URINALYSIS W/ RFLX MICROSCOPIC      CULTURE, URINE   2. Chronic low back pain with left-sided sciatica, unspecified back pain laterality M54.42 724.2 traMADol (ULTRAM) 50 mg tablet    G89.29 724.3      338.29    3. Lumbar radiculopathy M54.16 724.4 traMADol (ULTRAM) 50 mg tablet      amitriptyline (ELAVIL) 50 mg tablet   4. Anxiety F41.9 300.00 ALPRAZolam (XANAX) 0.5 mg tablet   5. IGT (impaired glucose tolerance) R73.02 790.22    6. Vitamin D deficiency E55.9 268.9    7. Hypothyroidism due to acquired atrophy of thyroid E03.4 244.8      246.8      Follow-up Disposition:  Return in about 1 month (around 3/28/2018) for pre-op for back surgery 4/9/18.   results and schedule of future studies reviewed with patient  reviewed diet, exercise and weight  cardiovascular risk and specific lipid/LDL goals reviewed  reviewed medications and side effects in detail   Continue pelvic PT  Increase elavil to 50 mg qhs  RTC in approx 1 month for pre-op for back surgery  Send urine for UA/micro and cx - no empiric abx

## 2018-02-28 NOTE — PROGRESS NOTES
Rm15    Chief Complaint   Patient presents with    Follow-up     back pain and bladder   pt states she is still having urinary burning  Back surgery April 9th   Pt is currently taking Amoxicillin     1. Have you been to the ER, urgent care clinic since your last visit? Hospitalized since your last visit? No    2. Have you seen or consulted any other health care providers outside of the [facility] since your last visit? Include any pap smears or colon screening.  No    Health Maintenance Due   Topic Date Due    BREAST CANCER SCRN MAMMOGRAM  06/26/2018     PHQ over the last two weeks 2/28/2018   Little interest or pleasure in doing things Not at all   Feeling down, depressed or hopeless Not at all   Total Score PHQ 2 0   Trouble falling or staying asleep, or sleeping too much -   Feeling tired or having little energy -   Poor appetite or overeating -   Feeling bad about yourself - or that you are a failure or have let yourself or your family down -   Trouble concentrating on things such as school, work, reading or watching TV -   Moving or speaking so slowly that other people could have noticed; or the opposite being so fidgety that others notice -   Thoughts of being better off dead, or hurting yourself in some way -   PHQ 9 Score -   How difficult have these problems made it for you to do your work, take care of your home and get along with others -

## 2018-03-02 LAB
APPEARANCE UR: CLEAR
BACTERIA #/AREA URNS HPF: NORMAL /[HPF]
BACTERIA UR CULT: ABNORMAL
BILIRUB UR QL STRIP: NEGATIVE
CASTS URNS QL MICRO: NORMAL /LPF
COLOR UR: YELLOW
EPI CELLS #/AREA URNS HPF: NORMAL /HPF
GLUCOSE UR QL: NEGATIVE
HGB UR QL STRIP: ABNORMAL
KETONES UR QL STRIP: NEGATIVE
LEUKOCYTE ESTERASE UR QL STRIP: NEGATIVE
MICRO URNS: ABNORMAL
MUCOUS THREADS URNS QL MICRO: PRESENT
NITRITE UR QL STRIP: NEGATIVE
PH UR STRIP: 5 [PH] (ref 5–7.5)
PROT UR QL STRIP: NEGATIVE
RBC #/AREA URNS HPF: NORMAL /HPF
SP GR UR: 1.01 (ref 1–1.03)
UROBILINOGEN UR STRIP-MCNC: 0.2 MG/DL (ref 0.2–1)
WBC #/AREA URNS HPF: NORMAL /HPF

## 2018-03-06 DIAGNOSIS — N39.0 URINARY TRACT INFECTION WITHOUT HEMATURIA, SITE UNSPECIFIED: ICD-10-CM

## 2018-03-06 RX ORDER — CIPROFLOXACIN 250 MG/1
250 TABLET, FILM COATED ORAL 2 TIMES DAILY
Qty: 14 TAB | Refills: 0 | Status: SHIPPED | OUTPATIENT
Start: 2018-03-06 | End: 2018-03-13

## 2018-03-07 NOTE — PROGRESS NOTES
Please notify pt of results    Urinalysis was essentially normal but the urine culture did grow bacteria. So, we should treat with 7 days of cipro to be sure we treat everything.

## 2018-03-21 DIAGNOSIS — M54.16 LUMBAR RADICULOPATHY: ICD-10-CM

## 2018-03-21 DIAGNOSIS — M54.42 CHRONIC LOW BACK PAIN WITH LEFT-SIDED SCIATICA, UNSPECIFIED BACK PAIN LATERALITY: ICD-10-CM

## 2018-03-21 DIAGNOSIS — G89.29 CHRONIC LOW BACK PAIN WITH LEFT-SIDED SCIATICA, UNSPECIFIED BACK PAIN LATERALITY: ICD-10-CM

## 2018-03-21 RX ORDER — TRAMADOL HYDROCHLORIDE 50 MG/1
TABLET ORAL
Qty: 60 TAB | Refills: 0 | Status: SHIPPED | OUTPATIENT
Start: 2018-03-21 | End: 2018-03-26 | Stop reason: SDUPTHER

## 2018-03-22 ENCOUNTER — DOCUMENTATION ONLY (OUTPATIENT)
Dept: INTERNAL MEDICINE CLINIC | Age: 53
End: 2018-03-22

## 2018-03-22 ENCOUNTER — HOSPITAL ENCOUNTER (OUTPATIENT)
Dept: CT IMAGING | Age: 53
Discharge: HOME | End: 2018-03-22
Attending: ORTHOPAEDIC SURGERY
Payer: MEDICAID

## 2018-03-22 DIAGNOSIS — M43.10 SPONDYLOLISTHESIS: ICD-10-CM

## 2018-03-22 DIAGNOSIS — M48.062 LUMBAR STENOSIS WITH NEUROGENIC CLAUDICATION: ICD-10-CM

## 2018-03-22 PROCEDURE — 72131 CT LUMBAR SPINE W/O DYE: CPT

## 2018-03-24 RX ORDER — SERTRALINE HYDROCHLORIDE 100 MG/1
TABLET, FILM COATED ORAL
Qty: 30 TAB | Refills: 5 | Status: SHIPPED | OUTPATIENT
Start: 2018-03-24 | End: 2018-07-10 | Stop reason: SDUPTHER

## 2018-03-26 ENCOUNTER — OFFICE VISIT (OUTPATIENT)
Dept: INTERNAL MEDICINE CLINIC | Age: 53
End: 2018-03-26

## 2018-03-26 VITALS
HEART RATE: 62 BPM | WEIGHT: 217 LBS | SYSTOLIC BLOOD PRESSURE: 115 MMHG | RESPIRATION RATE: 16 BRPM | TEMPERATURE: 98.2 F | HEIGHT: 69 IN | DIASTOLIC BLOOD PRESSURE: 81 MMHG | BODY MASS INDEX: 32.14 KG/M2 | OXYGEN SATURATION: 95 %

## 2018-03-26 DIAGNOSIS — K27.9 PUD (PEPTIC ULCER DISEASE): ICD-10-CM

## 2018-03-26 DIAGNOSIS — R73.02 IGT (IMPAIRED GLUCOSE TOLERANCE): ICD-10-CM

## 2018-03-26 DIAGNOSIS — M47.817 DJD (DEGENERATIVE JOINT DISEASE), LUMBOSACRAL: ICD-10-CM

## 2018-03-26 DIAGNOSIS — M17.12 PRIMARY OSTEOARTHRITIS OF LEFT KNEE: ICD-10-CM

## 2018-03-26 DIAGNOSIS — E78.5 HYPERLIPIDEMIA, UNSPECIFIED HYPERLIPIDEMIA TYPE: ICD-10-CM

## 2018-03-26 DIAGNOSIS — E03.4 HYPOTHYROIDISM DUE TO ACQUIRED ATROPHY OF THYROID: ICD-10-CM

## 2018-03-26 DIAGNOSIS — M54.42 CHRONIC LOW BACK PAIN WITH LEFT-SIDED SCIATICA, UNSPECIFIED BACK PAIN LATERALITY: ICD-10-CM

## 2018-03-26 DIAGNOSIS — Z01.818 PRE-OP EXAMINATION: ICD-10-CM

## 2018-03-26 DIAGNOSIS — M51.36 DDD (DEGENERATIVE DISC DISEASE), LUMBAR: ICD-10-CM

## 2018-03-26 DIAGNOSIS — M54.16 LUMBAR RADICULOPATHY: Primary | ICD-10-CM

## 2018-03-26 DIAGNOSIS — G89.29 CHRONIC LOW BACK PAIN WITH LEFT-SIDED SCIATICA, UNSPECIFIED BACK PAIN LATERALITY: ICD-10-CM

## 2018-03-26 LAB — HBA1C MFR BLD HPLC: 5.7 % (ref 4.8–5.6)

## 2018-03-26 RX ORDER — TRAMADOL HYDROCHLORIDE 50 MG/1
TABLET ORAL
Qty: 60 TAB | Refills: 1 | Status: SHIPPED | OUTPATIENT
Start: 2018-03-26 | End: 2018-04-11

## 2018-03-26 NOTE — PROGRESS NOTES
Rm 13    Chief Complaint   Patient presents with    Pre-op Exam     [facility] 4/9/2018     1. Have you been to the ER, urgent care clinic since your last visit? Hospitalized since your last visit? ED flu 3/24/18    2. Have you seen or consulted any other health care providers outside of the [address] [place] [address] since your last visit? Include any pap smears or colon screening.  No    Health Maintenance Due   Topic Date Due    BREAST CANCER SCRN MAMMOGRAM  06/26/2018       PHQ over the last two weeks 2/28/2018   Little interest or pleasure in doing things Not at all   Feeling down, depressed or hopeless Not at all   Total Score PHQ 2 0   Trouble falling or staying asleep, or sleeping too much -   Feeling tired or having little energy -   Poor appetite or overeating -   Feeling bad about yourself - or that you are a failure or have let yourself or your family down -   Trouble concentrating on things such as school, work, reading or watching TV -   Moving or speaking so slowly that other people could have noticed; or the opposite being so fidgety that others notice -   Thoughts of being better off dead, or hurting yourself in some way -   PHQ 9 Score -   How difficult have these problems made it for you to do your work, take care of your home and get along with others -     Learning Assessment 8/14/2017   PRIMARY LEARNER Patient   HIGHEST LEVEL OF EDUCATION - PRIMARY LEARNER  GRADUATED HIGH SCHOOL OR GED   BARRIERS PRIMARY LEARNER NONE   CO-LEARNER CAREGIVER -   PRIMARY LANGUAGE ENGLISH   LEARNER PREFERENCE PRIMARY DEMONSTRATION   LEARNING SPECIAL TOPICS -   ANSWERED BY patient   RELATIONSHIP SELF

## 2018-03-26 NOTE — PROGRESS NOTES
HPI:  Presents for f/u pre-op testing     Plan for lumbar fusion  Dr. Victor Hugo Villagran    Doing well  Stable. No CP, SOB, neuro sx    No anesthesia or bleeding problems    Past medical, Social, and Family history reviewed    Prior to Admission medications    Medication Sig Start Date End Date Taking? Authorizing Provider   traMADol (ULTRAM) 50 mg tablet TAKE 2 TABLETS BY MOUTH EVERY 8 HOURS AS NEEDED FOR PAIN 3/26/18  Yes Zee Leon MD   sertraline (ZOLOFT) 100 mg tablet TAKE 1 TABLET BY MOUTH ONCE DAILY 3/24/18  Yes Zee Leon MD   amoxicillin (AMOXIL) 500 mg capsule  2/26/18  Yes Historical Provider   ALPRAZolam Margorie Clunes) 0.5 mg tablet Take 1 Tab by mouth every eight (8) hours as needed for Anxiety for up to 15 doses. Max Daily Amount: 1.5 mg. 2/28/18  Yes Zee Leon MD   amitriptyline (ELAVIL) 50 mg tablet Take 1 Tab by mouth nightly. 2/28/18  Yes Zee Leon MD   pantoprazole (PROTONIX) 40 mg tablet TAKE 1 TAB BY MOUTH DAILY. 2/19/18  Yes Zee Leon MD   phenazopyridine (PYRIDIUM) 200 mg tablet Take 1 Tab by mouth three (3) times daily as needed for Pain. 1/8/18  Yes Miracle Cates MD   cholecalciferol (VITAMIN D3) 1,000 unit cap TAKE 3 CAPSULES DAILY 1/5/18  Yes Zee Leon MD   ibuprofen (MOTRIN) 600 mg tablet Take 600 mg by mouth. Yes Historical Provider   predniSONE (DELTASONE) 10 mg tablet Taper daily. 60mg x1, 50mg x 1, 40mg x 1, 30mg x 1, 20mg x 1, 10mg x 1 11/20/17  Yes Zee Leon MD   pravastatin (PRAVACHOL) 20 mg tablet TAKE 1 TAB BY MOUTH DAILY. 11/20/17  Yes Zee Leon MD   butalbital-acetaminophen-caff (FIORICET) -40 mg per capsule Take 1 Cap by mouth every six (6) hours as needed for Pain. Max Daily Amount: 4 Caps. 10/30/17  Yes Sangeeta Partida MD   predniSONE (DELTASONE) 10 mg tablet Taper daily.   60mg x1, 50mg x 1, 40mg x 1, 30mg x 1, 20mg x 1, 10mg x 1 10/18/17  Yes Zee Leon MD   levothyroxine (SYNTHROID) 88 mcg tablet TAKE 1 TABLET BY MOUTH EVERY DAY BEFORE BREAKFAST ON A EMPTY STOMACH 10/16/17  Yes Jared Hardy MD   Eduardo Servant figueroa As directed to assist with ambulation 9/18/17  Yes Jared Hardy MD   sucralfate (CARAFATE) 100 mg/mL suspension Take 10 mL by mouth four (4) times daily. 8/14/17  Yes Jared Hardy MD   polyethylene glycol [facility]) 17 gram/dose powder Take 17 g by mouth daily. Constipation 8/14/17  Yes Jared aHrdy MD   acetaminophen (TYLENOL ARTHRITIS PAIN) 650 mg CR tablet Take 650 mg by mouth every six (6) hours as needed for Pain. Yes Sudhir Corral MD   acetaminophen (TYLENOL EXTRA STRENGTH) 500 mg tablet Take 500 mg by mouth every six (6) hours as needed for Pain. Yes Sudhir Corral MD   traMADol (ULTRAM) 50 mg tablet Take 1 Tab by mouth every six (6) hours as needed for Pain. Max Daily Amount: 200 mg. 8/7/17  Yes ANDRESSA Sosa   albuterol (PROVENTIL HFA, VENTOLIN HFA, PROAIR HFA) 90 mcg/actuation inhaler Take 2 Puffs by inhalation as needed for Wheezing. Yes Historical Provider          ROS  Complete ROS reviewed and negative or stable except as noted in HPI. Physical Exam   Constitutional: She is oriented to person, place, and time. She appears well-nourished. No distress. HENT:   Head: Normocephalic and atraumatic. Eyes: EOM are normal. Pupils are equal, round, and reactive to light. No scleral icterus. Neck: Normal range of motion. Neck supple. No JVD present. Cardiovascular: Normal rate, regular rhythm and normal heart sounds. Exam reveals no gallop and no friction rub. No murmur heard. Pulmonary/Chest: Effort normal and breath sounds normal. No respiratory distress. She has no wheezes. She has no rales. Abdominal: Soft. Bowel sounds are normal. She exhibits no distension. There is no tenderness. Musculoskeletal: Normal range of motion. She exhibits no edema. Lymphadenopathy:     She has no cervical adenopathy. Neurological: She is alert and oriented to person, place, and time. She exhibits normal muscle tone. Skin: Skin is warm. No rash noted. Psychiatric: She has a normal mood and affect. Nursing note and vitals reviewed. Prior labs reviewed. Reviewed prior imaging   EKG - sinus nanette at 58, o/w unchanged, no acute changes. Assessment/Plan:    ICD-10-CM ICD-9-CM    1. Lumbar radiculopathy M54.16 724.4 traMADol (ULTRAM) 50 mg tablet   2. IGT (impaired glucose tolerance) R73.02 790.22 AMB POC HEMOGLOBIN A1C   3. Hypothyroidism due to acquired atrophy of thyroid E03.4 244.8      246.8    4. Primary osteoarthritis of left knee M17.12 715.16    5. PUD (peptic ulcer disease) K27.9 533.90    6. Hyperlipidemia, unspecified hyperlipidemia type E78.5 272.4    7. DJD (degenerative joint disease), lumbosacral M51.37 722.52    8. DDD (degenerative disc disease), lumbar M51.36 722.52    9. Pre-op examination Z01.818 V72.84 AMB POC EKG ROUTINE W/ 12 LEADS, INTER & REP      CBC WITH AUTOMATED DIFF      METABOLIC PANEL, BASIC      PROTHROMBIN TIME + INR      URINALYSIS W/ RFLX MICROSCOPIC      CULTURE, URINE   10. Chronic low back pain with left-sided sciatica, unspecified back pain laterality M54.42 724.2 traMADol (ULTRAM) 50 mg tablet    G89.29 724.3      338.29      Follow-up Disposition:  Return in about 3 months (around 6/26/2018), or if symptoms worsen or fail to improve, for cholesterol, thyroid.    results and schedule of future studies reviewed with patient  reviewed diet, exercise and weight   cardiovascular risk and specific lipid/LDL goals reviewed  reviewed medications and side effects in detail

## 2018-03-27 LAB
BASOPHILS # BLD AUTO: 0 X10E3/UL (ref 0–0.2)
BASOPHILS NFR BLD AUTO: 0 %
BUN SERPL-MCNC: 9 MG/DL (ref 6–24)
BUN/CREAT SERPL: 12 (ref 9–23)
CALCIUM SERPL-MCNC: 9.7 MG/DL (ref 8.7–10.2)
CHLORIDE SERPL-SCNC: 100 MMOL/L (ref 96–106)
CO2 SERPL-SCNC: 22 MMOL/L (ref 18–29)
CREAT SERPL-MCNC: 0.74 MG/DL (ref 0.57–1)
EOSINOPHIL # BLD AUTO: 0.2 X10E3/UL (ref 0–0.4)
EOSINOPHIL NFR BLD AUTO: 4 %
ERYTHROCYTE [DISTWIDTH] IN BLOOD BY AUTOMATED COUNT: 14.8 % (ref 12.3–15.4)
GLUCOSE SERPL-MCNC: 84 MG/DL (ref 65–99)
HCT VFR BLD AUTO: 43.1 % (ref 34–46.6)
HGB BLD-MCNC: 14.6 G/DL (ref 11.1–15.9)
IMM GRANULOCYTES # BLD: 0 X10E3/UL (ref 0–0.1)
IMM GRANULOCYTES NFR BLD: 0 %
INR PPP: 0.9 (ref 0.8–1.2)
LYMPHOCYTES # BLD AUTO: 2.4 X10E3/UL (ref 0.7–3.1)
LYMPHOCYTES NFR BLD AUTO: 52 %
MCH RBC QN AUTO: 29.4 PG (ref 26.6–33)
MCHC RBC AUTO-ENTMCNC: 33.9 G/DL (ref 31.5–35.7)
MCV RBC AUTO: 87 FL (ref 79–97)
MONOCYTES # BLD AUTO: 0.3 X10E3/UL (ref 0.1–0.9)
MONOCYTES NFR BLD AUTO: 7 %
NEUTROPHILS # BLD AUTO: 1.7 X10E3/UL (ref 1.4–7)
NEUTROPHILS NFR BLD AUTO: 37 %
PLATELET # BLD AUTO: 202 X10E3/UL (ref 150–379)
POTASSIUM SERPL-SCNC: 4.7 MMOL/L (ref 3.5–5.2)
PROTHROMBIN TIME: 9.7 SEC (ref 9.1–12)
RBC # BLD AUTO: 4.97 X10E6/UL (ref 3.77–5.28)
SODIUM SERPL-SCNC: 140 MMOL/L (ref 134–144)
WBC # BLD AUTO: 4.6 X10E3/UL (ref 3.4–10.8)

## 2018-03-28 ENCOUNTER — HOSPITAL ENCOUNTER (OUTPATIENT)
Dept: PREADMISSION TESTING | Age: 53
Discharge: HOME | End: 2018-03-28
Payer: MEDICAID

## 2018-03-28 VITALS
BODY MASS INDEX: 32.14 KG/M2 | SYSTOLIC BLOOD PRESSURE: 112 MMHG | DIASTOLIC BLOOD PRESSURE: 75 MMHG | WEIGHT: 217 LBS | RESPIRATION RATE: 14 BRPM | TEMPERATURE: 97.6 F | HEART RATE: 58 BPM | HEIGHT: 69 IN | OXYGEN SATURATION: 97 %

## 2018-03-28 LAB
APPEARANCE UR: CLEAR
BACTERIA #/AREA URNS HPF: NORMAL /[HPF]
BACTERIA UR CULT: NORMAL
BILIRUB UR QL STRIP: NEGATIVE
CASTS URNS QL MICRO: NORMAL /LPF
COLOR UR: YELLOW
EPI CELLS #/AREA URNS HPF: NORMAL /HPF
GLUCOSE UR QL: NEGATIVE
HGB UR QL STRIP: ABNORMAL
KETONES UR QL STRIP: NEGATIVE
LEUKOCYTE ESTERASE UR QL STRIP: NEGATIVE
MICRO URNS: ABNORMAL
NITRITE UR QL STRIP: NEGATIVE
PH UR STRIP: 6.5 [PH] (ref 5–7.5)
PROT UR QL STRIP: NEGATIVE
RBC #/AREA URNS HPF: NORMAL /HPF
SP GR UR: 1.01 (ref 1–1.03)
UROBILINOGEN UR STRIP-MCNC: 0.2 MG/DL (ref 0.2–1)
WBC #/AREA URNS HPF: NORMAL /HPF

## 2018-03-28 PROCEDURE — 86900 BLOOD TYPING SEROLOGIC ABO: CPT | Performed by: ORTHOPAEDIC SURGERY

## 2018-03-28 RX ORDER — AMITRIPTYLINE HYDROCHLORIDE 50 MG/1
TABLET, FILM COATED ORAL
COMMUNITY
End: 2018-04-18 | Stop reason: CLARIF

## 2018-03-28 NOTE — PROGRESS NOTES
Problem: Patient Education: Go to Patient Education Activity  Goal: Patient/Family Education  Outcome: Progressing Towards Goal  Attended pre-op spine class. Questions, concerns, and comments were addressed.

## 2018-03-29 LAB
ABO + RH BLD: NORMAL
BACTERIA SPEC CULT: NORMAL
BACTERIA SPEC CULT: NORMAL
BLOOD GROUP ANTIBODIES SERPL: NORMAL
SERVICE CMNT-IMP: NORMAL
SPECIMEN EXP DATE BLD: NORMAL

## 2018-04-05 NOTE — H&P
Murphy Reaves  Location: [place] [address] [address]  Patient #: [id number]  : 1965  Single / Language: [place] / Race: Black or   Female      History of Present Illness   The patient is a 46year old female who presents for a Recheck of Acute lumbar back pain. This condition occurred without any known injury. The last clinic visit was 3 month(s) ago. Management changes made at the last visit include ordering test(s) (injection). Symptoms include pain and numbness (left leg). Symptoms are located symmetrically and on the left side more than the right. The pain radiates to the left thigh, left lower leg and left foot. The patient describes the pain as sharp and burning. Onset was gradual 5 month(s) ago. The symptoms occur constantly. The patient describes this pain as severe and worsening. Symptoms are exacerbated by standing, sitting, recumbency, lifting and bending. Symptoms are relieved by heat packs. Current treatment includes non-opioid analgesics. The patient was previously evaluated in this clinic. Past evaluation has included x-ray of the lumbar spine and MRI of the lumbar spine. Past treatment has included nonsteroidal anti-inflammatory drugs, non-opioid analgesics, muscle relaxants, corticosteroids, epidural injections (patient reports she had no relief with the injection), physical therapy (sheltering arms for 8 weeks) and heating pad. Note for \"Acute lumbar back pain\": Romulo Kim .  Ms. Olga Hubbard returns today for a follow up visit after her recent round of epidural injections. She had a single round of bilateral L4-5 epidural steroid injections in , which she states provided her with no relief of her pain. She continues with constant low back pain with bilateral anterior thigh pain which radiates constantly to the knees. She reports numbness along the same distribution and weakness in her bilateral legs, which has worsened since her last office visit.  She states that her symptoms are particularly worse first thing in the morning. She has had intermittent numbness in her perineal area for the past two weeks and reports two episodes of decreased control of bowels and bladder. She is unable to tolerate walking for longer than 1/2 block without resting. She is using a cane for ambulation and has been taking Ibuprofen regularly without relief of her pain. Problem List/Past Medical   Lumbar stenosis with neurogenic claudication (724.03  M48.062)    Disc degeneration of lumbar region (722.52  M51.36)    Spondylolisthesis (Acquired) (738.4  M43.10)      Allergies   No Known Drug Allergies  [11/02/2017]: Family History  Arthritis   Father, Sister, Son. Diabetes Mellitus   Mother. Heart Disease   Daughter. Heart disease in female family member before age 72    Hypercholesterolemia   Mother. Hypertension   Mother, Sister. Social History  Alcohol use   Never drinks more than 5 drinks per occasion. Caffeine use   None. Current work status   Disabled. Exercise   Inactive. Marital status   Single. Seat Belt Use   Always uses seat belts. Sun Exposure   Occasionally. Tobacco / smoke exposure   None. Tobacco use   Never smoker. Medication History  Medications Reconciled     Pregnancy / Birth History   Pregnant   No.    Past Surgical History  Gallbladder Surgery   laparoscopic  Hysterectomy   non-cancerous: complete    Diagnostic Studies History  Lumbar Spine X-ray   Date: 11/2/2017, Results: Lumbar spondylolisthesis L4-5. It increases to 10 mm with flexion. It reduces to 4 mm with extension. MRI, Spine   Date: 11/2/2017, Results: Widening of the L4-5 facets. Facet hypertrophy. Lateral recess stenosis left greater than right.     Other Problems  Anxiety Disorder    Arthritis    Depression    Hypercholesterolemia    Thyroid Disease    Ulcer disease    Unspecified Diagnosis    Treatment options were discussed with the patient in full.        Review of Systems  General Present- Fatigue. Not Present- Appetite Loss, Chills, Fever, HIV Exposure, Night Sweats, Persistent Infections, Seasonal Allergies, Weight Gain and Weight Loss. Skin Not Present- Itching, Nail Changes, Poor Wound Healing, Rash, Skin Color Changes, Suspicious Lesions and Yellowish Skin Color. HEENT Present- Ringing in the Ears. Not Present- Decreased Hearing, Earache, Hoarseness, Loose Teeth, Nose Bleed and Sore Throat. Respiratory Present- Wakes up from Sleep Wheezing or Short of Breath. Not Present- Bloody sputum, Chronic Cough, Difficulty Breathing, Snoring and Wheezing. Cardiovascular Present- Difficulty Breathing Lying Down. Not Present- Bluish Discoloration Of Lips Or Nails, Chest Pain, Difficulty Breathing On Exertion, Leg Cramps With Exertion, Palpitations and Swelling of Extremities. Gastrointestinal Present- Change in Bowel Habits, Constipation and Difficulty Swallowing. Not Present- Abdominal Pain, Black, Tarry Stool, Cirrhosis, Diarrhea, Nausea and Vomiting. Female Genitourinary Present- Painful Urination. Not Present- Blood in Urine, Frequency, Pelvic Pain, Trouble Starting Urinary Stream and Urgency. Musculoskeletal Present- Back Pain, Joint Pain and Joint Stiffness. Not Present- Joint Swelling. Neurological Present- Fainting, Headaches, Memory Loss, Numbness, Tingling, Unsteadiness and Weakness. Not Present- Seizures and Tremor. Psychiatric Present- Anxiety. Not Present- Bipolar and Depression. Endocrine Present- Excessive Thirst. Not Present- Cold Intolerance, Excessive Hunger, Excessive Urination and Heat Intolerance. Hematology Not Present- Abnormal Bruising , Enlarged Lymph Nodes, Excessive bleeding and Skin Discoloration. Physical Exam   Neurologic  Sensory  Light Touch - Intact - Globally. Overall Assessment of Muscle Strength and Tone reveals  Lower Extremities - Right Iliopsoas - 5/5. Left Iliopsoas - 5/5. Right Tibialis Anterior - 5/5. Left Tibialis Anterior - 5/5.  Right Gastroc-Soleus - 5/5. Left Gastroc-Soleus - 5/5. Right EHL - 5/5. Left EHL - 3+/5. General Assessment of Reflexes  Right Ankle - Clonus is not present. Left Ankle - Clonus is not present. Reflexes (Dermatomes)  2/2 Normal - Left Achilles (L5-S2), Left Knee (L2-4), Right Achilles (L5-S2) and Right Knee (L2-4). Musculoskeletal  Global Assessment  Examination of related systems reveals - well-developed, well-nourished, in no acute distress, alert and oriented x 3. Gait and Station - Note: The patient ambulates with a cane; she has a slightly forward weight bearing line. Right Lower Extremity - normal strength and tone, normal range of motion without pain and no instability, subluxation or laxity. Left Lower Extremity - normal strength and tone, normal range of motion without pain and no instability, subluxation or laxity. Spine/Ribs/Pelvis  Cervical Spine - Examination of the cervical spine reveals - no tenderness to palpation, no pain, no swelling, edema or erythema, normal cervical spine movements and normal sensation. Thoracic (Dorsal) Spine - Examination of the thoracic spine reveals - no tenderness over thoracic vertebrae, no pain, normal sensation and normal thoracic spine movements. Lumbosacral Spine - Examination of the lumbosacral spine reveals - no known fractures or deformities. Inspection and Palpation - Tenderness - moderate. Assessment of pain reveals the following findings - The pain is characterized as - moderate. Location - pain refers laterally to left lower back and pain refers to posterior leg on affected side. ROJM - Testing limited - . Lumbosacral Spine - Functional Testing - Babinski Test negative, Prone Knee Bending Test negative, Slump Test negative, Straight Leg Raising Test negative. Assessment & Plan  Spondylolisthesis (Acquired) (738.4  M43.10)  Impression: The patient's radiologic findings have been reviewed with her again in detail today.  She has had 8 weeks of outpatient PT without relief of her discomfort. She has had a round of epidural injections without relief. She has low back pain and bilateral leg pain with evidence of an unstable spondylolisthesis. She is a candidate for a L4-5 midline fusion, and the risks, benefits, and potential outcomes of the procedure have been discussed with the patient in detail today. She wishes to proceed. We will see her on the date of the surgical procedure. The risks and benefits were discussed at length with the patient and the patient has elected to proceed. Indications for surgery include failed conservative treatment. Alternative treatments, risks and the perioperative course were discussed with the patient. All questions were answered. The risks and benefits of the procedure were explained. Benefits include definitive diagnosis, relief of pain, elimination of deformity and improved function. Risks of surgery including bleeding, infection, weakness, numbness, CSF leak, failure to improve symptoms, exacerbation of medical co-morbidities and even death were discussed with the patient. Current Plans  Presurgical planning was preformed with the patient today  Surgery to be scheduled  Lumbar stenosis with neurogenic claudication (724.03  M48.062)    Note: Dion Dyson M.D. was in the office suite as the supervising physician and was available for immediate consultation.         Signed by ANDRESSA Khan (2/27/2018 3:52 PM)    Co-signed electronically by Shiela Fields MD

## 2018-04-05 NOTE — H&P
May   Location: [place] [address] [address]  Patient #: [id number]  : 1934   / Language: Zack Tsang / Race: White  Female      History of Present Illness  The patient is a 79year old female who presents with acute lumbar back pain. This condition occurred following a specific injury. Symptoms include pain and decreased range of motion. Onset was gradual 4 month(s) ago. The symptoms occur intermittently. The patient describes this pain as severe and worsening. Symptoms are exacerbated by standing and direct pressure (and stairs). Symptoms are relieved by rest, cold packs and heat packs. Current treatment includes nonsteroidal anti-inflammatory drugs (Aleve). Past evaluation has included x-ray of the lumbar spine and CT of the lumbar spine. Allergies   Novocain *LOCAL ANESTHETICS-Parenteral*      Family History   Heart Disease   Mother. Social History  Alcohol use   1-2 drinks per occasion, 7 or more times, Drinks wine, Never drinks more than 5 drinks per occasion. Caffeine use   1-2 drinks per day, Coffee. Current work status   Retired. Exercise   1-2 times per week, Other. Marital status   . No drug use    Seat Belt Use   Always uses seat belts. Sun Exposure   Rarely. Tobacco / smoke exposure   None. Tobacco use   Never smoker. Medication History   Plavix  (Oral) Specific strength unknown - Active. Carvedilol  (Oral) Specific strength unknown - Active. PredniSONE  (Oral) Specific strength unknown - Active. Fish Oil  (Oral) Specific strength unknown - Active. Aspirin  (Oral) Specific strength unknown - Active. Centrum Silver  (Oral) Specific strength unknown - Active.   Medications Reconciled     Pregnancy / Birth History  Pregnant   No.    Past Surgical History  Abdominal Hernia Surgery    Cataract Surgery   bilateral  Dilation and Curettage of Uterus - Multiple    Hysterectomy   cancerous: complete  Inguinal Hernia Repair   laparoscopic: bilateral  Other Heart Procedures   Stent  Other Joint Surgery      Diagnostic Studies History   Lumbar Spine X-ray   Results: Degeneative anterolisthsis; stable with flexion and extenioson; no acute fracture  CT Scan   Lumbar, Results: Degeneratie anterolisthsis at L4-5; severe spinal stenosis at L4-5;    Other Problems   Arthritis    Gastroesophageal Reflux Disease    Heart disease    High blood pressure        Review of Systems   General Not Present- Appetite Loss, Chills, Fatigue, Fever, HIV Exposure, Night Sweats, Persistent Infections, Seasonal Allergies, Weight Gain and Weight Loss. Skin Not Present- Itching, Nail Changes, Poor Wound Healing, Rash, Skin Color Changes, Suspicious Lesions and Yellowish Skin Color. HEENT Not Present- Decreased Hearing, Earache, Hoarseness, Loose Teeth, Nose Bleed, Ringing in the Ears and Sore Throat. Respiratory Present- Chronic Cough, Difficulty Breathing and Snoring. Not Present- Bloody sputum, Wakes up from Sleep Wheezing or Short of Breath and Wheezing. Cardiovascular Present- Difficulty Breathing On Exertion and Swelling of Extremities. Not Present- Bluish Discoloration Of Lips Or Nails, Chest Pain, Difficulty Breathing Lying Down, Leg Cramps With Exertion and Palpitations. Gastrointestinal Not Present- Abdominal Pain, Black, Tarry Stool, Change in Bowel Habits, Cirrhosis, Constipation, Diarrhea, Difficulty Swallowing, Nausea and Vomiting. Female Genitourinary Not Present- Blood in Urine, Frequency, Painful Urination, Pelvic Pain, Trouble Starting Urinary Stream and Urgency. Musculoskeletal Present- Back Pain. Not Present- Joint Pain, Joint Stiffness and Joint Swelling. Psychiatric Not Present- Anxiety, Bipolar and Depression. Endocrine Not Present- Cold Intolerance, Excessive Hunger, Excessive Thirst, Excessive Urination and Heat Intolerance. Hematology Present- Abnormal Bruising . Not Present- Enlarged Lymph Nodes, Excessive bleeding and Skin Discoloration.     Vitals   Weight: 198 lb   Height: 63 in   Body Surface Area: 1.93 m²   Body Mass Index: 35.07 kg/m²      Physical Exam   Neurologic  Sensory  Light Touch - Intact - Globally. Overall Assessment of Muscle Strength and Tone reveals  Lower Extremities - Right Iliopsoas - 5/5. Left Iliopsoas - 5/5. Right Tibialis Anterior - 5/5. Left Tibialis Anterior - 5/5. Right Gastroc-Soleus - 5/5. Left Gastroc-Soleus - 5/5. Right EHL - 4/5 . Left EHL - 5/5 and 4-/5. General Assessment of Reflexes  Right Ankle - Clonus is not present. Left Ankle - Clonus is not present. Reflexes (Dermatomes)  2/2 Normal - Left Achilles (L5-S2), Left Knee (L2-4), Right Achilles (L5-S2) and Right Knee (L2-4). Musculoskeletal  Global Assessment  Examination of related systems reveals - well-developed, well-nourished, in no acute distress, alert and oriented x 3. Gait and Station - normal gait and station and normal posture. Right Lower Extremity - normal strength and tone, normal range of motion without pain and no instability, subluxation or laxity. Left Lower Extremity - normal strength and tone, normal range of motion without pain and no instability, subluxation or laxity. Spine/Ribs/Pelvis  Cervical Spine - Examination of the cervical spine reveals - no tenderness to palpation, no pain, no swelling, edema or erythema, normal cervical spine movements and normal sensation. Thoracic (Dorsal) Spine - Examination of the thoracic spine reveals - no tenderness over thoracic vertebrae, no pain, normal sensation and normal thoracic spine movements. Lumbosacral Spine - Examination of the lumbosacral spine reveals - no known fractures or deformities. Inspection and Palpation - Tenderness - moderate. Assessment of pain reveals the following findings - The pain is characterized as - moderate. Location - pain refers to lower back bilaterally. ROJM - Trunk Extension - 15 degrees. Lumbar Spine Flexion - .  Lumbosacral Spine - Functional Testing - Babinski Test negative, Prone Knee Bending Test negative, Slump Test negative, Straight Leg Raising Test negative. Assessment & Plan   Spondylolisthesis (Acquired) (738.4  M43.10)  Impression: The patient has severe neurogenic claudication from spinal stenoiss. She is quite dibilitated. Injections arenet helping. She is a canidate for a lumbar laminectomy and bony fusion at L4-5. The risks and benefits were discussed at length with the patient and the patient has elected to proceed. Indications for surgery include failed conservative treatment. Alternative treatments, risks and the perioperative course were discussed with the patient. All questions were answered. The risks and benefits of the procedure were explained. Benefits include definitive diagnosis, relief of pain, elimination of deformity and improved function. Risks of surgery including bleeding, infection, weakness, numbness, CSF leak, failure to improve symptoms, exacerbation of medical co-morbidities and even death were discussed with the patient. Current Plans  Pt Education - How to access health information online: discussed with patient and provided information. Pt Education - Educational materials were provided.: discussed with patient and provided information. X-RAY EXAM OF LUMBAR SPINE min 4 VIEWS (24022)  Started Ultram 50MG, 1 (one) Tablet every 4-6 hours as needed, #50, 02/21/2018, No Refill.   Disc degeneration of lumbar region (722.52  M51.36)  Lumbar stenosis with neurogenic claudication (724.03  M48.062)        Signed by Tayla Vaughan MD

## 2018-04-07 DIAGNOSIS — E03.4 HYPOTHYROIDISM DUE TO ACQUIRED ATROPHY OF THYROID: ICD-10-CM

## 2018-04-07 RX ORDER — LEVOTHYROXINE SODIUM 88 UG/1
TABLET ORAL
Qty: 30 TAB | Refills: 5 | Status: SHIPPED | OUTPATIENT
Start: 2018-04-07 | End: 2018-09-22 | Stop reason: SDUPTHER

## 2018-04-09 ENCOUNTER — HOSPITAL ENCOUNTER (INPATIENT)
Age: 53
LOS: 2 days | Discharge: HOME HEALTH | DRG: 304 | End: 2018-04-11
Attending: ORTHOPAEDIC SURGERY | Admitting: ORTHOPAEDIC SURGERY
Payer: MEDICAID

## 2018-04-09 ENCOUNTER — APPOINTMENT (OUTPATIENT)
Dept: GENERAL RADIOLOGY | Age: 53
DRG: 304 | End: 2018-04-09
Attending: ORTHOPAEDIC SURGERY
Payer: MEDICAID

## 2018-04-09 ENCOUNTER — ANESTHESIA (OUTPATIENT)
Dept: SURGERY | Age: 53
DRG: 304 | End: 2018-04-09
Payer: MEDICAID

## 2018-04-09 ENCOUNTER — ANESTHESIA EVENT (OUTPATIENT)
Dept: SURGERY | Age: 53
DRG: 304 | End: 2018-04-09
Payer: MEDICAID

## 2018-04-09 DIAGNOSIS — M48.062 SPINAL STENOSIS OF LUMBAR REGION WITH NEUROGENIC CLAUDICATION: Primary | ICD-10-CM

## 2018-04-09 PROBLEM — M48.061 SPINAL STENOSIS, LUMBAR: Status: ACTIVE | Noted: 2018-04-09

## 2018-04-09 PROCEDURE — 0SG00K1 FUSION OF LUMBAR VERTEBRAL JOINT WITH NONAUTOLOGOUS TISSUE SUBSTITUTE, POSTERIOR APPROACH, POSTERIOR COLUMN, OPEN APPROACH: ICD-10-PCS | Performed by: ORTHOPAEDIC SURGERY

## 2018-04-09 PROCEDURE — 77030022373 HC SPCR SPN STAXX SPWV -K1: Performed by: ORTHOPAEDIC SURGERY

## 2018-04-09 PROCEDURE — 74011000250 HC RX REV CODE- 250

## 2018-04-09 PROCEDURE — 77030031139 HC SUT VCRL2 J&J -A: Performed by: ORTHOPAEDIC SURGERY

## 2018-04-09 PROCEDURE — 74011250636 HC RX REV CODE- 250/636: Performed by: ANESTHESIOLOGY

## 2018-04-09 PROCEDURE — 77030004391 HC BUR FLUT MEDT -C: Performed by: ORTHOPAEDIC SURGERY

## 2018-04-09 PROCEDURE — 8E0W0CZ ROBOTIC ASSISTED PROCEDURE OF TRUNK REGION, OPEN APPROACH: ICD-10-PCS | Performed by: ORTHOPAEDIC SURGERY

## 2018-04-09 PROCEDURE — 74011250636 HC RX REV CODE- 250/636

## 2018-04-09 PROCEDURE — 65270000029 HC RM PRIVATE

## 2018-04-09 PROCEDURE — 76010000173 HC OR TIME 3 TO 3.5 HR INTENSV-TIER 1: Performed by: ORTHOPAEDIC SURGERY

## 2018-04-09 PROCEDURE — 77030032490 HC SLV COMPR SCD KNE COVD -B: Performed by: ORTHOPAEDIC SURGERY

## 2018-04-09 PROCEDURE — 76210000006 HC OR PH I REC 0.5 TO 1 HR: Performed by: ORTHOPAEDIC SURGERY

## 2018-04-09 PROCEDURE — 74011250637 HC RX REV CODE- 250/637

## 2018-04-09 PROCEDURE — 77030018832 HC SOL IRR H20 ICUM -A: Performed by: ORTHOPAEDIC SURGERY

## 2018-04-09 PROCEDURE — 77030038600 HC TU BPLR IRR DISP STRY -B: Performed by: ORTHOPAEDIC SURGERY

## 2018-04-09 PROCEDURE — 77030012406 HC DRN WND PENRS BARD -A: Performed by: ORTHOPAEDIC SURGERY

## 2018-04-09 PROCEDURE — 77030012893

## 2018-04-09 PROCEDURE — C1713 ANCHOR/SCREW BN/BN,TIS/BN: HCPCS | Performed by: ORTHOPAEDIC SURGERY

## 2018-04-09 PROCEDURE — 74011000272 HC RX REV CODE- 272: Performed by: ORTHOPAEDIC SURGERY

## 2018-04-09 PROCEDURE — 72100 X-RAY EXAM L-S SPINE 2/3 VWS: CPT

## 2018-04-09 PROCEDURE — 74011250636 HC RX REV CODE- 250/636: Performed by: ORTHOPAEDIC SURGERY

## 2018-04-09 PROCEDURE — 74011250637 HC RX REV CODE- 250/637: Performed by: PHYSICIAN ASSISTANT

## 2018-04-09 PROCEDURE — 74011250636 HC RX REV CODE- 250/636: Performed by: PHYSICIAN ASSISTANT

## 2018-04-09 PROCEDURE — 0SG00AJ FUSION OF LUMBAR VERTEBRAL JOINT WITH INTERBODY FUSION DEVICE, POSTERIOR APPROACH, ANTERIOR COLUMN, OPEN APPROACH: ICD-10-PCS | Performed by: ORTHOPAEDIC SURGERY

## 2018-04-09 PROCEDURE — 77030029099 HC BN WAX SSPC -A: Performed by: ORTHOPAEDIC SURGERY

## 2018-04-09 PROCEDURE — 77030032503 HC WRE K NIT ORTH -B: Performed by: ORTHOPAEDIC SURGERY

## 2018-04-09 PROCEDURE — 77030020782 HC GWN BAIR PAWS FLX 3M -B

## 2018-04-09 PROCEDURE — 77030018836 HC SOL IRR NACL ICUM -A: Performed by: ORTHOPAEDIC SURGERY

## 2018-04-09 PROCEDURE — 76010000878 HC OR TIME 3 TO 3.5HR INTENSV - TIER 2: Performed by: ORTHOPAEDIC SURGERY

## 2018-04-09 PROCEDURE — 77030034850: Performed by: ORTHOPAEDIC SURGERY

## 2018-04-09 PROCEDURE — 77030035339 HC CLMP RENSNCE KT DISP MAZR -G1: Performed by: ORTHOPAEDIC SURGERY

## 2018-04-09 PROCEDURE — 76001 XR FLUOROSCOPY OVER 60 MINUTES: CPT

## 2018-04-09 PROCEDURE — 0ST20ZZ RESECTION OF LUMBAR VERTEBRAL DISC, OPEN APPROACH: ICD-10-PCS | Performed by: ORTHOPAEDIC SURGERY

## 2018-04-09 PROCEDURE — 76060000037 HC ANESTHESIA 3 TO 3.5 HR: Performed by: ORTHOPAEDIC SURGERY

## 2018-04-09 PROCEDURE — 77030008684 HC TU ET CUF COVD -B: Performed by: ANESTHESIOLOGY

## 2018-04-09 PROCEDURE — 74011000258 HC RX REV CODE- 258

## 2018-04-09 PROCEDURE — 74011000250 HC RX REV CODE- 250: Performed by: ORTHOPAEDIC SURGERY

## 2018-04-09 PROCEDURE — 77030034475 HC MISC IMPL SPN: Performed by: ORTHOPAEDIC SURGERY

## 2018-04-09 DEVICE — GUIDE WIRE, NITINOL, 21", BLUNT
Type: IMPLANTABLE DEVICE | Site: BACK | Status: FUNCTIONAL
Brand: PHOENIX SFS

## 2018-04-09 DEVICE — SET SCREW
Type: IMPLANTABLE DEVICE | Site: BACK | Status: FUNCTIONAL
Brand: FIREBIRD NXG

## 2018-04-09 DEVICE — TOP LOADING BODY
Type: IMPLANTABLE DEVICE | Site: BACK | Status: FUNCTIONAL
Brand: FIREBIRD NXG

## 2018-04-09 DEVICE — GRAFT BNE SUB L CANC FRZN MORSELIZED W/ VIABLE CELL TRINITY: Type: IMPLANTABLE DEVICE | Site: BACK | Status: FUNCTIONAL

## 2018-04-09 RX ORDER — OXYCODONE HYDROCHLORIDE 5 MG/1
5 TABLET ORAL
Status: DISCONTINUED | OUTPATIENT
Start: 2018-04-09 | End: 2018-04-11 | Stop reason: HOSPADM

## 2018-04-09 RX ORDER — MIDAZOLAM HYDROCHLORIDE 1 MG/ML
0.5 INJECTION, SOLUTION INTRAMUSCULAR; INTRAVENOUS
Status: DISCONTINUED | OUTPATIENT
Start: 2018-04-09 | End: 2018-04-09 | Stop reason: HOSPADM

## 2018-04-09 RX ORDER — POLYETHYLENE GLYCOL 3350 17 G/17G
17 POWDER, FOR SOLUTION ORAL DAILY
Status: DISCONTINUED | OUTPATIENT
Start: 2018-04-10 | End: 2018-04-11 | Stop reason: HOSPADM

## 2018-04-09 RX ORDER — SERTRALINE HYDROCHLORIDE 50 MG/1
100 TABLET, FILM COATED ORAL DAILY
Status: DISCONTINUED | OUTPATIENT
Start: 2018-04-10 | End: 2018-04-11 | Stop reason: HOSPADM

## 2018-04-09 RX ORDER — SODIUM CHLORIDE, SODIUM LACTATE, POTASSIUM CHLORIDE, CALCIUM CHLORIDE 600; 310; 30; 20 MG/100ML; MG/100ML; MG/100ML; MG/100ML
INJECTION, SOLUTION INTRAVENOUS
Status: DISCONTINUED | OUTPATIENT
Start: 2018-04-09 | End: 2018-04-09 | Stop reason: HOSPADM

## 2018-04-09 RX ORDER — LIDOCAINE HYDROCHLORIDE 10 MG/ML
0.1 INJECTION, SOLUTION EPIDURAL; INFILTRATION; INTRACAUDAL; PERINEURAL AS NEEDED
Status: DISCONTINUED | OUTPATIENT
Start: 2018-04-09 | End: 2018-04-09 | Stop reason: HOSPADM

## 2018-04-09 RX ORDER — HYDROMORPHONE HYDROCHLORIDE 2 MG/ML
INJECTION, SOLUTION INTRAMUSCULAR; INTRAVENOUS; SUBCUTANEOUS AS NEEDED
Status: DISCONTINUED | OUTPATIENT
Start: 2018-04-09 | End: 2018-04-09

## 2018-04-09 RX ORDER — SODIUM CHLORIDE 0.9 % (FLUSH) 0.9 %
5-10 SYRINGE (ML) INJECTION AS NEEDED
Status: DISCONTINUED | OUTPATIENT
Start: 2018-04-09 | End: 2018-04-09 | Stop reason: HOSPADM

## 2018-04-09 RX ORDER — CYCLOBENZAPRINE HCL 10 MG
10 TABLET ORAL
Status: DISCONTINUED | OUTPATIENT
Start: 2018-04-09 | End: 2018-04-11 | Stop reason: HOSPADM

## 2018-04-09 RX ORDER — SODIUM CHLORIDE, SODIUM LACTATE, POTASSIUM CHLORIDE, CALCIUM CHLORIDE 600; 310; 30; 20 MG/100ML; MG/100ML; MG/100ML; MG/100ML
25 INJECTION, SOLUTION INTRAVENOUS CONTINUOUS
Status: DISCONTINUED | OUTPATIENT
Start: 2018-04-09 | End: 2018-04-09 | Stop reason: HOSPADM

## 2018-04-09 RX ORDER — KETOROLAC TROMETHAMINE 30 MG/ML
INJECTION, SOLUTION INTRAMUSCULAR; INTRAVENOUS
Status: DISPENSED
Start: 2018-04-09 | End: 2018-04-10

## 2018-04-09 RX ORDER — SODIUM CHLORIDE 0.9 % (FLUSH) 0.9 %
5-10 SYRINGE (ML) INJECTION EVERY 8 HOURS
Status: DISCONTINUED | OUTPATIENT
Start: 2018-04-10 | End: 2018-04-11 | Stop reason: HOSPADM

## 2018-04-09 RX ORDER — AMOXICILLIN 250 MG
1 CAPSULE ORAL 2 TIMES DAILY
Status: DISCONTINUED | OUTPATIENT
Start: 2018-04-09 | End: 2018-04-11 | Stop reason: HOSPADM

## 2018-04-09 RX ORDER — MIDAZOLAM HYDROCHLORIDE 1 MG/ML
1 INJECTION, SOLUTION INTRAMUSCULAR; INTRAVENOUS AS NEEDED
Status: DISCONTINUED | OUTPATIENT
Start: 2018-04-09 | End: 2018-04-09 | Stop reason: HOSPADM

## 2018-04-09 RX ORDER — HYDROMORPHONE HYDROCHLORIDE 2 MG/ML
INJECTION, SOLUTION INTRAMUSCULAR; INTRAVENOUS; SUBCUTANEOUS AS NEEDED
Status: DISCONTINUED | OUTPATIENT
Start: 2018-04-09 | End: 2018-04-09 | Stop reason: HOSPADM

## 2018-04-09 RX ORDER — OXYCODONE HYDROCHLORIDE 5 MG/1
10 TABLET ORAL
Status: DISCONTINUED | OUTPATIENT
Start: 2018-04-09 | End: 2018-04-11 | Stop reason: HOSPADM

## 2018-04-09 RX ORDER — POLYETHYLENE GLYCOL 3350 17 G/17G
17 POWDER, FOR SOLUTION ORAL DAILY
Status: DISCONTINUED | OUTPATIENT
Start: 2018-04-10 | End: 2018-04-09 | Stop reason: SDUPTHER

## 2018-04-09 RX ORDER — FENTANYL CITRATE 50 UG/ML
50 INJECTION, SOLUTION INTRAMUSCULAR; INTRAVENOUS AS NEEDED
Status: COMPLETED | OUTPATIENT
Start: 2018-04-09 | End: 2018-04-09

## 2018-04-09 RX ORDER — ALBUTEROL SULFATE 0.83 MG/ML
2.5 SOLUTION RESPIRATORY (INHALATION)
Status: DISCONTINUED | OUTPATIENT
Start: 2018-04-09 | End: 2018-04-11 | Stop reason: HOSPADM

## 2018-04-09 RX ORDER — NALOXONE HYDROCHLORIDE 0.4 MG/ML
0.4 INJECTION, SOLUTION INTRAMUSCULAR; INTRAVENOUS; SUBCUTANEOUS AS NEEDED
Status: DISCONTINUED | OUTPATIENT
Start: 2018-04-09 | End: 2018-04-11 | Stop reason: HOSPADM

## 2018-04-09 RX ORDER — ACETAMINOPHEN 10 MG/ML
INJECTION, SOLUTION INTRAVENOUS AS NEEDED
Status: DISCONTINUED | OUTPATIENT
Start: 2018-04-09 | End: 2018-04-09 | Stop reason: HOSPADM

## 2018-04-09 RX ORDER — DIPHENHYDRAMINE HYDROCHLORIDE 50 MG/ML
12.5 INJECTION, SOLUTION INTRAMUSCULAR; INTRAVENOUS
Status: ACTIVE | OUTPATIENT
Start: 2018-04-09 | End: 2018-04-10

## 2018-04-09 RX ORDER — HYDROMORPHONE HYDROCHLORIDE 1 MG/ML
INJECTION, SOLUTION INTRAMUSCULAR; INTRAVENOUS; SUBCUTANEOUS AS NEEDED
Status: DISCONTINUED | OUTPATIENT
Start: 2018-04-09 | End: 2018-04-09 | Stop reason: HOSPADM

## 2018-04-09 RX ORDER — SUCCINYLCHOLINE CHLORIDE 20 MG/ML
INJECTION INTRAMUSCULAR; INTRAVENOUS AS NEEDED
Status: DISCONTINUED | OUTPATIENT
Start: 2018-04-09 | End: 2018-04-09 | Stop reason: HOSPADM

## 2018-04-09 RX ORDER — ACETAMINOPHEN 325 MG/1
650 TABLET ORAL EVERY 6 HOURS
Status: DISCONTINUED | OUTPATIENT
Start: 2018-04-10 | End: 2018-04-11 | Stop reason: HOSPADM

## 2018-04-09 RX ORDER — TRISODIUM CITRATE DIHYDRATE AND CITRIC ACID MONOHYDRATE 500; 334 MG/5ML; MG/5ML
SOLUTION ORAL AS NEEDED
Status: DISCONTINUED | OUTPATIENT
Start: 2018-04-09 | End: 2018-04-09 | Stop reason: HOSPADM

## 2018-04-09 RX ORDER — KETAMINE HYDROCHLORIDE 10 MG/ML
INJECTION, SOLUTION INTRAMUSCULAR; INTRAVENOUS AS NEEDED
Status: DISCONTINUED | OUTPATIENT
Start: 2018-04-09 | End: 2018-04-09 | Stop reason: HOSPADM

## 2018-04-09 RX ORDER — SODIUM CHLORIDE 0.9 % (FLUSH) 0.9 %
5-10 SYRINGE (ML) INJECTION EVERY 8 HOURS
Status: DISCONTINUED | OUTPATIENT
Start: 2018-04-09 | End: 2018-04-09 | Stop reason: HOSPADM

## 2018-04-09 RX ORDER — PRAVASTATIN SODIUM 20 MG/1
20 TABLET ORAL DAILY
Status: DISCONTINUED | OUTPATIENT
Start: 2018-04-10 | End: 2018-04-11 | Stop reason: HOSPADM

## 2018-04-09 RX ORDER — CEFAZOLIN SODIUM/WATER 2 G/20 ML
2 SYRINGE (ML) INTRAVENOUS EVERY 8 HOURS
Status: COMPLETED | OUTPATIENT
Start: 2018-04-09 | End: 2018-04-10

## 2018-04-09 RX ORDER — LEVOTHYROXINE SODIUM 88 UG/1
88 TABLET ORAL
Status: DISCONTINUED | OUTPATIENT
Start: 2018-04-10 | End: 2018-04-11 | Stop reason: HOSPADM

## 2018-04-09 RX ORDER — MORPHINE SULFATE 10 MG/ML
2 INJECTION, SOLUTION INTRAMUSCULAR; INTRAVENOUS
Status: DISCONTINUED | OUTPATIENT
Start: 2018-04-09 | End: 2018-04-09 | Stop reason: HOSPADM

## 2018-04-09 RX ORDER — LIDOCAINE HYDROCHLORIDE 20 MG/ML
INJECTION, SOLUTION EPIDURAL; INFILTRATION; INTRACAUDAL; PERINEURAL AS NEEDED
Status: DISCONTINUED | OUTPATIENT
Start: 2018-04-09 | End: 2018-04-09 | Stop reason: HOSPADM

## 2018-04-09 RX ORDER — PANTOPRAZOLE SODIUM 40 MG/1
40 TABLET, DELAYED RELEASE ORAL
Status: DISCONTINUED | OUTPATIENT
Start: 2018-04-10 | End: 2018-04-11 | Stop reason: HOSPADM

## 2018-04-09 RX ORDER — FENTANYL CITRATE 50 UG/ML
INJECTION, SOLUTION INTRAMUSCULAR; INTRAVENOUS AS NEEDED
Status: DISCONTINUED | OUTPATIENT
Start: 2018-04-09 | End: 2018-04-09 | Stop reason: HOSPADM

## 2018-04-09 RX ORDER — ONDANSETRON 2 MG/ML
4 INJECTION INTRAMUSCULAR; INTRAVENOUS AS NEEDED
Status: COMPLETED | OUTPATIENT
Start: 2018-04-09 | End: 2018-04-09

## 2018-04-09 RX ORDER — SODIUM CHLORIDE, SODIUM LACTATE, POTASSIUM CHLORIDE, CALCIUM CHLORIDE 600; 310; 30; 20 MG/100ML; MG/100ML; MG/100ML; MG/100ML
125 INJECTION, SOLUTION INTRAVENOUS CONTINUOUS
Status: DISCONTINUED | OUTPATIENT
Start: 2018-04-09 | End: 2018-04-09 | Stop reason: HOSPADM

## 2018-04-09 RX ORDER — VANCOMYCIN HYDROCHLORIDE 1 G/20ML
INJECTION, POWDER, LYOPHILIZED, FOR SOLUTION INTRAVENOUS AS NEEDED
Status: DISCONTINUED | OUTPATIENT
Start: 2018-04-09 | End: 2018-04-09 | Stop reason: HOSPADM

## 2018-04-09 RX ORDER — AMITRIPTYLINE HYDROCHLORIDE 50 MG/1
50 TABLET, FILM COATED ORAL
Status: DISCONTINUED | OUTPATIENT
Start: 2018-04-09 | End: 2018-04-11 | Stop reason: HOSPADM

## 2018-04-09 RX ORDER — SODIUM CHLORIDE 9 MG/ML
25 INJECTION, SOLUTION INTRAVENOUS CONTINUOUS
Status: DISCONTINUED | OUTPATIENT
Start: 2018-04-09 | End: 2018-04-09 | Stop reason: HOSPADM

## 2018-04-09 RX ORDER — MELATONIN
3000 DAILY
Status: DISCONTINUED | OUTPATIENT
Start: 2018-04-10 | End: 2018-04-11 | Stop reason: HOSPADM

## 2018-04-09 RX ORDER — GLYCOPYRROLATE 0.2 MG/ML
INJECTION INTRAMUSCULAR; INTRAVENOUS AS NEEDED
Status: DISCONTINUED | OUTPATIENT
Start: 2018-04-09 | End: 2018-04-09 | Stop reason: HOSPADM

## 2018-04-09 RX ORDER — OXYCODONE HYDROCHLORIDE 5 MG/1
5 TABLET ORAL AS NEEDED
Status: DISCONTINUED | OUTPATIENT
Start: 2018-04-09 | End: 2018-04-09 | Stop reason: HOSPADM

## 2018-04-09 RX ORDER — ALPRAZOLAM 0.5 MG/1
0.5 TABLET ORAL
Status: DISCONTINUED | OUTPATIENT
Start: 2018-04-09 | End: 2018-04-11 | Stop reason: HOSPADM

## 2018-04-09 RX ORDER — ONDANSETRON 2 MG/ML
INJECTION INTRAMUSCULAR; INTRAVENOUS AS NEEDED
Status: DISCONTINUED | OUTPATIENT
Start: 2018-04-09 | End: 2018-04-09 | Stop reason: HOSPADM

## 2018-04-09 RX ORDER — MORPHINE SULFATE IN 0.9 % NACL 150MG/30ML
PATIENT CONTROLLED ANALGESIA SYRINGE INTRAVENOUS
Status: DISCONTINUED | OUTPATIENT
Start: 2018-04-09 | End: 2018-04-10

## 2018-04-09 RX ORDER — SODIUM CHLORIDE 0.9 % (FLUSH) 0.9 %
5-10 SYRINGE (ML) INJECTION AS NEEDED
Status: DISCONTINUED | OUTPATIENT
Start: 2018-04-09 | End: 2018-04-11 | Stop reason: HOSPADM

## 2018-04-09 RX ORDER — ALBUTEROL SULFATE 90 UG/1
2 AEROSOL, METERED RESPIRATORY (INHALATION) AS NEEDED
Status: DISCONTINUED | OUTPATIENT
Start: 2018-04-09 | End: 2018-04-09 | Stop reason: CLARIF

## 2018-04-09 RX ORDER — FENTANYL CITRATE 50 UG/ML
25 INJECTION, SOLUTION INTRAMUSCULAR; INTRAVENOUS
Status: DISCONTINUED | OUTPATIENT
Start: 2018-04-09 | End: 2018-04-09 | Stop reason: HOSPADM

## 2018-04-09 RX ORDER — FACIAL-BODY WIPES
10 EACH TOPICAL DAILY PRN
Status: DISCONTINUED | OUTPATIENT
Start: 2018-04-11 | End: 2018-04-11 | Stop reason: HOSPADM

## 2018-04-09 RX ORDER — MORPHINE SULFATE 4 MG/ML
2 INJECTION, SOLUTION INTRAMUSCULAR; INTRAVENOUS
Status: DISCONTINUED | OUTPATIENT
Start: 2018-04-09 | End: 2018-04-11 | Stop reason: HOSPADM

## 2018-04-09 RX ORDER — NEOSTIGMINE METHYLSULFATE 1 MG/ML
INJECTION INTRAVENOUS AS NEEDED
Status: DISCONTINUED | OUTPATIENT
Start: 2018-04-09 | End: 2018-04-09 | Stop reason: HOSPADM

## 2018-04-09 RX ORDER — DIPHENHYDRAMINE HYDROCHLORIDE 50 MG/ML
12.5 INJECTION, SOLUTION INTRAMUSCULAR; INTRAVENOUS AS NEEDED
Status: DISCONTINUED | OUTPATIENT
Start: 2018-04-09 | End: 2018-04-09 | Stop reason: HOSPADM

## 2018-04-09 RX ORDER — KETOROLAC TROMETHAMINE 30 MG/ML
30 INJECTION, SOLUTION INTRAMUSCULAR; INTRAVENOUS EVERY 6 HOURS
Status: COMPLETED | OUTPATIENT
Start: 2018-04-09 | End: 2018-04-10

## 2018-04-09 RX ORDER — ONDANSETRON 2 MG/ML
4 INJECTION INTRAMUSCULAR; INTRAVENOUS
Status: ACTIVE | OUTPATIENT
Start: 2018-04-09 | End: 2018-04-10

## 2018-04-09 RX ORDER — SODIUM CHLORIDE 9 MG/ML
125 INJECTION, SOLUTION INTRAVENOUS CONTINUOUS
Status: DISPENSED | OUTPATIENT
Start: 2018-04-09 | End: 2018-04-10

## 2018-04-09 RX ORDER — CEFAZOLIN SODIUM/WATER 2 G/20 ML
2 SYRINGE (ML) INTRAVENOUS ONCE
Status: COMPLETED | OUTPATIENT
Start: 2018-04-09 | End: 2018-04-09

## 2018-04-09 RX ORDER — PROPOFOL 10 MG/ML
INJECTION, EMULSION INTRAVENOUS AS NEEDED
Status: DISCONTINUED | OUTPATIENT
Start: 2018-04-09 | End: 2018-04-09 | Stop reason: HOSPADM

## 2018-04-09 RX ORDER — ROCURONIUM BROMIDE 10 MG/ML
INJECTION, SOLUTION INTRAVENOUS AS NEEDED
Status: DISCONTINUED | OUTPATIENT
Start: 2018-04-09 | End: 2018-04-09 | Stop reason: HOSPADM

## 2018-04-09 RX ORDER — MIDAZOLAM HYDROCHLORIDE 1 MG/ML
INJECTION, SOLUTION INTRAMUSCULAR; INTRAVENOUS AS NEEDED
Status: DISCONTINUED | OUTPATIENT
Start: 2018-04-09 | End: 2018-04-09 | Stop reason: HOSPADM

## 2018-04-09 RX ADMIN — HYDROMORPHONE HYDROCHLORIDE 0.4 MG: 1 INJECTION, SOLUTION INTRAMUSCULAR; INTRAVENOUS; SUBCUTANEOUS at 17:21

## 2018-04-09 RX ADMIN — ACETAMINOPHEN 1000 MG: 10 INJECTION, SOLUTION INTRAVENOUS at 15:35

## 2018-04-09 RX ADMIN — FENTANYL CITRATE 50 MCG: 50 INJECTION, SOLUTION INTRAMUSCULAR; INTRAVENOUS at 15:00

## 2018-04-09 RX ADMIN — SODIUM CHLORIDE, SODIUM LACTATE, POTASSIUM CHLORIDE, CALCIUM CHLORIDE: 600; 310; 30; 20 INJECTION, SOLUTION INTRAVENOUS at 14:58

## 2018-04-09 RX ADMIN — KETAMINE HYDROCHLORIDE 20 MG: 10 INJECTION, SOLUTION INTRAMUSCULAR; INTRAVENOUS at 15:16

## 2018-04-09 RX ADMIN — ROCURONIUM BROMIDE 25 MG: 10 INJECTION, SOLUTION INTRAVENOUS at 15:38

## 2018-04-09 RX ADMIN — HYDROMORPHONE HYDROCHLORIDE 0.4 MG: 2 INJECTION, SOLUTION INTRAMUSCULAR; INTRAVENOUS; SUBCUTANEOUS at 18:10

## 2018-04-09 RX ADMIN — HYDROMORPHONE HYDROCHLORIDE 0.6 MG: 1 INJECTION, SOLUTION INTRAMUSCULAR; INTRAVENOUS; SUBCUTANEOUS at 17:26

## 2018-04-09 RX ADMIN — SODIUM CHLORIDE 125 ML/HR: 900 INJECTION, SOLUTION INTRAVENOUS at 19:30

## 2018-04-09 RX ADMIN — FENTANYL CITRATE 50 MCG: 50 INJECTION, SOLUTION INTRAMUSCULAR; INTRAVENOUS at 14:36

## 2018-04-09 RX ADMIN — PROPOFOL 200 MG: 10 INJECTION, EMULSION INTRAVENOUS at 15:07

## 2018-04-09 RX ADMIN — STANDARDIZED SENNA CONCENTRATE AND DOCUSATE SODIUM 1 TABLET: 8.6; 5 TABLET, FILM COATED ORAL at 22:35

## 2018-04-09 RX ADMIN — HYDROMORPHONE HYDROCHLORIDE 0.4 MG: 2 INJECTION, SOLUTION INTRAMUSCULAR; INTRAVENOUS; SUBCUTANEOUS at 18:20

## 2018-04-09 RX ADMIN — KETOROLAC TROMETHAMINE 30 MG: 30 INJECTION, SOLUTION INTRAMUSCULAR; INTRAVENOUS at 22:36

## 2018-04-09 RX ADMIN — Medication: at 18:45

## 2018-04-09 RX ADMIN — Medication 2 G: at 22:36

## 2018-04-09 RX ADMIN — MIDAZOLAM HYDROCHLORIDE 2 MG: 1 INJECTION, SOLUTION INTRAMUSCULAR; INTRAVENOUS at 14:58

## 2018-04-09 RX ADMIN — Medication 2 G: at 15:38

## 2018-04-09 RX ADMIN — GLYCOPYRROLATE 0.5 MG: 0.2 INJECTION INTRAMUSCULAR; INTRAVENOUS at 16:15

## 2018-04-09 RX ADMIN — ACETAMINOPHEN 650 MG: 325 TABLET, FILM COATED ORAL at 23:47

## 2018-04-09 RX ADMIN — ONDANSETRON 4 MG: 2 INJECTION INTRAMUSCULAR; INTRAVENOUS at 17:45

## 2018-04-09 RX ADMIN — HYDROMORPHONE HYDROCHLORIDE 0.4 MG: 2 INJECTION, SOLUTION INTRAMUSCULAR; INTRAVENOUS; SUBCUTANEOUS at 17:42

## 2018-04-09 RX ADMIN — MIDAZOLAM 0.5 MG: 1 INJECTION INTRAMUSCULAR; INTRAVENOUS at 19:05

## 2018-04-09 RX ADMIN — SUCCINYLCHOLINE CHLORIDE 120 MG: 20 INJECTION INTRAMUSCULAR; INTRAVENOUS at 15:08

## 2018-04-09 RX ADMIN — SODIUM CHLORIDE, SODIUM LACTATE, POTASSIUM CHLORIDE, CALCIUM CHLORIDE: 600; 310; 30; 20 INJECTION, SOLUTION INTRAVENOUS at 18:02

## 2018-04-09 RX ADMIN — LIDOCAINE HYDROCHLORIDE 80 MG: 20 INJECTION, SOLUTION EPIDURAL; INFILTRATION; INTRACAUDAL; PERINEURAL at 15:07

## 2018-04-09 RX ADMIN — NEOSTIGMINE METHYLSULFATE 3 MG: 1 INJECTION INTRAVENOUS at 16:15

## 2018-04-09 RX ADMIN — TRISODIUM CITRATE DIHYDRATE AND CITRIC ACID MONOHYDRATE 30 ML: 500; 334 SOLUTION ORAL at 14:58

## 2018-04-09 RX ADMIN — FENTANYL CITRATE 100 MCG: 50 INJECTION, SOLUTION INTRAMUSCULAR; INTRAVENOUS at 15:07

## 2018-04-09 RX ADMIN — ONDANSETRON 4 MG: 2 INJECTION INTRAMUSCULAR; INTRAVENOUS at 19:05

## 2018-04-09 RX ADMIN — ONDANSETRON 4 MG: 2 INJECTION INTRAMUSCULAR; INTRAVENOUS at 19:00

## 2018-04-09 RX ADMIN — SODIUM CHLORIDE, SODIUM LACTATE, POTASSIUM CHLORIDE, AND CALCIUM CHLORIDE 25 ML/HR: 600; 310; 30; 20 INJECTION, SOLUTION INTRAVENOUS at 14:29

## 2018-04-09 RX ADMIN — ROCURONIUM BROMIDE 5 MG: 10 INJECTION, SOLUTION INTRAVENOUS at 15:07

## 2018-04-09 NOTE — BRIEF OP NOTE
BRIEF OPERATIVE NOTE    Date of Procedure: 4/9/2018   Preoperative Diagnosis: STENOSIS, DEGENERATIVE DISC DISEASE, SPONDYLOLISTHESIS  Postoperative Diagnosis: STENOSIS, DEGENERATIVE DISC DISEASE,     Procedure(s):  ROBOTIC ASSISTED L4-5 MIDLINE FUSION (OXYGEN)  Surgeon(s) and Role:     * Ruiz Jain MD - Primary         Assistant Staff: Physician Assistant: Jori Dent PA-C; ANDRESSA Steele      Surgical Staff:  Circ-1: [address]  Physician Assistant: Jori Dent PA-C; ANDRESSA Steele  Radiology Technician: Anna Nayak RT  Scrub RN-1: Ruchi Cat RN  Event Time In   Incision Start 1547   Incision Close      Anesthesia: General   Estimated Blood Loss: see full op note  Specimens: * No specimens in log *   Findings: stenosis, spondylolisthesis   Complications: none  Implants:   Implant Name Type Inv.  Item Serial No.  Lot No. LRB No. Used Action   GRAFT BNE ELITE CANDE LG --  - [id number]  GRAFT BNE ELITE CANDE LG --  [id number] MUSCULOSKELETAL TRANS 0010 N/A 1 Implanted   5.5X40MM SCREW   NA  NA N/A 4 Implanted   OPEN HEADS   NA  NA N/A 4 Implanted   SET SCREWS    NA  NA N/A 4 Implanted   Spine Wave Lordotic Expandable Implant   NA  [id number] N/A 1 Implanted   Spine Wave lordotic Expandable   NA  [id number] N/A 1 Implanted   40mm Rods     NA   NA N/A 2 Implanted

## 2018-04-09 NOTE — IP AVS SNAPSHOT
[address] 
[phone] Patient: Christian Wilkinson MRN: [id number] :1965 About your hospitalization You were admitted on:  2018 You last received care in the:  [address] [place] [address] You were discharged on:  2018 Why you were hospitalized Your primary diagnosis was:  Not on File Your diagnoses also included:  Spinal Stenosis, Lumbar Follow-up Information Follow up With Details Comments Contact Info Vanna Beckwith 227  Please call agency if you don\"t  hear from them within [address] [facility] [address] 
[phone] Lobito Egan MD   [address] [facility] [address] [place] [address] 
[phone] Your Scheduled Appointments  To Be Determined PT ADMISSION with Keli Newell, PT  
[address] ([address]) [address] ([address]) 2018 To Be Determined OT EVALUATION with Guero Ham [address] ([address]) [address] ([address]) Discharge Orders None A check curly indicates which time of day the medication should be taken. My Medications START taking these medications Instructions Each Dose to Equal  
 Morning Noon Evening Bedtime  
 oxyCODONE IR 5 mg immediate release tablet Commonly known as:  Radha Allan Your last dose was: Your next dose is: Take 1-2 Tabs by mouth every four (4) hours as needed. Max Daily Amount: 60 mg.  
 5-10 mg CHANGE how you take these medications Instructions Each Dose to Equal  
 Morning Noon Evening Bedtime  
 pantoprazole 40 mg tablet Commonly known as:  PROTONIX What changed:  See the new instructions. Your last dose was: Your next dose is: TAKE 1 TAB BY MOUTH DAILY. polyethylene glycol 17 gram/dose powder Commonly known as:  Kathrin Hodgkin What changed:   
- when to take this 
- reasons to take this 
- additional instructions Your last dose was: Your next dose is: Take 17 g by mouth daily. Constipation 17 g CONTINUE taking these medications Instructions Each Dose to Equal  
 Morning Noon Evening Bedtime  
 albuterol 90 mcg/actuation inhaler Commonly known as:  PROVENTIL HFA, VENTOLIN HFA, PROAIR HFA Your last dose was: Your next dose is: Take 2 Puffs by inhalation as needed for Wheezing. 2 Puff ALPRAZolam 0.5 mg tablet Commonly known as:  Juan Rivera Your last dose was: Your next dose is: Take 1 Tab by mouth every eight (8) hours as needed for Anxiety for up to 15 doses. Max Daily Amount: 1.5 mg.  
 0.5 mg  
    
   
   
   
  
 amitriptyline 50 mg tablet Commonly known as:  ELAVIL Your last dose was: Your next dose is: Take  by mouth nightly. Ermalinda Dense Your last dose was: Your next dose is: As directed to assist with ambulation  
     
   
   
   
  
 cholecalciferol 1,000 unit Cap Commonly known as:  VITAMIN D3 Your last dose was: Your next dose is: TAKE 3 CAPSULES DAILY  
     
   
   
   
  
 levothyroxine 88 mcg tablet Commonly known as:  SYNTHROID Your last dose was: Your next dose is: TAKE 1 TABLET BY MOUTH EVERY DAY BEFORE BREAKFAST ON A EMPTY STOMACH  
     
   
   
   
  
 multivitamin, tx-iron-ca-min 9 mg iron-400 mcg Tab tablet Commonly known as:  THERA-M w/ IRON Your last dose was: Your next dose is: Take 1 Tab by mouth daily. 1 Tab  
    
   
   
   
  
 pravastatin 20 mg tablet Commonly known as:  PRAVACHOL Your last dose was: Your next dose is: TAKE 1 TAB BY MOUTH DAILY. sertraline 100 mg tablet Commonly known as:  ZOLOFT Your last dose was: Your next dose is: TAKE 1 TABLET BY MOUTH ONCE DAILY  
     
   
   
   
  
 * TYLENOL ARTHRITIS PAIN 650 mg Tber Generic drug:  acetaminophen Your last dose was: Your next dose is: Take 650 mg by mouth every six (6) hours as needed for Pain. 650 mg  
    
   
   
   
  
 * TYLENOL EXTRA STRENGTH 500 mg tablet Generic drug:  acetaminophen Your last dose was: Your next dose is: Take 500 mg by mouth every six (6) hours as needed for Pain. 500 mg * Notice: This list has 2 medication(s) that are the same as other medications prescribed for you. Read the directions carefully, and ask your doctor or other care provider to review them with you. STOP taking these medications   
 traMADol 50 mg tablet Commonly known as:  ULTRAM  
   
  
  
  
Where to Get Your Medications Information on where to get these meds will be given to you by the nurse or doctor. ! Ask your nurse or doctor about these medications  
  oxyCODONE IR 5 mg immediate release tablet Opioid Education Prescription Opioids: What You Need to Know: 
 
 
Procedure: Procedure(s): 
 ROBOTIC ASSISTED L4-5 MIDLINE FUSION (OXYGEN)  PCP: Lobito Egan MD 
 
Follow up appointments 
-follow up with Dr. Dr. Pamela Guy in 2 weeks. Call [phone] to make an appointment as soon as you get home from the hospital. 
 
[address]: ____________________   phone: _______________________ The agency will contact you to arrange dates/times for visits. Please call them if you do not hear from them within 24 hours after you are discharged Physical therapy 3 times a week for 3 weeks Nursing-initial assessment and as needed When to call your Orthopaedic Surgeon: 
-Signs of infection-if your incision is red; continues to have drainage; drainage has a foul odor or if you have a persistent fever over 101 degrees for 24 hours 
-Nausea or vomiting, severe headache 
-Loss of bowel or bladder function, inability to urinate 
-Changes in sensation in your arms or legs (numbness, tingling, loss of color) -Increased weakness-greater than before your surgery 
-Severe pain or pain not relieved by medications 
-Signs of a blood clot in your leg-calf pain, tenderness, redness, swelling of lower leg When to call your Primary Care Physician: 
-Concerns about medical conditions such as diabetes, high blood pressure, asthma, congestive heart failure 
-Call if blood sugars are elevated, persistent headache or dizziness, coughing or congestion, constipation or diarrhea, burning with urination, abnormal heart rate When to call 911 and go to the nearest emergency room: 
-Acute onset of chest pain, shortness of breath, difficulty breathing Activity 
-You are going home a well person, be as active as possible. Your only exercise should be walking. Start with short frequent walks and increase your walking distance each day. 
-Limit the amount of time you sit to 20-30 minute intervals. Sitting for prolonged periods of time will be uncomfortable for you following surgery. -Do NOT lift anything over 5 pounds 
-Do NOT do any straining, twisting or bending 
-When you are in bed, you may lay on your back or on either side. Do NOT lie on your stomach Brace 
-If you have a back brace, you should wear your brace at all times when you are out of bed. Do not wear the brace while in bed or showering. 
-Remember to always wear a cotton t-shirt underneath your brace. 
-Do not bend or twist when your brace is off Diet 
-Resume usual diet; drink plenty of fluids; eat foods high in fiber 
-It is important to have regular bowel movements. Pain medications may cause constipation. You may want to take a stool softener (such as Senokot-S or Colace) to prevent constipation. 
 -If constipation occurs, take a laxative (such as Dulcolax tablets, Milk of Magnesia, or a suppository). Laxatives should only be used if the above preventable measures have failed and you still have not had a bowel movement after three days Driving 
-You may not drive or return to work until instructed by your physician. However, you may ride in the car for short periods of time. Showering 
-You may shower in approximately 4 days after your surgery.   
-Leave the dressing on during your shower. Do NOT allow the water to run directly onto your dressing. Once you get out of the shower, gently pat the dressing dry. -Reminder- your brace can be removed while showering. Remember to not bend or twist while your brace is off.   
-Do not take a tub bath. Preventing blood clots 
-You have been given T.E.D. stockings to wear. Continue to wear these for 7 days after your discharge. Put them on in the morning and take them off at night.   
-They are used to increase your circulation and prevent blood clots from forming in your legs 
-T. E.D. stockings can be machine washed, temperature not to exceed 160° F (71°C) and machine dried for 15 to 20 minutes, temperature not to exceed 250° F (121°C). Pain management -Take pain medication as prescribed; decrease the amount you use as your pain lessens 
-DO not wait until you are in extreme pain to take your medication. 
-Avoid alcoholic beverages while taking pain medication Pain Medication Safety DO: 
-Read the Medication Guide  
-Take your medicine exactly as prescribed  
-Store your medicine away from children and in a safe place  
-Flush unused medicine down the toilet  
-Call your healthcare provider for medical advice about side effects. You may report side effects to FDA at 5-228-FDA-4174.  
-Please be aware that many medications contain Tylenol. We do not want you to over medicate so please read the information below as a guide. Do not take more than 4 Grams of Tylenol in a 24 hour period. (There are 1000 milligrams in one Gram) Percocet contains 325 mg of Tylenol per tablet (do not take more than 12 tablets in 24 hours) Lortab contains 500 mg of Tylenol per tablet (do not take more than 8 tablets in 24 hours) Norco contains 325 mg of Tylenol per tablet (do not take more than 12 tablets in 24 hours). DO NOT: 
-Do not give your medicine to others  
-Do not take medicine unless it was prescribed for you  
-Do not stop taking your medicine without talking to your healthcare provider  
-Do not break, chew, crush, dissolve, or inject your medicine. If you cannot swallow your medicine whole, talk to your healthcare provider. 
-Do not drink alcohol while taking this medicine 
-Do not take anti-inflammatory medications or aspirin unless instructed by your     physician. Incision Care Your incision has been closed with absorbable sutures and the Zipline skin closure system. This will assist with healing. The Swathi Bleacher is to remain on your incision for 2 weeks. A dry dressing (ABD and tape) will be placed over it and should be changed daily, for at least the first several days after your surgery. If you have no incisional drainage, you may leave the incision open to air if you wish, still leaving the Zipline in place. Please make sure to wash your hands prior to touching your dressing. You may take brief showers but do not run the water directly onto the wound. After your shower, blot your incision dry with a soft towel and replace the dry dressing. Do not allow the tape to come in contact with the Zipline. Do not rub or apply any lotions or ointments to your incision site. Do not soak or scrub your wound. The Zipline dressing will be removed during your two week follow-up appointment. If you experience drainage leaking from underneath the Zipline or if it peels off before 2 weeks, please contact your orthopedic surgeons office. Introducing [facility]! [employer] introduces [employer]PropertyData patient portal. Now you can access parts of your medical record, email your doctor's office, and request medication refills online. 1. In your internet browser, go to [email or web address] 2. Click on the First Time User? Click Here link in the Sign In box. You will see the New Member Sign Up page. 3. Enter your ePropertyData Access Code exactly as it appears below. You will not need to use this code after youve completed the sign-up process. If you do not sign up before the expiration date, you must request a new code. · [employer]PropertyData Access Code: [email or web address] Expires: 4/18/2018 12:25 PM 
 
4. Enter the last four digits of your Social Security Number (xxxx) and Date of Birth (mm/dd/yyyy) as indicated and click Submit. You will be taken to the next sign-up page. 5. Create a Shoutfitt ID.  This will be your ePropertyData login ID and cannot be changed, so think of one that is secure and easy to remember. 6. Create a ZYB password. You can change your password at any time. 7. Enter your Password Reset Question and Answer. This can be used at a later time if you forget your password. 8. Enter your e-mail address. You will receive e-mail notification when new information is available in [address] 9. Click Sign Up. You can now view and download portions of your medical record. 10. Click the Download Summary menu link to download a portable copy of your medical information. If you have questions, please visit the Frequently Asked Questions section of the ZYB website. Remember, ZYB is NOT to be used for urgent needs. For medical emergencies, dial 911. Now available from your iPhone and Android! Introducing Vivek Stallings As a [employer] patient, I wanted to make you aware of our electronic visit tool called Vivek Stallings. [employer] 24/7 allows you to connect within minutes with a medical provider 24 hours a day, seven days a week via a mobile device or tablet or logging into a secure website from your computer. You can access Vivek Stallings from anywhere in the [place]. A virtual visit might be right for you when you have a simple condition and feel like you just dont want to get out of bed, or cant get away from work for an appointment, when your regular [employer] provider is not available (evenings, weekends or holidays), or when youre out of town and need minor care. Electronic visits cost only $49 and if the [employer] 24/7 provider determines a prescription is needed to treat your condition, one can be electronically transmitted to a nearby pharmacy*. Please take a moment to enroll today if you have not already done so. The enrollment process is free and takes just a few minutes.   To enroll, please download the [employer] 24/7 memo to your tablet or phone, or visit [email or web address] to enroll on your computer. And, as an [address] [place] [address] patient with a [employer] account, the results of your visits will be scanned into your electronic medical record and your primary care provider will be able to view the scanned results. We urge you to continue to see your regular [facility] provider for your ongoing medical care. And while your primary care provider may not be the one available when you seek a [facility] virtual visit, the peace of mind you get from getting a real diagnosis real time can be priceless. For more information on [facility], view our Frequently Asked Questions (FAQs) at [email or web address]. Sincerely, 
 
Kezia Whittington MD 
Chief Medical Officer [facility] Nickie Graham *:  certain medications cannot be prescribed via [facility] Providers Seen During Your Hospitalization Provider Specialty Primary office phone Ruiz Jain MD Orthopedic Surgery [phone] Your Primary Care Physician (PCP) Primary Care Physician Office Phone Office Fax Linda Milan [phone] You are allergic to the following No active allergies Recent Documentation OB Status Smoking Status Hysterectomy Never Smoker Emergency Contacts Name Discharge Info Relation Home Work Mobile [facility] CAREGIVER [3] Sister [23] [phone] [facility] DISCHARGE CAREGIVER [3] Sister [23] [phone] Patient Belongings The following personal items are in your possession at time of discharge: 
  Dental Appliances: None  Visual Aid: Glasses             Clothing:  (belongings sent to pacu) Please provide this summary of care documentation to your next provider.  
  
  
 
  
Signatures-by signing, you are acknowledging that this After Visit Summary has been reviewed with you and you have received a copy. Patient Signature:  ____________________________________________________________ Date:  ____________________________________________________________  
  
Elizabethann Glad Provider Signature:  ____________________________________________________________ Date:  ____________________________________________________________

## 2018-04-09 NOTE — ROUTINE PROCESS
Patient: Caren Dias MRN: [id number]  SSN: [id number]   YOB: 1965  Age: 46 y.o. Sex: female     Patient is status post Procedure(s):  ROBOTIC ASSISTED L4-5 MIDLINE FUSION (OXYGEN).     Surgeon(s) and Role:     Tony Bell MD - Primary                      Peripheral IV 04/09/18 Left Hand (Active)          Drain hemo vac drain 04/09/18 Right  (Active)      Airway - Endotracheal Tube 04/09/18 Oral (Active)                   Dressing/Packing:  Wound Back-DRESSING TYPE: ABD pad (Zip 8i) (04/09/18 9106)  Splint/Cast:  ]

## 2018-04-09 NOTE — OP NOTES
[address]  ACUTE CARE OP NOTE    Name:DORIE WALLER  MR#: [id number]  : 1965  ACCOUNT #: [de-identified]   DATE OF SERVICE: 2018    PREOPERATIVE DIAGNOSIS:  Lumbar stenosis, lumbar spondylolisthesis, L4-L5. POSTOPERATIVE DIAGNOSIS:  Lumbar stenosis, lumbar spondylolisthesis, L4-L5. PROCEDURES PERFORMED:  Lumbar laminectomy, L4-L5; transforaminal interbody fusion, L4-L5; posterior midline fusion with robotic, L4-L5. SURGEON:  Nilda Duarte MD     ASSISTANT:  Leatha Del Toro PA-C     ANESTHESIA:  General     ESTIMATED BLOOD LOSS:  Minimal.    SPECIMENS REMOVED:  None     COMPLICATIONS:  None. IMPLANTS:  Orthofix Janus     INDICATIONS:  A pleasant 71-year-old female with a history of back pain, bilateral leg pain. She had instability at L4-L5 from spondylolisthesis which is hypermobile. She had failed conservative treatments, under the risks and benefits, elected to proceed. PROCEDURE IN DETAIL:  The patient identified, was brought to the OP suite, underwent general anesthesia without difficulty. Placed in the prone position on the open [facility] table back was prepped and draped sterilely. Timeout performed. Incision made in the midline. Dissection was carried down, we exposed the L4-L5 facet with significant facet cysts noted bilaterally, and significant widening of the L4-L5 facets. We then brought the [employer] system in. We took 2 intraoperative x-rays and merged this with the preoperative CT scan without difficulty. We then placed cortical screw instrumentation at L4 and L5, 5.5 x 40 mm screws. These were orthopedic Janus screws. These tested out greater than 15 milliamps. We then did a wide partial facet resection as well as central decompression. Bilateral exposure was performed for exposure of the annulotomies. We did complete diskectomies. We then did rasp the endplates to lightly bleeding bone.   We then packed [employer] bone graft from the anterior 1/3 column followed by placement of 2 Spine Wave cages 22 x 9 mm in width and elevated them to 15 height. These were 15 degrees of lordosis. The Jazmin allograft had been placed in the anterior 1/3 column. We then irrigated with pulse lavage with bacitracin 3000 mL. We decorticated the remainder of the facets at L4-L5 and placed the remainder of the Jazmin allograft in the posterior gutters, followed by 40 mm longitudinal rods. Final tightening performed. Final x-rays demonstrated stable fixation. The patient had standard closure with medium Hemovac. The patient returned to the PACU in stable condition.       MD Anupam Bryant  D: 04/09/2018 17:23     T: 04/09/2018 19:58  JOB #: [id number]

## 2018-04-10 ENCOUNTER — HOME HEALTH ADMISSION (OUTPATIENT)
Dept: HOME HEALTH SERVICES | Facility: HOME HEALTH | Age: 53
End: 2018-04-10
Payer: MEDICAID

## 2018-04-10 LAB
ANION GAP SERPL CALC-SCNC: 8 MMOL/L (ref 5–15)
BUN SERPL-MCNC: 6 MG/DL (ref 6–20)
BUN/CREAT SERPL: 7 (ref 12–20)
CALCIUM SERPL-MCNC: 9.1 MG/DL (ref 8.5–10.1)
CHLORIDE SERPL-SCNC: 112 MMOL/L (ref 97–108)
CO2 SERPL-SCNC: 24 MMOL/L (ref 21–32)
CREAT SERPL-MCNC: 0.81 MG/DL (ref 0.55–1.02)
GLUCOSE SERPL-MCNC: 147 MG/DL (ref 65–100)
HGB BLD-MCNC: 12.3 G/DL (ref 11.5–16)
POTASSIUM SERPL-SCNC: 4.2 MMOL/L (ref 3.5–5.1)
SODIUM SERPL-SCNC: 144 MMOL/L (ref 136–145)

## 2018-04-10 PROCEDURE — 36415 COLL VENOUS BLD VENIPUNCTURE: CPT | Performed by: PHYSICIAN ASSISTANT

## 2018-04-10 PROCEDURE — L0464 TLSO 4MOD SACRO-SCAP PRE: HCPCS

## 2018-04-10 PROCEDURE — 97535 SELF CARE MNGMENT TRAINING: CPT

## 2018-04-10 PROCEDURE — G8988 SELF CARE GOAL STATUS: HCPCS

## 2018-04-10 PROCEDURE — 97165 OT EVAL LOW COMPLEX 30 MIN: CPT

## 2018-04-10 PROCEDURE — 97161 PT EVAL LOW COMPLEX 20 MIN: CPT

## 2018-04-10 PROCEDURE — 74011250637 HC RX REV CODE- 250/637: Performed by: PHYSICIAN ASSISTANT

## 2018-04-10 PROCEDURE — 74011250636 HC RX REV CODE- 250/636: Performed by: PHYSICIAN ASSISTANT

## 2018-04-10 PROCEDURE — 97116 GAIT TRAINING THERAPY: CPT

## 2018-04-10 PROCEDURE — 77010033678 HC OXYGEN DAILY

## 2018-04-10 PROCEDURE — 80048 BASIC METABOLIC PNL TOTAL CA: CPT | Performed by: PHYSICIAN ASSISTANT

## 2018-04-10 PROCEDURE — 85018 HEMOGLOBIN: CPT | Performed by: PHYSICIAN ASSISTANT

## 2018-04-10 PROCEDURE — 65270000029 HC RM PRIVATE

## 2018-04-10 PROCEDURE — 97530 THERAPEUTIC ACTIVITIES: CPT

## 2018-04-10 PROCEDURE — G8987 SELF CARE CURRENT STATUS: HCPCS

## 2018-04-10 PROCEDURE — G8989 SELF CARE D/C STATUS: HCPCS

## 2018-04-10 RX ADMIN — ACETAMINOPHEN 650 MG: 325 TABLET, FILM COATED ORAL at 11:36

## 2018-04-10 RX ADMIN — KETOROLAC TROMETHAMINE 30 MG: 30 INJECTION, SOLUTION INTRAMUSCULAR; INTRAVENOUS at 11:36

## 2018-04-10 RX ADMIN — Medication 10 ML: at 22:19

## 2018-04-10 RX ADMIN — ACETAMINOPHEN 650 MG: 325 TABLET, FILM COATED ORAL at 06:30

## 2018-04-10 RX ADMIN — LEVOTHYROXINE SODIUM 88 MCG: 88 TABLET ORAL at 06:30

## 2018-04-10 RX ADMIN — OXYCODONE HYDROCHLORIDE 10 MG: 5 TABLET ORAL at 16:04

## 2018-04-10 RX ADMIN — OXYCODONE HYDROCHLORIDE 5 MG: 5 TABLET ORAL at 09:08

## 2018-04-10 RX ADMIN — STANDARDIZED SENNA CONCENTRATE AND DOCUSATE SODIUM 1 TABLET: 8.6; 5 TABLET, FILM COATED ORAL at 17:28

## 2018-04-10 RX ADMIN — ALPRAZOLAM 0.5 MG: 0.5 TABLET ORAL at 00:39

## 2018-04-10 RX ADMIN — PRAVASTATIN SODIUM 20 MG: 20 TABLET ORAL at 22:18

## 2018-04-10 RX ADMIN — PANTOPRAZOLE SODIUM 40 MG: 40 TABLET, DELAYED RELEASE ORAL at 06:31

## 2018-04-10 RX ADMIN — KETOROLAC TROMETHAMINE 30 MG: 30 INJECTION, SOLUTION INTRAMUSCULAR; INTRAVENOUS at 06:30

## 2018-04-10 RX ADMIN — ACETAMINOPHEN 650 MG: 325 TABLET, FILM COATED ORAL at 17:28

## 2018-04-10 RX ADMIN — VITAMIN D, TAB 1000IU (100/BT) 3000 UNITS: 25 TAB at 09:07

## 2018-04-10 RX ADMIN — STANDARDIZED SENNA CONCENTRATE AND DOCUSATE SODIUM 1 TABLET: 8.6; 5 TABLET, FILM COATED ORAL at 09:06

## 2018-04-10 RX ADMIN — ALPRAZOLAM 0.5 MG: 0.5 TABLET ORAL at 09:24

## 2018-04-10 RX ADMIN — Medication 2 G: at 06:31

## 2018-04-10 RX ADMIN — KETOROLAC TROMETHAMINE 30 MG: 30 INJECTION, SOLUTION INTRAMUSCULAR; INTRAVENOUS at 17:28

## 2018-04-10 RX ADMIN — Medication 10 ML: at 06:31

## 2018-04-10 RX ADMIN — MULTIPLE VITAMINS W/ MINERALS TAB 1 TABLET: TAB at 09:06

## 2018-04-10 NOTE — PERIOP NOTES
TRANSFER - OUT REPORT:    Verbal report given to [facility] Santiago VALDES on Benny Diaz  being transferred to 546(unit) for routine post - op       Report consisted of patients Situation, Background, Assessment and   Recommendations(SBAR). Time Pre op antibiotic given:ANCEF 2 Beatriz@[employer] [facility]  Anesthesia Stop time: 1855  Bland Present on Transfer to floor:YES  Order for Bland on Chart:YES  Discharge Prescriptions with Chart:YES    Information from the following report(s) SBAR, OR Summary, Intake/Output, MAR and Med Rec Status was reviewed with the receiving nurse. Opportunity for questions and clarification was provided. Is the patient on 02? YES       L/Min 2      Is the patient on a monitor? NO    Is the nurse transporting with the patient? NO    Surgical Waiting Area notified of patient's transfer from PACU?  YES      The following personal items collected during your admission accompanied patient upon transfer:   Dental Appliance: Dental Appliances: None  Vision: Visual Aid: Glasses (glasses sent to pacu)  Hearing Aid:    Jewelry:    Clothing: Clothing:  (belongings sent to [employer])  Other Valuables:    Valuables sent to safe:

## 2018-04-10 NOTE — ANESTHESIA POSTPROCEDURE EVALUATION
Post-Anesthesia Evaluation and Assessment    Patient: Tamiko Shrestha MRN: [id number]  SSN: [id number]    YOB: 1965  Age: 46 y.o. Sex: female       Cardiovascular Function/Vital Signs  Visit Vitals    /71    Pulse 77    Temp 36.7 °C (98.1 °F)    Resp 14    SpO2 97%       Patient is status post general anesthesia for Procedure(s):  ROBOTIC ASSISTED L4-5 MIDLINE FUSION (OXYGEN). Nausea/Vomiting: None    Postoperative hydration reviewed and adequate. Pain:  Pain Scale 1: Numeric (0 - 10) (04/09/18 2124)  Pain Intensity 1: 0 (04/09/18 2124)   Managed    Neurological Status:   Neuro (WDL): Within Defined Limits (04/09/18 1930)   At baseline    Mental Status and Level of Consciousness: Arousable    Pulmonary Status:   O2 Device: Non-rebreather mask (04/09/18 1930)   Adequate oxygenation and airway patent    Complications related to anesthesia: None    Post-anesthesia assessment completed.  No concerns    Signed By: Charlie Child MD     April 10, 2018

## 2018-04-10 NOTE — PROGRESS NOTES
participated in [facility] rounds this am. I attempted to meet with patient this pm and she was working with therapy.   I will return later today and complete one stop and evaluate needs s/p hospital.

## 2018-04-10 NOTE — PROGRESS NOTES
Problem: Mobility Impaired (Adult and Pediatric)  Goal: *Acute Goals and Plan of Care (Insert Text)  Physical Therapy Goals  Initiated 4/10/2018    1. Patient will move from supine to sit and sit to supine  and roll side to side in bed with independence within 4 days. 2. Patient will perform sit to stand with independence within 4 days. 3. Patient will ambulate with modified independence for 150 feet with the least restrictive device within 4 days. 4. Patient will ascend/descend full flight of stairs with 1 handrail(s) with modified independence within 4 days. 5. Patient will verbalize and demonstrate understanding of spinal precautions (No bending, lifting greater than 5 lbs, or twisting; log-roll technique; frequent repositioning as instructed) within 4 days. physical Therapy TREATMENT  Patient: Glenis Villar (68 y.o. female)  Date: 4/10/2018  Diagnosis: STENOSIS, DEGENERATIVE DISC DISEASE, SPONDYLOLISTHESIS  Spinal stenosis, lumbar <principal problem not specified>  Procedure(s) (LRB):  ROBOTIC ASSISTED L4-5 MIDLINE FUSION (OXYGEN) (N/A) 1 Day Post-Op  Precautions: Back,No bending, no lifting greater than 5 lbs, no twisting, log-roll technique, repositioning every 20-30 min except when sleeping, brace when OOB    Chart, physical therapy assessment, plan of care and goals were reviewed.     ASSESSMENT:  Pt is making steady progress, sitting up in chair with TLSO and reports she has been standing in place every 30 min, pt recalled all back precautions, sit <> stand with supervision, ambulated with straight cane x 150 feet with CGA, gait slow and steady, reports moderate back pain, 5/10 on the VAS, recommend trying stairs tomorrow, no further services indicated post discharge at this time  Progression toward goals:  [x]      Improving appropriately and progressing toward goals  []      Improving slowly and progressing toward goals  []      Not making progress toward goals and plan of care will be adjusted PLAN:  Patient continues to benefit from skilled intervention to address the above impairments. Continue treatment per established plan of care. Discharge Recommendations:  None  Further Equipment Recommendations for Discharge:  Reacher recommended      SUBJECTIVE:   Patient stated I am doing good.    The patient stated 3/3 back precautions. Reviewed all 3 with patient.     OBJECTIVE DATA SUMMARY:   Critical Behavior:  Neurologic State: Alert  Orientation Level: Oriented X4  Cognition: Appropriate decision making, Appropriate for age attention/concentration, Appropriate safety awareness, Follows commands  Safety/Judgement: Awareness of environment, Good awareness of safety precautions, Insight into deficits, Fall prevention  Functional Mobility Training:  Bed Mobility:   not assessed, OOB in chair with TLSO    Transfers:  Sit to Stand: Supervision  Stand to Sit: Supervision                              Balance:  Sitting: Intact  Standing: Impaired  Standing - Static: Good  Standing - Dynamic : Good  Ambulation/Gait Training:  Distance (ft): 150 Feet (ft)  Assistive Device: Brace/Splint;Gait belt, straight cane  Ambulation - Level of Assistance: Contact guard assistance;Assist x1     Gait Description (WDL): Exceptions to WDL  Gait Abnormalities: antalgic              Speed/Jenny: Slow  Step Length: Left shortened;Right shortened                   Pain:  Pain Scale 1: Numeric (0 - 10)  Pain Intensity 1: 5  Pain Location 1: Back  Pain Orientation 1: Posterior  Pain Description 1: Aching  Pain Intervention(s) 1: Medication (see MAR)  Activity Tolerance:   good    After treatment:   [x]  Patient left in no apparent distress sitting up in chair  []  Patient left in no apparent distress in bed  [x]  Call bell left within reach  [x]  Nursing notified  []  Caregiver present  []  Bed alarm activated    COMMUNICATION/COLLABORATION:   The patients plan of care was discussed with: Registered Nurse    Purnima Dove Calculation: 11 mins

## 2018-04-10 NOTE — PROGRESS NOTES
Occupational Therapy EVALUATION/discharge  Patient: Armand Ansari (59 y.o. female)  Date: 4/10/2018  Primary Diagnosis: STENOSIS, DEGENERATIVE DISC DISEASE, SPONDYLOLISTHESIS  Spinal stenosis, lumbar  Procedure(s) (LRB):  ROBOTIC ASSISTED L4-5 MIDLINE FUSION (OXYGEN) (N/A) 1 Day Post-Op   Precautions:Back    ASSESSMENT:   Based on the objective data described below, the patient presents with back precautions s/p L4-5 midline fusion. Patient receptive to and provided with handout on back precautions, ADL modifications, activity recommendations, and home safety. Patient able to tailor sit in chair for LB dressing while maintaining bending precaution. Reports requiring assistance with bathing PTA due to back pain with reaching/ bending. Patient receptive to education on long-handled sponge, plans to purchase one in addition to a reacher. Patient performs sit-stand, ambulates to bathroom with SPC, performs toilet transfer on elevated seat, and performs grooming standing at sink all with SPV while maintaining precautions. Patient with no additional concerns. Recommend discharge home when medically stable. Further skilled acute occupational therapy is not indicated at this time. Discharge Recommendations: home when medically stable  Further Equipment Recommendations for Discharge: reacher, long-handled sponge     SUBJECTIVE:   Patient stated I'm good to go [home].     OBJECTIVE DATA SUMMARY:   HISTORY:   Past Medical History:   Diagnosis Date    Acid reflux     Adverse effect of anesthesia     woke up coughing    Allergic rhinitis     Anxiety     Arthritis     DDD (degenerative disc disease), lumbar     DJD (degenerative joint disease), lumbosacral     GERD (gastroesophageal reflux disease)     History of nonchemical tubal occlusion     Esure devices    Hyperlipidemia 2/20/2014    Hypothyroidism     HYPO    IGT (impaired glucose tolerance)     Ill-defined condition     prolapse uterus/states thus her intestines has collpased a little bit    Psychiatric disorder     anxiety and depression    PUD (peptic ulcer disease)     no EGD    [facility]necological examination     [facility]    Sinus disorder     Tinea pedis     Ureterocele     small, right     Past Surgical History:   Procedure Laterality Date    COLONOSCOPY N/A 9/13/2016    COLONOSCOPY / EGD  performed by Buddy Felton MD at [address] [facility]  10/12/2012    Es[facility]    HX LAP CHOLECYSTECTOMY  10/06/2016    HX TOTAL VAGINAL HYSTERECTOMY      WI REMOVAL OF OVARIAN CYST(S)         Prior Level of Function/Environment/Context: mod-I with SPC for ADLs and IADLs except assistance with bathing due to back pain with bending/ reaching. Lives with 15year old son    Expanded or extensive additional review of patient history:     Home Situation  Home Environment: Apartment  # Steps to Enter: 15  Rails to Enter: Yes  Hand Rails : Left  One/Two Story Residence: One story  Living Alone: No  Support Systems: Child(breanna)  Patient Expects to be Discharged to[de-identified] Apartment  Current DME Used/Available at Home: Cane, straight, Shower chair, Walker, rolling, Walker, rollator  Tub or Shower Type: Tub/Shower combination  []  Right hand dominant   []  Left hand dominant    EXAMINATION OF PERFORMANCE DEFICITS:  Cognitive/Behavioral Status:  Neurologic State: Alert  Orientation Level: Oriented X4  Cognition: Appropriate decision making; Appropriate for age attention/concentration; Appropriate safety awareness; Follows commands  Perception: Appears intact  Perseveration: No perseveration noted  Safety/Judgement: Awareness of environment;Good awareness of safety precautions; Insight into deficits; Fall prevention    Skin: visible skin appears intact, surgical site not observed    Edema: none noted    Hearing:       Vision/Perceptual:                           Acuity: Within Defined Limits    Corrective Lenses: Glasses    Range of Motion:    AROM: Within functional limits (BUE)  PROM: Within functional limits (BUE)                      Strength:    Strength: Within functional limits (BUE)                Coordination:  Coordination: Within functional limits (BUE)  Fine Motor Skills-Upper: Left Intact; Right Intact    Gross Motor Skills-Upper: Left Intact; Right Intact    Tone & Sensation:    Tone: Normal  Sensation: Intact                      Balance:  Sitting: Intact  Standing: Impaired  Standing - Static: Good  Standing - Dynamic : Good    Functional Mobility and Transfers for ADLs:    Transfers:  Sit to Stand: Supervision (using SPC)  Stand to Sit: Supervision (verbal cues for backing fully)  Toilet Transfer : Supervision (on raised toilet seat)    ADL Assessment:  Feeding: Independent (inferred)    Oral Facial Hygiene/Grooming: Supervision (washed hands standing at sink)    Bathing: Setup (inferred with long-handled sponge)    Upper Body Dressing: Setup (inferred)    Lower Body Dressing: Supervision (dons/ doffs socks and shoes in tailor sit)    Toileting: Supervision (inferred)                ADL Intervention and task modifications:                 Cognitive Retraining  Safety/Judgement: Awareness of environment;Good awareness of safety precautions; Insight into deficits; Fall prevention      Functional Measure:  Barthel Index:    Bathin  Bladder: 10  Bowels: 10  Groomin  Dressing: 10  Feeding: 10  Mobility: 10  Stairs: 0  Toilet Use: 10  Transfer (Bed to Chair and Back): 10  Total: 80       Barthel and G-code impairment scale:  Percentage of impairment CH  0% CI  1-19% CJ  20-39% CK  40-59% CL  60-79% CM  80-99% CN  100%   Barthel Score 0-100 100 99-80 79-60 59-40 20-39 1-19   0   Barthel Score 0-20 20 17-19 13-16 9-12 5-8 1-4 0      The Barthel ADL Index: Guidelines  1. The index should be used as a record of what a patient does, not as a record of what a patient could do.   2. The main aim is to establish degree of independence from any help, physical or verbal, however minor and for whatever reason. 3. The need for supervision renders the patient not independent. 4. A patient's performance should be established using the best available evidence. Asking the patient, friends/relatives and nurses are the usual sources, but direct observation and common sense are also important. However direct testing is not needed. 5. Usually the patient's performance over the preceding 24-48 hours is important, but occasionally longer periods will be relevant. 6. Middle categories imply that the patient supplies over 50 per cent of the effort. 7. Use of aids to be independent is allowed. Bradley Simon., Barthel, D.W. (0871). Functional evaluation: the Barthel Index. [facility] (14)2. Gisel Salazar lima ORLY Miles, Steven Mujica., Rachel Fagan, Willis, [facility] (1999). Measuring the change indisability after inpatient rehabilitation; comparison of the responsiveness of the Barthel Index and Functional [place] Measure. Journal of Neurology, Neurosurgery, and Psychiatry, 66(4), 722-490. Conner Parham, N.J.A, SANDRITA Avila, & Noreen Araya, MMarvinA. (2004.) Assessment of post-stroke quality of life in cost-effectiveness studies: The usefulness of the Barthel Index and the EuroQoL-5D. Quality of Life Research, 13, 231-35       G codes: In compliance with CMSs Claims Based Outcome Reporting, the following G-code set was chosen for this patient based on their primary functional limitation being treated: The outcome measure chosen to determine the severity of the functional limitation was the Barthel Index with a score of 80/100 which was correlated with the impairment scale. ?  Self Care:     - CURRENT STATUS: CI - 1%-19% impaired, limited or restricted    - GOAL STATUS: CI - 1%-19% impaired, limited or restricted    - D/C STATUS:  CI - 1%-19% impaired, limited or restricted     Occupational Therapy Evaluation Charge Determination   History Examination Decision-Making   LOW Complexity : Brief history review  LOW Complexity : 1-3 performance deficits relating to physical, cognitive , or psychosocial skils that result in activity limitations and / or participation restrictions  LOW Complexity : No comorbidities that affect functional and no verbal or physical assistance needed to complete eval tasks       Based on the above components, the patient evaluation is determined to be of the following complexity level: LOW   Pain:  Pain Scale 1: Numeric (0 - 10)  Pain Intensity 1: 7  Pain Location 1: Back  Pain Orientation 1: Posterior  Pain Description 1: Aching  Pain Intervention(s) 1: Medication (see MAR)  Nursing managing     Activity Tolerance:   VSS    After treatment:   [x]  Patient left in no apparent distress sitting up in chair  []  Patient left in no apparent distress in bed  [x]  Call bell left within reach  [x]  Nursing notified  []  Caregiver present  []  Bed alarm activated    COMMUNICATION/EDUCATION:   Communication/Collaboration:  [x]      Home safety education was provided and the patient/caregiver indicated understanding. [x]      Patient/family have participated as able and agree with findings and recommendations. []      Patient is unable to participate in plan of care at this time.   Findings and recommendations were discussed with: Physical Therapist and Registered Nurse    Kanika Crockett OT  Time Calculation: 20 mins

## 2018-04-10 NOTE — PROGRESS NOTES
Care Management Interventions  PCP Verified by CM: Yes Sergio Pereira )  Last Visit to PCP: 03/02/18  Palliative Care Criteria Met (RRAT>21 & CHF Dx)?: No  Mode of Transport at Discharge:  (car  shirley Barajas )  Transition of Care Consult (CM Consult): Home Health (Yenni Elizabeth )  [facility] - INPATIENT: Yes  MyChart Signup: No  Physical Therapy Consult: Yes  Occupational Therapy Consult: Yes  Speech Therapy Consult: No  Current Support Network: Own Home (2nd story [facility] with stairs to navigate )  Confirm Follow Up Transport:  (shirley )  Plan discussed with Pt/Family/Caregiver: Yes (patient and her sister Gracia Nath at bedside )  Freedom of Choice Offered: Yes  [address] Provided?: No  Discharge Location  Discharge Placement: Home with home health    met with patient and her sister Gracia Nath. Sister requested information about obtaining an elevated toilet seat or 3:1 commode. I gave her some names and she and patient will follow up with the therapist.  Patient may need to pay out of pocket if she has any other non covered equiptment needs and she is now aware of this. Choice offered for home care and patient picked [facility]. Patient has a 15ear old son at home and her fiance Tom Granger is helping him with meals and caretaking needs. He will supplly her with transportation home. Patient uses a cane at home and has been incapacitated per our conversation for the last year. I sent referral via cc link this pm.  Nurse Mya Briseno will place home health orders for patient prior to discharge.

## 2018-04-10 NOTE — PROGRESS NOTES
Primary Nurse Stevie Bradley RN and Maureen Baker RN performed a dual skin assessment on this patient No impairment noted  Carlos score is 21\

## 2018-04-10 NOTE — PROGRESS NOTES
Orthopedic Spine Progress Note  Post Op day: 1 Day Post-Op    April 10, 2018 8:08 AM   Admit Date: 2018  Procedure: Procedure(s):  ROBOTIC ASSISTED L4-5 MIDLINE FUSION (OXYGEN)    Subjective:     Armand Ansari is laying on her right side this am in bed. States she is doing very well and is in no pain. Has not eaten breakfast yet this morning. No N/V but reports constipation. Drain is still in place. Pain Control:   Pain Assessment  Pain Scale 1: Numeric (0 - 10)  Pain Intensity 1: 0    Objective:          Physical Exam:  General:  Alert and oriented. No acute distress. Heart:  Respirations unlabored. Abdomen:   Extremities: Soft, non-tender. No evidence of cyanosis. Pulses palpable in both upper and lower extremities. Neurologic:  Musculoskeletal:  No new motor deficits. Neurovascular exam within normal limits. Sensation stable. Motor: L5/S1 strength 5/5. Yan's sign negative in bilateral lower extremities. Calves soft, nontender upon palpation and with passive twitch. Moves both upper and lower extremities. Incision: clean, dry, and intact. No significant erythema or swelling. No active drainage noted. Vital Signs:    Blood pressure 111/71, pulse 77, temperature 98.1 °F (36.7 °C), resp. rate 14, last menstrual period 10/23/2016, SpO2 97 %.   Temp (24hrs), Av.9 °F (36.6 °C), Min:97.6 °F (36.4 °C), Max:98.6 °F (37 °C)      LAB:    Recent Labs      04/10/18   0346   HGB  12.3     Lab Results   Component Value Date/Time    Sodium 144 04/10/2018 03:46 AM    Potassium 4.2 04/10/2018 03:46 AM    Chloride 112 (H) 04/10/2018 03:46 AM    CO2 24 04/10/2018 03:46 AM    Glucose 147 (H) 04/10/2018 03:46 AM    BUN 6 04/10/2018 03:46 AM    Creatinine 0.81 04/10/2018 03:46 AM    Calcium 9.1 04/10/2018 03:46 AM       Intake/Output:    1901 - 04/10 0700  In: 1600 [I.V.:1600]  Out: 300 [Urine:175; Drains:125]    PT/OT:   Gait:                    Assessment:   Patient is 1 Day Post-Op s/p Procedure(s):  ROBOTIC ASSISTED L4-5 MIDLINE FUSION (OXYGEN)    Plan:     1. Start PT/OT today  2. Discontinue PCA. Transition to po oxycodone   3. VTE Prophylaxes - TEDS &/or SCDs   4. Off the shelf TLSO  5. Encourage ICS use  6. Remove drain if output <50cc per shift  7. Continue current bowel regimen for constipation. Enema tomorrow if she does not have a BM today.          Signed By: ANDRESSA Galo

## 2018-04-10 NOTE — PROGRESS NOTES
Problem: Mobility Impaired (Adult and Pediatric)  Goal: *Acute Goals and Plan of Care (Insert Text)  Physical Therapy Goals  Initiated 4/10/2018    1. Patient will move from supine to sit and sit to supine  and roll side to side in bed with independence within 4 days. 2. Patient will perform sit to stand with independence within 4 days. 3. Patient will ambulate with modified independence for 150 feet with the least restrictive device within 4 days. 4. Patient will ascend/descend full flight of stairs with 1 handrail(s) with modified independence within 4 days. 5. Patient will verbalize and demonstrate understanding of spinal precautions (No bending, lifting greater than 5 lbs, or twisting; log-roll technique; frequent repositioning as instructed) within 4 days. physical Therapy EVALUATION  Patient: Jose Angel Munroe (2 y.o. female)  Date: 4/10/2018  Primary Diagnosis: STENOSIS, DEGENERATIVE DISC DISEASE, SPONDYLOLISTHESIS  Spinal stenosis, lumbar  Procedure(s) (LRB):  ROBOTIC ASSISTED L4-5 MIDLINE FUSION (OXYGEN) (N/A) 1 Day Post-Op   Precautions:   Back,No bending, no lifting greater than 5 lbs, no twisting, log-roll technique, repositioning every 20-30 min except when sleeping, brace when OOB      ASSESSMENT :  Based on the objective data described below, the patient presents with mild lightheadedness, decreased standing balance, minimally decreased functional mobility from her baseline level and minimal back pain with activity. Pt reports she uses a straight cane for balance. She has a 15year old son and reports she has no one that will be helping her following discharge to home. Pt was provided with education regarding back precautions, log rolling, donning brace, and sitting restrictions. Pt was fitted with the Vista brace and reviewed wearing time. Pt completed bed mobility with minimal assist, sit <> stand with minimal assist, and ambulated with a straight cane with CGA.  Pt has 1 minor loss of balance that she self corrected. She remained up in chair at end of session. Anticipate pt will progress well and likely not need further services following discharge. Patient will benefit from skilled intervention to address the above impairments. Patients rehabilitation potential is considered to be Good  Factors which may influence rehabilitation potential include:   []         None noted  []         Mental ability/status  []         Medical condition  [x]         Home/family situation and support systems, plans to return home with her 15year old son and hoping she will not need any further assistance  []         Safety awareness  []         Pain tolerance/management  []         Other:      PLAN :  Recommendations and Planned Interventions:  [x]           Bed Mobility Training             []    Neuromuscular Re-Education  [x]           Transfer Training                   [x]    Orthotic/Prosthetic Training  [x]           Gait Training                         []    Modalities  []           Therapeutic Exercises           []    Edema Management/Control  [x]           Therapeutic Activities            [x]    Patient and Family Training/Education  []           Other (comment):    Frequency/Duration: Patient will be followed by physical therapy  twice daily to address goals. Discharge Recommendations: None and To Be Determined  Further Equipment Recommendations for Discharge: reacher recommended      SUBJECTIVE:   Patient stated I don't have any pain when I am walking!     OBJECTIVE DATA SUMMARY:   HISTORY:    Past Medical History:   Diagnosis Date    Acid reflux     Adverse effect of anesthesia     woke up coughing    Allergic rhinitis     Anxiety     Arthritis     DDD (degenerative disc disease), lumbar     DJD (degenerative joint disease), lumbosacral     GERD (gastroesophageal reflux disease)     History of nonchemical tubal occlusion     Esure devices    Hyperlipidemia 2/20/2014    Hypothyroidism     HYPO    IGT (impaired glucose tolerance)     Ill-defined condition     prolapse uterus/states thus her intestines has collpased a little bit    Psychiatric disorder     anxiety and depression    PUD (peptic ulcer disease)     no EGD    Routine gynecological examination     [facility]    Sinus disorder     Tinea pedis     Ureterocele     small, right     Past Surgical History:   Procedure Laterality Date    COLONOSCOPY N/A 9/13/2016    COLONOSCOPY / EGD  performed by Daysi Nichols MD at [address] [facility]  10/12/2012    Esure    HX LAP CHOLECYSTECTOMY  10/06/2016    HX TOTAL VAGINAL HYSTERECTOMY      FL REMOVAL OF OVARIAN CYST(S)       Prior Level of Function/Home Situation: ambulates with a straight cane for balance, denies any falls in the last year  Personal factors and/or comorbidities impacting plan of care:     Home Situation  Home Environment: Apartment  # Steps to Enter: 15  Rails to Enter: Yes  Hand Rails : Left  One/Two Story Residence: One story  Living Alone: No  Support Systems: Child(breanna), 15year old son  Patient Expects to be Discharged to[de-identified] Apartment  Current DME Used/Available at Home: Latonia Foster, straight, Shower chair, Walker, rolling, Walker, rollator  Tub or Shower Type: Tub/Shower combination    EXAMINATION/PRESENTATION/DECISION MAKING:   Critical Behavior:  Neurologic State: Alert, Appropriate for age  Orientation Level: Oriented X4  Cognition: Appropriate decision making, Appropriate for age attention/concentration, Appropriate safety awareness  Safety/Judgement: Insight into deficits       Skin:  Dressing, drain intact    Range Of Motion:   within functional limits                        Strength:     within functional limits                     Tone & Sensation:    intact                              Coordination:   intact       Functional Mobility:  Bed Mobility:  Rolling: Minimum assistance;Assist x1;Additional time  Supine to Sit: Minimum assistance;Assist x1;Additional time     Scooting: Supervision  Transfers:  Sit to Stand: Assist x1;Minimum assistance; Additional time  Stand to Sit: Minimum assistance;Assist x1;Additional time                       Balance:   Sitting: Intact  Standing: Impaired  Standing - Static: Good  Standing - Dynamic : Good  Ambulation/Gait Training:  Distance (ft): 75 Feet (ft)  Assistive Device: Brace/Splint;Cane, straight;Gait belt  Ambulation - Level of Assistance: Contact guard assistance;Assist x1     Gait Description (WDL): Exceptions to WDL  Gait Abnormalities: Decreased step clearance              Speed/Jenny: Slow  Step Length: Left shortened;Right shortened                   Physical Therapy Evaluation Charge Determination   History Examination Presentation Decision-Making   LOW Complexity : Zero comorbidities / personal factors that will impact the outcome / POC LOW Complexity : 1-2 Standardized tests and measures addressing body structure, function, activity limitation and / or participation in recreation  LOW Complexity : Stable, uncomplicated  LOW Complexity : FOTO score of       Based on the above components, the patient evaluation is determined to be of the following complexity level: LOW     Pain:  Pain Scale 1: Numeric (0 - 10)  Pain Intensity 1: 2 with bed mobility, 0 with ambulation   Pain Location 1: Back  Pain Orientation 1: Posterior  Pain Description 1: Aching  Pain Intervention(s) 1: Medication (see MAR)  Activity Tolerance:   Fair, reports mild lightheadedness   Vitals:    04/10/18 1210 04/10/18 1220 04/10/18 1222   BP: 97/62 110/70 111/78   BP 1 Location: Right arm Right arm Right arm   BP Patient Position: Supine Sitting Standing   Pulse: 83 72 (!) 101   Resp:      Temp:      SpO2:        Please refer to the flowsheet for vital signs taken during this treatment.   After treatment:   [x]         Patient left in no apparent distress sitting up in chair  []         Patient left in no apparent distress in bed  [x]         Call bell left within reach  [x]         Nursing notified  []         Caregiver present  []         Bed alarm activated    COMMUNICATION/EDUCATION:   The patients plan of care was discussed with: Registered Nurse. [x]         Fall prevention education was provided and the patient/caregiver indicated understanding. []         Patient/family have participated as able in goal setting and plan of care. [x]         Patient/family agree to work toward stated goals and plan of care. []         Patient understands intent and goals of therapy, but is neutral about his/her participation. []         Patient is unable to participate in goal setting and plan of care.     Thank you for this referral.  Amy Georgeanne Aase   Time Calculation: 30 mins

## 2018-04-11 ENCOUNTER — TELEPHONE (OUTPATIENT)
Dept: INTERNAL MEDICINE CLINIC | Age: 53
End: 2018-04-11

## 2018-04-11 VITALS
HEART RATE: 75 BPM | TEMPERATURE: 98.6 F | OXYGEN SATURATION: 100 % | DIASTOLIC BLOOD PRESSURE: 64 MMHG | SYSTOLIC BLOOD PRESSURE: 166 MMHG | RESPIRATION RATE: 16 BRPM

## 2018-04-11 DIAGNOSIS — Z98.1 S/P LUMBAR SPINAL FUSION: ICD-10-CM

## 2018-04-11 DIAGNOSIS — M54.16 LUMBAR RADICULOPATHY: Primary | ICD-10-CM

## 2018-04-11 LAB
GLUCOSE BLD STRIP.AUTO-MCNC: 109 MG/DL (ref 65–100)
HGB BLD-MCNC: 10.9 G/DL (ref 11.5–16)
SERVICE CMNT-IMP: ABNORMAL

## 2018-04-11 PROCEDURE — 97116 GAIT TRAINING THERAPY: CPT

## 2018-04-11 PROCEDURE — 85018 HEMOGLOBIN: CPT | Performed by: PHYSICIAN ASSISTANT

## 2018-04-11 PROCEDURE — 74011250637 HC RX REV CODE- 250/637: Performed by: NURSE PRACTITIONER

## 2018-04-11 PROCEDURE — 74011250637 HC RX REV CODE- 250/637: Performed by: PHYSICIAN ASSISTANT

## 2018-04-11 PROCEDURE — 36415 COLL VENOUS BLD VENIPUNCTURE: CPT | Performed by: PHYSICIAN ASSISTANT

## 2018-04-11 PROCEDURE — 82962 GLUCOSE BLOOD TEST: CPT

## 2018-04-11 RX ORDER — CALCIUM CARBONATE 200(500)MG
200 TABLET,CHEWABLE ORAL
Status: DISCONTINUED | OUTPATIENT
Start: 2018-04-11 | End: 2018-04-11 | Stop reason: HOSPADM

## 2018-04-11 RX ORDER — OXYCODONE HYDROCHLORIDE 5 MG/1
5-10 TABLET ORAL
Qty: 80 TAB | Refills: 0 | Status: SHIPPED | OUTPATIENT
Start: 2018-04-11 | End: 2018-07-25 | Stop reason: ALTCHOICE

## 2018-04-11 RX ADMIN — PANTOPRAZOLE SODIUM 40 MG: 40 TABLET, DELAYED RELEASE ORAL at 06:23

## 2018-04-11 RX ADMIN — ALPRAZOLAM 0.5 MG: 0.5 TABLET ORAL at 08:41

## 2018-04-11 RX ADMIN — ACETAMINOPHEN 650 MG: 325 TABLET, FILM COATED ORAL at 03:21

## 2018-04-11 RX ADMIN — OXYCODONE HYDROCHLORIDE 10 MG: 5 TABLET ORAL at 03:21

## 2018-04-11 RX ADMIN — OXYCODONE HYDROCHLORIDE 10 MG: 5 TABLET ORAL at 17:17

## 2018-04-11 RX ADMIN — LEVOTHYROXINE SODIUM 88 MCG: 88 TABLET ORAL at 06:22

## 2018-04-11 RX ADMIN — VITAMIN D, TAB 1000IU (100/BT) 3000 UNITS: 25 TAB at 08:33

## 2018-04-11 RX ADMIN — OXYCODONE HYDROCHLORIDE 10 MG: 5 TABLET ORAL at 06:22

## 2018-04-11 RX ADMIN — Medication 10 ML: at 06:23

## 2018-04-11 RX ADMIN — BISACODYL 10 MG: 10 SUPPOSITORY RECTAL at 08:45

## 2018-04-11 RX ADMIN — CALCIUM CARBONATE (ANTACID) CHEW TAB 500 MG 200 MG: 500 CHEW TAB at 08:41

## 2018-04-11 NOTE — TELEPHONE ENCOUNTER
Per pt she had surgery on Monday and since having surgery she now needs  a higher toilet seat. Pt needs an order place and wants to make sure it is covered by insurance.  Please advise # [phone]

## 2018-04-11 NOTE — PROGRESS NOTES
Orthopedic Spine Progress Note  Post Op day: 2 Days Post-Op    2018 8:38 AM   Admit Date: 2018  Procedure: Procedure(s):  ROBOTIC ASSISTED L4-5 MIDLINE FUSION (OXYGEN)    Subjective:     Anusha Solorzano appears well. Pain seems to be managed. She ambulated a good distance with physical therapy yesterday  Tolerating diet  No N/V  Voiding  Drain in place    Pain Control:   Pain Assessment  Pain Scale 1: Numeric (0 - 10)  Pain Intensity 1: 6  Pain Location 1: Back  Pain Orientation 1: Posterior  Pain Description 1: Aching  Pain Intervention(s) 1: Medication (see MAR)    Objective:          Physical Exam:  General:  Alert and oriented. No acute distress. Heart:  Respirations unlabored. Abdomen:   Extremities: Soft, non-tender. No evidence of cyanosis. Pulses palpable in both upper and lower extremities. Neurologic:  Musculoskeletal:  No new motor deficits. Neurovascular exam within normal limits. Sensation stable. Motor: unchanged C5-T1 and L2-S1. Yan's sign negative in bilateral lower extremities. Calves soft, nontender upon palpation and with passive twitch. Moves both upper and lower extremities. Incision: clean, dry, and intact. No significant erythema or swelling. No active drainage noted. Vital Signs:    Blood pressure 110/68, pulse 70, temperature 98.2 °F (36.8 °C), resp. rate 16, last menstrual period 10/23/2016, SpO2 100 %.   Temp (24hrs), Av °F (36.7 °C), Min:97.8 °F (36.6 °C), Max:98.5 °F (36.9 °C)      LAB:    Recent Labs      18   0328   HGB  10.9*     Lab Results   Component Value Date/Time    Sodium 144 04/10/2018 03:46 AM    Potassium 4.2 04/10/2018 03:46 AM    Chloride 112 (H) 04/10/2018 03:46 AM    CO2 24 04/10/2018 03:46 AM    Glucose 147 (H) 04/10/2018 03:46 AM    BUN 6 04/10/2018 03:46 AM    Creatinine 0.81 04/10/2018 03:46 AM    Calcium 9.1 04/10/2018 03:46 AM       Intake/Output:    1901 -  0700  In: 600 [I.V.:600]  Out: 4940 [QWRIH:2827; Drains:295]    PT/OT:   Gait:  Gait  Speed/Jenny: Slow  Step Length: Left shortened, Right shortened  Gait Abnormalities: Decreased step clearance  Ambulation - Level of Assistance: Contact guard assistance, Assist x1  Distance (ft): 150 Feet (ft)  Assistive Device: Brace/Splint, Gait belt                 Assessment:   Patient is 2 Days Post-Op s/p Procedure(s):  ROBOTIC ASSISTED L4-5 MIDLINE FUSION (OXYGEN)    Plan:     1. Continue PT/OT  2. Continue established methods of pain control  3. VTE Prophylaxes - TEDS & SCDs   4. Encourage use of ICS  5. Remove drain once output decreases  6.   Discharge to home today pending drain output and clearance by PT    Signed By: Rosalva Manzano PA-C

## 2018-04-11 NOTE — PROGRESS NOTES
Problem: Mobility Impaired (Adult and Pediatric)  Goal: *Acute Goals and Plan of Care (Insert Text)  Physical Therapy Goals  Initiated 4/10/2018    1. Patient will move from supine to sit and sit to supine  and roll side to side in bed with independence within 4 days. 2. Patient will perform sit to stand with independence within 4 days. 3. Patient will ambulate with modified independence for 150 feet with the least restrictive device within 4 days. 4. Patient will ascend/descend full flight of stairs with 1 handrail(s) with modified independence within 4 days. 5. Patient will verbalize and demonstrate understanding of spinal precautions (No bending, lifting greater than 5 lbs, or twisting; log-roll technique; frequent repositioning as instructed) within 4 days. physical Therapy TREATMENT  Patient: Stas Scott (71 y.o. female)  Date: 4/11/2018  Diagnosis: STENOSIS, DEGENERATIVE DISC DISEASE, SPONDYLOLISTHESIS  Spinal stenosis, lumbar <principal problem not specified>  Procedure(s) (LRB):  ROBOTIC ASSISTED L4-5 MIDLINE FUSION (OXYGEN) (N/A) 2 Days Post-Op  Precautions: Back  Chart, physical therapy assessment, plan of care and goals were reviewed. ASSESSMENT:  Patient received in bed in NAD. Good progression of activity this am with improved bed mobility and gait. She was able to modified independently ambulate 200' and ascend/descend 10 stairs with 1 rail and her cane. She verbalized understanding of her precautions and followed them functionally. She has met her acute goals and is clear for discharge when medically stable.    Progression toward goals:  [x]    Improving appropriately and progressing toward goals  []    Improving slowly and progressing toward goals  []    Not making progress toward goals and plan of care will be adjusted     PLAN:  Patient ready for discharge home  Discharge Recommendations:  None  Further Equipment Recommendations for Discharge:  none     SUBJECTIVE:   Patient stated I'm going home today.     OBJECTIVE DATA SUMMARY:   Critical Behavior:  Neurologic State: Alert  Orientation Level: Oriented X4  Cognition: Appropriate decision making, Appropriate for age attention/concentration, Appropriate safety awareness, Follows commands  Safety/Judgement: Awareness of environment, Good awareness of safety precautions, Insight into deficits, Fall prevention  Functional Mobility Training:  Bed Mobility:  Rolling: Contact guard assistance        Scooting: Supervision        Transfers:  Sit to Stand: Supervision  Stand to Sit: Supervision                             Balance:  Sitting: Intact  Standing: Impaired  Standing - Static: Good  Standing - Dynamic : Good  Ambulation/Gait Training:  Distance (ft): 200 Feet (ft)  Assistive Device: Brace/Splint;Cane, straight  Ambulation - Level of Assistance: Assist x1; Modified independent        Gait Abnormalities: Decreased step clearance              Speed/Jenny: Slow  Step Length: Left shortened;Right shortened                    Stairs:  Number of Stairs Trained: 10  Stairs - Level of Assistance: Modified independent   Rail Use: Right     Pain:  Pain Scale 1: Numeric (0 - 10)  Pain Intensity 1: 6           Pain Intervention(s) 1: Medication (see MAR)  Activity Tolerance:     Please refer to the flowsheet for vital signs taken during this treatment.   After treatment:   []    Patient left in no apparent distress sitting up in chair  [x]    Patient left in no apparent distress in bed  [x]    Call bell left within reach  [x]    Nursing notified  []    Caregiver present  []    Bed alarm activated    COMMUNICATION/COLLABORATION:   The patients plan of care was discussed with: Registered Nurse    Renetta Reddy, PT, DPT   Time Calculation: 25 mins

## 2018-04-11 NOTE — DISCHARGE INSTRUCTIONS
After Hospital Care Plan:  Discharge Instructions Lumbar Fusion Surgery   Dr. Verner Fetters   Patient Name: Jose E Shrestha    Date of procedure: 4/9/2018  Date of discharge:     Procedure: Procedure(s):  ROBOTIC ASSISTED L4-5 MIDLINE FUSION (OXYGEN)  PCP: Patrice Hubbard MD    Follow up appointments  -follow up with Dr. Dr. Verner Fetters in 2 weeks. Call [phone] to make an appointment as soon as you get home from the hospital.    [address] [place], [place] [address]y: ____________________   phone: _______________________  The agency will contact you to arrange dates/times for visits. Please call them if you do not hear from them within 24 hours after you are discharged  Physical therapy 3 times a week for 3 weeks  Nursing-initial assessment and as needed    When to call your Orthopaedic Surgeon:  -Signs of infection-if your incision is red; continues to have drainage; drainage has a foul odor or if you have a persistent fever over 101 degrees for 24 hours  -Nausea or vomiting, severe headache  -Loss of bowel or bladder function, inability to urinate  -Changes in sensation in your arms or legs (numbness, tingling, loss of color)  -Increased weakness-greater than before your surgery  -Severe pain or pain not relieved by medications  -Signs of a blood clot in your leg-calf pain, tenderness, redness, swelling of lower leg    When to call your Primary Care Physician:  -Concerns about medical conditions such as diabetes, high blood pressure, asthma, congestive heart failure  -Call if blood sugars are elevated, persistent headache or dizziness, coughing or congestion, constipation or diarrhea, burning with urination, abnormal heart rate    When to call 911 and go to the nearest emergency room:  -Acute onset of chest pain, shortness of breath, difficulty breathing    Activity  -You are going home a well person, be as active as possible. Your only exercise should be walking.   Start with short frequent walks and increase your walking distance each day.  -Limit the amount of time you sit to 20-30 minute intervals. Sitting for prolonged periods of time will be uncomfortable for you following surgery.  -Do NOT lift anything over 5 pounds  -Do NOT do any straining, twisting or bending  -When you are in bed, you may lay on your back or on either side. Do NOT lie on your stomach    Brace  -If you have a back brace, you should wear your brace at all times when you are out of bed. Do not wear the brace while in bed or showering.  -Remember to always wear a cotton t-shirt underneath your brace.  -Do not bend or twist when your brace is off    Diet  -Resume usual diet; drink plenty of fluids; eat foods high in fiber  -It is important to have regular bowel movements. Pain medications may cause constipation. You may want to take a stool softener (such as Senokot-S or Colace) to prevent constipation.   -If constipation occurs, take a laxative (such as Dulcolax tablets, Milk of Magnesia, or a suppository). Laxatives should only be used if the above preventable measures have failed and you still have not had a bowel movement after three days    Driving  -You may not drive or return to work until instructed by your physician. However, you may ride in the car for short periods of time. Showering  -You may shower in approximately 4 days after your surgery.    -Leave the dressing on during your shower. Do NOT allow the water to run directly onto your dressing. Once you get out of the shower, gently pat the dressing dry. -Reminder- your brace can be removed while showering. Remember to not bend or twist while your brace is off.    -Do not take a tub bath. Preventing blood clots  -You have been given T.E.D. stockings to wear. Continue to wear these for 7 days after your discharge. Put them on in the morning and take them off at night.    -They are used to increase your circulation and prevent blood clots from forming in your legs  -T. E.D. stockings can be machine washed, temperature not to exceed 160° F (71°C) and machine dried for 15 to 20 minutes, temperature not to exceed 250° F (121°C). Pain management  -Take pain medication as prescribed; decrease the amount you use as your pain lessens  -DO not wait until you are in extreme pain to take your medication.  -Avoid alcoholic beverages while taking pain medication    Pain Medication Safety  DO:  -Read the Medication Guide   -Take your medicine exactly as prescribed   -Store your medicine away from children and in a safe place   -Flush unused medicine down the toilet   -Call your healthcare provider for medical advice about side effects. You may report side effects to FDA at 4-384-FDA-0329.   -Please be aware that many medications contain Tylenol. We do not want you to over medicate so please read the information below as a guide. Do not take more than 4 Grams of Tylenol in a 24 hour period. (There are 1000 milligrams in one Gram)                                                                                                                                                                                                                                                Percocet contains 325 mg of Tylenol per tablet (do not take more than 12 tablets in 24 hours)  Lortab contains 500 mg of Tylenol per tablet (do not take more than 8 tablets in 24 hours)  Norco contains 325 mg of Tylenol per tablet (do not take more than 12 tablets in 24 hours). DO NOT:  -Do not give your medicine to others   -Do not take medicine unless it was prescribed for you   -Do not stop taking your medicine without talking to your healthcare provider   -Do not break, chew, crush, dissolve, or inject your medicine.  If you cannot swallow your medicine whole, talk to your healthcare provider.  -Do not drink alcohol while taking this medicine  -Do not take anti-inflammatory medications or aspirin unless instructed by your physician. Incision Care  Your incision has been closed with absorbable sutures and the Zipline skin closure system. This will assist with healing. The [facility] Pac is to remain on your incision for 2 weeks. A dry dressing (ABD and tape) will be placed over it and should be changed daily, for at least the first several days after your surgery. If you have no incisional drainage, you may leave the incision open to air if you wish, still leaving the Zipline in place. Please make sure to wash your hands prior to touching your dressing. You may take brief showers but do not run the water directly onto the wound. After your shower, blot your incision dry with a soft towel and replace the dry dressing. Do not allow the tape to come in contact with the Zipline. Do not rub or apply any lotions or ointments to your incision site. Do not soak or scrub your wound. The Zipline dressing will be removed during your two week follow-up appointment. If you experience drainage leaking from underneath the Zipline or if it peels off before 2 weeks, please contact your orthopedic surgeons office.

## 2018-04-12 ENCOUNTER — HOME CARE VISIT (OUTPATIENT)
Dept: SCHEDULING | Facility: HOME HEALTH | Age: 53
End: 2018-04-12
Payer: MEDICAID

## 2018-04-12 VITALS
OXYGEN SATURATION: 97 % | RESPIRATION RATE: 14 BRPM | HEART RATE: 63 BPM | TEMPERATURE: 98.9 F | DIASTOLIC BLOOD PRESSURE: 82 MMHG | SYSTOLIC BLOOD PRESSURE: 124 MMHG

## 2018-04-12 PROCEDURE — G0151 HHCP-SERV OF PT,EA 15 MIN: HCPCS

## 2018-04-12 PROCEDURE — 400013 HH SOC

## 2018-04-12 NOTE — TELEPHONE ENCOUNTER
Patient is s/p Laminectomy. Patient is setup for [facility] to see her today for PT and OT. OT will assess for the need of an elevated toilet seat. If needed OT will order elevated toilet seat.

## 2018-04-13 ENCOUNTER — HOME CARE VISIT (OUTPATIENT)
Dept: SCHEDULING | Facility: HOME HEALTH | Age: 53
End: 2018-04-13
Payer: MEDICAID

## 2018-04-13 PROCEDURE — G0157 HHC PT ASSISTANT EA 15: HCPCS

## 2018-04-13 NOTE — DISCHARGE SUMMARY
Procedure(s):  ROBOTIC ASSISTED L4-5 MIDLINE FUSION (OXYGEN) Operative Report      Date of Surgery: 4/9/2018     Preoperative Diagnosis:  STENOSIS, DEGENERATIVE DISC DISEASE, SPONDYLOLISTHESIS    Postoperative Diagnosis: STENOSIS, DEGENERATIVE DISC DISEASE,     Procedure: Procedure(s):  ROBOTIC ASSISTED L4-5 MIDLINE FUSION (OXYGEN)     Surgeon: Rayne Bosworth, MD    History and Hospital Course:  Edvin Causey is a pleasant 46y.o. year old female who has complaints of lower back pain. Diagnostic testing found spondylolisthesis and stenois. Having failed conservative treatment, the patient elected to undergo operative intervention. She underwent a lumbar laminectomy and fusion. She tolerated the procedure well and was admitted post-operatively for antibiotics and pain control. On post-op day 1, the patient was doing well. She had some complaints of lower back pain but no leg pain. She was started on a clear liquid diet. She was allowed to dangle on the edge of the bed with physical therapy. PCA was continued. IV antibiotics were continued. On post-op day 2, the patient continued to progress well. She was started on a regular diet. The PCA was discontinued and the patient was started on oral pain medications. She was allowed to started getting out of bed with physical therapy. On post-op day 3, the patient was deemed ready for discharge. She was discharged to home. She was tolerating a regular diet and pain was well controlled with oral pain medications. The patient was discharged with prescriptions for pain medications. She was instructed to wear her brace at all times when out of bed. She was instructed to limit her bending, lifting and twisting. The patient will follow-up in 10-14 days for repeat x-rays and a wound check.       Signed By: Rayne Bosworth, MD

## 2018-04-15 VITALS
RESPIRATION RATE: 18 BRPM | HEART RATE: 77 BPM | DIASTOLIC BLOOD PRESSURE: 87 MMHG | TEMPERATURE: 98.4 F | SYSTOLIC BLOOD PRESSURE: 130 MMHG | OXYGEN SATURATION: 97 %

## 2018-04-16 ENCOUNTER — HOME CARE VISIT (OUTPATIENT)
Dept: SCHEDULING | Facility: HOME HEALTH | Age: 53
End: 2018-04-16
Payer: MEDICAID

## 2018-04-16 VITALS — DIASTOLIC BLOOD PRESSURE: 80 MMHG | SYSTOLIC BLOOD PRESSURE: 126 MMHG | HEART RATE: 78 BPM | OXYGEN SATURATION: 97 %

## 2018-04-16 PROCEDURE — G0152 HHCP-SERV OF OT,EA 15 MIN: HCPCS

## 2018-04-17 ENCOUNTER — HOME CARE VISIT (OUTPATIENT)
Dept: SCHEDULING | Facility: HOME HEALTH | Age: 53
End: 2018-04-17
Payer: MEDICAID

## 2018-04-17 VITALS
SYSTOLIC BLOOD PRESSURE: 92 MMHG | DIASTOLIC BLOOD PRESSURE: 77 MMHG | TEMPERATURE: 98.6 F | HEART RATE: 80 BPM | OXYGEN SATURATION: 98 %

## 2018-04-17 PROCEDURE — G0157 HHC PT ASSISTANT EA 15: HCPCS

## 2018-04-18 ENCOUNTER — HOME CARE VISIT (OUTPATIENT)
Dept: SCHEDULING | Facility: HOME HEALTH | Age: 53
End: 2018-04-18
Payer: MEDICAID

## 2018-04-18 VITALS — HEART RATE: 111 BPM | DIASTOLIC BLOOD PRESSURE: 66 MMHG | SYSTOLIC BLOOD PRESSURE: 100 MMHG | OXYGEN SATURATION: 98 %

## 2018-04-18 PROCEDURE — G0152 HHCP-SERV OF OT,EA 15 MIN: HCPCS

## 2018-04-19 ENCOUNTER — HOME CARE VISIT (OUTPATIENT)
Dept: SCHEDULING | Facility: HOME HEALTH | Age: 53
End: 2018-04-19
Payer: MEDICAID

## 2018-04-19 PROCEDURE — G0157 HHC PT ASSISTANT EA 15: HCPCS

## 2018-04-20 VITALS
TEMPERATURE: 98 F | HEART RATE: 60 BPM | RESPIRATION RATE: 18 BRPM | DIASTOLIC BLOOD PRESSURE: 76 MMHG | SYSTOLIC BLOOD PRESSURE: 99 MMHG | OXYGEN SATURATION: 90 %

## 2018-04-23 ENCOUNTER — HOME CARE VISIT (OUTPATIENT)
Dept: SCHEDULING | Facility: HOME HEALTH | Age: 53
End: 2018-04-23
Payer: MEDICAID

## 2018-04-23 VITALS — OXYGEN SATURATION: 98 % | SYSTOLIC BLOOD PRESSURE: 108 MMHG | DIASTOLIC BLOOD PRESSURE: 70 MMHG | HEART RATE: 97 BPM

## 2018-04-23 PROCEDURE — G0152 HHCP-SERV OF OT,EA 15 MIN: HCPCS

## 2018-04-23 PROCEDURE — G0157 HHC PT ASSISTANT EA 15: HCPCS

## 2018-04-24 ENCOUNTER — HOME CARE VISIT (OUTPATIENT)
Dept: HOME HEALTH SERVICES | Facility: HOME HEALTH | Age: 53
End: 2018-04-24
Payer: MEDICAID

## 2018-04-26 ENCOUNTER — HOME CARE VISIT (OUTPATIENT)
Dept: SCHEDULING | Facility: HOME HEALTH | Age: 53
End: 2018-04-26
Payer: MEDICAID

## 2018-04-26 VITALS
RESPIRATION RATE: 14 BRPM | OXYGEN SATURATION: 94 % | DIASTOLIC BLOOD PRESSURE: 80 MMHG | HEART RATE: 90 BPM | TEMPERATURE: 98.6 F | SYSTOLIC BLOOD PRESSURE: 118 MMHG

## 2018-04-26 VITALS — DIASTOLIC BLOOD PRESSURE: 70 MMHG | OXYGEN SATURATION: 97 % | HEART RATE: 88 BPM | SYSTOLIC BLOOD PRESSURE: 126 MMHG

## 2018-04-26 PROCEDURE — G0151 HHCP-SERV OF PT,EA 15 MIN: HCPCS

## 2018-04-26 PROCEDURE — G0152 HHCP-SERV OF OT,EA 15 MIN: HCPCS

## 2018-05-11 NOTE — DISCHARGE INSTRUCTIONS
Steroid Injection Discharge Instructions      11/13/2018    Dr. Truman Gtz:   A steroid injection was performed today, placing a combination of a steroid and an anesthetic (numbing medicine) into the space around the nerves of your spine. This is done to treat back pain. It may take 7-10 days for the injection to reach its full potential.  This procedure can be done at any level of the spinal column, depending on where your pain is. Your doctor will have ordered the appropriate level to be treated prior to your coming in for the procedure.     Patient Signature:  Date: 11/13/2017  Discharging Nurse: Brittni Thompson RN
abnormal/89

## 2018-05-15 RX ORDER — PRAVASTATIN SODIUM 20 MG/1
TABLET ORAL
Qty: 30 TAB | Refills: 5 | Status: SHIPPED | OUTPATIENT
Start: 2018-05-15 | End: 2018-11-09 | Stop reason: SDUPTHER

## 2018-05-29 DIAGNOSIS — M54.16 LUMBAR RADICULOPATHY: Primary | ICD-10-CM

## 2018-05-29 RX ORDER — TRAMADOL HYDROCHLORIDE 50 MG/1
TABLET ORAL
Qty: 60 TAB | Refills: 1 | Status: SHIPPED | OUTPATIENT
Start: 2018-05-29 | End: 2020-09-28 | Stop reason: SDUPTHER

## 2018-07-05 ENCOUNTER — APPOINTMENT (OUTPATIENT)
Dept: GENERAL RADIOLOGY | Age: 53
End: 2018-07-05
Attending: NURSE PRACTITIONER
Payer: MEDICAID

## 2018-07-05 ENCOUNTER — HOSPITAL ENCOUNTER (EMERGENCY)
Age: 53
Discharge: HOME | End: 2018-07-05
Attending: STUDENT IN AN ORGANIZED HEALTH CARE EDUCATION/TRAINING PROGRAM
Payer: MEDICAID

## 2018-07-05 VITALS
HEIGHT: 69 IN | RESPIRATION RATE: 16 BRPM | HEART RATE: 79 BPM | TEMPERATURE: 97.4 F | OXYGEN SATURATION: 100 % | WEIGHT: 227.25 LBS | SYSTOLIC BLOOD PRESSURE: 139 MMHG | BODY MASS INDEX: 33.66 KG/M2 | DIASTOLIC BLOOD PRESSURE: 84 MMHG

## 2018-07-05 DIAGNOSIS — K59.00 CONSTIPATION, UNSPECIFIED CONSTIPATION TYPE: Primary | ICD-10-CM

## 2018-07-05 LAB
ALBUMIN SERPL-MCNC: 3.9 G/DL (ref 3.5–5)
ALBUMIN/GLOB SERPL: 0.9 {RATIO} (ref 1.1–2.2)
ALP SERPL-CCNC: 98 U/L (ref 45–117)
ALT SERPL-CCNC: 23 U/L (ref 12–78)
ANION GAP SERPL CALC-SCNC: 7 MMOL/L (ref 5–15)
APPEARANCE UR: CLEAR
AST SERPL-CCNC: 14 U/L (ref 15–37)
BACTERIA URNS QL MICRO: NEGATIVE /HPF
BASOPHILS # BLD: 0.1 K/UL (ref 0–0.1)
BASOPHILS NFR BLD: 1 % (ref 0–1)
BILIRUB SERPL-MCNC: 0.6 MG/DL (ref 0.2–1)
BILIRUB UR QL: NEGATIVE
BUN SERPL-MCNC: 10 MG/DL (ref 6–20)
BUN/CREAT SERPL: 10 (ref 12–20)
CALCIUM SERPL-MCNC: 9.6 MG/DL (ref 8.5–10.1)
CHLORIDE SERPL-SCNC: 106 MMOL/L (ref 97–108)
CO2 SERPL-SCNC: 28 MMOL/L (ref 21–32)
COLOR UR: ABNORMAL
CREAT SERPL-MCNC: 0.96 MG/DL (ref 0.55–1.02)
DIFFERENTIAL METHOD BLD: NORMAL
EOSINOPHIL # BLD: 0.2 K/UL (ref 0–0.4)
EOSINOPHIL NFR BLD: 3 % (ref 0–7)
EPITH CASTS URNS QL MICRO: ABNORMAL /LPF
ERYTHROCYTE [DISTWIDTH] IN BLOOD BY AUTOMATED COUNT: 13.9 % (ref 11.5–14.5)
GLOBULIN SER CALC-MCNC: 4.3 G/DL (ref 2–4)
GLUCOSE SERPL-MCNC: 93 MG/DL (ref 65–100)
GLUCOSE UR STRIP.AUTO-MCNC: NEGATIVE MG/DL
HCT VFR BLD AUTO: 41 % (ref 35–47)
HGB BLD-MCNC: 13.2 G/DL (ref 11.5–16)
HGB UR QL STRIP: ABNORMAL
HYALINE CASTS URNS QL MICRO: ABNORMAL /LPF (ref 0–5)
IMM GRANULOCYTES # BLD: 0 K/UL (ref 0–0.04)
IMM GRANULOCYTES NFR BLD AUTO: 0 % (ref 0–0.5)
KETONES UR QL STRIP.AUTO: NEGATIVE MG/DL
LEUKOCYTE ESTERASE UR QL STRIP.AUTO: NEGATIVE
LYMPHOCYTES # BLD: 2.7 K/UL (ref 0.8–3.5)
LYMPHOCYTES NFR BLD: 43 % (ref 12–49)
MCH RBC QN AUTO: 29.4 PG (ref 26–34)
MCHC RBC AUTO-ENTMCNC: 32.2 G/DL (ref 30–36.5)
MCV RBC AUTO: 91.3 FL (ref 80–99)
MONOCYTES # BLD: 0.5 K/UL (ref 0–1)
MONOCYTES NFR BLD: 9 % (ref 5–13)
NEUTS SEG # BLD: 2.8 K/UL (ref 1.8–8)
NEUTS SEG NFR BLD: 45 % (ref 32–75)
NITRITE UR QL STRIP.AUTO: NEGATIVE
NRBC # BLD: 0 K/UL (ref 0–0.01)
NRBC BLD-RTO: 0 PER 100 WBC
PH UR STRIP: 7 [PH] (ref 5–8)
PLATELET # BLD AUTO: 205 K/UL (ref 150–400)
PMV BLD AUTO: 11.3 FL (ref 8.9–12.9)
POTASSIUM SERPL-SCNC: 3.8 MMOL/L (ref 3.5–5.1)
PROT SERPL-MCNC: 8.2 G/DL (ref 6.4–8.2)
PROT UR STRIP-MCNC: NEGATIVE MG/DL
RBC # BLD AUTO: 4.49 M/UL (ref 3.8–5.2)
RBC #/AREA URNS HPF: ABNORMAL /HPF (ref 0–5)
SODIUM SERPL-SCNC: 141 MMOL/L (ref 136–145)
SP GR UR REFRACTOMETRY: <1.005 (ref 1–1.03)
UR CULT HOLD, URHOLD: NORMAL
UROBILINOGEN UR QL STRIP.AUTO: 0.2 EU/DL (ref 0.2–1)
WBC # BLD AUTO: 6.2 K/UL (ref 3.6–11)
WBC URNS QL MICRO: ABNORMAL /HPF (ref 0–4)

## 2018-07-05 PROCEDURE — 99283 EMERGENCY DEPT VISIT LOW MDM: CPT

## 2018-07-05 PROCEDURE — 80053 COMPREHEN METABOLIC PANEL: CPT | Performed by: PHYSICIAN ASSISTANT

## 2018-07-05 PROCEDURE — 74019 RADEX ABDOMEN 2 VIEWS: CPT

## 2018-07-05 PROCEDURE — 36415 COLL VENOUS BLD VENIPUNCTURE: CPT | Performed by: PHYSICIAN ASSISTANT

## 2018-07-05 PROCEDURE — 85025 COMPLETE CBC W/AUTO DIFF WBC: CPT | Performed by: PHYSICIAN ASSISTANT

## 2018-07-05 PROCEDURE — 81001 URINALYSIS AUTO W/SCOPE: CPT | Performed by: PHYSICIAN ASSISTANT

## 2018-07-05 RX ORDER — AMOXICILLIN 250 MG
1 CAPSULE ORAL DAILY
Qty: 30 TAB | Refills: 0 | Status: SHIPPED | OUTPATIENT
Start: 2018-07-05 | End: 2018-12-29

## 2018-07-05 NOTE — ED PROVIDER NOTES
HPI Comments: 46 y.o. female with past medical history significant for PUD, allergic rhinitis, IGT, tinea pedis, hyperlipidemia, DJD, DDD, uterocele, hypothyroidism, anxiety and depression, arthritis who presents via private vehicle with chief complaint of abdominal pain. Pt c/o worsening lower abdominal pain for the past 5 days, accompanied by nausea and decreased appetite. Pt reports feeling like something is \"dripping\" through her bowels after she eats. Pt has not had a nl BM in a week. Pt had a very small BM yesterday. Pt reports hx of chronic constipation, which she takes miralax for. Denies vomiting, fever, chills. There are no other acute medical concerns at this time. Social hx: denies tobacco use, denies EtOH consumption     PCP: Radha Messina MD    Note written by Mary Weaver, as dictated by Johnny Chavez NP 7:24 PM      The history is provided by the patient. No  was used.         Past Medical History:   Diagnosis Date    Acid reflux     Adverse effect of anesthesia     woke up coughing    Allergic rhinitis     Anxiety     Arthritis     DDD (degenerative disc disease), lumbar     DJD (degenerative joint disease), lumbosacral     GERD (gastroesophageal reflux disease)     History of nonchemical tubal occlusion     Esure devices    Hyperlipidemia 2/20/2014    Hypothyroidism     HYPO    IGT (impaired glucose tolerance)     Ill-defined condition     prolapse uterus/states thus her intestines has collpased a little bit    Psychiatric disorder     anxiety and depression    PUD (peptic ulcer disease)     no EGD    Routine gynecological examination     [facility]    Sinus disorder     Tinea pedis     Ureterocele     small, right       Past Surgical History:   Procedure Laterality Date    COLONOSCOPY N/A 9/13/2016    COLONOSCOPY / EGD  performed [facility] ENDOSCOPY    HX GYN  10/12/2012    Esure    HX LAP CHOLECYSTECTOMY  10/06/2016    HX OTHER SURGICAL      spinal fusion     HX TOTAL VAGINAL HYSTERECTOMY      MS REMOVAL OF OVARIAN CYST(S)           Family History:   Problem Relation Age of Onset    Hypertension Mother     Diabetes Mother     Heart Disease Mother     Heart Attack Mother     Cancer Father      lung    Other Sister      cholecystectomy    Cancer Brother      lung    Diabetes Maternal Aunt     Cancer Paternal Uncle      lung    Diabetes Maternal Aunt     Diabetes Maternal Aunt     Diabetes Maternal Aunt     Diabetes Maternal Aunt     Diabetes Maternal Aunt     Heart Attack Daughter 32    Other Son      narcolepsy/GERD (part of esophagus removed d/t this)    Cancer Paternal Uncle      lung    Anesth Problems Neg Hx        Social History     Social History    Marital status: SINGLE     Spouse name: N/A    Number of children: N/A    Years of education: N/A     Occupational History    Not on file. Social History Main Topics    Smoking status: Never Smoker    Smokeless tobacco: Never Used    Alcohol use No    Drug use: No    Sexual activity: Yes     Partners: Male     Birth control/ protection: Implant     Other Topics Concern    Not on file     Social History Narrative         ALLERGIES: Review of patient's allergies indicates no known allergies. Review of Systems   Constitutional: Negative for activity change, appetite change, chills, fatigue and fever. HENT: Negative for congestion and facial swelling. Eyes: Negative for discharge, redness and visual disturbance. Respiratory: Negative for cough, chest tightness and shortness of breath. Cardiovascular: Negative for chest pain. Gastrointestinal: Positive for abdominal pain, constipation and nausea. Negative for abdominal distention, diarrhea and vomiting. Genitourinary: Negative for difficulty urinating, dysuria and menstrual problem. Musculoskeletal: Negative for back pain, joint swelling and myalgias. Skin: Negative for color change and rash. Neurological: Negative for dizziness, syncope and headaches. Hematological: Negative for adenopathy. Does not bruise/bleed easily. Psychiatric/Behavioral: Negative for behavioral problems, confusion and suicidal ideas. The patient is not nervous/anxious. All other systems reviewed and are negative. Vitals:    07/05/18 1243   BP: 139/84   Pulse: 79   Resp: 16   Temp: 97.4 °F (36.3 °C)   SpO2: 100%   Weight: 103.1 kg (227 lb 4 oz)   Height: 5' 9\" (1.753 m)            Physical Exam   Constitutional: She is oriented to person, place, and time. She appears well-developed and well-nourished. No distress. HENT:   Head: Normocephalic and atraumatic. Right Ear: External ear normal.   Left Ear: External ear normal.   Nose: Nose normal.   Mouth/Throat: Oropharynx is clear and moist.   Eyes: Conjunctivae and EOM are normal. Pupils are equal, round, and reactive to light. Right eye exhibits no discharge. Left eye exhibits no discharge. Neck: Normal range of motion. Neck supple. No JVD present. No tracheal deviation present. Cardiovascular: Normal rate, regular rhythm, normal heart sounds and intact distal pulses. Exam reveals no gallop. No murmur heard. Pulmonary/Chest: Effort normal and breath sounds normal. No respiratory distress. She has no wheezes. She has no rales. She exhibits no tenderness. Abdominal: Soft. Bowel sounds are normal. She exhibits no distension. There is tenderness. There is no rebound and no guarding. Genitourinary:   Genitourinary Comments: Negative     Musculoskeletal: Normal range of motion. She exhibits no edema or tenderness. Neurological: She is alert and oriented to person, place, and time. Skin: Skin is warm and dry. No rash noted. No erythema. No pallor. Psychiatric: She has a normal mood and affect. Her behavior is normal. Judgment and thought content normal.   Nursing note and vitals reviewed.        MDM  Number of Diagnoses or Management Options  Constipation, unspecified constipation type: new and requires workup  Diagnosis management comments: Plan:  Discharge to home and follow up with PCP. ED Course   3:24 PM  Pt has been reexamined. Pt has no new complaints, changes or physical findings. Care plan outlined and precautions discussed. All available results were reviewed with pt. All medications were reviewed with pt. All of pt's questions and concerns were addressed. Pt agrees to F/U as instructed and agrees to return to ED upon further deterioration. Pt is ready to go home.   Mallorie Pedersen NP        Procedures

## 2018-07-05 NOTE — ED NOTES
12:42 PM  I have evaluated the patient as the Provider in Triage. I have reviewed Her vital signs and the triage nurse assessment. I have talked with the patient and any available family and advised that I am the provider in triage and have ordered the appropriate study to initiate their work up based on the clinical presentation during my assessment. I have advised that the patient will be accommodated in the Main ED as soon as possible. I have also requested to contact the triage nurse or myself immediately if the patient experiences any changes in their condition during this brief waiting period. Patient reports 5 day history of lower abdominal pain radiating to her left flank. + constipation. No N/V/D, dysuria or vaginal bleeding.   ANDRESSA Miller

## 2018-07-05 NOTE — ED NOTES
Miki Haley NP gave and reviewed discharge instructions. Patient verbalizes understanding of discharge instructions. Pt alert and oriented, appears in no acute distress, respirations equal and unlabored. Ambulatory upon discharge with steady gait.

## 2018-07-05 NOTE — ED TRIAGE NOTES
Pt c/o lower abd pain, feels like it is \"draining\", denies vaginal discharge or bleeding, denies fever or chills, denies n/v, denies diarrhea, +constipation, denies urinary symptoms , pain x 5 days

## 2018-07-05 NOTE — DISCHARGE INSTRUCTIONS
Constipation: Care Instructions  Your Care Instructions    Constipation means that you have a hard time passing stools (bowel movements). People pass stools from 3 times a day to once every 3 days. What is normal for you may be different. Constipation may occur with pain in the rectum and cramping. The pain may get worse when you try to pass stools. Sometimes there are small amounts of bright red blood on toilet paper or the surface of stools. This is because of enlarged veins near the rectum (hemorrhoids). A few changes in your diet and lifestyle may help you avoid ongoing constipation. Your doctor may also prescribe medicine to help loosen your stool. Some medicines can cause constipation. These include pain medicines and antidepressants. Tell your doctor about all the medicines you take. Your doctor may want to make a medicine change to ease your symptoms. Follow-up care is a key part of your treatment and safety. Be sure to make and go to all appointments, and call your doctor if you are having problems. It's also a good idea to know your test results and keep a list of the medicines you take. How can you care for yourself at home? · Drink plenty of fluids, enough so that your urine is light yellow or clear like water. If you have kidney, heart, or liver disease and have to limit fluids, talk with your doctor before you increase the amount of fluids you drink. · Include high-fiber foods in your diet each day. These include fruits, vegetables, beans, and whole grains. · Get at least 30 minutes of exercise on most days of the week. Walking is a good choice. You also may want to do other activities, such as running, swimming, cycling, or playing tennis or team sports. · Take a fiber supplement, such as Citrucel or Metamucil, every day. Read and follow all instructions on the label. · Schedule time each day for a bowel movement. A daily routine may help.  Take your time having your bowel movement. · Support your feet with a small step stool when you sit on the toilet. This helps flex your hips and places your pelvis in a squatting position. · Your doctor may recommend an over-the-counter laxative to relieve your constipation. Examples are Milk of Magnesia and MiraLax. Read and follow all instructions on the label. Do not use laxatives on a long-term basis. When should you call for help? Call your doctor now or seek immediate medical care if:  ? · You have new or worse belly pain. ? · You have new or worse nausea or vomiting. ? · You have blood in your stools. ? Watch closely for changes in your health, and be sure to contact your doctor if:  ? · Your constipation is getting worse. ? · You do not get better as expected. Where can you learn more? Go to http://viviane-misael.info/. Enter [phone] in the search box to learn more about \"Constipation: Care Instructions. \"  Current as of: March 20, 2017  Content Version: 11.4  © 3302-3676 [employer]. Care instructions adapted under license by [employer] (which disclaims liability or warranty for this information). If you have questions about a medical condition or this instruction, always ask your healthcare professional. [address] any warranty or liability for your use of this information.

## 2018-07-06 ENCOUNTER — PATIENT OUTREACH (OUTPATIENT)
Dept: INTERNAL MEDICINE CLINIC | Age: 53
End: 2018-07-06

## 2018-07-06 NOTE — PROGRESS NOTES
[address] [facility]      Patient referred to this NN via Referral from ED referral.    Most recent HIPAA form in the patient's chart (dated 4/27/18) was reviewed; authorized individual(s) listed on HIPAA form include no one listed. - Patient with ED visit to [facility] on 7/5/18 with diagnosis of Constipation.  - Per notes, patient was discharged from ED to home. - Patient's most recent Office Visit with PCP: 3/26/2018      NN follow-up call  NN contacted the patient today to complete NN Post-ED discharge assessment. Two patient identifiers verified. NN introduced self to the patient, including NN role and purpose of NN follow-up phone call; patient verbalizes understanding. NN inquired about the patient's current condition and how the patient is feeling; patient states, \"I'm about the same\". Reports filled prescription and will get an enema today. Encourage to increase water, fruits, and vegetables. Medication:   - New Presciption(s) at ED Discharge: yes  - Change(s) to existing Medication(s) at ED Discharge: no  - Medication(s) discontinued at ED Discharge: none  - Able to fill/pickup new medications without difficulty: Yes.  - Any Questions/Concerns regarding new Medication(s) and/or Medication Change(s)No.    Unable to complete Med Rec during this call        PLAN  - ED f/u appointment scheduled with PCP: yes  - ED Follow-Up Appointment scheduled with Tylor Camejo MD on 7/10/18. - patient to contact this NN and/or PCP office with any questions/concerns.  -Notified of availability of PCP on-call physician after-hours and on the weekends for non-emergent/non-life threatening medical questions/concerns. -Goals:  Goals      Establish PCP relationships and regularly scheduled appointments. 7/6/18 Attend ED follow up appointment 7/10/18. TL                    NN will route this encounter to Tylor Camejo MD for notification/review.

## 2018-07-10 ENCOUNTER — OFFICE VISIT (OUTPATIENT)
Dept: INTERNAL MEDICINE CLINIC | Age: 53
End: 2018-07-10

## 2018-07-10 VITALS
DIASTOLIC BLOOD PRESSURE: 65 MMHG | RESPIRATION RATE: 17 BRPM | HEART RATE: 60 BPM | SYSTOLIC BLOOD PRESSURE: 110 MMHG | HEIGHT: 69 IN | WEIGHT: 229.6 LBS | TEMPERATURE: 97.5 F | OXYGEN SATURATION: 99 % | BODY MASS INDEX: 34 KG/M2

## 2018-07-10 DIAGNOSIS — M48.062 SPINAL STENOSIS OF LUMBAR REGION WITH NEUROGENIC CLAUDICATION: ICD-10-CM

## 2018-07-10 DIAGNOSIS — M17.12 PRIMARY OSTEOARTHRITIS OF LEFT KNEE: ICD-10-CM

## 2018-07-10 DIAGNOSIS — M54.16 LUMBAR RADICULOPATHY: ICD-10-CM

## 2018-07-10 DIAGNOSIS — E78.5 HYPERLIPIDEMIA, UNSPECIFIED HYPERLIPIDEMIA TYPE: ICD-10-CM

## 2018-07-10 DIAGNOSIS — E03.4 HYPOTHYROIDISM DUE TO ACQUIRED ATROPHY OF THYROID: ICD-10-CM

## 2018-07-10 DIAGNOSIS — K59.00 CONSTIPATION, UNSPECIFIED CONSTIPATION TYPE: ICD-10-CM

## 2018-07-10 DIAGNOSIS — E55.9 VITAMIN D DEFICIENCY: ICD-10-CM

## 2018-07-10 DIAGNOSIS — F41.9 ANXIETY: Primary | ICD-10-CM

## 2018-07-10 DIAGNOSIS — E53.8 B12 DEFICIENCY: ICD-10-CM

## 2018-07-10 DIAGNOSIS — R73.02 IGT (IMPAIRED GLUCOSE TOLERANCE): ICD-10-CM

## 2018-07-10 RX ORDER — ALBUTEROL SULFATE 90 UG/1
2 AEROSOL, METERED RESPIRATORY (INHALATION)
Qty: 1 INHALER | Refills: 3 | Status: SHIPPED | OUTPATIENT
Start: 2018-07-10

## 2018-07-10 RX ORDER — SERTRALINE HYDROCHLORIDE 100 MG/1
150 TABLET, FILM COATED ORAL DAILY
Qty: 45 TAB | Refills: 5 | Status: SHIPPED | OUTPATIENT
Start: 2018-07-10 | End: 2019-01-07 | Stop reason: SDUPTHER

## 2018-07-10 RX ORDER — MAGNESIUM CITRATE
296 SOLUTION, ORAL ORAL
Qty: 1 BOTTLE | Refills: 1 | Status: SHIPPED | OUTPATIENT
Start: 2018-07-10 | End: 2018-07-10

## 2018-07-10 RX ORDER — ALPRAZOLAM 0.5 MG/1
0.5 TABLET ORAL
Qty: 15 TAB | Refills: 0 | Status: SHIPPED | OUTPATIENT
Start: 2018-07-10 | End: 2018-09-12 | Stop reason: SDUPTHER

## 2018-07-10 NOTE — PROGRESS NOTES
HPI:  Presents for f/u thyroid, lipids, etc    Pt reports anxiety  Feels the need to use xanax a couple times a week  Taking zoloft daily    Poor response to back surgery  Still feels leg weakness and numbness, though pain is better. Has not been in PT yet - reports plan to schedule  Following with surgeon. Pt seen in ER for abd pain  Pt reports dx constipation  Rx'd senna and docusate - pt reports no improvement in the past several days  Not using miralax - states it did not work. Past medical, Social, and Family history reviewed    Prior to Admission medications    Medication Sig Start Date End Date Taking? Authorizing Provider   ALPRAZolam Beverly Randall) 0.5 mg tablet Take 1 Tab by mouth every eight (8) hours as needed for Anxiety for up to 15 doses. Max Daily Amount: 1.5 mg. 7/10/18  Yes Evie Nichole MD   albuterol (PROVENTIL HFA, VENTOLIN HFA, PROAIR HFA) 90 mcg/actuation inhaler Take 2 Puffs by inhalation every four (4) hours as needed for Wheezing. 7/10/18  Yes Evie Nichole MD   sertraline (ZOLOFT) 100 mg tablet Take 1.5 Tabs by mouth daily. 7/10/18  Yes Evie Nichole MD   senna-docusate (SENNA WITH DOCUSATE SODIUM) 8.6-50 mg per tablet Take 1 Tab by mouth daily. Indications: constipation 7/5/18  Yes Maria T Marinelli, DARIO   traMADol (ULTRAM) 50 mg tablet TAKE 2 TABLETS BY MOUTH EVERY 8 HOURS AS NEEDED FOR PAIN 5/29/18  Yes Evie Nichole MD   pravastatin (PRAVACHOL) 20 mg tablet TAKE 1 TAB BY MOUTH DAILY. 5/15/18  Yes Evie Nichole MD   oxyCODONE IR (ROXICODONE) 5 mg immediate release tablet Take 1-2 Tabs by mouth every four (4) hours as needed. Max Daily Amount: 60 mg. 4/11/18  Yes Rochelle Hart PA-C   levothyroxine (SYNTHROID) 88 mcg tablet TAKE 1 TABLET BY MOUTH EVERY DAY BEFORE BREAKFAST ON A EMPTY STOMACH 4/7/18  Yes Evie Nichole MD   multivitamin, tx-iron-ca-min (THERA-M W/ IRON) 9 mg iron-400 mcg tab tablet Take 1 Tab by mouth daily.    Yes Historical Provider pantoprazole (PROTONIX) 40 mg tablet TAKE 1 TAB BY MOUTH DAILY. 2/19/18  Yes Odilon Pierson MD   cholecalciferol (VITAMIN D3) 1,000 unit cap TAKE 3 CAPSULES DAILY 1/5/18  Yes Odilon Pierson MD   Rosalva Carballo henry As directed to assist with ambulation 9/18/17  Yes Odilon Pierson MD   acetaminophen (TYLENOL EXTRA STRENGTH) 500 mg tablet Take 500 mg by mouth every six (6) hours as needed for Pain. Yes Sudhir Corral MD   oxyCODONE IR (ROXICODONE) 5 mg immediate release tablet Take 5 mg by mouth every four (4) hours as needed for Pain. Take 1-2 Tabs by mouth every four (4) hours as needed. Max Daily Amount: 60 mg    Historical Provider   docusate sodium (COLACE) 100 mg capsule Take 100 mg by mouth two (2) times a day. Historical Provider   pantoprazole (PROTONIX) 40 mg tablet Take 40 mg by mouth daily. take 40mg every night    Historical Provider   polyethylene glycol (MIRALAX) 17 gram packet Take 17 g by mouth daily. take as needed for constipation    Historical Provider   polyethylene glycol (MIRALAX) 17 gram/dose powder Take 17 g by mouth daily. Constipation  Patient not taking: Reported on 4/12/2018 8/14/17   Odilon Pierson MD          ROS  Complete ROS reviewed and negative or stable except as noted in HPI. Physical Exam   Constitutional: She is oriented to person, place, and time. She appears well-nourished. No distress. HENT:   Head: Normocephalic and atraumatic. Eyes: EOM are normal. Pupils are equal, round, and reactive to light. No scleral icterus. Neck: Normal range of motion. Neck supple. No JVD present. Cardiovascular: Normal rate, regular rhythm and normal heart sounds. Exam reveals no gallop and no friction rub. No murmur heard. Pulmonary/Chest: Effort normal and breath sounds normal. No respiratory distress. She has no wheezes. She has no rales. Abdominal: Soft. Bowel sounds are normal. She exhibits no distension. There is no tenderness.    Musculoskeletal: Normal range of motion. She exhibits no edema. Lymphadenopathy:     She has no cervical adenopathy. Neurological: She is alert and oriented to person, place, and time. She exhibits normal muscle tone. Skin: Skin is warm. No rash noted. Psychiatric: She has a normal mood and affect. Nursing note and vitals reviewed. Prior labs reviewed. Assessment/Plan:    ICD-10-CM ICD-9-CM    1. Anxiety F41.9 300.00 ALPRAZolam (XANAX) 0.5 mg tablet      sertraline (ZOLOFT) 100 mg tablet   2. IGT (impaired glucose tolerance) R73.02 790.22 HEMOGLOBIN A1C WITH EAG   3. Vitamin D deficiency E55.9 268.9 VITAMIN D, 25 HYDROXY   4. Constipation, unspecified constipation type K59.00 564.00 magnesium citrate solution   5. Hypothyroidism due to acquired atrophy of thyroid E03.4 244.8 T4, FREE     246.8 TSH 3RD GENERATION   6. Primary osteoarthritis of left knee M17.12 715.16    7. Lumbar radiculopathy M54.16 724.4    8. Hyperlipidemia, unspecified hyperlipidemia type E78.5 272.4 CK      LIPID PANEL      METABOLIC PANEL, COMPREHENSIVE   9. Spinal stenosis of lumbar region with neurogenic claudication M48.062 724.03    10. B12 deficiency E53.8 266.2 VITAMIN B12     Follow-up Disposition:  Return in about 2 months (around 9/10/2018), or if symptoms worsen or fail to improve, for mood, constipation.    results and schedule of future studies reviewed with patient  reviewed diet, exercise and weight  cardiovascular risk and specific lipid/LDL goals reviewed  reviewed medications and side effects in detail  Encouraged PT for legs and back  Increase zoloft to 150mg   Agree to xanax refill  Mag citrate  Return for fasting labs  Pt declines Shingrix today

## 2018-07-10 NOTE — MR AVS SNAPSHOT
[address] [place] [address] 
[phone] Patient: Vianca Rolon MRN: [id number] :1965 Visit Information Date & Time Provider Department Dept. Phone Encounter #  
 7/10/2018  8:15 AM Tao Ahmadi MD [facility] Pediatrics and Internal Medicine [phone] [id number] Follow-up Instructions Return in about 2 months (around 9/10/2018), or if symptoms worsen or fail to improve, for mood, constipation. Upcoming Health Maintenance Date Due  
 BREAST CANCER SCRN MAMMOGRAM 2018 Influenza Age 5 to Adult 2018 PAP AKA CERVICAL CYTOLOGY 3/17/2019 DTaP/Tdap/Td series (2 - Td) 2024 COLONOSCOPY 2026 Allergies as of 7/10/2018  Review Complete On: 7/10/2018 By: Tao Ahmadi MD  
 No Known Allergies Current Immunizations  Reviewed on 7/10/2018 Name Date Tdap 2014 Reviewed by Tao Ahmadi MD on 7/10/2018 at  9:04 AM  
You Were Diagnosed With   
  
 Codes Comments IGT (impaired glucose tolerance)    -  Primary ICD-10-CM: R73.02 
ICD-9-CM: 790.22 Anxiety     ICD-10-CM: F41.9 ICD-9-CM: 300.00 Vitamin D deficiency     ICD-10-CM: E55.9 ICD-9-CM: 268.9 Constipation, unspecified constipation type     ICD-10-CM: K59.00 ICD-9-CM: 564.00 Hypothyroidism due to acquired atrophy of thyroid     ICD-10-CM: E03.4 ICD-9-CM: 244.8, 246.8 Primary osteoarthritis of left knee     ICD-10-CM: M17.12 
ICD-9-CM: 715.16 Lumbar radiculopathy     ICD-10-CM: M54.16 
ICD-9-CM: 724.4 Hyperlipidemia, unspecified hyperlipidemia type     ICD-10-CM: E78.5 ICD-9-CM: 272.4 Spinal stenosis of lumbar region with neurogenic claudication     ICD-10-CM: M48.062 
ICD-9-CM: 724.03   
 B12 deficiency     ICD-10-CM: E53.8 ICD-9-CM: 266.2 Vitals BP Pulse Temp Resp Height(growth percentile) Weight(growth percentile) 110/65 (BP 1 Location: Left arm, BP Patient Position: Sitting) 60 97.5 °F (36.4 °C) (Oral) 17 5' 9\" (1.753 m) 229 lb 9.6 oz (104.1 kg) LMP SpO2 BMI OB Status Smoking Status 10/23/2016 (Approximate) 99% 33.91 kg/m2 Hysterectomy Never Smoker BMI and BSA Data Body Mass Index Body Surface Area  
 33.91 kg/m 2 2.25 m 2 Preferred Pharmacy Pharmacy Name Phone [id number] [phone] Your Updated Medication List  
  
   
This list is accurate as of 7/10/18  9:05 AM.  Always use your most recent med list.  
  
  
  
  
 albuterol 90 mcg/actuation inhaler Commonly known as:  PROVENTIL HFA, VENTOLIN HFA, PROAIR HFA Take 2 Puffs by inhalation every four (4) hours as needed for Wheezing. ALPRAZolam 0.5 mg tablet Commonly known as:  Sharyon Kida Take 1 Tab by mouth every eight (8) hours as needed for Anxiety for up to 15 doses. Max Daily Amount: 1.5 mg. Crispin Buchanan As directed to assist with ambulation  
  
 cholecalciferol 1,000 unit Cap Commonly known as:  VITAMIN D3  
TAKE 3 CAPSULES DAILY docusate sodium 100 mg capsule Commonly known as:  Temple Lecher Take 100 mg by mouth two (2) times a day. levothyroxine 88 mcg tablet Commonly known as:  SYNTHROID  
TAKE 1 TABLET BY MOUTH EVERY DAY BEFORE BREAKFAST ON A EMPTY STOMACH  
  
 magnesium citrate solution Take 296 mL by mouth now for 1 dose. multivitamin, tx-iron-ca-min 9 mg iron-400 mcg Tab tablet Commonly known as:  THERA-M w/ IRON Take 1 Tab by mouth daily. * oxyCODONE IR 5 mg immediate release tablet Commonly known as:  Ever Ivans Take 5 mg by mouth every four (4) hours as needed for Pain. Take 1-2 Tabs by mouth every four (4) hours as needed. Max Daily Amount: 60 mg  
  
 * oxyCODONE IR 5 mg immediate release tablet Commonly known as:  Ever Ivans  
 Take 1-2 Tabs by mouth every four (4) hours as needed. Max Daily Amount: 60 mg.  
  
 * pantoprazole 40 mg tablet Commonly known as:  PROTONIX Take 40 mg by mouth daily. take 40mg every night * pantoprazole 40 mg tablet Commonly known as:  PROTONIX  
TAKE 1 TAB BY MOUTH DAILY. * polyethylene glycol 17 gram packet Commonly known as:  Mariah Marques Take 17 g by mouth daily. take as needed for constipation * polyethylene glycol 17 gram/dose powder Commonly known as:  Aurther Buck Take 17 g by mouth daily. Constipation  
  
 pravastatin 20 mg tablet Commonly known as:  PRAVACHOL  
TAKE 1 TAB BY MOUTH DAILY. senna-docusate 8.6-50 mg per tablet Commonly known as:  SENNA WITH DOCUSATE SODIUM Take 1 Tab by mouth daily. Indications: constipation  
  
 sertraline 100 mg tablet Commonly known as:  ZOLOFT Take 1.5 Tabs by mouth daily. traMADol 50 mg tablet Commonly known as:  ULTRAM  
TAKE 2 TABLETS BY MOUTH EVERY 8 HOURS AS NEEDED FOR PAIN  
  
 TYLENOL EXTRA STRENGTH 500 mg tablet Generic drug:  acetaminophen Take 500 mg by mouth every six (6) hours as needed for Pain. * Notice: This list has 6 medication(s) that are the same as other medications prescribed for you. Read the directions carefully, and ask your doctor or other care provider to review them with you. Prescriptions Printed Refills ALPRAZolam (XANAX) 0.5 mg tablet 0 Sig: Take 1 Tab by mouth every eight (8) hours as needed for Anxiety for up to 15 doses. Max Daily Amount: 1.5 mg.  
 Class: Print Route: Oral  
  
Prescriptions Sent to Pharmacy Refills  
 albuterol (PROVENTIL HFA, VENTOLIN HFA, PROAIR HFA) 90 mcg/actuation inhaler 3 Sig: Take 2 Puffs by inhalation every four (4) hours as needed for Wheezing. Class: Normal  
 Pharmacy: [facility]/pharmacy #[id number] Ph #: [phone] Route: Inhalation magnesium citrate solution 1 Sig: Take 296 mL by mouth now for 1 dose. Class: Normal  
 Pharmacy: [facility]/pharmacy #[id number] Ph #: [phone] Route: Oral  
 sertraline (ZOLOFT) 100 mg tablet 5 Sig: Take 1.5 Tabs by mouth daily. Class: Normal  
 Pharmacy: [facility]/pharmacy #[id number] Ph #: [phone] Route: Oral  
  
We Performed the Following CK [id number] CPT(R)] HEMOGLOBIN A1C WITH EAG [12380 CPT(R)] LIPID PANEL [66386 CPT(R)] METABOLIC PANEL, COMPREHENSIVE [33401 CPT(R)] T4, FREE [id number] CPT(R)] TSH 3RD GENERATION [20393 CPT(R)] VITAMIN B12 [id number] CPT(R)] VITAMIN D, 25 HYDROXY [id number] CPT(R)] Follow-up Instructions Return in about 2 months (around 9/10/2018), or if symptoms worsen or fail to improve, for mood, constipation. Introducing [facility] & HEALTH SERVICES! Yvonne Berger introduces [employer] patient portal. Now you can access parts of your medical record, email your doctor's office, and request medication refills online. 1. In your internet browser, go to [email or web address] 2. Click on the First Time User? Click Here link in the Sign In box. You will see the New Member Sign Up page. 3. Enter your [employer] Access Code exactly as it appears below. You will not need to use this code after youve completed the sign-up process. If you do not sign up before the expiration date, you must request a new code. · [employer] Access Code: [email or web address] Expires: 7/17/2018 12:50 PM 
 
4. Enter the last four digits of your Social Security Number (xxxx) and Date of Birth (mm/dd/yyyy) as indicated and click Submit. You will be taken to the next sign-up page. 5. Create a [employer] ID. This will be your [employer] login ID and cannot be changed, so think of one that is secure and easy to remember. 6. Create a [employer] password. You can change your password at any time. 7. Enter your Password Reset Question and Answer. This can be used at a later time if you forget your password. 8. Enter your e-mail address. You will receive e-mail notification when new information is available in [address] 9. Click Sign Up. You can now view and download portions of your medical record. 10. Click the Download Summary menu link to download a portable copy of your medical information. If you have questions, please visit the Frequently Asked Questions section of the [employer] website. Remember, [employer] is NOT to be used for urgent needs. For medical emergencies, dial 911. Now available from your iPhone and Android! Please provide this summary of care documentation to your next provider. Your primary care clinician is listed as [address] If you have any questions after today's visit, please call [phone].

## 2018-07-10 NOTE — PROGRESS NOTES
Exam Room #14  Destiny Oden is a 46 y.o. female  Chief Complaint   Patient presents with    Back Surgery     3 month follow up    Medication Refill     would like for her XANAX to be refilled. 1. Have you been to the ER, urgent care clinic since your last visit? Hospitalized since your last visit? Yes, on 7/5/18 to [place] for a burning sensation in her lower abdomen, was diagnosed with constipation. 2. Have you seen or consulted any other health care providers outside of the [address] [place] [address] since your last visit? Include any pap smears or colon screening.  No      Visit Vitals    /65 (BP 1 Location: Left arm, BP Patient Position: Sitting)    Pulse 60    Temp 97.5 °F (36.4 °C) (Oral)    Resp 17    Ht 5' 9\" (1.753 m)    Wt 229 lb 9.6 oz (104.1 kg)    SpO2 99%    BMI 33.91 kg/m2

## 2018-07-19 LAB
25(OH)D3+25(OH)D2 SERPL-MCNC: 26.3 NG/ML (ref 30–100)
ALBUMIN SERPL-MCNC: 4.4 G/DL (ref 3.5–5.5)
ALBUMIN/GLOB SERPL: 1.8 {RATIO} (ref 1.2–2.2)
ALP SERPL-CCNC: 82 IU/L (ref 39–117)
ALT SERPL-CCNC: 16 IU/L (ref 0–32)
AST SERPL-CCNC: 17 IU/L (ref 0–40)
BILIRUB SERPL-MCNC: 0.3 MG/DL (ref 0–1.2)
BUN SERPL-MCNC: 7 MG/DL (ref 6–24)
BUN/CREAT SERPL: 8 (ref 9–23)
CALCIUM SERPL-MCNC: 9.7 MG/DL (ref 8.7–10.2)
CHLORIDE SERPL-SCNC: 108 MMOL/L (ref 96–106)
CHOLEST SERPL-MCNC: 187 MG/DL (ref 100–199)
CK SERPL-CCNC: 114 U/L (ref 24–173)
CO2 SERPL-SCNC: 23 MMOL/L (ref 20–29)
CREAT SERPL-MCNC: 0.92 MG/DL (ref 0.57–1)
EST. AVERAGE GLUCOSE BLD GHB EST-MCNC: 114 MG/DL
GLOBULIN SER CALC-MCNC: 2.5 G/DL (ref 1.5–4.5)
GLUCOSE SERPL-MCNC: 88 MG/DL (ref 65–99)
HBA1C MFR BLD: 5.6 % (ref 4.8–5.6)
HDLC SERPL-MCNC: 75 MG/DL
LDLC SERPL CALC-MCNC: 96 MG/DL (ref 0–99)
POTASSIUM SERPL-SCNC: 4.1 MMOL/L (ref 3.5–5.2)
PROT SERPL-MCNC: 6.9 G/DL (ref 6–8.5)
SODIUM SERPL-SCNC: 137 MMOL/L (ref 134–144)
T4 FREE SERPL-MCNC: 1.43 NG/DL (ref 0.82–1.77)
TRIGL SERPL-MCNC: 81 MG/DL (ref 0–149)
TSH SERPL DL<=0.005 MIU/L-ACNC: 0.73 UIU/ML (ref 0.45–4.5)
VIT B12 SERPL-MCNC: 652 PG/ML (ref 232–1245)
VLDLC SERPL CALC-MCNC: 16 MG/DL (ref 5–40)

## 2018-08-19 DIAGNOSIS — N32.89 IRRITABLE BLADDER: ICD-10-CM

## 2018-08-19 DIAGNOSIS — K27.9 PUD (PEPTIC ULCER DISEASE): ICD-10-CM

## 2018-08-19 DIAGNOSIS — M54.16 LUMBAR RADICULOPATHY: ICD-10-CM

## 2018-08-20 RX ORDER — AMITRIPTYLINE HYDROCHLORIDE 50 MG/1
TABLET, FILM COATED ORAL
Qty: 30 TAB | Refills: 5 | OUTPATIENT
Start: 2018-08-20

## 2018-08-20 RX ORDER — PANTOPRAZOLE SODIUM 40 MG/1
TABLET, DELAYED RELEASE ORAL
Qty: 30 TAB | Refills: 5 | Status: SHIPPED | OUTPATIENT
Start: 2018-08-20 | End: 2019-01-14 | Stop reason: SDUPTHER

## 2018-08-20 RX ORDER — AMITRIPTYLINE HYDROCHLORIDE 50 MG/1
50 TABLET, FILM COATED ORAL
COMMUNITY
End: 2018-12-29

## 2018-08-20 NOTE — TELEPHONE ENCOUNTER
After verifying patient's name and , writer notified patient rx for Tramadol was approved and sent to pharmacy. Patient clarified she is currently taking Amitriptyline 50 mg, but does not need a refill on medication. Patient given opportunity to ask questions and voice understanding. No further concerns at this time.

## 2018-08-20 NOTE — TELEPHONE ENCOUNTER
Medication refill authorized for pantoprazole    Please clarify the request for elavil. At her 7/10/18 visit, we documented that she was no longer taking it.

## 2018-09-12 ENCOUNTER — OFFICE VISIT (OUTPATIENT)
Dept: INTERNAL MEDICINE CLINIC | Age: 53
End: 2018-09-12

## 2018-09-12 VITALS
DIASTOLIC BLOOD PRESSURE: 74 MMHG | WEIGHT: 228.2 LBS | TEMPERATURE: 97.9 F | HEIGHT: 69 IN | BODY MASS INDEX: 33.8 KG/M2 | OXYGEN SATURATION: 100 % | RESPIRATION RATE: 17 BRPM | SYSTOLIC BLOOD PRESSURE: 129 MMHG | HEART RATE: 56 BPM

## 2018-09-12 DIAGNOSIS — E03.4 HYPOTHYROIDISM DUE TO ACQUIRED ATROPHY OF THYROID: ICD-10-CM

## 2018-09-12 DIAGNOSIS — K59.00 CONSTIPATION, UNSPECIFIED CONSTIPATION TYPE: ICD-10-CM

## 2018-09-12 DIAGNOSIS — R73.02 IGT (IMPAIRED GLUCOSE TOLERANCE): ICD-10-CM

## 2018-09-12 DIAGNOSIS — E55.9 VITAMIN D DEFICIENCY: ICD-10-CM

## 2018-09-12 DIAGNOSIS — Z23 ENCOUNTER FOR IMMUNIZATION: ICD-10-CM

## 2018-09-12 DIAGNOSIS — F41.9 ANXIETY: Primary | ICD-10-CM

## 2018-09-12 DIAGNOSIS — E78.5 HYPERLIPIDEMIA, UNSPECIFIED HYPERLIPIDEMIA TYPE: ICD-10-CM

## 2018-09-12 DIAGNOSIS — M47.817 DJD (DEGENERATIVE JOINT DISEASE), LUMBOSACRAL: ICD-10-CM

## 2018-09-12 RX ORDER — ALPRAZOLAM 0.5 MG/1
0.5 TABLET ORAL
Qty: 15 TAB | Refills: 0 | Status: SHIPPED | OUTPATIENT
Start: 2018-09-12 | End: 2019-10-25 | Stop reason: SDUPTHER

## 2018-09-12 NOTE — PROGRESS NOTES
HPI: 
Presents for f/u mood, constipation The increase in zoloft has helped Less xanax use Mood better - pt pleased Constipation improved with mag citrate Pt has made diet changes + stool softener Requests DMV placard renewal 
 
Past medical, Social, and Family history reviewed Prior to Admission medications Medication Sig Start Date End Date Taking? Authorizing Provider ALPRAZolam (XANAX) 0.5 mg tablet Take 1 Tab by mouth every eight (8) hours as needed for Anxiety for up to 15 doses. Max Daily Amount: 1.5 mg. 9/12/18  Yes Tylor Camejo MD  
pantoprazole (PROTONIX) 40 mg tablet TAKE 1 TAB BY MOUTH DAILY. 8/20/18  Yes Tylor Camejo MD  
amitriptyline (ELAVIL) 50 mg tablet Take 50 mg by mouth nightly. Yes Historical Provider  
traMADol (ULTRAM) 50 mg tablet TAKE 2 TABLETS BY MOUTH EVERY 8 HOURS AS NEEDED FOR PAIN 7/25/18  Yes Tylor Camejo MD  
albuterol (PROVENTIL HFA, VENTOLIN HFA, PROAIR HFA) 90 mcg/actuation inhaler Take 2 Puffs by inhalation every four (4) hours as needed for Wheezing. 7/10/18  Yes Tylor Camejo MD  
sertraline (ZOLOFT) 100 mg tablet Take 1.5 Tabs by mouth daily. 7/10/18  Yes Tylor Camejo MD  
senna-docusate (SENNA WITH DOCUSATE SODIUM) 8.6-50 mg per tablet Take 1 Tab by mouth daily. Indications: constipation 7/5/18  Yes Floyd Marinelli NP  
pravastatin (PRAVACHOL) 20 mg tablet TAKE 1 TAB BY MOUTH DAILY. 5/15/18  Yes Tylor Camejo MD  
docusate sodium (COLACE) 100 mg capsule Take 100 mg by mouth two (2) times a day. Yes Historical Provider  
pantoprazole (PROTONIX) 40 mg tablet Take 40 mg by mouth daily. take 40mg every night   Yes Historical Provider  
multivitamin, tx-iron-ca-min (THERA-M W/ IRON) 9 mg iron-400 mcg tab tablet Take 1 Tab by mouth daily.    Yes Historical Provider  
cholecalciferol (VITAMIN D3) 1,000 unit cap TAKE 3 CAPSULES DAILY 1/5/18  Yes Tylor Camejo MD  
 Erlinda Boswell henry As directed to assist with ambulation 9/18/17  Yes Buddy Campos MD  
polyethylene glycol [facility]) 17 gram/dose powder Take 17 g by mouth daily. Constipation 8/14/17  Yes Buddy Campos MD  
acetaminophen (TYLENOL EXTRA STRENGTH) 500 mg tablet Take 500 mg by mouth every six (6) hours as needed for Pain. Yes Sudhir Corral, MD  
levothyroxine (SYNTHROID) 88 mcg tablet TAKE 1 TABLET BY MOUTH EVERY DAY BEFORE BREAKFAST ON A EMPTY STOMACH 9/23/18   Buddy Campos MD  
polyethylene glycol (MIRALAX) 17 gram packet Take 17 g by mouth daily. take as needed for constipation    Historical Provider ROS Complete ROS reviewed and negative or stable except as noted in HPI. Physical Exam  
Constitutional: She is oriented to person, place, and time. She appears well-nourished. No distress. HENT:  
Head: Normocephalic and atraumatic. Eyes: EOM are normal. Pupils are equal, round, and reactive to light. No scleral icterus. Neck: Normal range of motion. Neck supple. No JVD present. Cardiovascular: Normal rate, regular rhythm and normal heart sounds. Exam reveals no gallop and no friction rub. No murmur heard. Pulmonary/Chest: Effort normal and breath sounds normal. No respiratory distress. She has no wheezes. She has no rales. Abdominal: Soft. Bowel sounds are normal. She exhibits no distension. There is no tenderness. Musculoskeletal: Normal range of motion. She exhibits no edema. Lymphadenopathy:  
  She has no cervical adenopathy. Neurological: She is alert and oriented to person, place, and time. She exhibits normal muscle tone. Skin: Skin is warm. No rash noted. Psychiatric: She has a normal mood and affect. Nursing note and vitals reviewed. Prior labs reviewed. Assessment/Plan: ICD-10-CM ICD-9-CM 1. Anxiety F41.9 300.00 ALPRAZolam (XANAX) 0.5 mg tablet 2.  Encounter for immunization Z23 V03.89 INFLUENZA VIRUS VAC QUAD,SPLIT,PRESV FREE SYRINGE IM  
 3. Hypothyroidism due to acquired atrophy of thyroid E03.4 244.8   
  246.8 4. DJD (degenerative joint disease), lumbosacral M51.37 722.52   
5. Hyperlipidemia, unspecified hyperlipidemia type E78.5 272.4 6. Vitamin D deficiency E55.9 268.9   
7. IGT (impaired glucose tolerance) R73.02 790.22   
8. Constipation, unspecified constipation type K59.00 564.00 Follow-up Disposition: 
Return in about 4 months (around 1/12/2019), or if symptoms worsen or fail to improve, for cholesterol, mood. results and schedule of future studies reviewed with patient 
reviewed diet, exercise and weight   
cardiovascular risk and specific lipid/LDL goals reviewed 
reviewed medications and side effects in detail Needs DMV placard - permanent at this point Flu shot Continue current medications

## 2018-09-12 NOTE — MR AVS SNAPSHOT
[address] [place] [address] [address] [place] [address] 
[phone] Patient: Izzy Garcia MRN: [id number] :1965 Visit Information Date & Time Provider Department Dept. Phone Encounter #  
 2018  8:45 AM Blanka Linda, [address] [place] [address] and Internal Medicine [phone] [id number] Follow-up Instructions Return in about 4 months (around 2019), or if symptoms worsen or fail to improve, for cholesterol, mood. Upcoming Health Maintenance Date Due Influenza Age 5 to Adult 2018 PAP AKA CERVICAL CYTOLOGY 3/17/2019 BREAST CANCER SCRN MAMMOGRAM 2020 DTaP/Tdap/Td series (2 - Td) 2024 COLONOSCOPY 2026 Allergies as of 2018  Review Complete On: 2018 By: Blanka Linda MD  
 No Known Allergies Current Immunizations  Reviewed on 2018 Name Date Influenza Vaccine (Quad) PF  Incomplete Tdap 2014 Reviewed by Blanka Linda MD on 2018 at 10:00 AM  
You Were Diagnosed With   
  
 Codes Comments Encounter for immunization    -  Primary ICD-10-CM: S03 ICD-9-CM: V03.89 Anxiety     ICD-10-CM: F41.9 ICD-9-CM: 300.00 Vitals BP Pulse Temp Resp Height(growth percentile) Weight(growth percentile) 129/74 (BP 1 Location: Left arm, BP Patient Position: Sitting) (!) 56 97.9 °F (36.6 °C) (Oral) 17 5' 9\" (1.753 m) 228 lb 3.2 oz (103.5 kg) LMP SpO2 BMI OB Status Smoking Status 10/23/2016 (Approximate) 100% 33.7 kg/m2 Hysterectomy Never Smoker BMI and BSA Data Body Mass Index Body Surface Area 33.7 kg/m 2 2.24 m 2 Preferred Pharmacy Pharmacy Name Phone [id number] [address] [phone] Your Updated Medication List  
  
   
This list is accurate as of 18 10:06 AM.  Always use your most recent med list.  
  
  
  
  
 albuterol 90 mcg/actuation inhaler Commonly known as:  PROVENTIL HFA, VENTOLIN HFA, PROAIR HFA Take 2 Puffs by inhalation every four (4) hours as needed for Wheezing. ALPRAZolam 0.5 mg tablet Commonly known as:  Star Carp Take 1 Tab by mouth every eight (8) hours as needed for Anxiety for up to 15 doses. Max Daily Amount: 1.5 mg.  
  
 amitriptyline 50 mg tablet Commonly known as:  ELAVIL Take 50 mg by mouth nightly. Beth Yanci As directed to assist with ambulation  
  
 cholecalciferol 1,000 unit Cap Commonly known as:  VITAMIN D3  
TAKE 3 CAPSULES DAILY docusate sodium 100 mg capsule Commonly known as:  Altamont Dewey Take 100 mg by mouth two (2) times a day. levothyroxine 88 mcg tablet Commonly known as:  SYNTHROID  
TAKE 1 TABLET BY MOUTH EVERY DAY BEFORE BREAKFAST ON A EMPTY STOMACH  
  
 multivitamin, tx-iron-ca-min 9 mg iron-400 mcg Tab tablet Commonly known as:  THERA-M w/ IRON Take 1 Tab by mouth daily. * pantoprazole 40 mg tablet Commonly known as:  PROTONIX Take 40 mg by mouth daily. take 40mg every night * pantoprazole 40 mg tablet Commonly known as:  PROTONIX  
TAKE 1 TAB BY MOUTH DAILY. * polyethylene glycol 17 gram packet Commonly known as:  Nathalie Naga Take 17 g by mouth daily. take as needed for constipation * polyethylene glycol 17 gram/dose powder Commonly known as:  Nathalie Naga Take 17 g by mouth daily. Constipation  
  
 pravastatin 20 mg tablet Commonly known as:  PRAVACHOL  
TAKE 1 TAB BY MOUTH DAILY. senna-docusate 8.6-50 mg per tablet Commonly known as:  SENNA WITH DOCUSATE SODIUM Take 1 Tab by mouth daily. Indications: constipation  
  
 sertraline 100 mg tablet Commonly known as:  ZOLOFT Take 1.5 Tabs by mouth daily. traMADol 50 mg tablet Commonly known as:  ULTRAM  
TAKE 2 TABLETS BY MOUTH EVERY 8 HOURS AS NEEDED FOR PAIN  
  
 TYLENOL EXTRA STRENGTH 500 mg tablet Generic drug:  acetaminophen Take 500 mg by mouth every six (6) hours as needed for Pain. * Notice: This list has 4 medication(s) that are the same as other medications prescribed for you. Read the directions carefully, and ask your doctor or other care provider to review them with you. Prescriptions Printed Refills ALPRAZolam (XANAX) 0.5 mg tablet 0 Sig: Take 1 Tab by mouth every eight (8) hours as needed for Anxiety for up to 15 doses. Max Daily Amount: 1.5 mg.  
 Class: Print Route: Oral  
  
We Performed the Following INFLUENZA VIRUS VAC QUAD,SPLIT,PRESV FREE SYRINGE IM [id number] CPT(R)] Follow-up Instructions Return in about 4 months (around 1/12/2019), or if symptoms worsen or fail to improve, for cholesterol, mood. Introducing [facility]! Jeff Crespo introduces [employer] patient portal. Now you can access parts of your medical record, email your doctor's office, and request medication refills online. 1. In your internet browser, go to [email or web address] 2. Click on the First Time User? Click Here link in the Sign In box. You will see the New Member Sign Up page. 3. Enter your [employer]abs Access Code exactly as it appears below. You will not need to use this code after youve completed the sign-up process. If you do not sign up before the expiration date, you must request a new code. · [employer] Access Code: [email or web address] Expires: 10/16/2018  8:06 AM 
 
4. Enter the last four digits of your Social Security Number (xxxx) and Date of Birth (mm/dd/yyyy) as indicated and click Submit. You will be taken to the next sign-up page. 5. Create a [employer]entabs ID. This will be your [employer]abs login ID and cannot be changed, so think of one that is secure and easy to remember. 6. Create a [employer]abs password. You can change your password at any time. 7. Enter your Password Reset Question and Answer. This can be used at a later time if you forget your password. 8. Enter your e-mail address. You will receive e-mail notification when new information is available in [address] 9. Click Sign Up. You can now view and download portions of your medical record. 10. Click the Download Summary menu link to download a portable copy of your medical information. If you have questions, please visit the Frequently Asked Questions section of the [employer] website. Remember, [employer] is NOT to be used for urgent needs. For medical emergencies, dial 911. Now available from your iPhone and Android! Please provide this summary of care documentation to your next provider. Your primary care clinician is listed as [address] If you have any questions after today's visit, please call [phone].

## 2018-09-12 NOTE — PROGRESS NOTES
Learning Assessment 9/12/2018 PRIMARY LEARNER Patient HIGHEST LEVEL OF EDUCATION - PRIMARY LEARNER  GRADUATED HIGH SCHOOL OR GED  
BARRIERS PRIMARY LEARNER NONE  
CO-LEARNER CAREGIVER No  
PRIMARY LANGUAGE ENGLISH  
LEARNER PREFERENCE PRIMARY DEMONSTRATION  
LEARNING SPECIAL TOPICS -  
ANSWERED BY patient RELATIONSHIP SELF

## 2018-09-12 NOTE — PROGRESS NOTES
Exam Room #13 Patricia Celeste is a 46 y.o. female Chief Complaint Patient presents with  Constipation 2 month follow up  Behavioral Problem 1. Have you been to the ER, urgent care clinic since your last visit? Hospitalized since your last visit? No 
 
2. Have you seen or consulted any other health care providers outside of the [address] [place] [address] since your last visit? Include any pap smears or colon screening. Yes, [facility] Specialist with Dr. Nikole Crowell. There were no vitals taken for this visit.

## 2018-09-22 DIAGNOSIS — E03.4 HYPOTHYROIDISM DUE TO ACQUIRED ATROPHY OF THYROID: ICD-10-CM

## 2018-09-23 PROBLEM — K59.00 CONSTIPATION: Status: ACTIVE | Noted: 2018-09-23

## 2018-09-23 RX ORDER — LEVOTHYROXINE SODIUM 88 UG/1
TABLET ORAL
Qty: 30 TAB | Refills: 5 | Status: SHIPPED | OUTPATIENT
Start: 2018-09-23 | End: 2019-03-17 | Stop reason: SDUPTHER

## 2018-10-29 ENCOUNTER — DOCUMENTATION ONLY (OUTPATIENT)
Dept: INTERNAL MEDICINE CLINIC | Age: 53
End: 2018-10-29

## 2018-10-29 NOTE — PROGRESS NOTES
Pt dropped off [employer] Medical Evaluation form to be competed by pcp. Pt was notified of Turn around time for paperwork, however pt did state she did need it completed asap.  Form scanned in CC and laced into pcps bin

## 2018-11-09 RX ORDER — PRAVASTATIN SODIUM 20 MG/1
TABLET ORAL
Qty: 30 TAB | Refills: 5 | Status: SHIPPED | OUTPATIENT
Start: 2018-11-09 | End: 2019-01-21 | Stop reason: SDUPTHER

## 2018-12-15 DIAGNOSIS — N32.89 IRRITABLE BLADDER: ICD-10-CM

## 2018-12-15 DIAGNOSIS — M54.16 LUMBAR RADICULOPATHY: ICD-10-CM

## 2018-12-16 RX ORDER — AMITRIPTYLINE HYDROCHLORIDE 50 MG/1
50 TABLET, FILM COATED ORAL
Qty: 30 TAB | Refills: 5 | Status: SHIPPED | OUTPATIENT
Start: 2018-12-16

## 2018-12-23 DIAGNOSIS — E55.9 VITAMIN D DEFICIENCY: ICD-10-CM

## 2018-12-23 RX ORDER — GLUCOSAMINE SULFATE 1500 MG
POWDER IN PACKET (EA) ORAL
Qty: 90 CAP | Refills: 11 | Status: SHIPPED | OUTPATIENT
Start: 2018-12-23 | End: 2019-04-22 | Stop reason: SDUPTHER

## 2018-12-29 ENCOUNTER — OFFICE VISIT (OUTPATIENT)
Dept: URGENT CARE | Age: 53
End: 2018-12-29

## 2018-12-29 VITALS
WEIGHT: 231 LBS | OXYGEN SATURATION: 97 % | HEART RATE: 61 BPM | RESPIRATION RATE: 16 BRPM | BODY MASS INDEX: 34.21 KG/M2 | SYSTOLIC BLOOD PRESSURE: 122 MMHG | DIASTOLIC BLOOD PRESSURE: 85 MMHG | TEMPERATURE: 97.2 F | HEIGHT: 69 IN

## 2018-12-29 DIAGNOSIS — J00 ACUTE NASOPHARYNGITIS: Primary | ICD-10-CM

## 2018-12-29 RX ORDER — PROMETHAZINE HYDROCHLORIDE AND CODEINE PHOSPHATE 6.25; 1 MG/5ML; MG/5ML
5 SOLUTION ORAL
Qty: 120 ML | Refills: 0 | Status: SHIPPED | OUTPATIENT
Start: 2018-12-29 | End: 2020-09-28 | Stop reason: ALTCHOICE

## 2018-12-29 NOTE — PATIENT INSTRUCTIONS

## 2018-12-29 NOTE — PROGRESS NOTES
Two days of coughing, sneezing, sore throat and subjective fever.               Past Medical History:   Diagnosis Date    Acid reflux     Adverse effect of anesthesia     woke up coughing    Allergic rhinitis     Anxiety     Arthritis     DDD (degenerative disc disease), lumbar     DJD (degenerative joint disease), lumbosacral     GERD (gastroesophageal reflux disease)     History of nonchemical tubal occlusion     [employer] devices    Hyperlipidemia 2/20/2014    Hypothyroidism     HYPO    IGT (impaired glucose tolerance)     Ill-defined condition     prolapse uterus/states thus her intestines has collpased a little bit    Psychiatric disorder     anxiety and depression    PUD (peptic ulcer disease)     no EGD    Routine gynecological examination     [facility]    Sinus disorder     Tinea pedis     Ureterocele     small, right        Past Surgical History:   Procedure Laterality Date    COLONOSCOPY N/A 9/13/2016    COLONOSCOPY / EGD  performed by Chanelle Sevilla MD at [address] HX GYN  10/12/2012    [facility]    HX LAP CHOLECYSTECTOMY  10/06/2016    HX OTHER SURGICAL      spinal fusion     HX TOTAL VAGINAL HYSTERECTOMY      HI REMOVAL OF OVARIAN CYST(S)           Family History   Problem Relation Age of Onset    Hypertension Mother     Diabetes Mother     Heart Disease Mother     Heart Attack Mother     Cancer Father         lung    Other Sister         cholecystectomy    Cancer Brother         lung    Diabetes Maternal Aunt     Cancer Paternal Uncle         lung    Diabetes Maternal Aunt     Diabetes Maternal Aunt     Diabetes Maternal Aunt     Diabetes Maternal Aunt     Diabetes Maternal Aunt     Heart Attack Daughter 32    Other Son         narcolepsy/GERD (part of esophagus removed d/t this)    Cancer Paternal Uncle         lung    Anesth Problems Neg Hx         Social History     Socioeconomic History    Marital status: SINGLE     Spouse name: Not on file    Number of children: Not on file    Years of education: Not on file    Highest education level: Not on file   Social Needs    Financial resource strain: Not on file    Food insecurity - worry: Not on file    Food insecurity - inability: Not on file    Transportation needs - medical: Not on file   [employer] needs - non-medical: Not on file   Occupational History    Not on file   Tobacco Use    Smoking status: Never Smoker    Smokeless tobacco: Never Used   Substance and Sexual Activity    Alcohol use: No    Drug use: No    Sexual activity: Yes     Partners: Male     Birth control/protection: Implant   Other Topics Concern    Not on file   Social History Narrative    Not on file                ALLERGIES: Patient has no known allergies. Review of Systems   Constitutional: Positive for fatigue and fever. HENT: Positive for sneezing and sore throat. Respiratory: Positive for cough. Vitals:    12/29/18 1045   BP: 122/85   Pulse: 61   Resp: 16   Temp: 97.2 °F (36.2 °C)   SpO2: 97%   Weight: 231 lb (104.8 kg)   Height: 5' 9\" (1.753 m)       Physical Exam   Constitutional: She is oriented to person, place, and time. She appears well-developed and well-nourished. HENT:   Right Ear: External ear normal.   Left Ear: External ear normal.   Mouth/Throat: Oropharynx is clear and moist.   Eyes: Conjunctivae are normal.   Cardiovascular: Normal rate and regular rhythm. Pulmonary/Chest: Effort normal and breath sounds normal. She has no wheezes. She has no rales. Neurological: She is alert and oriented to person, place, and time. Skin: Skin is warm and dry. Psychiatric: She has a normal mood and affect. Her behavior is normal.   Nursing note and vitals reviewed.       MDM     Differential Diagnosis; Clinical Impression; Plan:     Common cold will treat symptoms  Progress:   Patient progress:  Stable      Procedures

## 2019-01-07 DIAGNOSIS — F41.9 ANXIETY: ICD-10-CM

## 2019-01-07 RX ORDER — SERTRALINE HYDROCHLORIDE 100 MG/1
150 TABLET, FILM COATED ORAL DAILY
Qty: 45 TAB | Refills: 5 | Status: SHIPPED | OUTPATIENT
Start: 2019-01-07 | End: 2019-07-20 | Stop reason: SDUPTHER

## 2019-01-14 ENCOUNTER — OFFICE VISIT (OUTPATIENT)
Dept: INTERNAL MEDICINE CLINIC | Age: 54
End: 2019-01-14

## 2019-01-14 VITALS
SYSTOLIC BLOOD PRESSURE: 123 MMHG | TEMPERATURE: 98.2 F | BODY MASS INDEX: 34.8 KG/M2 | HEIGHT: 69 IN | HEART RATE: 79 BPM | OXYGEN SATURATION: 98 % | RESPIRATION RATE: 16 BRPM | WEIGHT: 235 LBS | DIASTOLIC BLOOD PRESSURE: 85 MMHG

## 2019-01-14 DIAGNOSIS — K27.9 PUD (PEPTIC ULCER DISEASE): ICD-10-CM

## 2019-01-14 DIAGNOSIS — E03.4 HYPOTHYROIDISM DUE TO ACQUIRED ATROPHY OF THYROID: ICD-10-CM

## 2019-01-14 DIAGNOSIS — R13.19 ESOPHAGEAL DYSPHAGIA: Primary | ICD-10-CM

## 2019-01-14 DIAGNOSIS — M17.12 PRIMARY OSTEOARTHRITIS OF LEFT KNEE: ICD-10-CM

## 2019-01-14 DIAGNOSIS — K59.00 CONSTIPATION, UNSPECIFIED CONSTIPATION TYPE: ICD-10-CM

## 2019-01-14 DIAGNOSIS — M51.36 DDD (DEGENERATIVE DISC DISEASE), LUMBAR: ICD-10-CM

## 2019-01-14 DIAGNOSIS — F41.9 ANXIETY: ICD-10-CM

## 2019-01-14 DIAGNOSIS — M47.817 DJD (DEGENERATIVE JOINT DISEASE), LUMBOSACRAL: ICD-10-CM

## 2019-01-14 DIAGNOSIS — R73.02 IGT (IMPAIRED GLUCOSE TOLERANCE): ICD-10-CM

## 2019-01-14 DIAGNOSIS — E78.5 HYPERLIPIDEMIA, UNSPECIFIED HYPERLIPIDEMIA TYPE: ICD-10-CM

## 2019-01-14 DIAGNOSIS — E55.9 VITAMIN D DEFICIENCY: ICD-10-CM

## 2019-01-14 DIAGNOSIS — M48.062 SPINAL STENOSIS OF LUMBAR REGION WITH NEUROGENIC CLAUDICATION: ICD-10-CM

## 2019-01-14 RX ORDER — POLYETHYLENE GLYCOL 3350 17 G/17G
17 POWDER, FOR SOLUTION ORAL DAILY
Qty: 595 G | Refills: 5 | Status: SHIPPED | OUTPATIENT
Start: 2019-01-14 | End: 2020-02-10 | Stop reason: SDUPTHER

## 2019-01-14 RX ORDER — AMOXICILLIN 250 MG
1 CAPSULE ORAL DAILY
Qty: 30 TAB | Refills: 5 | Status: SHIPPED | OUTPATIENT
Start: 2019-01-14 | End: 2020-09-28 | Stop reason: SDUPTHER

## 2019-01-14 RX ORDER — PANTOPRAZOLE SODIUM 40 MG/1
TABLET, DELAYED RELEASE ORAL
Qty: 30 TAB | Refills: 5 | Status: SHIPPED | OUTPATIENT
Start: 2019-01-14 | End: 2019-10-25 | Stop reason: SDUPTHER

## 2019-01-14 NOTE — PROGRESS NOTES
HPI: 
Presents for f/u cholesterol, constipation, mood, etc 
 
Pt reports mood is stable on med Still trouble with constipation. Pt requests rec's Taking and tolerating meds. Pt reports mild intermittent dysphagia to solids Feels like it won't go down Also, +GERD sx as well 
+associated with increased belching No specific food types identified per pt. Still taking protonix. Past medical, Social, and Family history reviewed Prior to Admission medications Medication Sig Start Date End Date Taking? Authorizing Provider  
sertraline (ZOLOFT) 100 mg tablet TAKE 1.5 TABS BY MOUTH DAILY. 1/7/19  Yes Donna Baldwin MD  
cholecalciferol (VITAMIN D3) 1,000 unit cap TAKE 3 CAPSULES DAILY 12/23/18  Yes Donna Baldwin MD  
amitriptyline (ELAVIL) 50 mg tablet TAKE 1 TAB BY MOUTH NIGHTLY. 12/16/18  Yes Donna Baldwin MD  
pravastatin (PRAVACHOL) 20 mg tablet TAKE 1 TAB BY MOUTH DAILY. 11/9/18  Yes Donna Baldwin MD  
levothyroxine (SYNTHROID) 88 mcg tablet TAKE 1 TABLET BY MOUTH EVERY DAY BEFORE BREAKFAST ON A EMPTY STOMACH 9/23/18  Yes Donna Baldwin MD  
pantoprazole (PROTONIX) 40 mg tablet TAKE 1 TAB BY MOUTH DAILY. 8/20/18  Yes Donna Baldwin MD  
albuterol (PROVENTIL HFA, VENTOLIN HFA, PROAIR HFA) 90 mcg/actuation inhaler Take 2 Puffs by inhalation every four (4) hours as needed for Wheezing. 7/10/18  Yes Donna Baldwin MD  
[place] Dil[place] figueroa As directed to assist with ambulation 9/18/17  Yes Donna Baldwin MD  
promethazine-[facility] WITH CODEINE) 6.25-10 mg/5 mL syrup Take 5 mL by mouth every six (6) hours as needed for Cough. Max Daily Amount: 20 mL. 12/29/18   Cristine Mayfield MD  
ALPRAZolam Estephania Patterson) 0.5 mg tablet Take 1 Tab by mouth every eight (8) hours as needed for Anxiety for up to 15 doses.  Max Daily Amount: 1.5 mg. 9/12/18   Donna Baldwin MD  
traMADol (ULTRAM) 50 mg tablet TAKE 2 TABLETS BY MOUTH EVERY 8 HOURS AS NEEDED FOR PAIN 7/25/18   Andrez Manning MD  
  
 
 
ROS Complete ROS reviewed and negative or stable except as noted in HPI. Physical Exam  
Constitutional: She is oriented to person, place, and time. She appears well-nourished. No distress. HENT:  
Head: Normocephalic and atraumatic. Eyes: EOM are normal. Pupils are equal, round, and reactive to light. No scleral icterus. Neck: Normal range of motion. Neck supple. No JVD present. Cardiovascular: Normal rate, regular rhythm and normal heart sounds. Exam reveals no gallop and no friction rub. No murmur heard. Pulmonary/Chest: Effort normal and breath sounds normal. No respiratory distress. She has no wheezes. She has no rales. Abdominal: Soft. Bowel sounds are normal. She exhibits no distension. There is no tenderness. Musculoskeletal: Normal range of motion. She exhibits no edema. Lymphadenopathy:  
  She has no cervical adenopathy. Neurological: She is alert and oriented to person, place, and time. She exhibits normal muscle tone. Skin: Skin is warm. No rash noted. Psychiatric: She has a normal mood and affect. Nursing note and vitals reviewed. Prior labs reviewed. Assessment/Plan: ICD-10-CM ICD-9-CM 1. Esophageal dysphagia R13.10 787.20 REFERRAL TO GASTROENTEROLOGY 2. Vitamin D deficiency E55.9 268.9 VITAMIN D, 25 HYDROXY 3. Primary osteoarthritis of left knee M17.12 715.16 CBC WITH AUTOMATED DIFF  
4. IGT (impaired glucose tolerance) R73.02 790.22 HEMOGLOBIN A1C WITH EAG  
5. Hypothyroidism due to acquired atrophy of thyroid E03.4 244.8 TSH 3RD GENERATION  
  246.8 T4, FREE 6. Hyperlipidemia, unspecified hyperlipidemia type E78.5 272.4 CK  
   LIPID PANEL  
   METABOLIC PANEL, COMPREHENSIVE 7. DJD (degenerative joint disease), lumbosacral M51.37 722.52   
8. DDD (degenerative disc disease), lumbar M51.36 722.52   
9.  Constipation, unspecified constipation type K59.00 564.00 senna-docusate (SENNA WITH DOCUSATE SODIUM) 8.6-50 mg per tablet  
   polyethylene glycol (MIRALAX) 17 gram/dose powder 10. Anxiety F41.9 300.00   
11. Spinal stenosis of lumbar region with neurogenic claudication M48.062 724.03   
12. PUD (peptic ulcer disease) K27.9 533.90 REFERRAL TO GASTROENTEROLOGY  
   pantoprazole (PROTONIX) 40 mg tablet Follow-up Disposition: 
Return in about 3 months (around 4/14/2019), or if symptoms worsen or fail to improve, for GI follow up. results and schedule of future studies reviewed with patient 
reviewed diet, exercise and weight   
cardiovascular risk and specific lipid/LDL goals reviewed 
reviewed medications and side effects in detail Refill miralax + senna Discussed self adjustment to find regular bowel regimen Continue PPI See GI - consider EGD Return for fasting labs Encouraged to check on Shingrix at the pharmacy

## 2019-01-14 NOTE — PROGRESS NOTES
RM 14 Chief Complaint Patient presents with  Cholesterol Problem  
  chronic condition f/u 1. Have you been to the ER, urgent care clinic since your last visit? [facility] since your last visit? [facility]. Have you seen or consulted any other health care providers outside of the [address] [place] [address] since your last visit? Include any pap smears or colon screening.  No

## 2019-01-17 ENCOUNTER — LAB ONLY (OUTPATIENT)
Dept: INTERNAL MEDICINE CLINIC | Age: 54
End: 2019-01-17

## 2019-01-17 DIAGNOSIS — M17.12 PRIMARY OSTEOARTHRITIS OF LEFT KNEE: ICD-10-CM

## 2019-01-17 DIAGNOSIS — E55.9 VITAMIN D DEFICIENCY: ICD-10-CM

## 2019-01-17 DIAGNOSIS — R73.02 IGT (IMPAIRED GLUCOSE TOLERANCE): ICD-10-CM

## 2019-01-17 DIAGNOSIS — E78.5 HYPERLIPIDEMIA, UNSPECIFIED HYPERLIPIDEMIA TYPE: ICD-10-CM

## 2019-01-17 DIAGNOSIS — E03.4 HYPOTHYROIDISM DUE TO ACQUIRED ATROPHY OF THYROID: ICD-10-CM

## 2019-01-18 LAB
25(OH)D3+25(OH)D2 SERPL-MCNC: 29.7 NG/ML (ref 30–100)
ALBUMIN SERPL-MCNC: 4.7 G/DL (ref 3.5–5.5)
ALBUMIN/GLOB SERPL: 1.7 {RATIO} (ref 1.2–2.2)
ALP SERPL-CCNC: 81 IU/L (ref 39–117)
ALT SERPL-CCNC: 19 IU/L (ref 0–32)
AST SERPL-CCNC: 14 IU/L (ref 0–40)
BASOPHILS # BLD AUTO: 0 X10E3/UL (ref 0–0.2)
BASOPHILS NFR BLD AUTO: 1 %
BILIRUB SERPL-MCNC: 0.6 MG/DL (ref 0–1.2)
BUN SERPL-MCNC: 8 MG/DL (ref 6–24)
BUN/CREAT SERPL: 8 (ref 9–23)
CALCIUM SERPL-MCNC: 9.9 MG/DL (ref 8.7–10.2)
CHLORIDE SERPL-SCNC: 101 MMOL/L (ref 96–106)
CHOLEST SERPL-MCNC: 234 MG/DL (ref 100–199)
CK SERPL-CCNC: 94 U/L (ref 24–173)
CO2 SERPL-SCNC: 20 MMOL/L (ref 20–29)
CREAT SERPL-MCNC: 0.97 MG/DL (ref 0.57–1)
EOSINOPHIL # BLD AUTO: 0.2 X10E3/UL (ref 0–0.4)
EOSINOPHIL NFR BLD AUTO: 3 %
ERYTHROCYTE [DISTWIDTH] IN BLOOD BY AUTOMATED COUNT: 15.3 % (ref 12.3–15.4)
EST. AVERAGE GLUCOSE BLD GHB EST-MCNC: 120 MG/DL
GLOBULIN SER CALC-MCNC: 2.7 G/DL (ref 1.5–4.5)
GLUCOSE SERPL-MCNC: 95 MG/DL (ref 65–99)
HBA1C MFR BLD: 5.8 % (ref 4.8–5.6)
HCT VFR BLD AUTO: 43.1 % (ref 34–46.6)
HDLC SERPL-MCNC: 78 MG/DL
HGB BLD-MCNC: 14.3 G/DL (ref 11.1–15.9)
IMM GRANULOCYTES # BLD AUTO: 0 X10E3/UL (ref 0–0.1)
IMM GRANULOCYTES NFR BLD AUTO: 0 %
LDLC SERPL CALC-MCNC: 130 MG/DL (ref 0–99)
LYMPHOCYTES # BLD AUTO: 2 X10E3/UL (ref 0.7–3.1)
LYMPHOCYTES NFR BLD AUTO: 35 %
MCH RBC QN AUTO: 30.3 PG (ref 26.6–33)
MCHC RBC AUTO-ENTMCNC: 33.2 G/DL (ref 31.5–35.7)
MCV RBC AUTO: 91 FL (ref 79–97)
MONOCYTES # BLD AUTO: 0.3 X10E3/UL (ref 0.1–0.9)
MONOCYTES NFR BLD AUTO: 5 %
NEUTROPHILS # BLD AUTO: 3.2 X10E3/UL (ref 1.4–7)
NEUTROPHILS NFR BLD AUTO: 56 %
PLATELET # BLD AUTO: 241 X10E3/UL (ref 150–379)
POTASSIUM SERPL-SCNC: 4.5 MMOL/L (ref 3.5–5.2)
PROT SERPL-MCNC: 7.4 G/DL (ref 6–8.5)
RBC # BLD AUTO: 4.72 X10E6/UL (ref 3.77–5.28)
SODIUM SERPL-SCNC: 141 MMOL/L (ref 134–144)
T4 FREE SERPL-MCNC: 1.1 NG/DL (ref 0.82–1.77)
TRIGL SERPL-MCNC: 131 MG/DL (ref 0–149)
TSH SERPL DL<=0.005 MIU/L-ACNC: 1.15 UIU/ML (ref 0.45–4.5)
VLDLC SERPL CALC-MCNC: 26 MG/DL (ref 5–40)
WBC # BLD AUTO: 5.7 X10E3/UL (ref 3.4–10.8)

## 2019-01-21 DIAGNOSIS — E78.5 HYPERLIPIDEMIA, UNSPECIFIED HYPERLIPIDEMIA TYPE: Primary | ICD-10-CM

## 2019-01-21 RX ORDER — PRAVASTATIN SODIUM 40 MG/1
40 TABLET ORAL
Qty: 30 TAB | Refills: 5 | Status: SHIPPED | OUTPATIENT
Start: 2019-01-21 | End: 2019-05-07 | Stop reason: SDUPTHER

## 2019-01-22 NOTE — PROGRESS NOTES
Please notify pt of results    LDL cholesterol has increased to 130. Increase the pravastatin to 40 mg daily. Repeat labs at or around your follow up appt 4/16/19. Vitamin D is a little low - increase daily vitamin D by 1000 units per day. HgbA1C is back into the pre-diabetes range. Work on diet.   Continue your other current medications

## 2019-02-24 ENCOUNTER — ED HISTORICAL/CONVERTED ENCOUNTER (OUTPATIENT)
Dept: OTHER | Age: 54
End: 2019-02-24

## 2019-03-03 DIAGNOSIS — K27.9 PUD (PEPTIC ULCER DISEASE): ICD-10-CM

## 2019-03-03 RX ORDER — PANTOPRAZOLE SODIUM 40 MG/1
TABLET, DELAYED RELEASE ORAL
Qty: 30 TAB | Refills: 5 | Status: SHIPPED | OUTPATIENT
Start: 2019-03-03 | End: 2019-10-25 | Stop reason: SDUPTHER

## 2019-03-07 ENCOUNTER — DOCUMENTATION ONLY (OUTPATIENT)
Dept: INTERNAL MEDICINE CLINIC | Age: 54
End: 2019-03-07

## 2019-03-07 NOTE — PROGRESS NOTES
PA for Pantoprazole 40 mg started,     Cover my med response: Your information has been sent to [facility]. Agnieszka has not yet replied to your PA request. You may close this dialog, return to your dashboard, and perform other tasks. To check for an update later, open this request again from your dashboard. If Agnieszka has not replied to your request within 24 hours please contact Agnieszka at [phone].       [place]: Johanna Guerrero

## 2019-03-17 DIAGNOSIS — E03.4 HYPOTHYROIDISM DUE TO ACQUIRED ATROPHY OF THYROID: ICD-10-CM

## 2019-03-18 RX ORDER — LEVOTHYROXINE SODIUM 88 UG/1
TABLET ORAL
Qty: 30 TAB | Refills: 5 | Status: SHIPPED | OUTPATIENT
Start: 2019-03-18 | End: 2019-09-20 | Stop reason: SDUPTHER

## 2019-04-01 ENCOUNTER — OP HISTORICAL/CONVERTED ENCOUNTER (OUTPATIENT)
Dept: OTHER | Age: 54
End: 2019-04-01

## 2019-04-05 ENCOUNTER — DOCUMENTATION ONLY (OUTPATIENT)
Dept: INTERNAL MEDICINE CLINIC | Age: 54
End: 2019-04-05

## 2019-04-05 NOTE — PROGRESS NOTES
TAN[employer] forms dropped for patient. Scanned into media and placed in bin for completion.  Pt notified that Dr. Zahira Castro is out of the office until 4.8.19

## 2019-04-16 ENCOUNTER — OFFICE VISIT (OUTPATIENT)
Dept: INTERNAL MEDICINE CLINIC | Age: 54
End: 2019-04-16

## 2019-04-16 VITALS
HEIGHT: 69 IN | OXYGEN SATURATION: 100 % | HEART RATE: 65 BPM | WEIGHT: 232 LBS | DIASTOLIC BLOOD PRESSURE: 74 MMHG | RESPIRATION RATE: 16 BRPM | BODY MASS INDEX: 34.36 KG/M2 | TEMPERATURE: 98 F | SYSTOLIC BLOOD PRESSURE: 120 MMHG

## 2019-04-16 DIAGNOSIS — E03.4 HYPOTHYROIDISM DUE TO ACQUIRED ATROPHY OF THYROID: ICD-10-CM

## 2019-04-16 DIAGNOSIS — F41.9 ANXIETY: ICD-10-CM

## 2019-04-16 DIAGNOSIS — E78.5 HYPERLIPIDEMIA, UNSPECIFIED HYPERLIPIDEMIA TYPE: ICD-10-CM

## 2019-04-16 DIAGNOSIS — M51.36 DDD (DEGENERATIVE DISC DISEASE), LUMBAR: ICD-10-CM

## 2019-04-16 DIAGNOSIS — M47.817 DJD (DEGENERATIVE JOINT DISEASE), LUMBOSACRAL: ICD-10-CM

## 2019-04-16 DIAGNOSIS — K59.00 CONSTIPATION, UNSPECIFIED CONSTIPATION TYPE: ICD-10-CM

## 2019-04-16 DIAGNOSIS — M48.062 SPINAL STENOSIS OF LUMBAR REGION WITH NEUROGENIC CLAUDICATION: Primary | ICD-10-CM

## 2019-04-16 DIAGNOSIS — M17.12 PRIMARY OSTEOARTHRITIS OF LEFT KNEE: ICD-10-CM

## 2019-04-16 DIAGNOSIS — M54.16 LUMBAR RADICULOPATHY: ICD-10-CM

## 2019-04-16 DIAGNOSIS — R73.02 IGT (IMPAIRED GLUCOSE TOLERANCE): ICD-10-CM

## 2019-04-16 NOTE — PROGRESS NOTES
Rm 14 Chief Complaint Patient presents with  GI Problem  
  pt states she feels better 1. Have you been to the ER, urgent care clinic since your last visit? Hospitalized since your last visit? No 
 
2. Have you seen or consulted any other health care providers outside of the [address] [place] [address] since your last visit? Include any pap smears or colon screening. No 
 
Health Maintenance Due Topic Date Due  Shingrix Vaccine Age 50> (1 of 2) 01/18/2014  
 
3 most recent PHQ Screens 1/14/2019 Little interest or pleasure in doing things Not at all Feeling down, depressed, irritable, or hopeless Not at all Total Score PHQ 2 0 Trouble falling or staying asleep, or sleeping too much - Feeling tired or having little energy - Poor appetite, weight loss, or overeating - Feeling bad about yourself - or that you are a failure or have let yourself or your family down - Trouble concentrating on things such as school, work, reading, or watching TV - Moving or speaking so slowly that other people could have noticed; or the opposite being so fidgety that others notice - Thoughts of being better off dead, or hurting yourself in some way -  
PHQ 9 Score - How difficult have these problems made it for you to do your work, take care of your home and get along with others - Learning Assessment 9/12/2018 PRIMARY LEARNER Patient HIGHEST LEVEL OF EDUCATION - PRIMARY LEARNER  GRADUATED HIGH SCHOOL OR GED  
BARRIERS PRIMARY LEARNER NONE  
CO-LEARNER CAREGIVER No  
PRIMARY LANGUAGE ENGLISH  
LEARNER PREFERENCE PRIMARY DEMONSTRATION  
LEARNING SPECIAL TOPICS -  
ANSWERED BY patient RELATIONSHIP SELF

## 2019-04-16 NOTE — PROGRESS NOTES
HPI: 
Presents for f/u spinal stenosis, GI, etc. 
 
Pt seeing ortho spine - Dr. Nikole Crowell Pt reports S1 degeneration requiring surgery to replace bone or graft to provide support Pt has disability forms to be filled out. Pt's functional status reviewed in detail in relation to SSI forms. Other concerns are at least stable. Past medical, Social, and Family history reviewed Prior to Admission medications Medication Sig Start Date End Date Taking? Authorizing Provider  
pantoprazole (PROTONIX) 40 mg tablet TAKE 1 TAB BY MOUTH DAILY. 3/3/19  Yes Tresa Landin MD  
pravastatin (PRAVACHOL) 40 mg tablet Take 1 Tab by mouth nightly. 1/21/19  Yes Tresa Landin MD  
senna-docusate (SENNA WITH DOCUSATE SODIUM) 8.6-50 mg per tablet Take 1 Tab by mouth daily. 1/14/19  Yes Tresa Landin MD  
polyethylene glycol [facility]) 17 gram/dose powder Take 17 g by mouth daily. 1/14/19  Yes Tresa Landin MD  
pantoprazole (PROTONIX) 40 mg tablet TAKE 1 TAB BY MOUTH DAILY. 1/14/19  Yes Tresa Landin MD  
sertraline (ZOLOFT) 100 mg tablet TAKE 1.5 TABS BY MOUTH DAILY. 1/7/19  Yes Tresa Landin MD  
promethazine-codeine (PHENERGAN WITH CODEINE) 6.25-10 mg/5 mL syrup Take 5 mL by mouth every six (6) hours as needed for Cough. Max Daily Amount: 20 mL. 12/29/18  Yes Matias Coulter MD  
cholecalciferol (VITAMIN D3) 1,000 unit cap TAKE 3 CAPSULES DAILY 12/23/18  Yes Tresa Landin MD  
amitriptyline (ELAVIL) 50 mg tablet TAKE 1 TAB BY MOUTH NIGHTLY. 12/16/18  Yes Tresa Landin MD  
ALPRAZolam Geoffry Chill) 0.5 mg tablet Take 1 Tab by mouth every eight (8) hours as needed for Anxiety for up to 15 doses.  Max Daily Amount: 1.5 mg. 9/12/18  Yes Tresa Landin MD  
traMADol (ULTRAM) 50 mg tablet TAKE 2 TABLETS BY MOUTH EVERY 8 HOURS AS NEEDED FOR PAIN 7/25/18  Yes Tresa Landin MD  
albuterol (PROVENTIL HFA, VENTOLIN HFA, PROAIR HFA) 90 mcg/actuation inhaler Take 2 Puffs by inhalation every four (4) hours as needed for Wheezing. 7/10/18  Yes Constance Pina MD  
Rosalee figueroa As directed to assist with ambulation 9/18/17  Yes Constance Pina MD  
levothyroxine (SYNTHROID) 88 mcg tablet TAKE 1 TABLET BY MOUTH EVERY DAY BEFORE BREAKFAST ON A EMPTY STOMACH 3/18/19   Constance Pina MD  
  
 
 
ROS Complete ROS reviewed and negative or stable except as noted in HPI. Physical Exam  
Constitutional: She is oriented to person, place, and time. She appears well-nourished. No distress. HENT:  
Head: Normocephalic and atraumatic. Eyes: Pupils are equal, round, and reactive to light. EOM are normal. No scleral icterus. Neck: Normal range of motion. Neck supple. No JVD present. Cardiovascular: Normal rate, regular rhythm and normal heart sounds. Exam reveals no gallop and no friction rub. No murmur heard. Pulmonary/Chest: Effort normal and breath sounds normal. No respiratory distress. She has no wheezes. She has no rales. Abdominal: Soft. Bowel sounds are normal. She exhibits no distension. There is no tenderness. Musculoskeletal: Normal range of motion. She exhibits no edema. Lymphadenopathy:  
  She has no cervical adenopathy. Neurological: She is alert and oriented to person, place, and time. She exhibits normal muscle tone. Skin: Skin is warm. No rash noted. Psychiatric: She has a normal mood and affect. Nursing note and vitals reviewed. Prior labs reviewed. Assessment/Plan: ICD-10-CM ICD-9-CM 1. Spinal stenosis of lumbar region with neurogenic claudication M48.062 724.03   
2. IGT (impaired glucose tolerance) R73.02 790.22   
3. Anxiety F41.9 300.00   
4. DJD (degenerative joint disease), lumbosacral M51.37 722.52   
5. DDD (degenerative disc disease), lumbar M51.36 722.52   
6. Hyperlipidemia, unspecified hyperlipidemia type E78.5 272.4 7. Lumbar radiculopathy M54.16 724.4 8. Constipation, unspecified constipation type K59.00 564.00   
9. Hypothyroidism due to acquired atrophy of thyroid E03.4 244.8   
  246.8 10. Primary osteoarthritis of left knee M17.12 715.16 Follow-up and Dispositions · Return in about 3 months (around 7/16/2019), or if symptoms worsen or fail to improve, for cholesterol, thyroid. results and schedule of future studies reviewed with patient 
reviewed diet, exercise and weight  
cardiovascular risk and specific lipid/LDL goals reviewed 
reviewed medications and side effects in detail Filled out forms and reviewed functional status >40 minutes time spent with >50% in counseling and coordination of care

## 2019-04-22 ENCOUNTER — TELEPHONE (OUTPATIENT)
Dept: INTERNAL MEDICINE CLINIC | Age: 54
End: 2019-04-22

## 2019-04-22 DIAGNOSIS — K27.9 PUD (PEPTIC ULCER DISEASE): ICD-10-CM

## 2019-04-22 DIAGNOSIS — E03.9 ACQUIRED HYPOTHYROIDISM: ICD-10-CM

## 2019-04-22 DIAGNOSIS — E55.9 VITAMIN D DEFICIENCY: Primary | ICD-10-CM

## 2019-04-22 DIAGNOSIS — E55.9 VITAMIN D DEFICIENCY: ICD-10-CM

## 2019-04-22 DIAGNOSIS — E78.2 MIXED HYPERLIPIDEMIA: ICD-10-CM

## 2019-04-22 DIAGNOSIS — R73.02 IGT (IMPAIRED GLUCOSE TOLERANCE): ICD-10-CM

## 2019-04-22 RX ORDER — GLUCOSAMINE SULFATE 1500 MG
POWDER IN PACKET (EA) ORAL
Qty: 90 CAP | Refills: 11 | Status: SHIPPED | OUTPATIENT
Start: 2019-04-22 | End: 2020-07-10 | Stop reason: DRUGHIGH

## 2019-04-22 NOTE — TELEPHONE ENCOUNTER
Pt states she tried calling refill in to pharmacy, but it wouldn't let her do it. Pt our of Rx. Medication refill request:    Last Office Visit: 4/16/19  Next Office Visit:    Future Appointments   Date Time Provider Virgie Krys   7/18/2019 11:00 AM Jesus Schmitz MD [facility] verified. yes    Patient requesting 90 day supply.

## 2019-05-02 LAB
25(OH)D3+25(OH)D2 SERPL-MCNC: 46.9 NG/ML (ref 30–100)
ALBUMIN SERPL-MCNC: 4.4 G/DL (ref 3.5–5.5)
ALBUMIN/GLOB SERPL: 1.7 {RATIO} (ref 1.2–2.2)
ALP SERPL-CCNC: 71 IU/L (ref 39–117)
ALT SERPL-CCNC: 18 IU/L (ref 0–32)
AST SERPL-CCNC: 18 IU/L (ref 0–40)
BASOPHILS # BLD AUTO: 0 X10E3/UL (ref 0–0.2)
BASOPHILS NFR BLD AUTO: 1 %
BILIRUB SERPL-MCNC: 0.5 MG/DL (ref 0–1.2)
BUN SERPL-MCNC: 7 MG/DL (ref 6–24)
BUN/CREAT SERPL: 8 (ref 9–23)
CALCIUM SERPL-MCNC: 9.7 MG/DL (ref 8.7–10.2)
CHLORIDE SERPL-SCNC: 105 MMOL/L (ref 96–106)
CHOLEST SERPL-MCNC: 176 MG/DL (ref 100–199)
CK SERPL-CCNC: 103 U/L (ref 24–173)
CO2 SERPL-SCNC: 24 MMOL/L (ref 20–29)
CREAT SERPL-MCNC: 0.89 MG/DL (ref 0.57–1)
EOSINOPHIL # BLD AUTO: 0.1 X10E3/UL (ref 0–0.4)
EOSINOPHIL NFR BLD AUTO: 2 %
ERYTHROCYTE [DISTWIDTH] IN BLOOD BY AUTOMATED COUNT: 15.1 % (ref 12.3–15.4)
EST. AVERAGE GLUCOSE BLD GHB EST-MCNC: 120 MG/DL
GLOBULIN SER CALC-MCNC: 2.6 G/DL (ref 1.5–4.5)
GLUCOSE SERPL-MCNC: 95 MG/DL (ref 65–99)
HBA1C MFR BLD: 5.8 % (ref 4.8–5.6)
HCT VFR BLD AUTO: 41 % (ref 34–46.6)
HDLC SERPL-MCNC: 82 MG/DL
HGB BLD-MCNC: 13.4 G/DL (ref 11.1–15.9)
IMM GRANULOCYTES # BLD AUTO: 0 X10E3/UL (ref 0–0.1)
IMM GRANULOCYTES NFR BLD AUTO: 0 %
LDLC SERPL CALC-MCNC: 77 MG/DL (ref 0–99)
LYMPHOCYTES # BLD AUTO: 1.9 X10E3/UL (ref 0.7–3.1)
LYMPHOCYTES NFR BLD AUTO: 35 %
MCH RBC QN AUTO: 29.5 PG (ref 26.6–33)
MCHC RBC AUTO-ENTMCNC: 32.7 G/DL (ref 31.5–35.7)
MCV RBC AUTO: 90 FL (ref 79–97)
MONOCYTES # BLD AUTO: 0.3 X10E3/UL (ref 0.1–0.9)
MONOCYTES NFR BLD AUTO: 6 %
NEUTROPHILS # BLD AUTO: 3.1 X10E3/UL (ref 1.4–7)
NEUTROPHILS NFR BLD AUTO: 56 %
PLATELET # BLD AUTO: 225 X10E3/UL (ref 150–379)
POTASSIUM SERPL-SCNC: 4 MMOL/L (ref 3.5–5.2)
PROT SERPL-MCNC: 7 G/DL (ref 6–8.5)
RBC # BLD AUTO: 4.55 X10E6/UL (ref 3.77–5.28)
SODIUM SERPL-SCNC: 142 MMOL/L (ref 134–144)
T4 FREE SERPL-MCNC: 1.48 NG/DL (ref 0.82–1.77)
TRIGL SERPL-MCNC: 86 MG/DL (ref 0–149)
TSH SERPL DL<=0.005 MIU/L-ACNC: 0.94 UIU/ML (ref 0.45–4.5)
VLDLC SERPL CALC-MCNC: 17 MG/DL (ref 5–40)
WBC # BLD AUTO: 5.5 X10E3/UL (ref 3.4–10.8)

## 2019-05-07 ENCOUNTER — DOCUMENTATION ONLY (OUTPATIENT)
Dept: INTERNAL MEDICINE CLINIC | Age: 54
End: 2019-05-07

## 2019-05-07 ENCOUNTER — TELEPHONE (OUTPATIENT)
Dept: INTERNAL MEDICINE CLINIC | Age: 54
End: 2019-05-07

## 2019-05-07 RX ORDER — PRAVASTATIN SODIUM 40 MG/1
40 TABLET ORAL
Qty: 30 TAB | Refills: 5 | Status: SHIPPED | OUTPATIENT
Start: 2019-05-07 | End: 2019-10-25 | Stop reason: SDUPTHER

## 2019-05-07 NOTE — TELEPHONE ENCOUNTER
Left detailed message on secure VM in regards to lab results and providers recommendations. Instructed to contact office back with any questions or concerns.

## 2019-05-07 NOTE — TELEPHONE ENCOUNTER
Pt had lab work done 5/1/19, pt wants to know if she should continue taking Vit D rx?   Please call pt - ph#: [phone]

## 2019-05-09 ENCOUNTER — ED HISTORICAL/CONVERTED ENCOUNTER (OUTPATIENT)
Dept: OTHER | Age: 54
End: 2019-05-09

## 2019-07-20 DIAGNOSIS — F41.9 ANXIETY: ICD-10-CM

## 2019-07-21 RX ORDER — SERTRALINE HYDROCHLORIDE 100 MG/1
150 TABLET, FILM COATED ORAL DAILY
Qty: 45 TAB | Refills: 5 | Status: SHIPPED | OUTPATIENT
Start: 2019-07-21 | End: 2020-07-10

## 2019-10-18 DIAGNOSIS — E55.9 VITAMIN D DEFICIENCY: Primary | ICD-10-CM

## 2019-10-18 DIAGNOSIS — E78.2 MIXED HYPERLIPIDEMIA: ICD-10-CM

## 2019-10-18 DIAGNOSIS — E03.4 HYPOTHYROIDISM DUE TO ACQUIRED ATROPHY OF THYROID: ICD-10-CM

## 2019-10-18 DIAGNOSIS — R73.02 IGT (IMPAIRED GLUCOSE TOLERANCE): ICD-10-CM

## 2019-10-22 LAB
25(OH)D3+25(OH)D2 SERPL-MCNC: 34 NG/ML (ref 30–100)
ALBUMIN SERPL-MCNC: 4.4 G/DL (ref 3.5–5.5)
ALBUMIN/GLOB SERPL: 1.6 {RATIO} (ref 1.2–2.2)
ALP SERPL-CCNC: 73 IU/L (ref 39–117)
ALT SERPL-CCNC: 17 IU/L (ref 0–32)
AST SERPL-CCNC: 14 IU/L (ref 0–40)
BASOPHILS # BLD AUTO: 0 X10E3/UL (ref 0–0.2)
BASOPHILS NFR BLD AUTO: 1 %
BILIRUB SERPL-MCNC: 0.5 MG/DL (ref 0–1.2)
BUN SERPL-MCNC: 9 MG/DL (ref 6–24)
BUN/CREAT SERPL: 11 (ref 9–23)
CALCIUM SERPL-MCNC: 10.2 MG/DL (ref 8.7–10.2)
CHLORIDE SERPL-SCNC: 104 MMOL/L (ref 96–106)
CHOLEST SERPL-MCNC: 200 MG/DL (ref 100–199)
CK SERPL-CCNC: 101 U/L (ref 24–173)
CO2 SERPL-SCNC: 25 MMOL/L (ref 20–29)
CREAT SERPL-MCNC: 0.83 MG/DL (ref 0.57–1)
EOSINOPHIL # BLD AUTO: 0.1 X10E3/UL (ref 0–0.4)
EOSINOPHIL NFR BLD AUTO: 2 %
ERYTHROCYTE [DISTWIDTH] IN BLOOD BY AUTOMATED COUNT: 14.5 % (ref 12.3–15.4)
EST. AVERAGE GLUCOSE BLD GHB EST-MCNC: 117 MG/DL
GLOBULIN SER CALC-MCNC: 2.8 G/DL (ref 1.5–4.5)
GLUCOSE SERPL-MCNC: 81 MG/DL (ref 65–99)
HBA1C MFR BLD: 5.7 % (ref 4.8–5.6)
HCT VFR BLD AUTO: 43.9 % (ref 34–46.6)
HDLC SERPL-MCNC: 91 MG/DL
HGB BLD-MCNC: 14.3 G/DL (ref 11.1–15.9)
IMM GRANULOCYTES # BLD AUTO: 0 X10E3/UL (ref 0–0.1)
IMM GRANULOCYTES NFR BLD AUTO: 0 %
LDLC SERPL CALC-MCNC: 91 MG/DL (ref 0–99)
LYMPHOCYTES # BLD AUTO: 2 X10E3/UL (ref 0.7–3.1)
LYMPHOCYTES NFR BLD AUTO: 37 %
MCH RBC QN AUTO: 29.1 PG (ref 26.6–33)
MCHC RBC AUTO-ENTMCNC: 32.6 G/DL (ref 31.5–35.7)
MCV RBC AUTO: 89 FL (ref 79–97)
MONOCYTES # BLD AUTO: 0.3 X10E3/UL (ref 0.1–0.9)
MONOCYTES NFR BLD AUTO: 6 %
NEUTROPHILS # BLD AUTO: 3 X10E3/UL (ref 1.4–7)
NEUTROPHILS NFR BLD AUTO: 54 %
PLATELET # BLD AUTO: 199 X10E3/UL (ref 150–450)
POTASSIUM SERPL-SCNC: 4.1 MMOL/L (ref 3.5–5.2)
PROT SERPL-MCNC: 7.2 G/DL (ref 6–8.5)
RBC # BLD AUTO: 4.91 X10E6/UL (ref 3.77–5.28)
SODIUM SERPL-SCNC: 144 MMOL/L (ref 134–144)
T4 FREE SERPL-MCNC: 1.41 NG/DL (ref 0.82–1.77)
TRIGL SERPL-MCNC: 90 MG/DL (ref 0–149)
TSH SERPL DL<=0.005 MIU/L-ACNC: 1.5 UIU/ML (ref 0.45–4.5)
VLDLC SERPL CALC-MCNC: 18 MG/DL (ref 5–40)
WBC # BLD AUTO: 5.5 X10E3/UL (ref 3.4–10.8)

## 2019-10-25 ENCOUNTER — OFFICE VISIT (OUTPATIENT)
Dept: INTERNAL MEDICINE CLINIC | Age: 54
End: 2019-10-25

## 2019-10-25 VITALS
SYSTOLIC BLOOD PRESSURE: 94 MMHG | WEIGHT: 223 LBS | TEMPERATURE: 97.6 F | OXYGEN SATURATION: 99 % | HEART RATE: 49 BPM | HEIGHT: 69 IN | DIASTOLIC BLOOD PRESSURE: 62 MMHG | RESPIRATION RATE: 14 BRPM | BODY MASS INDEX: 33.03 KG/M2

## 2019-10-25 DIAGNOSIS — R73.02 IGT (IMPAIRED GLUCOSE TOLERANCE): ICD-10-CM

## 2019-10-25 DIAGNOSIS — E03.4 HYPOTHYROIDISM DUE TO ACQUIRED ATROPHY OF THYROID: ICD-10-CM

## 2019-10-25 DIAGNOSIS — K27.9 PUD (PEPTIC ULCER DISEASE): ICD-10-CM

## 2019-10-25 DIAGNOSIS — F41.9 ANXIETY: ICD-10-CM

## 2019-10-25 DIAGNOSIS — E78.2 MIXED HYPERLIPIDEMIA: Primary | ICD-10-CM

## 2019-10-25 RX ORDER — PRAVASTATIN SODIUM 40 MG/1
40 TABLET ORAL
Qty: 30 TAB | Refills: 5 | Status: SHIPPED | OUTPATIENT
Start: 2019-10-25 | End: 2020-06-01 | Stop reason: SDUPTHER

## 2019-10-25 RX ORDER — PANTOPRAZOLE SODIUM 40 MG/1
40 TABLET, DELAYED RELEASE ORAL DAILY
Qty: 30 TAB | Refills: 5 | Status: SHIPPED | OUTPATIENT
Start: 2019-10-25 | End: 2020-07-10 | Stop reason: SDUPTHER

## 2019-10-25 RX ORDER — ALPRAZOLAM 0.5 MG/1
0.5 TABLET ORAL
Qty: 15 TAB | Refills: 0 | Status: SHIPPED | OUTPATIENT
Start: 2019-10-25 | End: 2020-07-10 | Stop reason: SDUPTHER

## 2019-10-25 RX ORDER — IBUPROFEN 800 MG/1
TABLET ORAL
COMMUNITY
Start: 2019-10-11 | End: 2020-10-29 | Stop reason: ALTCHOICE

## 2019-10-25 NOTE — PROGRESS NOTES
History of Present Illness:   Thai Izaguirre is a 53 y.o. female here for evaluation:    Chief Complaint   Patient presents with    Cholesterol Problem     follow up     Thyroid Problem     follow up      Notes:  Patient fasting. PAtient refused flu vaccine. Patient reports feeling light headed for the past month randomly, abdominal burning continues. Feels like a draining/bleed to left side of abdomen. Request endoscopy. PAtient would like new gerd medication, currently using Pantoprazole, not effective. Patient reports difficultly with breathing when feeling light headed     LOV with Dr. Noemí Perry April 2019. Recommended 3mo follow-up. Last labs 10-21-19. Reviewed at visit.     Prescription Monitoring Program ([place]) database query with fills:  10/03/2019 1 07/31/2019 Gabapentin 300 Mg Capsule 60.00 30 [facility] [id number] Vir [phone] 2 [place]   08/30/2019 1 07/31/2019 Gabapentin 300 Mg Capsule 60.00 30 [facility] [id number] Vir (1557) 1 [place]   7/31/2019 1 07/31/2019 Gabapentin 300 Mg Capsule 60.00 30 [facility] [id number] Vir (1557) 0 [place]   06/27/2019 1 06/27/2019 Tramadol Hcl 50 Mg Tablet 30.00 30 [facility] [id number] Vir (1557) 0 5.00 MME [place]   06/23/2019 1 05/14/2019 Gabapentin 300 Mg Capsule 60.00 30 [facility] [id number] Vir (1557) 1 [place]   05/28/2019 1 05/28/2019 Tramadol Hcl 50 Mg Tablet 50.00 7 [facility] [id number] Vir (1557) 0 35.71 [place]   05/24/2019 1 05/14/2019 Gabapentin 300 Mg Capsule 60.00 30 [facility] [id number] Vir (1557) 0 [place]   05/08/2019 1 04/16/2019 Gabapentin 300 Mg Capsule 60.00 20  Cj [id number] Vir (1557) 0 [place]   04/16/2019 1 04/16/2019 Tramadol Hcl 50 Mg Tablet 50.00 7 [facility] [id number] Vir (9677) 0 35.71 [place]   04/11/2019 1 03/15/2019 Gabapentin 300 Mg Capsule 60.00 30 [facility] [id number] Vir (1557) 1 [place]   03/15/2019 1 03/15/2019 Gabapentin 300 Mg Capsule 60.00 30  Cj [id number] Vir (1557) 0 [place]   03/15/2019 1 03/15/2019 Tramadol Hcl 50 Mg Tablet 50.00 8 [facility] [id number] Vir (1557) 0 31.25 MME Private Pay [place]   2019 1 2019 Tramadol Hcl 50 Mg Tablet 50.00 13 [facility] [id number] Vir [phone]) 0 19.23 [place]   2019 1 2019 Codeine-Guaifen  Mg/5 Ml 100.00 5 Ma Hol [id number] Vir (9677) 0 6.00 [place]   2018 1 2018 Promethazine-Codeine Syrup 120.00 6 Ol Awo [id number] Vir (9677) 0 6.00 [place]   2018 1 2018 Tramadol Hcl 50 Mg Tablet 50.00 9 [facility] [id number] Vir (9677) 0 27.78 [place]   2018 1 2018 Gabapentin 300 Mg Capsule 30.00 15 [facility] [id number] Vir (9677) 1 [place] 2018   1 2018 Gabapentin 300 Mg Capsule 30.00 15 [facility] [id number] Vir (9677) 0 [place] 2018   1 2018 Tramadol Hcl 50 Mg Tablet 50.00 8 [facility] [id number] Vir (9677) 0 31.25 [place]   2018 1 2018 Tramadol Hcl 50 Mg Tablet 50.00 10 [facility] [id number] Vir (9677) 0 25.00 [place]   2018 1 2018 Alprazolam 0.5 Mg Tablet 15.00 30 Ra Mor [id number] Vir (9677) 0 0.50 L[facility] [place]      She ntoes abnormal feeling lateral abdomen. She feels \"drainage\" inside. Notes only with GERD symptoms. She mentions \"draining\" in conjunction with her acid symptoms. Reviewed labs and medications. Notes due to see GI also--she has seen Dr. Marcelino Han in past.    Has old bottle of Protonix 40mg from 2019. Expiration on bottle listed as 1-15-20. Reviewed she could take, since not , but if not helping, to fill new script, since 8mo old. She notes has both 20mg and 40mg pravastatin at home. Reviewed on Protonix bottle how to check expiration date med, and could take 2 x 20mg pravastatin or 1 x 40mg pravastatin if not . Refill reviewed if needed. Nursing screenings reviewed by provider at visit.       Past Medical History:   Diagnosis Date    Acid reflux     Adverse effect of anesthesia     woke up coughing  Allergic rhinitis     Anxiety     Arthritis     DDD (degenerative disc disease), lumbar     DJD (degenerative joint disease), lumbosacral     GERD (gastroesophageal reflux disease)     History of nonchemical tubal occlusion     Esure devices    Hyperlipidemia 2/20/2014    Hypothyroidism     HYPO    IGT (impaired glucose tolerance)     Ill-defined condition     prolapse uterus/states thus her intestines has collpased a little bit    Psychiatric disorder     anxiety and depression    PUD (peptic ulcer disease)     no EGD    Routine gynecological examination     [facility]    Sinus disorder     Tinea pedis     Ureterocele     small, right        Prior to Admission medications    Medication Sig Start Date End Date Taking? Authorizing Provider   ibuprofen (MOTRIN) 800 mg tablet  10/11/19  Yes Provider, Historical   levothyroxine (SYNTHROID) 88 mcg tablet TAKE 1 TABLET BY MOUTH EVERY DAY BEFORE BREAKFAST ON A EMPTY STOMACH 9/20/19  Yes Daniela Jackman NP   sertraline (ZOLOFT) 100 mg tablet TAKE 1.5 TABS BY MOUTH DAILY. 7/21/19  Yes Maritza Galeana MD   pravastatin (PRAVACHOL) 40 mg tablet Take 1 Tab by mouth nightly. 5/7/19  Yes Maritza Galeana MD   cholecalciferol (VITAMIN D3) 1,000 unit cap TAKE 3 CAPSULES DAILY 4/22/19  Yes Maritza Galeana MD   pantoprazole (PROTONIX) 40 mg tablet TAKE 1 TAB BY MOUTH DAILY. 3/3/19  Yes Maritza Galeana MD   polyethylene glycol [facility]) 17 gram/dose powder Take 17 g by mouth daily. 1/14/19  Yes Maritza Galeana MD   pantoprazole (PROTONIX) 40 mg tablet TAKE 1 TAB BY MOUTH DAILY. 1/14/19  Yes Maritza Galeana MD   promethazine-codeine (PHENERGAN WITH CODEINE) 6.25-10 mg/5 mL syrup Take 5 mL by mouth every six (6) hours as needed for Cough. Max Daily Amount: 20 mL. 12/29/18  Yes Shereen Wiseman MD   amitriptyline (ELAVIL) 50 mg tablet TAKE 1 TAB BY MOUTH NIGHTLY.  12/16/18  Yes Maritza Galeana MD   ALPRAZolam [facility]) 0.5 mg tablet Take 1 Tab by mouth every eight (8) hours as needed for Anxiety for up to 15 doses. Max Daily Amount: 1.5 mg. 9/12/18  Yes Brayden Morgan MD   traMADol (ULTRAM) 50 mg tablet TAKE 2 TABLETS BY MOUTH EVERY 8 HOURS AS NEEDED FOR PAIN 7/25/18  Yes Brayden Morgan MD   albuterol (PROVENTIL HFA, VENTOLIN HFA, PROAIR HFA) 90 mcg/actuation inhaler Take 2 Puffs by inhalation every four (4) hours as needed for Wheezing. 7/10/18  Yes Brayden Morgan MD   Jose [place] henry As directed to assist with ambulation 9/18/17  Yes Brayden Morgan MD   senna-docusate (SENNA WITH DOCUSATE SODIUM) 8.6-50 mg per tablet Take 1 Tab by mouth daily. 1/14/19   Brayden Morgan MD        ROS    Vitals:    10/25/19 1002   BP: 94/62   Pulse: (!) 49   Resp: 14   Temp: 97.6 °F (36.4 °C)   TempSrc: Oral   SpO2: 99%   Weight: 223 lb (101.2 kg)   Height: 5' 9\" (1.753 m)   PainSc:   0 - No pain   LMP: 10/23/2016      Body mass index is 32.93 kg/m². Physical Exam:     Physical Exam   Constitutional: She appears well-developed and well-nourished. No distress. HENT:   Head: Normocephalic and atraumatic. Eyes: Conjunctivae are normal. Right eye exhibits no discharge. Left eye exhibits no discharge. No scleral icterus. Neck: Neck supple. Cardiovascular: Normal rate, regular rhythm, normal heart sounds and intact distal pulses. Exam reveals no gallop and no friction rub. No murmur heard. Pulmonary/Chest: Effort normal and breath sounds normal. No respiratory distress. She has no wheezes. She has no rales. She exhibits no tenderness. Abdominal: Soft. Bowel sounds are normal. She exhibits no distension. There is no tenderness. Musculoskeletal: She exhibits no edema or tenderness. Neurological: She is alert. She exhibits normal muscle tone. Coordination normal.   Skin: Skin is warm. No rash noted. She is not diaphoretic. No erythema. No pallor. Psychiatric: She has a normal mood and affect.  Her behavior is normal. Judgment and thought content normal.       Assessment and Plan:       ICD-10-CM ICD-9-CM    1. Mixed hyperlipidemia E78.2 272.2 pravastatin (PRAVACHOL) 40 mg tablet   2. Anxiety F41.9 300.00 ALPRAZolam (XANAX) 0.5 mg tablet   3. Hypothyroidism due to acquired atrophy of thyroid E03.4 244.8      246.8    4. IGT (impaired glucose tolerance) R73.02 790.22    5. PUD (peptic ulcer disease) K27.9 533.90 pantoprazole (PROTONIX) 40 mg tablet      REFERRAL TO GASTROENTEROLOGY       1.  Medication refill and dose clarification reviewed at visit. 2.  Refill reviewed at visit. 5.  Reviewed evaluation with GI if not improving on pantoprazole as above. Reviewed since old bottle not  may start that medication but if not effective to refill as new prescription and start as reviewed. Follow-up and Dispositions    · Return in about 3 months (around 2020), or if symptoms worsen or fail to improve, for referral follow-up, non-fasting labs, medication follow-up.       lab results and schedule of future lab studies reviewed with patient  reviewed diet, exercise and weight control  reviewed medications and side effects in detail    For additional documentation of information and/or recommendations discussed this visit, please see notes in instructions. Plan and evaluation (above) reviewed with pt at visit  Patient voiced understanding of plan and provided with time to ask/review questions. After Visit Summary (AVS) provided to pt after visit with additional instructions as needed/reviewed.         Future Appointments   Date Time Provider Virgie Marcano   2020 11:00 AM Luis E Pittman MD [id number]

## 2019-10-25 NOTE — PROGRESS NOTES
RM 17     Patient fasting. Patient refused flu vaccine. Patient reports feeling light headed for the past month randomly, difficultly with breathing when feeling light headed, and abdominal burning continues. Feels like a draining/bleed to left side of abdomen. Request endoscopy. Patient request new GERD medication, currently using Pantoprazole, not effective. Chief Complaint   Patient presents with    Cholesterol Problem     follow up     Thyroid Problem     follow up      1. Have you been to the ER, urgent care clinic since your last visit? Hospitalized since your last visit? No    2. Have you seen or consulted any other health care providers outside of the [address] [place] [address] since your last visit? Include any pap smears or colon screening. No    Health Maintenance Due   Topic Date Due    Shingrix Vaccine Age 49> (1 of 2) 01/18/2014     Abuse Screening Questionnaire 10/25/2019   Do you ever feel afraid of your partner? N   Are you in a relationship with someone who physically or mentally threatens you? N   Is it safe for you to go home?  Y       3 most recent PHQ Screens 10/25/2019   Little interest or pleasure in doing things Not at all   Feeling down, depressed, irritable, or hopeless Not at all   Total Score PHQ 2 0   Trouble falling or staying asleep, or sleeping too much -   Feeling tired or having little energy -   Poor appetite, weight loss, or overeating -   Feeling bad about yourself - or that you are a failure or have let yourself or your family down -   Trouble concentrating on things such as school, work, reading, or watching TV -   Moving or speaking so slowly that other people could have noticed; or the opposite being so fidgety that others notice -   Thoughts of being better off dead, or hurting yourself in some way -   PHQ 9 Score -   How difficult have these problems made it for you to do your work, take care of your home and get along with others -     Learning Assessment 10/25/2019   PRIMARY LEARNER Patient   HIGHEST LEVEL OF EDUCATION - PRIMARY LEARNER  -   BARRIERS PRIMARY LEARNER NONE   CO-LEARNER CAREGIVER -   PRIMARY LANGUAGE ENGLISH   LEARNER PREFERENCE PRIMARY DEMONSTRATION   LEARNING SPECIAL TOPICS -   ANSWERED BY patient   RELATIONSHIP SELF

## 2019-11-14 ENCOUNTER — ED HISTORICAL/CONVERTED ENCOUNTER (OUTPATIENT)
Dept: OTHER | Age: 54
End: 2019-11-14

## 2020-02-10 ENCOUNTER — OFFICE VISIT (OUTPATIENT)
Dept: INTERNAL MEDICINE CLINIC | Age: 55
End: 2020-02-10

## 2020-02-10 VITALS
HEART RATE: 60 BPM | RESPIRATION RATE: 16 BRPM | HEIGHT: 69 IN | SYSTOLIC BLOOD PRESSURE: 105 MMHG | DIASTOLIC BLOOD PRESSURE: 73 MMHG | WEIGHT: 226.13 LBS | OXYGEN SATURATION: 100 % | TEMPERATURE: 97.6 F | BODY MASS INDEX: 33.49 KG/M2

## 2020-02-10 DIAGNOSIS — E78.2 MIXED HYPERLIPIDEMIA: Primary | ICD-10-CM

## 2020-02-10 DIAGNOSIS — K27.9 PUD (PEPTIC ULCER DISEASE): ICD-10-CM

## 2020-02-10 DIAGNOSIS — E55.9 VITAMIN D DEFICIENCY: ICD-10-CM

## 2020-02-10 DIAGNOSIS — K59.00 CONSTIPATION, UNSPECIFIED CONSTIPATION TYPE: ICD-10-CM

## 2020-02-10 DIAGNOSIS — R73.02 IGT (IMPAIRED GLUCOSE TOLERANCE): ICD-10-CM

## 2020-02-10 DIAGNOSIS — E03.9 ACQUIRED HYPOTHYROIDISM: ICD-10-CM

## 2020-02-10 RX ORDER — POLYETHYLENE GLYCOL 3350 17 G/17G
POWDER, FOR SOLUTION ORAL
Qty: 595 G | Refills: 5 | Status: SHIPPED | OUTPATIENT
Start: 2020-02-10 | End: 2021-05-20 | Stop reason: SDUPTHER

## 2020-02-10 NOTE — PATIENT INSTRUCTIONS
Please increase your Miralax from 17grams per dose to 34 grams per dose. You can mix with 6-8 ounces water or juice (prune or pear juice), and take 2-3 times daily to have 1-2 soft, pasty stools daily. Constipation: Care Instructions  Your Care Instructions    Constipation means that you have a hard time passing stools (bowel movements). People pass stools from 3 times a day to once every 3 days. What is normal for you may be different. Constipation may occur with pain in the rectum and cramping. The pain may get worse when you try to pass stools. Sometimes there are small amounts of bright red blood on toilet paper or the surface of stools. This is because of enlarged veins near the rectum (hemorrhoids). A few changes in your diet and lifestyle may help you avoid ongoing constipation. Your doctor may also prescribe medicine to help loosen your stool. Some medicines can cause constipation. These include pain medicines and antidepressants. Tell your doctor about all the medicines you take. Your doctor may want to make a medicine change to ease your symptoms. Follow-up care is a key part of your treatment and safety. Be sure to make and go to all appointments, and call your doctor if you are having problems. It's also a good idea to know your test results and keep a list of the medicines you take. How can you care for yourself at home? · Drink plenty of fluids, enough so that your urine is light yellow or clear like water. If you have kidney, heart, or liver disease and have to limit fluids, talk with your doctor before you increase the amount of fluids you drink. · Include high-fiber foods in your diet each day. These include fruits, vegetables, beans, and whole grains. · Get at least 30 minutes of exercise on most days of the week. Walking is a good choice. You also may want to do other activities, such as running, swimming, cycling, or playing tennis or team sports.   · Take a fiber supplement, such as Citrucel or Metamucil, every day. Read and follow all instructions on the label. · Schedule time each day for a bowel movement. A daily routine may help. Take your time having your bowel movement. · Support your feet with a small step stool when you sit on the toilet. This helps flex your hips and places your pelvis in a squatting position. · Your doctor may recommend an over-the-counter laxative to relieve your constipation. Examples are Milk of Magnesia and MiraLax. Read and follow all instructions on the label. Do not use laxatives on a long-term basis. When should you call for help? Call your doctor now or seek immediate medical care if:    · You have new or worse belly pain.     · You have new or worse nausea or vomiting.     · You have blood in your stools.    Watch closely for changes in your health, and be sure to contact your doctor if:    · Your constipation is getting worse.     · You do not get better as expected. Where can you learn more? Go to http://viviane-misael.info/. Enter 21 [phone] in the search box to learn more about \"Constipation: Care Instructions. \"  Current as of: June 26, 2019  Content Version: 12.2  © 6699-2141 [employer]. Care instructions adapted under license by [employer] (which disclaims liability or warranty for this information). If you have questions about a medical condition or this instruction, always ask your healthcare professional. [place] [address] any warranty or liability for your use of this information.

## 2020-02-10 NOTE — PROGRESS NOTES
History of Present Illness:   Nery Lujan is a 63 y.o. female here for evaluation:    Chief Complaint   Patient presents with    Referral Follow Up    Labs     Patient not fasting.  Medication Evaluation     follow up       Notes (nursing/rooming note copied below in italics):  Patient states Mirlax powder not effective, would like an alternative. LOV with me Oct 2019. Planned non-fasting labs at follow-up today. Last labs done 10/21, and reviewed at visit with me above. She notes has seen GI since last visit, and didn't change any meds with GI. She is not sure of exact recommendations. Didn't discuss constipation with them. Miralax last written by Dr. Schulz Hams Jan 2019 at 17gm daily. She notes dosing 17gm BID, and having BM every 2-3 days. She didn't address with GI at follow-up. Reviewed with labs in October, taking 20mg pravastatin. She had increased to 40mg nightly regularly now. She would prefer to have labs done prior to next visit here, not today. Notes no problems with pravastatin increase. Nursing screenings reviewed by provider at visit. Prior to Admission medications    Medication Sig Start Date End Date Taking? Authorizing Provider   ibuprofen (MOTRIN) 800 mg tablet  10/11/19  Yes Provider, Historical   pravastatin (PRAVACHOL) 40 mg tablet Take 1 Tab by mouth nightly. 10/25/19  Yes Moustapha Rincon MD   ALPRAZolam [place] [facility]) 0.5 mg tablet Take 1 Tab by mouth every eight (8) hours as needed for Anxiety for up to 15 doses. Max Daily Amount: 1.5 mg. 10/25/19  Yes Moustapha Rincon MD   pantoprazole (PROTONIX) 40 mg tablet Take 1 Tab by mouth daily. Take instead of omeprazole. 10/25/19  Yes Moustapha Rincon MD   levothyroxine (SYNTHROID) 88 mcg tablet TAKE 1 TABLET BY MOUTH EVERY DAY BEFORE BREAKFAST ON A EMPTY STOMACH 9/20/19  Yes Symoen Peter NP   sertraline (ZOLOFT) 100 mg tablet TAKE 1.5 TABS BY MOUTH DAILY.  7/21/19  Yes Verdis Mortimer MD [facility]   cholecalciferol (VITAMIN D3) 1,000 unit cap TAKE 3 CAPSULES DAILY 4/22/19  Yes Lina Bryan MD   polyethylene glycol [facility]) 17 gram/dose powder Take 17 g by mouth daily. 1/14/19  Yes Lina Bryan MD   promethazine-codeine (PHENERGAN WITH CODEINE) 6.25-10 mg/5 mL syrup Take 5 mL by mouth every six (6) hours as needed for Cough. Max Daily Amount: 20 mL. 12/29/18  Yes Elza Spencer MD   amitriptyline (ELAVIL) 50 mg tablet TAKE 1 TAB BY MOUTH NIGHTLY. 12/16/18  Yes Lina Bryan MD   traMADol (ULTRAM) 50 mg tablet TAKE 2 TABLETS BY MOUTH EVERY 8 HOURS AS NEEDED FOR PAIN 7/25/18  Yes Lina Bryan MD   albuterol (PROVENTIL HFA, VENTOLIN HFA, PROAIR HFA) 90 mcg/actuation inhaler Take 2 Puffs by inhalation every four (4) hours as needed for Wheezing. 7/10/18  Yes MD Scott Ochoa As directed to assist with ambulation 9/18/17  Yes Lina Bryan MD   senna-docusate (SENNA WITH DOCUSATE SODIUM) 8.6-50 mg per tablet Take 1 Tab by mouth daily. 1/14/19   Lina Bryan MD        ROS    Vitals:    02/10/20 1410   BP: 105/73   Pulse: 60   Resp: 16   Temp: 97.6 °F (36.4 °C)   TempSrc: Oral   SpO2: 100%   Weight: 226 lb 2 oz (102.6 kg)   Height: 5' 9\" (1.753 m)   PainSc:   8   PainLoc: Back   LMP: 10/23/2016      Body mass index is 33.39 kg/m². Physical Exam:     Physical Exam  Vitals signs and nursing note reviewed. Constitutional:       General: She is not in acute distress. Appearance: Normal appearance. She is well-developed. She is not diaphoretic. HENT:      Head: Normocephalic and atraumatic. Mouth/Throat:      Mouth: Mucous membranes are moist.   Eyes:      General: No scleral icterus. Right eye: No discharge. Left eye: No discharge. Conjunctiva/sclera: Conjunctivae normal.   Cardiovascular:      Rate and Rhythm: Normal rate and regular rhythm. Pulses: Normal pulses. Heart sounds: Normal heart sounds. No murmur. No friction rub. No gallop. Pulmonary:      Effort: Pulmonary effort is normal. No respiratory distress. Breath sounds: Normal breath sounds. No stridor. No wheezing or rhonchi. Abdominal:      General: Bowel sounds are normal.      Palpations: Abdomen is soft. Tenderness: There is no abdominal tenderness. Musculoskeletal:         General: No deformity or signs of injury. Skin:     General: Skin is warm. Coloration: Skin is not jaundiced or pale. Findings: No bruising, erythema or rash. Neurological:      General: No focal deficit present. Mental Status: She is alert. Motor: No abnormal muscle tone. Coordination: Coordination normal.      Gait: Gait normal.   Psychiatric:         Mood and Affect: Mood normal.         Behavior: Behavior normal.         Thought Content: Thought content normal.         Judgment: Judgment normal.         Assessment and Plan:       ICD-10-CM ICD-9-CM    1. Mixed hyperlipidemia E78.2 272.2 CBC WITH AUTOMATED DIFF      METABOLIC PANEL, COMPREHENSIVE      LIPID PANEL      CK   2. IGT (impaired glucose tolerance) R73.02 790.22 HEMOGLOBIN A1C WITH EAG   3. PUD (peptic ulcer disease) K27.9 533.90 CBC WITH AUTOMATED DIFF   4. Vitamin D deficiency E55.9 268.9 VITAMIN D, 25 HYDROXY   5. Acquired hypothyroidism E03.9 244.9 TSH AND FREE T4   6. Constipation, unspecified constipation type K59.00 564.00 polyethylene glycol (MIRALAX) 17 gram/dose powder       1-5. Future fasting labs prior to next visit here reviewed  Dr. Shirley Horne had been monitoring Vit D and TFT's with every 6mo lipid monitoring labs. 6.  Dose increase reviewed.       Follow-up and Dispositions    · Return in about 2 months (around 4/10/2020), or if symptoms worsen or fail to improve, for medication follow-up; fasting labs 1-2 weeks prior to visit.       lab results and schedule of future lab studies reviewed with patient  reviewed diet, exercise and weight control  reviewed medications and side effects in detail    For additional documentation of information and/or recommendations discussed this visit, please see notes in instructions. Plan and evaluation (above) reviewed with pt at visit  Patient voiced understanding of plan and provided with time to ask/review questions. After Visit Summary (AVS) provided to pt after visit with additional instructions as needed/reviewed. No future appointments.

## 2020-02-21 ENCOUNTER — DOCUMENTATION ONLY (OUTPATIENT)
Dept: INTERNAL MEDICINE CLINIC | Age: 55
End: 2020-02-21

## 2020-04-02 DIAGNOSIS — E03.4 HYPOTHYROIDISM DUE TO ACQUIRED ATROPHY OF THYROID: ICD-10-CM

## 2020-04-02 RX ORDER — LEVOTHYROXINE SODIUM 88 UG/1
TABLET ORAL
Qty: 30 TAB | Refills: 3 | Status: SHIPPED | OUTPATIENT
Start: 2020-04-02 | End: 2020-07-10 | Stop reason: SDUPTHER

## 2020-06-01 DIAGNOSIS — E78.2 MIXED HYPERLIPIDEMIA: ICD-10-CM

## 2020-06-01 RX ORDER — PRAVASTATIN SODIUM 40 MG/1
40 TABLET ORAL
Qty: 90 TAB | Refills: 1 | Status: SHIPPED | OUTPATIENT
Start: 2020-06-01 | End: 2021-05-20 | Stop reason: SDUPTHER

## 2020-06-01 NOTE — TELEPHONE ENCOUNTER
Last visit 02/10/2020 MD Escalante Quivers   Next appointment 06/12/2020 MD Boris Ortega   Previous refill encounter(s) 10/25/2019 Pravachol #30 with 5 refills     Requested Prescriptions     Pending Prescriptions Disp Refills    pravastatin (PRAVACHOL) 40 mg tablet 90 Tab 0     Sig: Take 1 Tab by mouth nightly.

## 2020-06-03 ENCOUNTER — TELEPHONE (OUTPATIENT)
Dept: INTERNAL MEDICINE CLINIC | Age: 55
End: 2020-06-03

## 2020-06-03 DIAGNOSIS — E78.2 MIXED HYPERLIPIDEMIA: Primary | ICD-10-CM

## 2020-06-03 DIAGNOSIS — E03.4 HYPOTHYROIDISM DUE TO ACQUIRED ATROPHY OF THYROID: ICD-10-CM

## 2020-06-03 DIAGNOSIS — E55.9 VITAMIN D DEFICIENCY: ICD-10-CM

## 2020-06-03 DIAGNOSIS — R73.02 IGT (IMPAIRED GLUCOSE TOLERANCE): ICD-10-CM

## 2020-06-03 NOTE — TELEPHONE ENCOUNTER
Pt has lab orders from feb 2020 that she never had done. She calling to schedule an apt. Are these labs still accurate and can they be mailed to her house so that she can go to [place] to have them collected?       Please advise

## 2020-07-08 LAB
25(OH)D3+25(OH)D2 SERPL-MCNC: 24.7 NG/ML (ref 30–100)
ALBUMIN SERPL-MCNC: 4.6 G/DL (ref 3.8–4.9)
ALBUMIN/GLOB SERPL: 1.9 {RATIO} (ref 1.2–2.2)
ALP SERPL-CCNC: 72 IU/L (ref 39–117)
ALT SERPL-CCNC: 32 IU/L (ref 0–32)
AST SERPL-CCNC: 17 IU/L (ref 0–40)
BASOPHILS # BLD AUTO: 0 X10E3/UL (ref 0–0.2)
BASOPHILS NFR BLD AUTO: 1 %
BILIRUB SERPL-MCNC: 0.5 MG/DL (ref 0–1.2)
BUN SERPL-MCNC: 12 MG/DL (ref 6–24)
BUN/CREAT SERPL: 11 (ref 9–23)
CALCIUM SERPL-MCNC: 9.9 MG/DL (ref 8.7–10.2)
CHLORIDE SERPL-SCNC: 104 MMOL/L (ref 96–106)
CHOLEST SERPL-MCNC: 182 MG/DL (ref 100–199)
CK SERPL-CCNC: 128 U/L (ref 32–182)
CO2 SERPL-SCNC: 21 MMOL/L (ref 20–29)
CREAT SERPL-MCNC: 1.08 MG/DL (ref 0.57–1)
EOSINOPHIL # BLD AUTO: 0.1 X10E3/UL (ref 0–0.4)
EOSINOPHIL NFR BLD AUTO: 2 %
ERYTHROCYTE [DISTWIDTH] IN BLOOD BY AUTOMATED COUNT: 14.1 % (ref 11.7–15.4)
EST. AVERAGE GLUCOSE BLD GHB EST-MCNC: 120 MG/DL
GLOBULIN SER CALC-MCNC: 2.4 G/DL (ref 1.5–4.5)
GLUCOSE SERPL-MCNC: 90 MG/DL (ref 65–99)
HBA1C MFR BLD: 5.8 % (ref 4.8–5.6)
HCT VFR BLD AUTO: 43.5 % (ref 34–46.6)
HDLC SERPL-MCNC: 76 MG/DL
HGB BLD-MCNC: 14 G/DL (ref 11.1–15.9)
IMM GRANULOCYTES # BLD AUTO: 0 X10E3/UL (ref 0–0.1)
IMM GRANULOCYTES NFR BLD AUTO: 0 %
LDLC SERPL CALC-MCNC: 86 MG/DL (ref 0–99)
LYMPHOCYTES # BLD AUTO: 2 X10E3/UL (ref 0.7–3.1)
LYMPHOCYTES NFR BLD AUTO: 34 %
MCH RBC QN AUTO: 29.2 PG (ref 26.6–33)
MCHC RBC AUTO-ENTMCNC: 32.2 G/DL (ref 31.5–35.7)
MCV RBC AUTO: 91 FL (ref 79–97)
MONOCYTES # BLD AUTO: 0.4 X10E3/UL (ref 0.1–0.9)
MONOCYTES NFR BLD AUTO: 7 %
NEUTROPHILS # BLD AUTO: 3.2 X10E3/UL (ref 1.4–7)
NEUTROPHILS NFR BLD AUTO: 56 %
PLATELET # BLD AUTO: 193 X10E3/UL (ref 150–450)
POTASSIUM SERPL-SCNC: 3.9 MMOL/L (ref 3.5–5.2)
PROT SERPL-MCNC: 7 G/DL (ref 6–8.5)
RBC # BLD AUTO: 4.8 X10E6/UL (ref 3.77–5.28)
SODIUM SERPL-SCNC: 142 MMOL/L (ref 134–144)
T4 FREE SERPL-MCNC: 1.25 NG/DL (ref 0.82–1.77)
TRIGL SERPL-MCNC: 98 MG/DL (ref 0–149)
TSH SERPL DL<=0.005 MIU/L-ACNC: 1.29 UIU/ML (ref 0.45–4.5)
VLDLC SERPL CALC-MCNC: 20 MG/DL (ref 5–40)
WBC # BLD AUTO: 5.8 X10E3/UL (ref 3.4–10.8)

## 2020-07-10 ENCOUNTER — TELEPHONE (OUTPATIENT)
Dept: INTERNAL MEDICINE CLINIC | Age: 55
End: 2020-07-10

## 2020-07-10 ENCOUNTER — OFFICE VISIT (OUTPATIENT)
Dept: INTERNAL MEDICINE CLINIC | Age: 55
End: 2020-07-10

## 2020-07-10 VITALS
BODY MASS INDEX: 34.51 KG/M2 | OXYGEN SATURATION: 97 % | WEIGHT: 233 LBS | HEIGHT: 69 IN | RESPIRATION RATE: 16 BRPM | HEART RATE: 66 BPM | SYSTOLIC BLOOD PRESSURE: 109 MMHG | DIASTOLIC BLOOD PRESSURE: 69 MMHG | TEMPERATURE: 98.5 F

## 2020-07-10 DIAGNOSIS — K27.9 PUD (PEPTIC ULCER DISEASE): ICD-10-CM

## 2020-07-10 DIAGNOSIS — E78.2 MIXED HYPERLIPIDEMIA: ICD-10-CM

## 2020-07-10 DIAGNOSIS — R73.02 IGT (IMPAIRED GLUCOSE TOLERANCE): ICD-10-CM

## 2020-07-10 DIAGNOSIS — E03.4 HYPOTHYROIDISM DUE TO ACQUIRED ATROPHY OF THYROID: Primary | ICD-10-CM

## 2020-07-10 DIAGNOSIS — F41.9 ANXIETY: ICD-10-CM

## 2020-07-10 DIAGNOSIS — E55.9 VITAMIN D DEFICIENCY: ICD-10-CM

## 2020-07-10 RX ORDER — PANTOPRAZOLE SODIUM 40 MG/1
40 TABLET, DELAYED RELEASE ORAL DAILY
Qty: 90 TAB | Refills: 1 | Status: SHIPPED | OUTPATIENT
Start: 2020-07-10 | End: 2020-10-29 | Stop reason: SDUPTHER

## 2020-07-10 RX ORDER — ALPRAZOLAM 0.5 MG/1
0.5 TABLET ORAL
Qty: 15 TAB | Refills: 0 | Status: SHIPPED | OUTPATIENT
Start: 2020-07-10 | End: 2021-05-20 | Stop reason: SDUPTHER

## 2020-07-10 RX ORDER — SERTRALINE HYDROCHLORIDE 100 MG/1
200 TABLET, FILM COATED ORAL DAILY
Qty: 180 TAB | Refills: 1 | Status: SHIPPED | OUTPATIENT
Start: 2020-07-10 | End: 2021-01-04

## 2020-07-10 RX ORDER — LEVOTHYROXINE SODIUM 88 UG/1
TABLET ORAL
Qty: 90 TAB | Refills: 1 | Status: SHIPPED | OUTPATIENT
Start: 2020-07-10 | End: 2021-01-11 | Stop reason: SDUPTHER

## 2020-07-10 RX ORDER — ACETAMINOPHEN 500 MG
4000 TABLET ORAL DAILY
Qty: 180 CAP | Refills: 3 | Status: SHIPPED | OUTPATIENT
Start: 2020-07-10 | End: 2022-01-07 | Stop reason: SDUPTHER

## 2020-07-10 NOTE — TELEPHONE ENCOUNTER
Request faxed to [facility] today, confirmation received, placed in scan bin

## 2020-07-10 NOTE — PROGRESS NOTES
Rm#[id number]  Non fasting     Chief Complaint   Patient presents with    Cholesterol Problem     f/u     Results     7/7/20 lab results     Thyroid Problem     f/u     Medication Evaluation     f/u      1. Have you been to the ER, urgent care clinic since your last visit? Hospitalized since your last visit? No    2. Have you seen or consulted any other health care providers outside of the [address] [place] [address] since your last visit? Include any pap smears or colon screening. No  There are no preventive care reminders to display for this patient. 3 most recent PHQ Screens 2/10/2020   Little interest or pleasure in doing things Not at all   Feeling down, depressed, irritable, or hopeless Not at all   Total Score PHQ 2 0   Trouble falling or staying asleep, or sleeping too much -   Feeling tired or having little energy -   Poor appetite, weight loss, or overeating -   Feeling bad about yourself - or that you are a failure or have let yourself or your family down -   Trouble concentrating on things such as school, work, reading, or watching TV -   Moving or speaking so slowly that other people could have noticed; or the opposite being so fidgety that others notice -   Thoughts of being better off dead, or hurting yourself in some way -   PHQ 9 Score -   How difficult have these problems made it for you to do your work, take care of your home and get along with others -     Recent Travel Screening and Travel History documentation     Travel Screening     Question   Response    In the last month, have you been in contact with someone who was confirmed or suspected to have Donel Anuradha / COVID-19? No / Unsure    Do you have any of the following symptoms? None of these    Have you traveled internationally in the last month?   No      Travel History   Travel since 06/10/20     No documented travel since 06/10/20

## 2020-07-10 NOTE — PROGRESS NOTES
HPI:  Presents for f/u thyroid, IGT, lipids, etc    Anxiety sx increased with COVID 19, etc  Feels like she needs xanax 2-3 x per week, but not taking that often  Current zoloft dose is 150 mg daily    +med compliance and tolerance    No CP, SOB, neuro sx    Euthyroid. overall stable. Past medical, Social, and Family history reviewed    Prior to Admission medications    Medication Sig Start Date End Date Taking? Authorizing Provider   pravastatin (PRAVACHOL) 40 mg tablet Take 1 Tab by mouth nightly. 6/1/20  Yes Gurvinder Gr MD   levothyroxine (SYNTHROID) 88 mcg tablet TAKE 1 TABLET BY MOUTH EVERY DAY BEFORE BREAKFAST ON A EMPTY STOMACH 4/2/20  Yes Yoly Almendarez NP   polyethylene glycol (MIRALAX) 17 gram/dose powder Take 34gm in 6-8 ounces juice or water 2-3 times daily as directed/needed for constipation. 2/10/20  Yes Christia Gilford, MD   ibuprofen (MOTRIN) 800 mg tablet  10/11/19  Yes Provider, Historical   ALPRAZolam (XANAX) 0.5 mg tablet Take 1 Tab by mouth every eight (8) hours as needed for Anxiety for up to 15 doses. Max Daily Amount: 1.5 mg. 10/25/19  Yes Christia Gilford, MD   pantoprazole (PROTONIX) 40 mg tablet Take 1 Tab by mouth daily. Take instead of omeprazole. 10/25/19  Yes Christia Gilford, MD   sertraline (ZOLOFT) 100 mg tablet TAKE 1.5 TABS BY MOUTH DAILY. 7/21/19  Yes Gurvinder Gr MD   senna-docusate (SENNA WITH DOCUSATE SODIUM) 8.6-50 mg per tablet Take 1 Tab by mouth daily. 1/14/19  Yes Gurvinder Gr MD   promethazine-codeine (PHENERGAN WITH CODEINE) 6.25-10 mg/5 mL syrup Take 5 mL by mouth every six (6) hours as needed for Cough. Max Daily Amount: 20 mL. 12/29/18  Yes Arturo Hogan MD   amitriptyline (ELAVIL) 50 mg tablet TAKE 1 TAB BY MOUTH NIGHTLY.  12/16/18  Yes Gurvinder Gr MD   traMADol (ULTRAM) 50 mg tablet TAKE 2 TABLETS BY MOUTH EVERY 8 HOURS AS NEEDED FOR PAIN 7/25/18  Yes Gurvinder Gr MD   albuterol (PROVENTIL HFA, VENTOLIN HFA, PROAIR HFA) 90 mcg/actuation inhaler Take 2 Puffs by inhalation every four (4) hours as needed for Wheezing. 7/10/18  Yes Mary Hanley MD   [facility]az[facility] figueroa As directed to assist with ambulation 9/18/17  Yes Mary Hanley MD          ROS  Complete ROS reviewed and negative or stable except as noted in HPI. Physical Exam  Vitals signs and nursing note reviewed. Constitutional:       General: She is not in acute distress. HENT:      Head: Normocephalic and atraumatic. Eyes:      General: No scleral icterus. Pupils: Pupils are equal, round, and reactive to light. Neck:      Musculoskeletal: Normal range of motion and neck supple. Vascular: No JVD. Cardiovascular:      Rate and Rhythm: Normal rate and regular rhythm. Heart sounds: Normal heart sounds. No murmur. No friction rub. No gallop. Pulmonary:      Effort: Pulmonary effort is normal. No respiratory distress. Breath sounds: Normal breath sounds. No wheezing or rales. Abdominal:      General: Bowel sounds are normal. There is no distension. Palpations: Abdomen is soft. Tenderness: There is no abdominal tenderness. Musculoskeletal: Normal range of motion. Lymphadenopathy:      Cervical: No cervical adenopathy. Skin:     General: Skin is warm. Findings: No rash. Neurological:      Mental Status: She is alert and oriented to person, place, and time. Motor: No abnormal muscle tone. Prior labs reviewed. Assessment/Plan:    ICD-10-CM ICD-9-CM    1. Hypothyroidism due to acquired atrophy of thyroid  E03.4 244.8 levothyroxine (SYNTHROID) 88 mcg tablet     246.8    2. Anxiety  F41.9 300.00 ALPRAZolam (Xanax) 0.5 mg tablet      sertraline (ZOLOFT) 100 mg tablet   3. Mixed hyperlipidemia  E78.2 272.2    4. IGT (impaired glucose tolerance)  R73.02 790.22    5.  Vitamin D deficiency  E55.9 268.9    6. PUD (peptic ulcer disease)  K27.9 533.90 pantoprazole (PROTONIX) 40 mg tablet Follow-up and Dispositions    · Return in about 6 months (around 1/10/2021), or if symptoms worsen or fail to improve, for blood pressure, cholesterol, thyroid - IO.        results and schedule of future  studies reviewed with patient  reviewed diet, exercise and weight    cardiovascular risk and specific lipid/LDL goals reviewed  reviewed medications and side effects in detail     Refill xanax for prn use - limited quant  Increase zoloft to 200 mg daily  Increase vit d to 4000 units daily

## 2020-09-28 ENCOUNTER — OFFICE VISIT (OUTPATIENT)
Dept: INTERNAL MEDICINE CLINIC | Age: 55
End: 2020-09-28
Payer: MEDICAID

## 2020-09-28 VITALS
OXYGEN SATURATION: 97 % | DIASTOLIC BLOOD PRESSURE: 85 MMHG | BODY MASS INDEX: 33.47 KG/M2 | WEIGHT: 226 LBS | TEMPERATURE: 98.8 F | HEIGHT: 69 IN | HEART RATE: 65 BPM | RESPIRATION RATE: 18 BRPM | SYSTOLIC BLOOD PRESSURE: 126 MMHG

## 2020-09-28 DIAGNOSIS — E03.4 HYPOTHYROIDISM DUE TO ACQUIRED ATROPHY OF THYROID: ICD-10-CM

## 2020-09-28 DIAGNOSIS — M47.817 DJD (DEGENERATIVE JOINT DISEASE), LUMBOSACRAL: ICD-10-CM

## 2020-09-28 DIAGNOSIS — R73.02 IGT (IMPAIRED GLUCOSE TOLERANCE): ICD-10-CM

## 2020-09-28 DIAGNOSIS — M62.838 MUSCLE SPASMS OF BOTH LOWER EXTREMITIES: Primary | ICD-10-CM

## 2020-09-28 DIAGNOSIS — M54.16 LUMBAR RADICULOPATHY: ICD-10-CM

## 2020-09-28 DIAGNOSIS — K59.00 CONSTIPATION, UNSPECIFIED CONSTIPATION TYPE: ICD-10-CM

## 2020-09-28 DIAGNOSIS — M51.36 DDD (DEGENERATIVE DISC DISEASE), LUMBAR: ICD-10-CM

## 2020-09-28 DIAGNOSIS — M48.062 SPINAL STENOSIS OF LUMBAR REGION WITH NEUROGENIC CLAUDICATION: ICD-10-CM

## 2020-09-28 DIAGNOSIS — E78.2 MIXED HYPERLIPIDEMIA: ICD-10-CM

## 2020-09-28 PROCEDURE — 99214 OFFICE O/P EST MOD 30 MIN: CPT | Performed by: INTERNAL MEDICINE

## 2020-09-28 RX ORDER — AMOXICILLIN 250 MG
1 CAPSULE ORAL DAILY
Qty: 30 TAB | Refills: 5 | Status: SHIPPED | OUTPATIENT
Start: 2020-09-28 | End: 2022-02-20

## 2020-09-28 RX ORDER — TIZANIDINE 4 MG/1
4 TABLET ORAL
Qty: 40 TAB | Refills: 1 | Status: SHIPPED | OUTPATIENT
Start: 2020-09-28

## 2020-09-28 RX ORDER — TRAMADOL HYDROCHLORIDE 50 MG/1
50 TABLET ORAL
Qty: 20 TAB | Refills: 0 | Status: SHIPPED | OUTPATIENT
Start: 2020-09-28 | End: 2020-10-05

## 2020-09-28 NOTE — PROGRESS NOTES
HPI:  Presents for f/u leg pain and weakness    Bilateral leg pain and weakness - worsened x 1 week  No new injury or event    Awaiting appt at ortho spine on 11/12/20    +thigh tightness and spasm mainly    Chronic leg weakness  No falls, though  Uses cane regularly    Pt asks re: PPI use  Upper GI sx stable, controlled. Past medical, Social, and Family history reviewed    Prior to Admission medications    Medication Sig Start Date End Date Taking? Authorizing Provider   ALPRAZolam (Xanax) 0.5 mg tablet Take 1 Tab by mouth every eight (8) hours as needed for Anxiety for up to 15 doses. Max Daily Amount: 1.5 mg. 7/10/20  Yes Maritza aGleana MD   cholecalciferol (VITAMIN D3) (2,000 UNITS /50 MCG) cap capsule Take 4,000 Units by mouth daily. 7/10/20  Yes Maritza Galeana MD   sertraline (ZOLOFT) 100 mg tablet Take 2 Tabs by mouth daily. 7/10/20  Yes Maritza Galeana MD   levothyroxine (SYNTHROID) 88 mcg tablet TAKE 1 TABLET BY MOUTH EVERY DAY BEFORE BREAKFAST ON A EMPTY STOMACH 7/10/20  Yes Maritza Galeana MD   pantoprazole (PROTONIX) 40 mg tablet Take 1 Tab by mouth daily. Take instead of omeprazole. 7/10/20  Yes Maritza Galeana MD   pravastatin (PRAVACHOL) 40 mg tablet Take 1 Tab by mouth nightly. 6/1/20  Yes Maritza Galeana MD   polyethylene glycol [facility]) 17 gram/dose powder Take 34gm in 6-8 ounces juice or water 2-3 times daily as directed/needed for constipation. 2/10/20  Yes Yuko Blanco MD   ibuprofen (MOTRIN) 800 mg tablet  10/11/19  Yes Provider, Historical   senna-docusate (SENNA WITH DOCUSATE SODIUM) 8.6-50 mg per tablet Take 1 Tab by mouth daily. 1/14/19  Yes Maritza Galeana MD   promethazine-codeine (PHENERGAN WITH CODEINE) 6.25-10 mg/5 mL syrup Take 5 mL by mouth every six (6) hours as needed for Cough. Max Daily Amount: 20 mL. 12/29/18  Yes Shereen Wiseman MD   amitriptyline (ELAVIL) 50 mg tablet TAKE 1 TAB BY MOUTH NIGHTLY.  12/16/18  Yes Maritza Galeana MD traMADol (ULTRAM) 50 mg tablet TAKE 2 TABLETS BY MOUTH EVERY 8 HOURS AS NEEDED FOR PAIN 7/25/18  Yes Marti Villegas MD   albuterol (PROVENTIL HFA, VENTOLIN HFA, PROAIR HFA) 90 mcg/actuation inhaler Take 2 Puffs by inhalation every four (4) hours as needed for Wheezing. 7/10/18  Yes Marti Villegas MD   [address] [facility] [place] As directed to assist with ambulation 9/18/17  Yes Marti Villegas MD          ROS  Complete ROS reviewed and negative or stable except as noted in HPI. Physical Exam  Vitals signs and nursing note reviewed. Constitutional:       General: She is not in acute distress. HENT:      Head: Normocephalic and atraumatic. Eyes:      General: No scleral icterus. Pupils: Pupils are equal, round, and reactive to light. Neck:      Musculoskeletal: Normal range of motion and neck supple. Vascular: No JVD. Cardiovascular:      Rate and Rhythm: Normal rate and regular rhythm. Heart sounds: Normal heart sounds. No murmur. No friction rub. No gallop. Pulmonary:      Effort: Pulmonary effort is normal. No respiratory distress. Breath sounds: Normal breath sounds. No wheezing or rales. Abdominal:      General: Bowel sounds are normal. There is no distension. Palpations: Abdomen is soft. Tenderness: There is no abdominal tenderness. Lymphadenopathy:      Cervical: No cervical adenopathy. Skin:     General: Skin is warm. Findings: No rash. Neurological:      Mental Status: She is alert and oriented to person, place, and time. Mental status is at baseline. Motor: No abnormal muscle tone. Gait: Gait abnormal (using cane). Prior labs reviewed. Assessment/Plan:    ICD-10-CM ICD-9-CM    1. Muscle spasms of both lower extremities  M62.838 728.85 tiZANidine (ZANAFLEX) 4 mg tablet   2. Mixed hyperlipidemia  E78.2 272.2    3. Hypothyroidism due to acquired atrophy of thyroid  E03.4 244.8      246.8    4.  IGT (impaired glucose tolerance) R73.02 790.22    5. DJD (degenerative joint disease), lumbosacral  M47.817 721.3    6. Spinal stenosis of lumbar region with neurogenic claudication  M48.062 724.03    7. DDD (degenerative disc disease), lumbar  M51.36 722.52    8. Constipation, unspecified constipation type  K59.00 564.00 senna-docusate (Senna with Docusate Sodium) 8.6-50 mg per tablet   9. Lumbar radiculopathy  M54.16 724.4 traMADoL (ULTRAM) 50 mg tablet     Follow-up and Dispositions    · Return in about 15 weeks (around 1/11/2021), or if symptoms worsen or fail to improve, for as scheduled.         results and schedule of future studies reviewed with patient  reviewed diet, exercise and weight   cardiovascular risk and specific lipid/LDL goals reviewed  reviewed medications and side effects in detail     Trial of muscle relaxant   Prn tramadol  Pt defers PT for now due to pandemic  See ortho spine as scheduled  Stop PPI - resume if symptoms recur  Pt declines flu shot  Continue current medications

## 2020-09-28 NOTE — PROGRESS NOTES
Nori Martinez is a 63 y.o. female      Chief Complaint   Patient presents with    Leg Pain     weak      1. Have you been to the ER, urgent care clinic since your last visit? Hospitalized since your last visit? No    2. Have you seen or consulted any other health care providers outside of the [address] [place] [address] since your last visit? Include any pap smears or colon screening.   No

## 2020-10-29 ENCOUNTER — VIRTUAL VISIT (OUTPATIENT)
Dept: INTERNAL MEDICINE CLINIC | Age: 55
End: 2020-10-29
Payer: MEDICAID

## 2020-10-29 DIAGNOSIS — M62.838 MUSCLE SPASMS OF BOTH LOWER EXTREMITIES: ICD-10-CM

## 2020-10-29 DIAGNOSIS — E78.2 MIXED HYPERLIPIDEMIA: ICD-10-CM

## 2020-10-29 DIAGNOSIS — E03.4 HYPOTHYROIDISM DUE TO ACQUIRED ATROPHY OF THYROID: ICD-10-CM

## 2020-10-29 DIAGNOSIS — K21.9 GASTROESOPHAGEAL REFLUX DISEASE, UNSPECIFIED WHETHER ESOPHAGITIS PRESENT: Primary | ICD-10-CM

## 2020-10-29 DIAGNOSIS — M47.817 DJD (DEGENERATIVE JOINT DISEASE), LUMBOSACRAL: ICD-10-CM

## 2020-10-29 DIAGNOSIS — R73.02 IGT (IMPAIRED GLUCOSE TOLERANCE): ICD-10-CM

## 2020-10-29 DIAGNOSIS — M48.062 SPINAL STENOSIS OF LUMBAR REGION WITH NEUROGENIC CLAUDICATION: ICD-10-CM

## 2020-10-29 DIAGNOSIS — F41.9 ANXIETY: ICD-10-CM

## 2020-10-29 DIAGNOSIS — K27.9 PUD (PEPTIC ULCER DISEASE): ICD-10-CM

## 2020-10-29 PROCEDURE — 99213 OFFICE O/P EST LOW 20 MIN: CPT | Performed by: INTERNAL MEDICINE

## 2020-10-29 RX ORDER — PANTOPRAZOLE SODIUM 40 MG/1
40 TABLET, DELAYED RELEASE ORAL DAILY
Qty: 90 TAB | Refills: 1 | Status: SHIPPED | OUTPATIENT
Start: 2020-10-29 | End: 2021-01-11 | Stop reason: ALTCHOICE

## 2020-10-29 RX ORDER — SUCRALFATE 1 G/10ML
1 SUSPENSION ORAL
Qty: 900 ML | Refills: 1 | Status: SHIPPED | OUTPATIENT
Start: 2020-10-29

## 2020-10-29 NOTE — PROGRESS NOTES
I was in the office while conducting this encounter. Consent:  She and/or her healthcare decision maker is aware that this patient-initiated Telehealth encounter is a billable service, with coverage as determined by her insurance carrier. She is aware that she may receive a bill and has provided verbal consent to proceed: Yes    This virtual visit was conducted telephone encounter only. -  I affirm this is a Patient Initiated Episode with an Established Patient who has not had a related appointment within my department in the past 7 days or scheduled within the next 24 hours. Note: this encounter is not billable if this call serves to triage the patient into an appointment for the relevant concern. Total Time: minutes: 11-20 minutes. Pt has had increased GERD  Stopped PPI after last visit as discussed. Symptoms returned very soon after, but despite resuming the PPI she continues to have poorly controlled GERD sx  Some acidic foods, ie tomato sauce  But, denies any NSAIDs    Tizanidine has helped a great deal.  Sx much improved. O/w stable. Prior labs reviewed. ICD-10-CM ICD-9-CM    1. Gastroesophageal reflux disease, unspecified whether esophagitis present  K21.9 530.81    2. PUD (peptic ulcer disease)  K27.9 533.90 pantoprazole (PROTONIX) 40 mg tablet   3. Muscle spasms of both lower extremities  M62.838 728.85    4. Hypothyroidism due to acquired atrophy of thyroid  E03.4 244.8      246.8    5. Mixed hyperlipidemia  E78.2 272.2    6. IGT (impaired glucose tolerance)  R73.02 790.22    7. Anxiety  F41.9 300.00    8. Spinal stenosis of lumbar region with neurogenic claudication  M48.062 724.03    9. DJD (degenerative joint disease), lumbosacral  M47.817 721.3      Follow-up and Dispositions    · Return in about 2 months (around 1/11/2021), or if symptoms worsen or fail to improve, for GI, etc as scheduled.         results and schedule of future studies reviewed with patient  reviewed diet, exercise and weight   cardiovascular risk and specific lipid/LDL goals reviewed  reviewed medications and side effects in detail     Continue PPI - likely will require long term use given failure off med  Add carafate for additional relief in the next couple of weeks  If persists or worsens, will need to see GI for endoscopy  Continue other current medications

## 2021-01-03 DIAGNOSIS — F41.9 ANXIETY: ICD-10-CM

## 2021-01-04 RX ORDER — SERTRALINE HYDROCHLORIDE 100 MG/1
TABLET, FILM COATED ORAL
Qty: 180 TAB | Refills: 1 | Status: SHIPPED | OUTPATIENT
Start: 2021-01-04 | End: 2021-07-13

## 2021-01-11 ENCOUNTER — VIRTUAL VISIT (OUTPATIENT)
Dept: INTERNAL MEDICINE CLINIC | Age: 56
End: 2021-01-11
Payer: COMMERCIAL

## 2021-01-11 DIAGNOSIS — E03.4 HYPOTHYROIDISM DUE TO ACQUIRED ATROPHY OF THYROID: ICD-10-CM

## 2021-01-11 DIAGNOSIS — E78.2 MIXED HYPERLIPIDEMIA: ICD-10-CM

## 2021-01-11 DIAGNOSIS — K27.9 PUD (PEPTIC ULCER DISEASE): ICD-10-CM

## 2021-01-11 DIAGNOSIS — R73.02 IGT (IMPAIRED GLUCOSE TOLERANCE): ICD-10-CM

## 2021-01-11 DIAGNOSIS — K21.9 GASTROESOPHAGEAL REFLUX DISEASE, UNSPECIFIED WHETHER ESOPHAGITIS PRESENT: ICD-10-CM

## 2021-01-11 DIAGNOSIS — E78.5 HYPERLIPIDEMIA, UNSPECIFIED HYPERLIPIDEMIA TYPE: ICD-10-CM

## 2021-01-11 DIAGNOSIS — K29.70 GASTRITIS WITHOUT BLEEDING, UNSPECIFIED CHRONICITY, UNSPECIFIED GASTRITIS TYPE: Primary | ICD-10-CM

## 2021-01-11 DIAGNOSIS — E55.9 VITAMIN D DEFICIENCY: ICD-10-CM

## 2021-01-11 DIAGNOSIS — F41.9 ANXIETY: ICD-10-CM

## 2021-01-11 DIAGNOSIS — E03.9 ACQUIRED HYPOTHYROIDISM: ICD-10-CM

## 2021-01-11 PROCEDURE — 99214 OFFICE O/P EST MOD 30 MIN: CPT | Performed by: INTERNAL MEDICINE

## 2021-01-11 RX ORDER — ESOMEPRAZOLE MAGNESIUM 40 MG/1
40 CAPSULE, DELAYED RELEASE ORAL DAILY
Qty: 90 CAP | Refills: 1 | Status: SHIPPED | OUTPATIENT
Start: 2021-01-11 | End: 2022-02-09

## 2021-01-11 RX ORDER — LEVOTHYROXINE SODIUM 88 UG/1
TABLET ORAL
Qty: 90 TAB | Refills: 1 | Status: SHIPPED | OUTPATIENT
Start: 2021-01-11 | End: 2021-01-17

## 2021-01-11 NOTE — PROGRESS NOTES
Barbara Pinedo is a 63 y.o. female who was seen by synchronous (real-time) audio-video technology on 1/11/2021. Consent: Barbara Pinedo, who was seen by synchronous (real-time) audio-video technology, and/or her healthcare decision maker, is aware that this patient-initiated, Telehealth encounter on 1/11/2021 is a billable service, with coverage as determined by her insurance carrier. She is aware that she may receive a bill and has provided verbal consent to proceed: Yes. I was in the office while conducting this encounter. Subjective:   Barbara Pinedo was seen for GERD (f/u, pt states there is no change. she did see Dr. Renae Whatley), Abdominal Pain (6/10 pain, lower part of abdomen. pt states her medication does not work), and Medication Evaluation (pt states that the protonix medication is making her see things that are there.)      Notes:  GERD - seeing GI - Dr. Mayelin Bahena labs and stool blood testing  Plan for colonoscopy and EGD if +blood in stool. Still c/o GERD sx despite PPI and carafate    Pt c/o SE from protonix    Lower abd pain as well. Using miralax - having BID BMs    Pt reports 25-30 lbs weight loss. Seen in ER [facility] about 2 weeks ago  EKG and CT scan were OK. Blood work reported NL. Nursing screenings reviewed by provider at visit. Past medical, Social, and Family history reviewed  Medications reviewed and updated. No Known Allergies    Prior to Admission medications    Medication Sig Start Date End Date Taking? Authorizing Provider   sertraline (ZOLOFT) 100 mg tablet TAKE 2 TABLETS BY MOUTH EVERY DAY 1/4/21  Yes Shabnam Basilio MD   pantoprazole (PROTONIX) 40 mg tablet Take 1 Tab by mouth daily. 10/29/20  Yes Shabnam Basilio MD   sucralfate (CARAFATE) 100 mg/mL suspension Take 10 mL by mouth Before breakfast, lunch, dinner and at bedtime.  10/29/20  Yes Shabnam Basilio MD   tiZANidine (ZANAFLEX) 4 mg tablet Take 1 Tab by mouth three (3) times daily as needed for Muscle Spasm(s). 9/28/20  Yes Dorinda Perkins MD   senna-docusate (Senna with Docusate Sodium) 8.6-50 mg per tablet Take 1 Tab by mouth daily. Indications: constipation 9/28/20  Yes Dorinad Perkins MD   ALPRAZolam (Xanax) 0.5 mg tablet Take 1 Tab by mouth every eight (8) hours as needed for Anxiety for up to 15 doses. Max Daily Amount: 1.5 mg. 7/10/20  Yes Dorinda Perkins MD   cholecalciferol (VITAMIN D3) (2,000 UNITS /50 MCG) cap capsule Take 4,000 Units by mouth daily. 7/10/20  Yes Dorinda Perkins MD   levothyroxine (SYNTHROID) 88 mcg tablet TAKE 1 TABLET BY MOUTH EVERY DAY BEFORE BREAKFAST ON A EMPTY STOMACH 7/10/20  Yes Dorinda Perkins MD   pravastatin (PRAVACHOL) 40 mg tablet Take 1 Tab by mouth nightly. 6/1/20  Yes Dorinda Perkins MD   polyethylene glycol [facility]) 17 gram/dose powder Take 34gm in 6-8 ounces juice or water 2-3 times daily as directed/needed for constipation. 2/10/20  Yes Gricelda Holt MD   amitriptyline (ELAVIL) 50 mg tablet TAKE 1 TAB BY MOUTH NIGHTLY. 12/16/18  Yes Dorinda Perkins MD   traMADol (ULTRAM) 50 mg tablet TAKE 2 TABLETS BY MOUTH EVERY 8 HOURS AS NEEDED FOR PAIN 7/25/18  Yes Dorinda Perkins MD   albuterol (PROVENTIL HFA, VENTOLIN HFA, PROAIR HFA) 90 mcg/actuation inhaler Take 2 Puffs by inhalation every four (4) hours as needed for Wheezing. 7/10/18  Yes Dorinda Perkins MD   [facility] As directed to assist with ambulation 9/18/17  Yes Dorinda Perkins MD         ROS     A complete ROS was performed and negative except as noted in HPI     PHYSICAL EXAMINATION:    Vital Signs:  Last menstrual period 10/23/2016. No flowsheet data found.       Constitutional: [x] Appears well-developed and well-nourished [x] No apparent distress      Mental status: [x] Alert and awake  [x] Oriented [x] Able to follow commands       Eyes:   EOM    [x]  Normal      Sclera  [x]  Normal              Discharge [x]  None visible       HENT: [x] Normocephalic, atraumatic    [x] Mouth/Throat: Mucous membranes are moist    External Ears [x] Normal      Neck: [x] No visualized mass     Pulmonary/Chest: [x] Respiratory effort normal   [x] No visualized signs of difficulty breathing or respiratory distress    Musculoskeletal:  [x] Normal range of motion of neck    Neurological:        [x] No Facial Asymmetry (Cranial nerve 7 motor function) (limited exam due to video visit)          [x] No gaze palsy     Skin:        [x] No significant exanthematous lesions or discoloration noted on facial skin             Psychiatric:       [x] Normal Affect       Other pertinent observable physical exam findings:  None. Prior labs reviewed. We discussed the expected course, resolution and complications of the diagnosis(es) in detail. Medication risks, benefits, costs, interactions, and alternatives were discussed as indicated. I advised her to contact the office if her condition worsens, changes or fails to improve as anticipated. She expressed understanding with the diagnosis(es) and plan. Ayla Stokes is a 63 y.o. female who was evaluated by a video visit encounter for concerns as above. Patient identification was verified prior to start of the visit. A caregiver was present when appropriate. Due to this being a TeleHealth encounter (During VERPY-22 public health emergency), evaluation of the following organ systems was limited: Vitals/Constitutional/EENT/Resp/CV/GI//MS/Neuro/Skin/Heme-Lymph-Imm. Pursuant to the emergency declaration under the [facility], [facility] waiver authority and the Funding Options and [employer] Act, this Virtual  Visit was conducted, with patient's (and/or legal guardian's) consent, to reduce the patient's risk of exposure to COVID-19 and provide necessary medical care.      Services were provided through a video synchronous discussion virtually to substitute for in-person clinic visit. Assessment & Plan:   Diagnoses and all orders for this visit:      ICD-10-CM ICD-9-CM    1. Gastritis without bleeding, unspecified chronicity, unspecified gastritis type  K29.70 535.50 esomeprazole (NexIUM) 40 mg capsule      LIPASE   2. Hypothyroidism due to acquired atrophy of thyroid  E03.4 244.8 levothyroxine (SYNTHROID) 88 mcg tablet     246.8    3. PUD (peptic ulcer disease)  K27.9 533.90 LIPASE   4. Anxiety  F41.9 300.00    5. Gastroesophageal reflux disease, unspecified whether esophagitis present  K21.9 530.81 CBC WITH AUTOMATED DIFF      IRON PROFILE      FERRITIN   6. Mixed hyperlipidemia  E78.2 319.1 CK      METABOLIC PANEL, COMPREHENSIVE      LIPID PANEL   7. IGT (impaired glucose tolerance)  R73.02 790.22 HEMOGLOBIN A1C WITH EAG   8. Vitamin D deficiency  E55.9 268.9 VITAMIN D, 25 HYDROXY   9. Hyperlipidemia, unspecified hyperlipidemia type  E78.5 272.4    10. Acquired hypothyroidism  E03.9 244.9 TSH 3RD GENERATION      T4, FREE     Follow-up and Dispositions    · Return in about 3 months (around 4/11/2021), or if symptoms worsen or fail to improve, for cholesterol, thyroid, GERD. results and schedule of future studies reviewed with patient  reviewed diet, exercise and weight   cardiovascular risk and specific lipid/LDL goals reviewed  reviewed medications and side effects in detail    change to nexium from protonix  Continue miralax and carafate  Encouraged endoscopy  Lab visit for fasting labs      AVS:  []  Sent to patient as [employer] message after visit. [x]  Mailed to patient after visit. []  Not sent to patient after visit.

## 2021-01-11 NOTE — PROGRESS NOTES
Virtual visit [phone]    Chief Complaint   Patient presents with    GERD     f/u, pt states there is no change. she did see Dr. Prosper Zuleta Abdominal Pain     6/10 pain, lower part of abdomen. pt states her medication does not work    Medication Evaluation     pt states that the protonix medication is making her see things that are there. 1. Have you been to the ER, urgent care clinic since your last visit? Hospitalized since your last visit? ED [facility], 2 weeks ago, abdominal burning    2. Have you seen or consulted any other health care providers outside of the [address] [place] [address] since your last visit? Include any pap smears or colon screening. No        There are no preventive care reminders to display for this patient.         3 most recent PHQ Screens 9/28/2020   Little interest or pleasure in doing things Not at all   Feeling down, depressed, irritable, or hopeless Not at all   Total Score PHQ 2 0   Trouble falling or staying asleep, or sleeping too much -   Feeling tired or having little energy -   Poor appetite, weight loss, or overeating -   Feeling bad about yourself - or that you are a failure or have let yourself or your family down -   Trouble concentrating on things such as school, work, reading, or watching TV -   Moving or speaking so slowly that other people could have noticed; or the opposite being so fidgety that others notice -   Thoughts of being better off dead, or hurting yourself in some way -   PHQ 9 Score -   How difficult have these problems made it for you to do your work, take care of your home and get along with others -           Learning Assessment 10/25/2019   PRIMARY LEARNER Patient   HIGHEST LEVEL OF EDUCATION - PRIMARY LEARNER  -   BARRIERS PRIMARY LEARNER NONE   CO-LEARNER CAREGIVER -   PRIMARY LANGUAGE ENGLISH   LEARNER PREFERENCE PRIMARY DEMONSTRATION   LEARNING SPECIAL TOPICS -   ANSWERED BY patient   RELATIONSHIP SELF         An After Visit Summary was printed and given to the patient.

## 2021-01-14 ENCOUNTER — LAB ONLY (OUTPATIENT)
Dept: INTERNAL MEDICINE CLINIC | Age: 56
End: 2021-01-14

## 2021-01-14 DIAGNOSIS — R73.02 IGT (IMPAIRED GLUCOSE TOLERANCE): ICD-10-CM

## 2021-01-14 DIAGNOSIS — E78.2 MIXED HYPERLIPIDEMIA: ICD-10-CM

## 2021-01-14 DIAGNOSIS — K29.70 GASTRITIS WITHOUT BLEEDING, UNSPECIFIED CHRONICITY, UNSPECIFIED GASTRITIS TYPE: ICD-10-CM

## 2021-01-14 DIAGNOSIS — E55.9 VITAMIN D DEFICIENCY: ICD-10-CM

## 2021-01-14 DIAGNOSIS — K21.9 GASTROESOPHAGEAL REFLUX DISEASE, UNSPECIFIED WHETHER ESOPHAGITIS PRESENT: ICD-10-CM

## 2021-01-14 DIAGNOSIS — K27.9 PUD (PEPTIC ULCER DISEASE): ICD-10-CM

## 2021-01-14 DIAGNOSIS — E03.9 ACQUIRED HYPOTHYROIDISM: ICD-10-CM

## 2021-01-15 LAB
25(OH)D3+25(OH)D2 SERPL-MCNC: 33.2 NG/ML (ref 30–100)
ALBUMIN SERPL-MCNC: 4.6 G/DL (ref 3.8–4.9)
ALBUMIN/GLOB SERPL: 1.8 {RATIO} (ref 1.2–2.2)
ALP SERPL-CCNC: 75 IU/L (ref 39–117)
ALT SERPL-CCNC: 15 IU/L (ref 0–32)
AST SERPL-CCNC: 16 IU/L (ref 0–40)
BASOPHILS # BLD AUTO: 0 X10E3/UL (ref 0–0.2)
BASOPHILS NFR BLD AUTO: 1 %
BILIRUB SERPL-MCNC: 0.7 MG/DL (ref 0–1.2)
BUN SERPL-MCNC: 7 MG/DL (ref 6–24)
BUN/CREAT SERPL: 8 (ref 9–23)
CALCIUM SERPL-MCNC: 9.7 MG/DL (ref 8.7–10.2)
CHLORIDE SERPL-SCNC: 106 MMOL/L (ref 96–106)
CHOLEST SERPL-MCNC: 177 MG/DL (ref 100–199)
CK SERPL-CCNC: 99 U/L (ref 32–182)
CO2 SERPL-SCNC: 22 MMOL/L (ref 20–29)
CREAT SERPL-MCNC: 0.86 MG/DL (ref 0.57–1)
EOSINOPHIL # BLD AUTO: 0.2 X10E3/UL (ref 0–0.4)
EOSINOPHIL NFR BLD AUTO: 3 %
ERYTHROCYTE [DISTWIDTH] IN BLOOD BY AUTOMATED COUNT: 14.2 % (ref 11.7–15.4)
EST. AVERAGE GLUCOSE BLD GHB EST-MCNC: 120 MG/DL
FERRITIN SERPL-MCNC: 75 NG/ML (ref 15–150)
GLOBULIN SER CALC-MCNC: 2.6 G/DL (ref 1.5–4.5)
GLUCOSE SERPL-MCNC: 88 MG/DL (ref 65–99)
HBA1C MFR BLD: 5.8 % (ref 4.8–5.6)
HCT VFR BLD AUTO: 44.3 % (ref 34–46.6)
HDLC SERPL-MCNC: 83 MG/DL
HGB BLD-MCNC: 14.9 G/DL (ref 11.1–15.9)
IMM GRANULOCYTES # BLD AUTO: 0 X10E3/UL (ref 0–0.1)
IMM GRANULOCYTES NFR BLD AUTO: 0 %
IRON SATN MFR SERPL: 25 % (ref 15–55)
IRON SERPL-MCNC: 65 UG/DL (ref 27–159)
LDLC SERPL CALC-MCNC: 78 MG/DL (ref 0–99)
LIPASE SERPL-CCNC: 25 U/L (ref 14–72)
LYMPHOCYTES # BLD AUTO: 2.1 X10E3/UL (ref 0.7–3.1)
LYMPHOCYTES NFR BLD AUTO: 39 %
MCH RBC QN AUTO: 30.1 PG (ref 26.6–33)
MCHC RBC AUTO-ENTMCNC: 33.6 G/DL (ref 31.5–35.7)
MCV RBC AUTO: 90 FL (ref 79–97)
MONOCYTES # BLD AUTO: 0.3 X10E3/UL (ref 0.1–0.9)
MONOCYTES NFR BLD AUTO: 6 %
NEUTROPHILS # BLD AUTO: 2.8 X10E3/UL (ref 1.4–7)
NEUTROPHILS NFR BLD AUTO: 51 %
PLATELET # BLD AUTO: 182 X10E3/UL (ref 150–450)
POTASSIUM SERPL-SCNC: 3.6 MMOL/L (ref 3.5–5.2)
PROT SERPL-MCNC: 7.2 G/DL (ref 6–8.5)
RBC # BLD AUTO: 4.95 X10E6/UL (ref 3.77–5.28)
SODIUM SERPL-SCNC: 142 MMOL/L (ref 134–144)
T4 FREE SERPL-MCNC: 1.51 NG/DL (ref 0.82–1.77)
TIBC SERPL-MCNC: 264 UG/DL (ref 250–450)
TRIGL SERPL-MCNC: 89 MG/DL (ref 0–149)
TSH SERPL DL<=0.005 MIU/L-ACNC: 0.42 UIU/ML (ref 0.45–4.5)
UIBC SERPL-MCNC: 199 UG/DL (ref 131–425)
VLDLC SERPL CALC-MCNC: 16 MG/DL (ref 5–40)
WBC # BLD AUTO: 5.5 X10E3/UL (ref 3.4–10.8)

## 2021-01-17 DIAGNOSIS — E03.4 HYPOTHYROIDISM DUE TO ACQUIRED ATROPHY OF THYROID: ICD-10-CM

## 2021-01-17 RX ORDER — LEVOTHYROXINE SODIUM 75 UG/1
75 TABLET ORAL
Qty: 90 TAB | Refills: 1 | Status: SHIPPED | OUTPATIENT
Start: 2021-01-17 | End: 2021-07-13

## 2021-01-17 NOTE — PROGRESS NOTES
Please notify pt of results    Thyroid dose is a little high - reduce to 75 mcg daily levothyroxine.  New Rx sent to pharmacy.   Other labs are either normal or stable and at goal.  Continue other current medications

## 2021-01-17 NOTE — PROGRESS NOTES
Thyroid dose is a little high - reduce to 75 mcg daily levothyroxine. New Rx sent to pharmacy.   Other labs are either normal or stable and at goal.  Continue other current medications

## 2021-01-20 NOTE — PROGRESS NOTES
Notified patient about her lab results being normal and to reduce levothyroxine to [facility]. . Patient repeated back the correct  interventions. Patient had no questions at this time.

## 2021-02-02 ENCOUNTER — TELEPHONE (OUTPATIENT)
Dept: INTERNAL MEDICINE CLINIC | Age: 56
End: 2021-02-02

## 2021-02-02 NOTE — TELEPHONE ENCOUNTER
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

## 2021-02-02 NOTE — TELEPHONE ENCOUNTER
Called patient to clarify request- pt having body aches, fever, SOB, headaches. Pt wanting something for pain and antibiotic. Please advise. Does pt need virtual visit to discuss with provider?

## 2021-02-03 ENCOUNTER — VIRTUAL VISIT (OUTPATIENT)
Dept: INTERNAL MEDICINE CLINIC | Age: 56
End: 2021-02-03
Payer: COMMERCIAL

## 2021-02-03 DIAGNOSIS — E55.9 VITAMIN D DEFICIENCY: ICD-10-CM

## 2021-02-03 DIAGNOSIS — R05.9 COUGH: ICD-10-CM

## 2021-02-03 DIAGNOSIS — R06.2 WHEEZING: ICD-10-CM

## 2021-02-03 DIAGNOSIS — E03.9 ACQUIRED HYPOTHYROIDISM: ICD-10-CM

## 2021-02-03 DIAGNOSIS — U07.1 COVID-19: Primary | ICD-10-CM

## 2021-02-03 DIAGNOSIS — K21.9 GASTROESOPHAGEAL REFLUX DISEASE, UNSPECIFIED WHETHER ESOPHAGITIS PRESENT: ICD-10-CM

## 2021-02-03 DIAGNOSIS — E78.2 MIXED HYPERLIPIDEMIA: ICD-10-CM

## 2021-02-03 DIAGNOSIS — R73.02 IGT (IMPAIRED GLUCOSE TOLERANCE): ICD-10-CM

## 2021-02-03 PROCEDURE — 99214 OFFICE O/P EST MOD 30 MIN: CPT | Performed by: INTERNAL MEDICINE

## 2021-02-03 RX ORDER — ALBUTEROL SULFATE 90 UG/1
2 AEROSOL, METERED RESPIRATORY (INHALATION)
Qty: 1 INHALER | Refills: 0 | Status: SHIPPED | OUTPATIENT
Start: 2021-02-03

## 2021-02-03 RX ORDER — CODEINE PHOSPHATE AND GUAIFENESIN 10; 100 MG/5ML; MG/5ML
5 SOLUTION ORAL
Qty: 120 ML | Refills: 0 | Status: SHIPPED | OUTPATIENT
Start: 2021-02-03 | End: 2021-02-10

## 2021-02-03 RX ORDER — PANTOPRAZOLE SODIUM 40 MG/1
40 TABLET, DELAYED RELEASE ORAL DAILY
COMMUNITY
End: 2022-02-09

## 2021-02-03 NOTE — PROGRESS NOTES
Yudi Angulo (: 1965) is a 61 y.o. female, established patient, here for evaluation of the following chief complaint(s)--see below:    Yudi Angulo is a 61 y.o. female who was seen by synchronous (real-time) audio-video technology on 2/3/2021. Consent: Yudi Angulo, who was seen by synchronous (real-time) audio-video technology, and/or her healthcare decision maker, is aware that this patient-initiated, Telehealth encounter on 2/3/2021 is a billable service, with coverage as determined by her insurance carrier. She is aware that she may receive a bill and has provided verbal consent to proceed: Yes. I was in the office while conducting this encounter. Subjective:   Yudi Angulo was seen for:  Chief Complaint   Patient presents with    Concern For COVID-19 (Coronavirus)     possible medications for cough and pain       Notes:  Dx COVID 19+ 21  2 days of sx prior to dx    Cough is the worst sx  +wheeziness    +achiness  +fevers and chills initially    No longer having fevers, chills    Using dayquil and nyquil      Nursing screenings reviewed by provider at visit. Past medical, Social, and Family history reviewed  Medications reviewed and updated. Pt had not changed the thyroid dose yet    No Known Allergies    Prior to Admission medications    Medication Sig Start Date End Date Taking? Authorizing Provider   pantoprazole (PROTONIX) 40 mg tablet Take 40 mg by mouth daily. Yes Provider, Historical   levothyroxine (SYNTHROID) 75 mcg tablet Take 1 Tab by mouth Daily (before breakfast). TAKE 1 TABLET BY MOUTH EVERY DAY BEFORE BREAKFAST ON A EMPTY STOMACH 21  Yes Terrie De Paz MD   sertraline (ZOLOFT) 100 mg tablet TAKE 2 TABLETS BY MOUTH EVERY DAY 21  Yes Terrie De Paz MD   sucralfate (CARAFATE) 100 mg/mL suspension Take 10 mL by mouth Before breakfast, lunch, dinner and at bedtime.  10/29/20  Yes Terrie De Paz MD   tiZANidine (ZANAFLEX) 4 mg tablet Take 1 Tab by mouth three (3) times daily as needed for Muscle Spasm(s). 9/28/20  Yes Saji Christian MD   senna-docusate (Senna with Docusate Sodium) 8.6-50 mg per tablet Take 1 Tab by mouth daily. Indications: constipation 9/28/20  Yes Saji Christian MD   ALPRAZolam (Xanax) 0.5 mg tablet Take 1 Tab by mouth every eight (8) hours as needed for Anxiety for up to 15 doses. Max Daily Amount: 1.5 mg. 7/10/20  Yes Saji Christian MD   cholecalciferol (VITAMIN D3) (2,000 UNITS /50 MCG) cap capsule Take 4,000 Units by mouth daily. 7/10/20  Yes Saji Christian MD   pravastatin (PRAVACHOL) 40 mg tablet Take 1 Tab by mouth nightly. 6/1/20  Yes Saji Christian MD   polyethylene glycol [facility]) 17 gram/dose powder Take 34gm in 6-8 ounces juice or water 2-3 times daily as directed/needed for constipation. 2/10/20  Yes Steve Red MD   amitriptyline (ELAVIL) 50 mg tablet TAKE 1 TAB BY MOUTH NIGHTLY. 12/16/18  Yes Saji Christian MD   traMADol (ULTRAM) 50 mg tablet TAKE 2 TABLETS BY MOUTH EVERY 8 HOURS AS NEEDED FOR PAIN 7/25/18  Yes Saji Crhistian MD   albuterol (PROVENTIL HFA, VENTOLIN HFA, PROAIR HFA) 90 mcg/actuation inhaler Take 2 Puffs by inhalation every four (4) hours as needed for Wheezing. 7/10/18  Yes Saji Christian MD   [address] [place] [address] As directed to assist with ambulation 9/18/17  Yes Saji Christian MD   esomeprazole (NexIUM) 40 mg capsule Take 1 Cap by mouth daily. 1/11/21   Saji Christian MD         ROS      A complete ROS was performed and negative except as noted in HPI    PHYSICAL EXAMINATION:    Vital Signs:  Last menstrual period 10/23/2016. No flowsheet data found.       Constitutional: [x] Appears well-developed and well-nourished [x] No apparent distress      Mental status: [x] Alert and awake  [x] Oriented [x] Able to follow commands       Eyes:   EOM    [x]  Normal      Sclera  [x]  Normal              Discharge [x]  None visible       HENT: [x] Normocephalic, atraumatic    [x] Mouth/Throat: Mucous membranes are moist    External Ears [x] Normal      Neck: [x] No visualized mass     Pulmonary/Chest: [x] Respiratory effort normal   [x] No visualized signs of difficulty breathing or respiratory distress    Coarse cough heard, some audible wheeze with cough. Musculoskeletal:  [x] Normal range of motion of neck    Neurological:        [x] No Facial Asymmetry (Cranial nerve 7 motor function) (limited exam due to video visit)          [x] No gaze palsy     Skin:        [x] No significant exanthematous lesions or discoloration noted on facial skin             Psychiatric:       [x] Normal Affect       Other pertinent observable physical exam findings:  None. Prior labs reviewed. We discussed the expected course, resolution and complications of the diagnosis(es) in detail. Medication risks, benefits, costs, interactions, and alternatives were discussed as indicated. I advised her to contact the office if her condition worsens, changes or fails to improve as anticipated. She expressed understanding with the diagnosis(es) and plan. Cristina Larry is a 61 y.o. female who was evaluated by a video visit encounter for concerns as     Assessment & Plan:   Diagnoses and all orders for this visit:      ICD-10-CM ICD-9-CM    1. COVID-19  U07.1 079.89    2. Wheezing  R06.2 786.07 albuterol (PROVENTIL HFA, VENTOLIN HFA, PROAIR HFA) 90 mcg/actuation inhaler   3. Cough  R05 786.2 guaiFENesin-codeine (ROBITUSSIN AC) 100-10 mg/5 mL solution   4. Gastroesophageal reflux disease, unspecified whether esophagitis present  K21.9 530.81    5. Mixed hyperlipidemia  E78.2 272.2    6. Acquired hypothyroidism  E03.9 244.9    7. Vitamin D deficiency  E55.9 268.9    8. IGT (impaired glucose tolerance)  R73.02 790.22      Follow-up and Dispositions    · Return in about 2 months (around 4/3/2021), or if symptoms worsen or fail to improve, for thyroid.         results and schedule of future studies reviewed with patient  reviewed diet, exercise and weight   cardiovascular risk and specific lipid/LDL goals reviewed  reviewed medications and side effects in detail    Robitussin AC  Albuterol prn  Continue other current medications       AVS:  []  [facility] after visit signed. [x]  Mailed to patient after visit. []  Not sent to patient after visit. Niles Bass is being evaluated by a Virtual Visit (video visit) encounter to address concerns as mentioned above. A caregiver was present when appropriate. Due to this being a TeleHealth encounter (During ONLTT-73 public health emergency), evaluation of the following organ systems was limited: Vitals/Constitutional/EENT/Resp/CV/GI//MS/Neuro/Skin/Heme-Lymph-Imm. Pursuant to the emergency declaration under the [address] waiver authority and the [employer] Act, this Virtual Visit was conducted with patient's (and/or legal guardian's) consent, to reduce the patient's risk of exposure to COVID-19 and provide necessary medical care. The patient (and/or legal guardian) has also been advised to contact this office for worsening conditions or problems, and seek emergency medical treatment and/or call 911 if deemed necessary. Patient identification was verified at the start of the visit: YES. Services were provided through a video synchronous discussion virtually to substitute for in-person clinic visit. Patient was located at their individual home (or other location as per patient preference). Provider was located in medical office. An electronic signature was used to authenticate this note.   -- Karoline Bishop MD

## 2021-02-03 NOTE — PROGRESS NOTES
Virtual Visit    Chief Complaint   Patient presents with    Concern For COVID-19 (Coronavirus)     possible medications for cough and pain       1. Have you been to the ER, urgent care clinic since your last visit? Hospitalized since your last visit? No    2. Have you seen or consulted any other health care providers outside of the [address] [place] [address] since your last visit? Include any pap smears or colon screening. No    Health Maintenance Due   Topic Date Due    COVID-19 Vaccine (1 of 2) [birth year]/18/1980       Learning Assessment 10/25/2019   PRIMARY LEARNER Patient   HIGHEST LEVEL OF EDUCATION - PRIMARY LEARNER  -   BARRIERS PRIMARY LEARNER NONE   CO-LEARNER CAREGIVER -   PRIMARY LANGUAGE ENGLISH   LEARNER PREFERENCE PRIMARY DEMONSTRATION   LEARNING SPECIAL TOPICS -   ANSWERED BY patient   RELATIONSHIP SELF           AVS, education, follow up and recommendations provided and addressed with patient.   services used to advise patient no

## 2021-03-16 ENCOUNTER — VIRTUAL VISIT (OUTPATIENT)
Dept: INTERNAL MEDICINE CLINIC | Age: 56
End: 2021-03-16
Payer: COMMERCIAL

## 2021-03-16 DIAGNOSIS — F41.9 ANXIETY: ICD-10-CM

## 2021-03-16 DIAGNOSIS — K21.9 GASTROESOPHAGEAL REFLUX DISEASE, UNSPECIFIED WHETHER ESOPHAGITIS PRESENT: ICD-10-CM

## 2021-03-16 DIAGNOSIS — R73.02 IGT (IMPAIRED GLUCOSE TOLERANCE): ICD-10-CM

## 2021-03-16 DIAGNOSIS — U07.1 COVID-19: Primary | ICD-10-CM

## 2021-03-16 DIAGNOSIS — R00.2 PALPITATIONS: ICD-10-CM

## 2021-03-16 DIAGNOSIS — E03.9 ACQUIRED HYPOTHYROIDISM: ICD-10-CM

## 2021-03-16 DIAGNOSIS — E55.9 VITAMIN D DEFICIENCY: ICD-10-CM

## 2021-03-16 DIAGNOSIS — E78.2 MIXED HYPERLIPIDEMIA: ICD-10-CM

## 2021-03-16 PROCEDURE — 99214 OFFICE O/P EST MOD 30 MIN: CPT | Performed by: INTERNAL MEDICINE

## 2021-03-16 NOTE — PROGRESS NOTES
Daysi Velásquez (: 1965) is a 61 y.o. female, established patient, here for evaluation of the following chief complaint(s)--see below:    Daysi Velásquez is a 61 y.o. female who was seen by synchronous (real-time) audio-video technology on 3/16/2021. Consent: Daysi Velásquez, who was seen by synchronous (real-time) audio-video technology, and/or her healthcare decision maker, is aware that this patient-initiated, Telehealth encounter on 3/16/2021 is a billable service, with coverage as determined by her insurance carrier. She is aware that she may receive a bill and has provided verbal consent to proceed: Yes. I was in the office while conducting this encounter. Assessment & Plan:   Diagnoses and all orders for this visit:      ICD-10-CM ICD-9-CM    1. COVID-19  U07.1 079.89    2. Palpitations  R00.2 785.1 CARDIAC HOLTER MONITOR   3. Acquired hypothyroidism  E03.9 244.9 T4, FREE      TSH 3RD GENERATION   4. Mixed hyperlipidemia  E78.2 272.2    5. Vitamin D deficiency  E55.9 268.9    6. Gastroesophageal reflux disease, unspecified whether esophagitis present  K21.9 530.81    7. IGT (impaired glucose tolerance)  R73.02 790.22    8. Anxiety  F41.9 300.00      Follow-up and Dispositions    · Return in about 2 months (around 2021), or if symptoms worsen or fail to improve, for thyroid, palpitations. results and schedule of future studies reviewed with patient  reviewed diet, exercise and weight   reviewed medications and side effects in detail    defer COVID vaccine until 3 months from illness  Lab visit for thyroid testing  Holter monitor - 48 hrs   Continue current medications     AVS:  []  Available to patient in [employer] after visit signed. [x]  Mailed to patient after visit. []  Not sent to patient after visit.       Subjective:   Daysi Velásquez was seen for:  Chief Complaint   Patient presents with    Follow-up       Notes:  COVID 19 follow up    Still some SOB, palpitations  Mild CATHERINE, but functioning pretty well  Cough has improved. Palpitations are felt to be skipped beat not racing. Intermittent. No distress  No syncope or pre-syncopal sx assoc with it    Back pain worse s/p COVID dx     Taking lower thyroid dose    Nursing screenings reviewed by provider at visit. Past medical, Social, and Family history reviewed  Medications reviewed and updated. No Known Allergies    Prior to Admission medications    Medication Sig Start Date End Date Taking? Authorizing Provider   pantoprazole (PROTONIX) 40 mg tablet Take 40 mg by mouth daily. Yes Provider, Historical   albuterol (PROVENTIL HFA, VENTOLIN HFA, PROAIR HFA) 90 mcg/actuation inhaler Take 2 Puffs by inhalation every four (4) hours as needed for Wheezing. 2/3/21  Yes Alanna Don MD   levothyroxine (SYNTHROID) 75 mcg tablet Take 1 Tab by mouth Daily (before breakfast). TAKE 1 TABLET BY MOUTH EVERY DAY BEFORE BREAKFAST ON A EMPTY STOMACH 1/17/21  Yes Alanna Don MD   sertraline (ZOLOFT) 100 mg tablet TAKE 2 TABLETS BY MOUTH EVERY DAY 1/4/21  Yes Alanna Don MD   sucralfate (CARAFATE) 100 mg/mL suspension Take 10 mL by mouth Before breakfast, lunch, dinner and at bedtime. 10/29/20  Yes Alanna Don MD   tiZANidine (ZANAFLEX) 4 mg tablet Take 1 Tab by mouth three (3) times daily as needed for Muscle Spasm(s). 9/28/20  Yes Alanna Don MD   senna-docusate (Senna with Docusate Sodium) 8.6-50 mg per tablet Take 1 Tab by mouth daily. Indications: constipation 9/28/20  Yes Alanna Don MD   ALPRAZolam (Xanax) 0.5 mg tablet Take 1 Tab by mouth every eight (8) hours as needed for Anxiety for up to 15 doses. Max Daily Amount: 1.5 mg. 7/10/20  Yes Alanna Don MD   cholecalciferol (VITAMIN D3) (2,000 UNITS /50 MCG) cap capsule Take 4,000 Units by mouth daily. 7/10/20  Yes Alanna Don MD   pravastatin (PRAVACHOL) 40 mg tablet Take 1 Tab by mouth nightly.  6/1/20  Yes Alex Lee, Kailash Arroyo MD   polyethylene glycol (MIRALAX) 17 gram/dose powder Take 34gm in 6-8 ounces juice or water 2-3 times daily as directed/needed for constipation. 2/10/20  Yes Dean Smith MD   amitriptyline (ELAVIL) 50 mg tablet TAKE 1 TAB BY MOUTH NIGHTLY. 12/16/18  Yes Cleo Villarreal MD   traMADol (ULTRAM) 50 mg tablet TAKE 2 TABLETS BY MOUTH EVERY 8 HOURS AS NEEDED FOR PAIN 7/25/18  Yes Cleo Villarreal MD   albuterol (PROVENTIL HFA, VENTOLIN HFA, PROAIR HFA) 90 mcg/actuation inhaler Take 2 Puffs by inhalation every four (4) hours as needed for Wheezing. 7/10/18  Yes Cleo Villarreal MD   esomeprazole (NexIUM) 40 mg capsule Take 1 Cap by mouth daily. 1/11/21   MD Cherise Sosa Mai As directed to assist with ambulation 9/18/17   Cleo Villarreal MD         ROS     A complete ROS was performed and negative except as noted in HPI     PHYSICAL EXAMINATION:    Vital Signs:  Last menstrual period 10/23/2016. No flowsheet data found. Constitutional: [x] Appears well-developed and well-nourished [x] No apparent distress      Mental status: [x] Alert and awake  [x] Oriented [x] Able to follow commands       Eyes:   EOM    [x]  Normal      Sclera  [x]  Normal              Discharge [x]  None visible       HENT: [x] Normocephalic, atraumatic    [x] Mouth/Throat: Mucous membranes are moist    External Ears [x] Normal      Neck: [x] No visualized mass     Pulmonary/Chest: [x] Respiratory effort normal   [x] No visualized signs of difficulty breathing or respiratory distress    Musculoskeletal:  [x] Normal range of motion of neck    Neurological:        [x] No Facial Asymmetry (Cranial nerve 7 motor function) (limited exam due to video visit)          [x] No gaze palsy     Skin:        [x] No significant exanthematous lesions or discoloration noted on facial skin             Psychiatric:       [x] Normal Affect       Other pertinent observable physical exam findings:  None.       We discussed the expected course, resolution and complications of the diagnosis(es) in detail. Medication risks, benefits, costs, interactions, and alternatives were discussed as indicated. I advised her to contact the office if her condition worsens, changes or fails to improve as anticipated. She expressed understanding with the diagnosis(es) and plan. Pebbles Cody is a 61 y.o. female who was evaluated by a video visit encounter for concerns as above. Pebbles Cody is being evaluated by a Virtual Visit (video visit) encounter to address concerns as mentioned above. A caregiver was present when appropriate. Due to this being a TeleHealth encounter (During SGTSK-30 public health emergency), evaluation of the following organ systems was limited: Vitals/Constitutional/EENT/Resp/CV/GI//MS/Neuro/Skin/Heme-Lymph-Imm. Pursuant to the emergency declaration under the [address] [place] [address], [address] [place] [address] and the [employer] Act, this Virtual Visit was conducted with patient's (and/or legal guardian's) consent, to reduce the patient's risk of exposure to COVID-19 and provide necessary medical care. The patient (and/or legal guardian) has also been advised to contact this office for worsening conditions or problems, and seek emergency medical treatment and/or call 911 if deemed necessary. Patient identification was verified at the start of the visit: YES. Services were provided through a video synchronous discussion virtually to substitute for in-person clinic visit. Patient was located at their individual home (or other location as per patient preference). Provider was located in medical office. An electronic signature was used to authenticate this note.   -- Merary Currie MD

## 2021-03-16 NOTE — PROGRESS NOTES
Virtual visit    Chief Complaint   Patient presents with    Follow-up     Patient present for vv for follow-up. Patient states she still has some SOB, and heart palpitations but reports cough has improved and currently has no other symptoms. 1. Have you been to the ER, urgent care clinic since your last visit? Hospitalized since your last visit? No    2. Have you seen or consulted any other health care providers outside of the [address] [place] [address] since your last visit? Include any pap smears or colon screening. No    Health Maintenance Due   Topic Date Due    COVID-19 Vaccine (1) Never done       Learning Assessment 10/25/2019   PRIMARY LEARNER Patient   HIGHEST LEVEL OF EDUCATION - PRIMARY LEARNER  -   BARRIERS PRIMARY LEARNER NONE   CO-LEARNER CAREGIVER -   PRIMARY LANGUAGE ENGLISH   LEARNER PREFERENCE PRIMARY DEMONSTRATION   LEARNING SPECIAL TOPICS -   ANSWERED BY patient   RELATIONSHIP SELF         AVS  education, follow up, and recommendations provided and addressed with patient.   services used to advise patient No

## 2021-03-22 ENCOUNTER — APPOINTMENT (OUTPATIENT)
Dept: INTERNAL MEDICINE CLINIC | Age: 56
End: 2021-03-22

## 2021-03-22 DIAGNOSIS — E03.9 ACQUIRED HYPOTHYROIDISM: ICD-10-CM

## 2021-03-22 LAB
T4 FREE SERPL-MCNC: 0.9 NG/DL (ref 0.8–1.5)
TSH SERPL DL<=0.05 MIU/L-ACNC: 1.31 UIU/ML (ref 0.36–3.74)

## 2021-04-08 ENCOUNTER — HOSPITAL ENCOUNTER (OUTPATIENT)
Dept: NON INVASIVE DIAGNOSTICS | Age: 56
Discharge: HOME | End: 2021-04-08
Attending: INTERNAL MEDICINE
Payer: COMMERCIAL

## 2021-04-08 DIAGNOSIS — R00.2 PALPITATIONS: ICD-10-CM

## 2021-04-08 PROCEDURE — 93225 XTRNL ECG REC<48 HRS REC: CPT

## 2021-04-19 ENCOUNTER — TELEPHONE (OUTPATIENT)
Dept: INTERNAL MEDICINE CLINIC | Age: 56
End: 2021-04-19

## 2021-04-19 NOTE — TELEPHONE ENCOUNTER
Letter generated today. Please notify pt of results:    Rare extra beats may be the source of your palpitations and would feel like a skipped beat. But, these are benign and will not cause other problems. RECOMMENDATIONS:     Continue with current  medications.

## 2021-04-19 NOTE — TELEPHONE ENCOUNTER
Pt is calling because she stated that she needs the nurse or provider to call her in regards to results from her heart monitoring.

## 2021-04-20 NOTE — TELEPHONE ENCOUNTER
----- Message from Thong Gray sent at 4/20/2021 11:50 AM EDT -----  Regarding: JOSE MARIA/TELEPHONE  Contact: [phone]  General Message/Vendor Calls    Caller's first and last name: Haydee Lugo      Reason for call: Heart  monitor results      Callback required yes/no and why: Yes      Best contact number(s): [phone]      Details to clarify the request: Patient would like the results of her heart monitor test before taking her scheduled COVID test on 04/24/21      Thong Gray

## 2021-04-24 ENCOUNTER — IMMUNIZATION (OUTPATIENT)
Dept: FAMILY MEDICINE CLINIC | Age: 56
End: 2021-04-24
Payer: COMMERCIAL

## 2021-04-24 DIAGNOSIS — Z23 ENCOUNTER FOR IMMUNIZATION: Primary | ICD-10-CM

## 2021-04-24 PROCEDURE — 91300 COVID-19, MRNA, LNP-S, PF, 30MCG/0.3ML DOSE(PFIZER): CPT | Performed by: FAMILY MEDICINE

## 2021-04-24 PROCEDURE — 0001A COVID-19, MRNA, LNP-S, PF, 30MCG/0.3ML DOSE(PFIZER): CPT | Performed by: FAMILY MEDICINE

## 2021-05-14 ENCOUNTER — OFFICE VISIT (OUTPATIENT)
Dept: URGENT CARE | Age: 56
End: 2021-05-14
Payer: COMMERCIAL

## 2021-05-14 VITALS — RESPIRATION RATE: 18 BRPM | TEMPERATURE: 98 F | HEART RATE: 81 BPM | OXYGEN SATURATION: 99 %

## 2021-05-14 DIAGNOSIS — J01.90 ACUTE SINUSITIS, RECURRENCE NOT SPECIFIED, UNSPECIFIED LOCATION: Primary | ICD-10-CM

## 2021-05-14 DIAGNOSIS — R09.81 NASAL CONGESTION: ICD-10-CM

## 2021-05-14 LAB — SARS-COV-2 POC: NEGATIVE

## 2021-05-14 PROCEDURE — S9083 URGENT CARE CENTER GLOBAL: HCPCS | Performed by: NURSE PRACTITIONER

## 2021-05-14 PROCEDURE — 87426 SARSCOV CORONAVIRUS AG IA: CPT | Performed by: NURSE PRACTITIONER

## 2021-05-14 RX ORDER — AMOXICILLIN AND CLAVULANATE POTASSIUM 875; 125 MG/1; MG/1
1 TABLET, FILM COATED ORAL 2 TIMES DAILY
Qty: 14 TAB | Refills: 0 | Status: SHIPPED | OUTPATIENT
Start: 2021-05-14 | End: 2021-05-21

## 2021-05-14 RX ORDER — TRIAMCINOLONE ACETONIDE 55 UG/1
2 SPRAY, METERED NASAL DAILY
Qty: 1 BOTTLE | Refills: 0 | Status: SHIPPED | OUTPATIENT
Start: 2021-05-14 | End: 2021-05-20 | Stop reason: CLARIF

## 2021-05-14 NOTE — PROGRESS NOTES
Subjective: (As above and below)       This patient was seen in [facility] at [address] [place] [address] Urgent Care while outdoors at their vehicle due to COVID-19 pandemic with PPE and focused examination in order to decrease community viral transmission. The patient/guardian gave verbal consent to treat. Chief Complaint   Patient presents with    Sinus Infection     headaches, drainage, congestion, 4 days ago , tylenol sinus and nasal antihistamine ineffective      Pepe Marcus is a 61 y.o. female who presents for evaluation of : nasal congestion, sinus pain and pressure. Symptom onset 4-5 days ago . Preceding illness: none. No other identified aggravating or alleviating factors. Symptoms are constant and overall unchanged. Promotes no decrease in PO intake of fluids. Denies: severe lethargy, SOB, syncope/near syncope, vomiting/diarrhea, chest pain, chest pain with breathing, labored breathing, severe headache,  fevers . Recent travel: no  Known Exposure to COVID-19: no  Known flu or strep contact: no       Review of Systems - negative except as listed above    Reviewed PmHx, RxHx, FmHx, SocHx, AllgHx and updated in chart.   Family History   Problem Relation Age of Onset    Hypertension Mother     Diabetes Mother     Heart Disease Mother     Heart Attack Mother     Cancer Father         lung    Other Sister         cholecystectomy    Cancer Brother         lung    Diabetes Maternal Aunt     Cancer Paternal Uncle         lung    Diabetes Maternal Aunt     Diabetes Maternal Aunt     Diabetes Maternal Aunt     Diabetes Maternal Aunt     Diabetes Maternal Aunt     Heart Attack Daughter 32    Other Son         narcolepsy/GERD (part of esophagus removed d/t this)    Cancer Paternal Uncle         lung    Anesth Problems Neg Hx        Past Medical History:   Diagnosis Date    Acid reflux     Adverse effect of anesthesia     woke up coughing    Allergic rhinitis     Anxiety     Arthritis  DDD (degenerative disc disease), lumbar     DJD (degenerative joint disease), lumbosacral     GERD (gastroesophageal reflux disease)     History of nonchemical tubal occlusion     Esure devices    Hyperlipidemia 2/20/2014    Hypothyroidism     HYPO    IGT (impaired glucose tolerance)     Ill-defined condition     prolapse uterus/states thus her intestines has collpased a little bit    Psychiatric disorder     anxiety and depression    PUD (peptic ulcer disease)     no EGD    Routine gynecological examination     [facility]    Sinus disorder     Tinea pedis     Ureterocele     small, right      Social History     Socioeconomic History    Marital status: SINGLE     Spouse name: Not on file    Number of children: Not on file    Years of education: Not on file    Highest education level: Not on file   Tobacco Use    Smoking status: Never Smoker    Smokeless tobacco: Never Used   Substance and Sexual Activity    Alcohol use: No    Drug use: No    Sexual activity: Yes     Partners: Male     Birth control/protection: Implant          Current Outpatient Medications   Medication Sig    amoxicillin-clavulanate (Augmentin) 875-125 mg per tablet Take 1 Tab by mouth two (2) times a day for 7 days.  triamcinolone (NASACORT AQ) 55 mcg nasal inhaler 2 Sprays by Both Nostrils route daily for 30 days.  pantoprazole (PROTONIX) 40 mg tablet Take 40 mg by mouth daily.  albuterol (PROVENTIL HFA, VENTOLIN HFA, PROAIR HFA) 90 mcg/actuation inhaler Take 2 Puffs by inhalation every four (4) hours as needed for Wheezing.  levothyroxine (SYNTHROID) 75 mcg tablet Take 1 Tab by mouth Daily (before breakfast). TAKE 1 TABLET BY MOUTH EVERY DAY BEFORE BREAKFAST ON A EMPTY STOMACH    esomeprazole (NexIUM) 40 mg capsule Take 1 Cap by mouth daily.     sertraline (ZOLOFT) 100 mg tablet TAKE 2 TABLETS BY MOUTH EVERY DAY    sucralfate (CARAFATE) 100 mg/mL suspension Take 10 mL by mouth Before breakfast, lunch, dinner and at bedtime.  tiZANidine (ZANAFLEX) 4 mg tablet Take 1 Tab by mouth three (3) times daily as needed for Muscle Spasm(s).  senna-docusate (Senna with Docusate Sodium) 8.6-50 mg per tablet Take 1 Tab by mouth daily. Indications: constipation    ALPRAZolam (Xanax) 0.5 mg tablet Take 1 Tab by mouth every eight (8) hours as needed for Anxiety for up to 15 doses. Max Daily Amount: 1.5 mg.    cholecalciferol (VITAMIN D3) (2,000 UNITS /50 MCG) cap capsule Take 4,000 Units by mouth daily.  pravastatin (PRAVACHOL) 40 mg tablet Take 1 Tab by mouth nightly.  polyethylene glycol (MIRALAX) 17 gram/dose powder Take 34gm in 6-8 ounces juice or water 2-3 times daily as directed/needed for constipation.  amitriptyline (ELAVIL) 50 mg tablet TAKE 1 TAB BY MOUTH NIGHTLY.  traMADol (ULTRAM) 50 mg tablet TAKE 2 TABLETS BY MOUTH EVERY 8 HOURS AS NEEDED FOR PAIN    albuterol (PROVENTIL HFA, VENTOLIN HFA, PROAIR HFA) 90 mcg/actuation inhaler Take 2 Puffs by inhalation every four (4) hours as needed for Wheezing.  Cane henry As directed to assist with ambulation     No current facility-administered medications for this visit. Objective:     Vitals:    05/14/21 1020   Pulse: 81   Resp: 18   Temp: 98 °F (36.7 °C)   SpO2: 99%       Physical Exam  General appearance  appears well hydrated and does not appear toxic, no acute distress  Eyes - EOMs intact. Non injected. No scleral icterus   Ears - no external swelling  Nose - nasal congestion. No purulent drainage  Mouth - OP clear without swelling, exudate or lesion. Mucus membranes moist. Uvula midline. Neck/Lymphatics  trachea midline, full AROM  Chest - Normal breathing effort no wheeze rales, rhonchi or diminishments bilaterally. Heart - RRR, no murmurs  Skin - no observable rashes or pallor  Neurologic- alert and oriented x 3  Psychiatric- normal mood, behavior and though content. Assessment/ Plan:     1.  Acute sinusitis, recurrence not specified, unspecified location    - AMB POC SARS-COV-2  - amoxicillin-clavulanate (Augmentin) 875-125 mg per tablet; Take 1 Tab by mouth two (2) times a day for 7 days. Dispense: 14 Tab; Refill: 0  - triamcinolone (NASACORT AQ) 55 mcg nasal inhaler; 2 Sprays by Both Nostrils route daily for 30 days. Dispense: 1 Bottle; Refill: 0    2. Nasal congestion    - AMB POC SARS-COV-2    Suspect allergies. Advised nasacort for 3-4 days. If worse or not improved may start Augmentin for bacterial sinusitis. Rapid COVID 19 test result NEGATIVE  Will discharge home with close monitoring and follow up. Supportive home care for mild symptoms advised- maintain adequate fluid intake, lozenges, over the counter Tylenol (for fever, aches, pains, chills), deep breathing exercises  Recommendation for self isolation/quarantine based on current CDC guidelines      Follow up: We have reviewed worsening/concerning signs and symptoms that may warrant seeking immediate care in the ED setting. For other non-severe changes, non-improvement or questions, patient aware to contact PCP office or consider a virtual online medical consultation.         Maria Del Carmen Rainey NP

## 2021-05-15 ENCOUNTER — IMMUNIZATION (OUTPATIENT)
Dept: FAMILY MEDICINE CLINIC | Age: 56
End: 2021-05-15
Payer: COMMERCIAL

## 2021-05-15 DIAGNOSIS — Z23 ENCOUNTER FOR IMMUNIZATION: Primary | ICD-10-CM

## 2021-05-15 PROCEDURE — 0002A COVID-19, MRNA, LNP-S, PF, 30MCG/0.3ML DOSE(PFIZER): CPT | Performed by: FAMILY MEDICINE

## 2021-05-15 PROCEDURE — 91300 COVID-19, MRNA, LNP-S, PF, 30MCG/0.3ML DOSE(PFIZER): CPT | Performed by: FAMILY MEDICINE

## 2021-05-20 ENCOUNTER — OFFICE VISIT (OUTPATIENT)
Dept: INTERNAL MEDICINE CLINIC | Age: 56
End: 2021-05-20
Payer: COMMERCIAL

## 2021-05-20 VITALS
SYSTOLIC BLOOD PRESSURE: 109 MMHG | DIASTOLIC BLOOD PRESSURE: 74 MMHG | WEIGHT: 220 LBS | OXYGEN SATURATION: 97 % | HEART RATE: 66 BPM | HEIGHT: 69 IN | RESPIRATION RATE: 20 BRPM | BODY MASS INDEX: 32.58 KG/M2 | TEMPERATURE: 97.9 F

## 2021-05-20 DIAGNOSIS — K59.00 CONSTIPATION, UNSPECIFIED CONSTIPATION TYPE: ICD-10-CM

## 2021-05-20 DIAGNOSIS — E78.2 MIXED HYPERLIPIDEMIA: ICD-10-CM

## 2021-05-20 DIAGNOSIS — Z12.31 ENCOUNTER FOR SCREENING MAMMOGRAM FOR BREAST CANCER: ICD-10-CM

## 2021-05-20 DIAGNOSIS — F41.9 ANXIETY: ICD-10-CM

## 2021-05-20 DIAGNOSIS — I49.3 PVC'S (PREMATURE VENTRICULAR CONTRACTIONS): ICD-10-CM

## 2021-05-20 DIAGNOSIS — J01.00 ACUTE MAXILLARY SINUSITIS, RECURRENCE NOT SPECIFIED: Primary | ICD-10-CM

## 2021-05-20 DIAGNOSIS — J30.2 SEASONAL ALLERGIC RHINITIS, UNSPECIFIED TRIGGER: ICD-10-CM

## 2021-05-20 DIAGNOSIS — E55.9 VITAMIN D DEFICIENCY: ICD-10-CM

## 2021-05-20 DIAGNOSIS — E03.9 ACQUIRED HYPOTHYROIDISM: ICD-10-CM

## 2021-05-20 DIAGNOSIS — R73.02 IGT (IMPAIRED GLUCOSE TOLERANCE): ICD-10-CM

## 2021-05-20 LAB
T4 FREE SERPL-MCNC: 1 NG/DL (ref 0.8–1.5)
TSH SERPL DL<=0.05 MIU/L-ACNC: 1.04 UIU/ML (ref 0.36–3.74)

## 2021-05-20 PROCEDURE — 99214 OFFICE O/P EST MOD 30 MIN: CPT | Performed by: INTERNAL MEDICINE

## 2021-05-20 RX ORDER — PRAVASTATIN SODIUM 40 MG/1
40 TABLET ORAL
Qty: 90 TABLET | Refills: 1 | Status: SHIPPED | OUTPATIENT
Start: 2021-05-20 | End: 2022-05-24 | Stop reason: SDUPTHER

## 2021-05-20 RX ORDER — LORATADINE 10 MG/1
10 TABLET ORAL DAILY
Qty: 30 TABLET | Refills: 5 | Status: SHIPPED | OUTPATIENT
Start: 2021-05-20

## 2021-05-20 RX ORDER — FLUTICASONE PROPIONATE 50 MCG
2 SPRAY, SUSPENSION (ML) NASAL DAILY
Qty: 1 BOTTLE | Refills: 5 | Status: SHIPPED | OUTPATIENT
Start: 2021-05-20

## 2021-05-20 RX ORDER — ALPRAZOLAM 0.5 MG/1
0.5 TABLET ORAL
Qty: 15 TABLET | Refills: 0 | Status: SHIPPED | OUTPATIENT
Start: 2021-05-20 | End: 2022-01-07 | Stop reason: SDUPTHER

## 2021-05-20 RX ORDER — METHOCARBAMOL 500 MG/1
500 TABLET, FILM COATED ORAL
COMMUNITY
Start: 2021-03-10

## 2021-05-20 RX ORDER — POLYETHYLENE GLYCOL 3350 17 G/17G
POWDER, FOR SOLUTION ORAL
Qty: 595 G | Refills: 5 | Status: SHIPPED | OUTPATIENT
Start: 2021-05-20 | End: 2022-02-09 | Stop reason: SDUPTHER

## 2021-05-20 RX ORDER — DICLOFENAC SODIUM 75 MG/1
TABLET, DELAYED RELEASE ORAL
COMMUNITY
Start: 2021-03-10

## 2021-05-20 RX ORDER — AZITHROMYCIN 250 MG/1
TABLET, FILM COATED ORAL
Qty: 6 TABLET | Refills: 0 | Status: SHIPPED | OUTPATIENT
Start: 2021-05-20 | End: 2021-05-25

## 2021-05-20 RX ORDER — AZELASTINE 1 MG/ML
1 SPRAY, METERED NASAL 2 TIMES DAILY
Qty: 1 BOTTLE | Refills: 5 | Status: SHIPPED | OUTPATIENT
Start: 2021-05-20 | End: 2021-07-15 | Stop reason: SDUPTHER

## 2021-05-20 NOTE — PROGRESS NOTES
HPI:  established patient  Presents for f/u sinuses, thyroid, etc    Several days of persistent sinus pressure, fullness    Runny nose and congestion for a few weeks    Intermittently, nose bleeds    Pt did not take the augmentin due to the size of the pills were too large for the pt. Past medical, Social, and Family history reviewed    Prior to Admission medications    Medication Sig Start Date End Date Taking? Authorizing Provider   methocarbamoL (ROBAXIN) 500 mg tablet Take 500 mg by mouth. 3/10/21  Yes Provider, Historical   amoxicillin-clavulanate (Augmentin) 875-125 mg per tablet Take 1 Tab by mouth two (2) times a day for 7 days. 5/14/21 5/21/21 Yes Aimee Kelly NP   triamcinolone (NASACORT AQ) 55 mcg nasal inhaler 2 Sprays by Both Nostrils route daily for 30 days. 5/14/21 6/13/21 Yes Aimee Kelly NP   pantoprazole (PROTONIX) 40 mg tablet Take 40 mg by mouth daily. Yes Provider, Historical   levothyroxine (SYNTHROID) 75 mcg tablet Take 1 Tab by mouth Daily (before breakfast). TAKE 1 TABLET BY MOUTH EVERY DAY BEFORE BREAKFAST ON A EMPTY STOMACH 1/17/21  Yes Pop Garcia MD   esomeprazole (NexIUM) 40 mg capsule Take 1 Cap by mouth daily. 1/11/21  Yes Pop Garcia MD   sertraline (ZOLOFT) 100 mg tablet TAKE 2 TABLETS BY MOUTH EVERY DAY 1/4/21  Yes Pop Garcia MD   sucralfate (CARAFATE) 100 mg/mL suspension Take 10 mL by mouth Before breakfast, lunch, dinner and at bedtime. 10/29/20  Yes Pop Garcia MD   tiZANidine (ZANAFLEX) 4 mg tablet Take 1 Tab by mouth three (3) times daily as needed for Muscle Spasm(s). 9/28/20  Yes Pop Garcia MD   senna-docusate (Senna with Docusate Sodium) 8.6-50 mg per tablet Take 1 Tab by mouth daily. Indications: constipation 9/28/20  Yes Pop Garcia MD   ALPRAZolam (Xanax) 0.5 mg tablet Take 1 Tab by mouth every eight (8) hours as needed for Anxiety for up to 15 doses.  Max Daily Amount: 1.5 mg. 7/10/20  Yes Shalonda Rubin, Randy Pena MD   cholecalciferol (VITAMIN D3) (2,000 UNITS /50 MCG) cap capsule Take 4,000 Units by mouth daily. 7/10/20  Yes Mercy Raygoza MD   pravastatin (PRAVACHOL) 40 mg tablet Take 1 Tab by mouth nightly. 6/1/20  Yes Mercy Raygoza MD   polyethylene glycol [facility]) 17 gram/dose powder Take 34gm in 6-8 ounces juice or water 2-3 times daily as directed/needed for constipation. 2/10/20  Yes Rashi Ward MD   amitriptyline (ELAVIL) 50 mg tablet TAKE 1 TAB BY MOUTH NIGHTLY. 12/16/18  Yes Mercy Raygoza MD   traMADol (ULTRAM) 50 mg tablet TAKE 2 TABLETS BY MOUTH EVERY 8 HOURS AS NEEDED FOR PAIN 7/25/18  Yes Mercy Raygoza MD   albuterol (PROVENTIL HFA, VENTOLIN HFA, PROAIR HFA) 90 mcg/actuation inhaler Take 2 Puffs by inhalation every four (4) hours as needed for Wheezing. 7/10/18  Yes MD Jose Carrasco Critz henry As directed to assist with ambulation 9/18/17  Yes Mercy Raygoza MD   diclofenac EC (VOLTAREN) 75 mg EC tablet TAKE 1 TABLET BY MOUTH TWICE A DAY 3/10/21   ProviderMarguerite   albuterol (PROVENTIL HFA, VENTOLIN HFA, PROAIR HFA) 90 mcg/actuation inhaler Take 2 Puffs by inhalation every four (4) hours as needed for Wheezing. Patient not taking: Reported on 5/20/2021 2/3/21   Mercy Raygoza MD          ROS  Complete ROS reviewed and negative or stable except as noted in HPI. Physical Exam  Vitals and nursing note reviewed. Constitutional:       General: She is not in acute distress. HENT:      Head: Normocephalic and atraumatic. Nose:      Right Sinus: Maxillary sinus tenderness present. Left Sinus: Maxillary sinus tenderness present. Eyes:      General: No scleral icterus. Pupils: Pupils are equal, round, and reactive to light. Neck:      Vascular: No JVD. Cardiovascular:      Rate and Rhythm: Normal rate and regular rhythm. Heart sounds: Normal heart sounds. No murmur heard. No friction rub. No gallop.     Pulmonary:      Eff[place]: Pulmonary effort is normal. No respiratory distress. Breath sounds: Normal breath sounds. No wheezing or rales. Abdominal:      General: Bowel sounds are normal. There is no distension. Palpations: Abdomen is soft. Tenderness: There is no abdominal tenderness. Musculoskeletal:         General: Normal range of motion. Cervical back: Normal range of motion and neck supple. Lymphadenopathy:      Cervical: No cervical adenopathy. Skin:     General: Skin is warm. Findings: No rash. Neurological:      Mental Status: She is alert and oriented to person, place, and time. Motor: No abnormal muscle tone. Prior labs reviewed. Assessment/Plan:    ICD-10-CM ICD-9-CM    1. Acute maxillary sinusitis, recurrence not specified  J01.00 461.0 azithromycin (ZITHROMAX) 250 mg tablet   2. Acquired hypothyroidism  E03.9 244.9 TSH 3RD GENERATION      T4, FREE      T4, FREE      TSH 3RD GENERATION   3. Mixed hyperlipidemia  E78.2 272.2 pravastatin (PRAVACHOL) 40 mg tablet   4. Vitamin D deficiency  E55.9 268.9    5. IGT (impaired glucose tolerance)  R73.02 790.22    6. Seasonal allergic rhinitis, unspecified trigger  J30.2 477.9 azelastine (Astelin) 137 mcg (0.1 %) nasal spray      fluticasone propionate (FLONASE) 50 mcg/actuation nasal spray      loratadine (Claritin) 10 mg tablet   7. Constipation, unspecified constipation type  K59.00 564.00 polyethylene glycol (MIRALAX) 17 gram/dose powder   8. Anxiety  F41.9 300.00 ALPRAZolam (Xanax) 0.5 mg tablet   9. PVC's (premature ventricular contractions)  I49.3 427.69    10. Encounter for screening mammogram for breast cancer  Z12.31 V76.12 [facility] 3D RACHID W MAMMO BI SCREENING INCL CAD     Follow-up and Dispositions    · Return in about 2 months (around 7/20/2021), or if symptoms worsen or fail to improve, for cholesterol, thyroid.        results and schedule of future studies reviewed with patient  reviewed diet, exercise and weight cardiovascular risk and specific lipid/LDL goals reviewed  reviewed medications and side effects in detail    azithro  mucinex   flonase   Refill other meds  Recheck thyroid      An electronic signature was used to authenticate this note.   -- Floyd Bond MD

## 2021-05-20 NOTE — PROGRESS NOTES
RM: 13  PT IS NOT FASTING  Chief Complaint   Patient presents with    Sinus Infection     PT STATES SHE HAS BEEN DEALING WITH THESINUS INFECTION FOR 1 WEEK. PT STATES SHE HAS USED TYLENOIL SINUS AND HAS HAD NO RELIEF    Medication Refill      Visit Vitals  /74 (BP 1 Location: Left upper arm, BP Patient Position: Sitting, BP Cuff Size: Large adult)   Pulse 66   Temp 97.9 °F (36.6 °C) (Oral)   Resp 20   Ht 5' 9\" (1.753 m)   Wt 220 lb (99.8 kg)   LMP 10/23/2016 (Approximate)   SpO2 97%   BMI 32.49 kg/m²     Recent Travel Screening and Travel History documentation     Travel Screening     Question   Response    In the last month, have you been in contact with someone who was confirmed or suspected to have Coronavirus / COVID-19? No / Unsure    Have you had a COVID-19 viral test in the last 14 days? No    Do you have any of the following new or worsening symptoms? None of these    Have you traveled internationally or domestically in the last month? No      Travel History   Travel since 04/20/21     No documented travel since 04/20/21        3 most recent [facility] Screens 5/20/2021   Little interest or pleasure in doing things Not at all   Feeling down, depressed, irritable, or hopeless Not at all   Total Score PHQ 2 0   Trouble falling or staying asleep, or sleeping too much -   Feeling tired or having little energy -   Poor appetite, weight loss, or overeating -   Feeling bad about yourself - or that you are a failure or have let yourself or your family down -   Trouble concentrating on things such as school, work, reading, or watching TV -   Moving or speaking so slowly that other people could have noticed; or the opposite being so fidgety that others notice -   Thoughts of being better off dead, or hurting yourself in some way -   PHQ 9 Score -   How difficult have these problems made it for you to do your work, take care of your home and get along with others -            1.  Have you been to the ER, urgent care clinic since your last visit? Hospitalized since your last visit? No    2. Have you seen or consulted any other health care providers outside of the [address] [place] [address] since your last visit? Include any pap smears or colon screening.  No     Health Maintenance Due   Topic Date Due    Breast Cancer Screen Mammogram  06/14/2021        Learning Assessment 10/25/2019   PRIMARY LEARNER Patient   HIGHEST LEVEL OF EDUCATION - PRIMARY LEARNER  -   BARRIERS PRIMARY LEARNER NONE   CO-LEARNER CAREGIVER -   PRIMARY LANGUAGE ENGLISH   LEARNER PREFERENCE PRIMARY DEMONSTRATION   LEARNING SPECIAL TOPICS -   ANSWERED BY patient   RELATIONSHIP SELF

## 2021-05-20 NOTE — LETTER
5/20/2021 Ms. Becky Monte [address] [place] [address] [address] Dear Becky Monte: 
 
Please find your most recent results below. Resulted Orders T4, FREE Result Value Ref Range T4, Free 1.0 0.8 - 1.5 NG/DL  
TSH 3RD GENERATION Result Value Ref Range TSH 1.04 0.36 - 3.74 uIU/mL Labs are normal and at goal. 
 
 
RECOMMENDATIONS: 
 
Continue with current  medications. Please call me if you have any questions: [phone] Sincerely, Rossy Rutledge MD

## 2021-07-13 DIAGNOSIS — F41.9 ANXIETY: ICD-10-CM

## 2021-07-13 DIAGNOSIS — E03.4 HYPOTHYROIDISM DUE TO ACQUIRED ATROPHY OF THYROID: ICD-10-CM

## 2021-07-13 RX ORDER — SERTRALINE HYDROCHLORIDE 100 MG/1
TABLET, FILM COATED ORAL
Qty: 180 TABLET | Refills: 1 | Status: SHIPPED | OUTPATIENT
Start: 2021-07-13 | End: 2022-02-14 | Stop reason: SDUPTHER

## 2021-07-13 RX ORDER — LEVOTHYROXINE SODIUM 75 UG/1
75 TABLET ORAL
Qty: 90 TABLET | Refills: 1 | Status: SHIPPED | OUTPATIENT
Start: 2021-07-13 | End: 2022-01-11

## 2021-07-15 ENCOUNTER — OFFICE VISIT (OUTPATIENT)
Dept: INTERNAL MEDICINE CLINIC | Age: 56
End: 2021-07-15
Payer: COMMERCIAL

## 2021-07-15 VITALS
HEIGHT: 68 IN | WEIGHT: 225.6 LBS | OXYGEN SATURATION: 96 % | TEMPERATURE: 97.3 F | HEART RATE: 69 BPM | DIASTOLIC BLOOD PRESSURE: 77 MMHG | SYSTOLIC BLOOD PRESSURE: 113 MMHG | BODY MASS INDEX: 34.19 KG/M2

## 2021-07-15 DIAGNOSIS — J30.2 SEASONAL ALLERGIC RHINITIS, UNSPECIFIED TRIGGER: ICD-10-CM

## 2021-07-15 DIAGNOSIS — E78.2 MIXED HYPERLIPIDEMIA: ICD-10-CM

## 2021-07-15 DIAGNOSIS — E03.4 HYPOTHYROIDISM DUE TO ACQUIRED ATROPHY OF THYROID: ICD-10-CM

## 2021-07-15 DIAGNOSIS — E55.9 VITAMIN D DEFICIENCY: ICD-10-CM

## 2021-07-15 DIAGNOSIS — J32.9 RECURRENT SINUSITIS: Primary | ICD-10-CM

## 2021-07-15 DIAGNOSIS — K21.9 GASTROESOPHAGEAL REFLUX DISEASE, UNSPECIFIED WHETHER ESOPHAGITIS PRESENT: ICD-10-CM

## 2021-07-15 DIAGNOSIS — R73.02 IGT (IMPAIRED GLUCOSE TOLERANCE): ICD-10-CM

## 2021-07-15 PROCEDURE — 99214 OFFICE O/P EST MOD 30 MIN: CPT | Performed by: INTERNAL MEDICINE

## 2021-07-15 RX ORDER — AZELASTINE 1 MG/ML
1 SPRAY, METERED NASAL 2 TIMES DAILY
Qty: 1 BOTTLE | Refills: 5 | Status: SHIPPED | OUTPATIENT
Start: 2021-07-15 | End: 2022-08-10

## 2021-07-15 RX ORDER — AMOXICILLIN AND CLAVULANATE POTASSIUM 875; 125 MG/1; MG/1
1 TABLET, FILM COATED ORAL 2 TIMES DAILY
Qty: 20 TABLET | Refills: 0 | Status: SHIPPED | OUTPATIENT
Start: 2021-07-15 | End: 2021-07-25

## 2021-07-15 RX ORDER — AMOXICILLIN 875 MG/1
875 TABLET, FILM COATED ORAL 2 TIMES DAILY
COMMUNITY
End: 2021-07-15 | Stop reason: ALTCHOICE

## 2021-07-15 NOTE — PROGRESS NOTES
RM 14  Pt is not fasting     Chief Complaint   Patient presents with    Cholesterol Problem    Thyroid Problem    Sinus Infection     states she dx with acute sinus infection at ENT. started 3 weeks ago, green drainage and headaches, facial pain. states inside of nose is swollen. has been using neti pot but symptoms continue, saturday had episode of dysphagia. lasted 20 min. Visit Vitals  /77   Pulse 69   Temp 97.3 °F (36.3 °C)   Ht 5' 8\" (1.727 m)   Wt 225 lb 9.6 oz (102.3 kg)   SpO2 96%   BMI 34.30 kg/m²       3 most recent PHQ Screens 7/15/2021   Little interest or pleasure in doing things Not at all   Feeling down, depressed, irritable, or hopeless Not at all   Total Score PHQ 2 0   Trouble falling or staying asleep, or sleeping too much -   Feeling tired or having little energy -   Poor appetite, weight loss, or overeating -   Feeling bad about yourself - or that you are a failure or have let yourself or your family down -   Trouble concentrating on things such as school, work, reading, or watching TV -   Moving or speaking so slowly that other people could have noticed; or the opposite being so fidgety that others notice -   Thoughts of being better off dead, or hurting yourself in some way -   PHQ 9 Score -   How difficult have these problems made it for you to do your work, take care of your home and get along with others -         1. Have you been to the ER, urgent care clinic since your last visit? Hospitalized since your last visit? No    2. Have you seen or consulted any other health care providers outside of the [address] [place] [address] since your last visit? Include any pap smears or colon screening.  Seen by ENT for sinus infection, given amox, cannot remember name of Dr. Franco Dubose Maintenance Due   Topic Date Due    Breast Cancer Screen Mammogram  06/14/2021       Learning Assessment 10/25/2019   PRIMARY LEARNER Patient   HIGHEST LEVEL OF EDUCATION - PRIMARY LEARNER  -   Clovis Chatman PRIMARY LEARNER NONE   CO-LEARNER CAREGIVER -   PRIMARY LANGUAGE ENGLISH   LEARNER PREFERENCE PRIMARY DEMONSTRATION   LEARNING SPECIAL TOPICS -   ANSWERED BY patient   RELATIONSHIP SELF         AVS  education, follow up, and recommendations provided and addressed with patient.

## 2021-07-15 NOTE — PROGRESS NOTES
HPI:  established patient  Presents for f/u sinusitis, thyroid, cholesterol    Recurrent sinusitis - saw ENT   Rx'd amox 875 - pills too big to swallow  Using flonase , astelin and antihistamine as Rx'd  Using netipot. Taking and tolerating other meds    No CP, SOB, neuro sx    Euthyroid. Past medical, Social, and Family history reviewed    Prior to Admission medications    Medication Sig Start Date End Date Taking? Authorizing Provider   amoxicillin (AMOXIL) 875 mg tablet Take 875 mg by mouth two (2) times a day. Yes Provider, Historical   levothyroxine (SYNTHROID) 75 mcg tablet TAKE 1 TAB BY MOUTH DAILY (BEFORE BREAKFAST). TAKE 1 TABLET BY MOUTH EVERY DAY BEFORE BREAKFAST ON A EMPTY STOMACH 7/13/21  Yes Donald Goodman MD   sertraline (ZOLOFT) 100 mg tablet TAKE 2 TABLETS BY MOUTH EVERY DAY 7/13/21  Yes Donald Goodman MD   methocarbamoL (ROBAXIN) 500 mg tablet Take 500 mg by mouth. 3/10/21  Yes Provider, Historical   diclofenac EC (VOLTAREN) 75 mg EC tablet TAKE 1 TABLET BY MOUTH TWICE A DAY 3/10/21  Yes Provider, Historical   azelastine (Astelin) 137 mcg (0.1 %) nasal spray 1 Leroy by Both Nostrils route two (2) times a day. Use in each nostril as directed 5/20/21  Yes Donald Goodman MD   fluticasone propionate (FLONASE) 50 mcg/actuation nasal spray 2 Sprays by Both Nostrils route daily. 5/20/21  Yes Donald Goodman MD   loratadine (Claritin) 10 mg tablet Take 1 Tablet by mouth daily. 5/20/21  Yes Donald Goodman MD   polyethylene glycol [facility]) 17 gram/dose powder Take 34gm in 6-8 ounces juice or water 2-3 times daily as directed/needed for constipation. 5/20/21  Yes Donald Goodman MD   ALPRAZolam (Xanax) 0.5 mg tablet Take 1 Tablet by mouth every eight (8) hours as needed for Anxiety for up to 15 doses. Max Daily Amount: 1.5 mg. 5/20/21  Yes Donald Goodman MD   pravastatin (PRAVACHOL) 40 mg tablet Take 1 Tablet by mouth nightly.  5/20/21  Yes Donald Goodman MD pantoprazole (PROTONIX) 40 mg tablet Take 40 mg by mouth daily. Yes Remedios, Historical   albuterol (PROVENTIL HFA, VENTOLIN HFA, PROAIR HFA) 90 mcg/actuation inhaler Take 2 Puffs by inhalation every four (4) hours as needed for Wheezing. 2/3/21  Yes Dallas Escamilla MD   esomeprazole (NexIUM) 40 mg capsule Take 1 Cap by mouth daily. 1/11/21  Yes Dallas Escamilla MD   senna-docusate (Senna with Docusate Sodium) 8.6-50 mg per tablet Take 1 Tab by mouth daily. Indications: constipation 9/28/20  Yes Dallas Escamilla MD   cholecalciferol (VITAMIN D3) (2,000 UNITS /50 MCG) cap capsule Take 4,000 Units by mouth daily. 7/10/20  Yes Dallas Escamilla MD   amitriptyline (ELAVIL) 50 mg tablet TAKE 1 TAB BY MOUTH NIGHTLY. 12/16/18  Yes Dallas Escamilla MD   traMADol (ULTRAM) 50 mg tablet TAKE 2 TABLETS BY MOUTH EVERY 8 HOURS AS NEEDED FOR PAIN 7/25/18  Yes Dallas Escamilla MD   albuterol (PROVENTIL HFA, VENTOLIN HFA, PROAIR HFA) 90 mcg/actuation inhaler Take 2 Puffs by inhalation every four (4) hours as needed for Wheezing. 7/10/18  Yes MD Reena Garza Evans henry As directed to assist with ambulation 9/18/17  Yes Dallas Escamilla MD   sucralfate (CARAFATE) 100 mg/mL suspension Take 10 mL by mouth Before breakfast, lunch, dinner and at bedtime. 10/29/20   Dallas Escamilla MD   tiZANidine (ZANAFLEX) 4 mg tablet Take 1 Tab by mouth three (3) times daily as needed for Muscle Spasm(s). Patient not taking: Reported on 7/15/2021 9/28/20   Dallas Escamilla MD          ROS  Complete ROS reviewed and negative or stable except as noted in HPI. Physical Exam  Vitals and nursing note reviewed. Constitutional:       General: She is not in acute distress. HENT:      Head: Normocephalic and atraumatic. Nose:      Right Sinus: Maxillary sinus tenderness present. Left Sinus: Maxillary sinus tenderness present. Eyes:      General: No scleral icterus.      Pupils: Pupils are equal, round, and reactive to light. Neck:      Vascular: No JVD. Cardiovascular:      Rate and Rhythm: Normal rate and regular rhythm. Heart sounds: Normal heart sounds. No murmur heard. No friction rub. No gallop. Pulmonary:      Effort: Pulmonary effort is normal. No respiratory distress. Breath sounds: Normal breath sounds. No wheezing or rales. Abdominal:      General: Bowel sounds are normal. There is no distension. Palpations: Abdomen is soft. Tenderness: There is no abdominal tenderness. Musculoskeletal:         General: Normal range of motion. Cervical back: Normal range of motion and neck supple. Lymphadenopathy:      Cervical: No cervical adenopathy. Skin:     General: Skin is warm. Findings: No rash. Neurological:      Mental Status: She is alert and oriented to person, place, and time. Motor: No abnormal muscle tone. Prior labs reviewed. Assessment/Plan:  ?anatomic obstruction to drainage vs uncontrolled allergies despite flonase, astelin and antihistamines      ICD-10-CM ICD-9-CM    1. Recurrent sinusitis  J32.9 473.9 amoxicillin-clavulanate (AUGMENTIN) 875-125 mg per tablet      REFERRAL TO ALLERGY   2. Seasonal allergic rhinitis, unspecified trigger  J30.2 477.9 azelastine (Astelin) 137 mcg (0.1 %) nasal spray      REFERRAL TO ALLERGY   3. Hypothyroidism due to acquired atrophy of thyroid  E03.4 244.8 TSH 3RD GENERATION     246.8 T4, FREE   4. Mixed hyperlipidemia  E78.2 484.7 CK      METABOLIC PANEL, COMPREHENSIVE      LIPID PANEL   5. Vitamin D deficiency  E55.9 268.9 VITAMIN D, 25 HYDROXY   6. IGT (impaired glucose tolerance)  R73.02 790.22 CBC WITH AUTOMATED DIFF      HEMOGLOBIN A1C WITH EAG   7.  Gastroesophageal reflux disease, unspecified whether esophagitis present  K21.9 530.81 CBC WITH AUTOMATED DIFF     Follow-up and Dispositions    · Return in about 6 months (around 1/15/2022), or if symptoms worsen or fail to improve, for blood pressure, cholesterol, thyroid. results and schedule of future studies reviewed with patient  reviewed diet, exercise and weight    cardiovascular risk and specific lipid/LDL goals reviewed  reviewed medications and side effects in detail    augmentin   Continue other current medications   Ref to allergist - consider allergy shots as indicated  See ENT if no allergies identified. An electronic signature was used to authenticate this note.   -- Gurvinder Brand MD

## 2021-07-19 ENCOUNTER — HOSPITAL ENCOUNTER (OUTPATIENT)
Dept: MAMMOGRAPHY | Age: 56
Discharge: HOME | End: 2021-07-19
Attending: INTERNAL MEDICINE
Payer: COMMERCIAL

## 2021-07-19 DIAGNOSIS — Z12.31 ENCOUNTER FOR SCREENING MAMMOGRAM FOR BREAST CANCER: ICD-10-CM

## 2021-07-19 PROCEDURE — 77063 BREAST TOMOSYNTHESIS BI: CPT

## 2021-07-30 ENCOUNTER — APPOINTMENT (OUTPATIENT)
Dept: INTERNAL MEDICINE CLINIC | Age: 56
End: 2021-07-30

## 2021-07-30 DIAGNOSIS — E78.2 MIXED HYPERLIPIDEMIA: ICD-10-CM

## 2021-07-30 DIAGNOSIS — E03.4 HYPOTHYROIDISM DUE TO ACQUIRED ATROPHY OF THYROID: ICD-10-CM

## 2021-07-30 DIAGNOSIS — R73.02 IGT (IMPAIRED GLUCOSE TOLERANCE): ICD-10-CM

## 2021-07-30 DIAGNOSIS — E55.9 VITAMIN D DEFICIENCY: ICD-10-CM

## 2021-07-30 DIAGNOSIS — K21.9 GASTROESOPHAGEAL REFLUX DISEASE, UNSPECIFIED WHETHER ESOPHAGITIS PRESENT: ICD-10-CM

## 2021-07-30 LAB
25(OH)D3 SERPL-MCNC: 33.3 NG/ML (ref 30–100)
ALBUMIN SERPL-MCNC: 4.2 G/DL (ref 3.5–5)
ALBUMIN/GLOB SERPL: 1.2 {RATIO} (ref 1.1–2.2)
ALP SERPL-CCNC: 74 U/L (ref 45–117)
ALT SERPL-CCNC: 35 U/L (ref 12–78)
ANION GAP SERPL CALC-SCNC: 5 MMOL/L (ref 5–15)
AST SERPL-CCNC: 13 U/L (ref 15–37)
BASOPHILS # BLD: 0 K/UL (ref 0–0.1)
BASOPHILS NFR BLD: 1 % (ref 0–1)
BILIRUB SERPL-MCNC: 0.8 MG/DL (ref 0.2–1)
BUN SERPL-MCNC: 11 MG/DL (ref 6–20)
BUN/CREAT SERPL: 13 (ref 12–20)
CALCIUM SERPL-MCNC: 9.7 MG/DL (ref 8.5–10.1)
CHLORIDE SERPL-SCNC: 109 MMOL/L (ref 97–108)
CHOLEST SERPL-MCNC: 203 MG/DL
CK SERPL-CCNC: 125 U/L (ref 26–192)
CO2 SERPL-SCNC: 27 MMOL/L (ref 21–32)
CREAT SERPL-MCNC: 0.84 MG/DL (ref 0.55–1.02)
DIFFERENTIAL METHOD BLD: NORMAL
EOSINOPHIL # BLD: 0.1 K/UL (ref 0–0.4)
EOSINOPHIL NFR BLD: 2 % (ref 0–7)
ERYTHROCYTE [DISTWIDTH] IN BLOOD BY AUTOMATED COUNT: 14.1 % (ref 11.5–14.5)
EST. AVERAGE GLUCOSE BLD GHB EST-MCNC: 123 MG/DL
GLOBULIN SER CALC-MCNC: 3.4 G/DL (ref 2–4)
GLUCOSE SERPL-MCNC: 88 MG/DL (ref 65–100)
HBA1C MFR BLD: 5.9 % (ref 4–5.6)
HCT VFR BLD AUTO: 43.7 % (ref 35–47)
HDLC SERPL-MCNC: 97 MG/DL
HDLC SERPL: 2.1 {RATIO} (ref 0–5)
HGB BLD-MCNC: 14 G/DL (ref 11.5–16)
IMM GRANULOCYTES # BLD AUTO: 0 K/UL (ref 0–0.04)
IMM GRANULOCYTES NFR BLD AUTO: 0 % (ref 0–0.5)
LDLC SERPL CALC-MCNC: 89.2 MG/DL (ref 0–100)
LYMPHOCYTES # BLD: 2.4 K/UL (ref 0.8–3.5)
LYMPHOCYTES NFR BLD: 42 % (ref 12–49)
MCH RBC QN AUTO: 29.5 PG (ref 26–34)
MCHC RBC AUTO-ENTMCNC: 32 G/DL (ref 30–36.5)
MCV RBC AUTO: 92.2 FL (ref 80–99)
MONOCYTES # BLD: 0.4 K/UL (ref 0–1)
MONOCYTES NFR BLD: 7 % (ref 5–13)
NEUTS SEG # BLD: 2.7 K/UL (ref 1.8–8)
NEUTS SEG NFR BLD: 48 % (ref 32–75)
NRBC # BLD: 0 K/UL (ref 0–0.01)
NRBC BLD-RTO: 0 PER 100 WBC
PLATELET # BLD AUTO: 176 K/UL (ref 150–400)
PMV BLD AUTO: 12.4 FL (ref 8.9–12.9)
POTASSIUM SERPL-SCNC: 4 MMOL/L (ref 3.5–5.1)
PROT SERPL-MCNC: 7.6 G/DL (ref 6.4–8.2)
RBC # BLD AUTO: 4.74 M/UL (ref 3.8–5.2)
SODIUM SERPL-SCNC: 141 MMOL/L (ref 136–145)
T4 FREE SERPL-MCNC: 1 NG/DL (ref 0.8–1.5)
TRIGL SERPL-MCNC: 84 MG/DL (ref ?–150)
TSH SERPL DL<=0.05 MIU/L-ACNC: 2.32 UIU/ML (ref 0.36–3.74)
VLDLC SERPL CALC-MCNC: 16.8 MG/DL
WBC # BLD AUTO: 5.5 K/UL (ref 3.6–11)

## 2021-10-14 ENCOUNTER — NURSE TRIAGE (OUTPATIENT)
Dept: OTHER | Facility: CLINIC | Age: 56
End: 2021-10-14

## 2021-10-14 NOTE — TELEPHONE ENCOUNTER
Received call from Bella at [facility] with Red Flag Complaint. Brief description of triage: headache     Triage indicates for patient to see in office today. Pt informed if unable to get an appointment to go to urgent care or Emergency Department. Pt agreeable with plan. Care advice provided, patient verbalizes understanding; denies any other questions or concerns; instructed to call back for any new or worsening symptoms. Scheduling department to call pt back. Pt informed and agreeable. 1258: Jose Guadalupe Reyna acknowledged to call pt and schedule appt. Attention Provider: Thank you for allowing me to participate in the care of your patient. The patient was connected to triage in response to information provided to the [facility]. Please do not respond through this encounter as the response is not directed to a shared pool. Reason for Disposition   Patient wants to be seen    Answer Assessment - Initial Assessment Questions  1. LOCATION: \"Where does it hurt? \"       Back of head and left side of head. Sleeps on left side with 3 pillows. Last night tried 4 pillows. States sleeps with multiple pillows due to chronic sinus complications. 2. ONSET: \"When did the headache start? \" (Minutes, hours or days)       1 month ago    3. PATTERN: \"Does the pain come and go, or has it been constant since it started? \"      Only at night when she goes to bed. 4. SEVERITY: \"How bad is the pain? \" and \"What does it keep you from doing? \"  (e.g., Scale 1-10; mild, moderate, or severe)    - MILD (1-3): doesn't interfere with normal activities     - MODERATE (4-7): interferes with normal activities or awakens from sleep     - SEVERE (8-10): excruciating pain, unable to do any normal activities         0/10 now. 10/10 at night. 5. RECURRENT SYMPTOM: \"Have you ever had headaches before? \" If so, ask: \"When was the last time? \" and \"What happened that time? \"       No history of headaches.  States thought it was her pillows so she bought new pillows which didn't help    6. CAUSE: \"What do you think is causing the headache? \"      Unsure. 7. MIGRAINE: \"Have you been diagnosed with migraine headaches? \" If so, ask: \"Is this headache similar? \"       No    8. HEAD INJURY: \"Has there been any recent injury to the head? \"       Denies    9. OTHER SYMPTOMS: \"Do you have any other symptoms? \" (fever, stiff neck, eye pain, sore throat, cold symptoms)      Denies dizziness, chest pain, shortness of breath. Denies sore throat, neck pain. Chronic nasal drainage. 10. PREGNANCY: \"Is there any chance you are pregnant? \" \"When was your last menstrual period? \"        LMP- postmenopausal    Protocols used: HEADACHE-ADULT-[place]

## 2021-10-20 ENCOUNTER — OFFICE VISIT (OUTPATIENT)
Dept: URGENT CARE | Age: 56
End: 2021-10-20
Payer: COMMERCIAL

## 2021-10-20 VITALS — RESPIRATION RATE: 16 BRPM | HEART RATE: 63 BPM | TEMPERATURE: 98.4 F | OXYGEN SATURATION: 96 %

## 2021-10-20 DIAGNOSIS — H65.192 ACUTE MIDDLE EAR EFFUSION, LEFT: Primary | ICD-10-CM

## 2021-10-20 DIAGNOSIS — J30.89 ENVIRONMENTAL AND SEASONAL ALLERGIES: ICD-10-CM

## 2021-10-20 PROCEDURE — S9083 URGENT CARE CENTER GLOBAL: HCPCS | Performed by: NURSE PRACTITIONER

## 2021-10-20 RX ORDER — METHYLPREDNISOLONE 4 MG/1
TABLET ORAL
Qty: 1 DOSE PACK | Refills: 0 | Status: SHIPPED | OUTPATIENT
Start: 2021-10-20 | End: 2022-01-07 | Stop reason: ALTCHOICE

## 2021-10-20 NOTE — PROGRESS NOTES
This patient was seen at [address] [place] [address] Urgent Care while in their vehicle due to COVID-19 pandemic with PPE and focused examination in order to decrease community viral transmission. The patient/guardian gave verbal consent to treat. Belle Marie is a 61 y.o. female who presents for evaluation of left ear pressure, PND, dry cough, nasal congestion, left sinus pressure and rhinorrhea since this morning. Reports no other associated symptoms such as pain, dizziness, hearing changes. Denies any symptoms such as SOB, chest tightness, ST, HA, n/v/d, fever etc. No known exposure to COVID or sick contacts. Normal appetite. No other complaints or concerns at this time. Patient completed COVID vaccinations x 2 more than one month ago. PMH: GERD, Allergic Rhinitis, GERD, Hypothyroidism. Non-smoker.              Past Medical History:   Diagnosis Date    Acid reflux     Adverse effect of anesthesia     woke up coughing    Allergic rhinitis     Anxiety     Arthritis     DDD (degenerative disc disease), lumbar     DJD (degenerative joint disease), lumbosacral     GERD (gastroesophageal reflux disease)     History of nonchemical tubal occlusion     Esure devices    Hyperlipidemia 2/20/2014    Hypothyroidism     HYPO    IGT (impaired glucose tolerance)     Ill-defined condition     prolapse uterus/states thus her intestines has collpased a little bit    Psychiatric disorder     anxiety and depression    PUD (peptic ulcer disease)     no EGD    Routine gynecological examination     [facility]    Sinus disorder     Tinea pedis     Ureterocele     small, right        Past Surgical History:   Procedure Laterality Date    COLONOSCOPY N/A 9/13/2016    COLONOSCOPY / EGD  performed by Bin Morel MD at [address] HX GYN  10/12/2012    [employer]    HX LAP CHOLECYSTECTOMY  10/06/2016    HX OTHER SURGICAL      spinal fusion     HX TOTAL VAGINAL HYSTERECTOMY      [place] REMOVAL OF OVARIAN CYST(S)           Family History   Problem Relation Age of Onset    Hypertension Mother     Diabetes Mother     Heart Disease Mother     Heart Attack Mother     Cancer Father         lung    Other Sister         cholecystectomy    Cancer Brother         lung    Diabetes Maternal Aunt     Cancer Paternal Uncle         lung    Diabetes Maternal Aunt     Diabetes Maternal Aunt     Diabetes Maternal Aunt     Diabetes Maternal Aunt     Diabetes Maternal Aunt     Heart Attack Daughter 32    Other Son         narcolepsy/GERD (part of esophagus removed d/t this)    Cancer Paternal Uncle         lung    Anesth Problems Neg Hx         Social History     Socioeconomic History    Marital status: SINGLE     Spouse name: Not on file    Number of children: Not on file    Years of education: Not on file    Highest education level: Not on file   Occupational History    Not on file   Tobacco Use    Smoking status: Never Smoker    Smokeless tobacco: Never Used   Substance and Sexual Activity    Alcohol use: No    Drug use: No    Sexual activity: Yes     Partners: Male     Birth control/protection: Implant   Other Topics Concern    Not on file   Social History Narrative    Not on file     Social Determinants of Health     Financial Resource Strain:     Difficulty of Paying Living Expenses:    Food Insecurity:     Worried About Running Out of Food in the Last Year:     [address] in the Last Year:    Transportation Needs:     Lack of Transportation (Medical):      Lack of Transportation (Non-Medical):    Physical Activity:     Days of Exercise per Week:     Minutes of Exercise per Session:    Stress:     Feeling of Stress :    Social Connections:     Frequency of Communication with Friends and Family:     Frequency of Social Gatherings with Friends and Family:     Attends Jewish Services:     Active Member of Clubs or Organizations:     Attends Club or Organization Meetings:     Marital Status:    Intimate Partner Violence:     Fear of Current or Ex-Partner:     Emotionally Abused:     Physically Abused:     Sexually Abused: ALLERGIES: Grass pollen, House dust mite, Mold, and Tree and shrub pollen    Review of Systems   Constitutional: Negative for activity change, appetite change, chills, diaphoresis, fatigue and fever. HENT: Positive for congestion, postnasal drip and rhinorrhea. Negative for ear discharge, ear pain, facial swelling, sinus pressure, sinus pain and sore throat. Left ear pressure, see HPI   Respiratory: Positive for cough. Negative for chest tightness, shortness of breath and wheezing. Cardiovascular: Negative for chest pain. Gastrointestinal: Negative for abdominal pain, constipation, diarrhea, nausea and vomiting. Musculoskeletal: Negative for myalgias. Skin: Negative for rash. Neurological: Negative for dizziness, weakness, light-headedness and headaches. Vitals:    10/20/21 1008   Pulse: 63   Resp: 16   Temp: 98.4 °F (36.9 °C)   SpO2: 96%       Physical Exam  Vitals and nursing note reviewed. Constitutional:       General: She is not in acute distress. Appearance: Normal appearance. She is not ill-appearing. HENT:      Head: Normocephalic and atraumatic. Right Ear: Tympanic membrane and ear canal normal. No drainage, swelling or tenderness. No middle ear effusion. There is no impacted cerumen. No foreign body. No mastoid tenderness. Tympanic membrane is not injected, erythematous, retracted or bulging. Left Ear: No drainage, swelling or tenderness. A middle ear effusion is present. There is no impacted cerumen. No foreign body. No mastoid tenderness. Tympanic membrane is bulging. Tympanic membrane is not injected, erythematous or retracted. Nose: Congestion and rhinorrhea present. Right Sinus: No maxillary sinus tenderness or frontal sinus tenderness. Left Sinus: Maxillary sinus tenderness present. No frontal sinus tenderness. Mouth/Throat:      Mouth: Mucous membranes are moist.      Pharynx: Oropharynx is clear. No pharyngeal swelling, oropharyngeal exudate or posterior oropharyngeal erythema. Comments: +PND  Eyes:      Conjunctiva/sclera: Conjunctivae normal.      Pupils: Pupils are equal, round, and reactive to light. Cardiovascular:      Rate and Rhythm: Normal rate and regular rhythm. Heart sounds: Normal heart sounds. No murmur heard. Pulmonary:      Effort: Pulmonary effort is normal.      Breath sounds: Normal breath sounds. No wheezing or rhonchi. Musculoskeletal:         General: Normal range of motion. Cervical back: Normal range of motion and neck supple. No muscular tenderness. Lymphadenopathy:      Cervical: No cervical adenopathy. Skin:     General: Skin is warm and dry. Findings: No rash. Neurological:      Mental Status: She is alert and oriented to person, place, and time. Psychiatric:         Mood and Affect: Mood normal.         Behavior: Behavior normal.         MDM    Procedures        ICD-10-CM ICD-9-CM   1. Acute middle ear effusion, left  H65.192 381.00   2. Environmental and seasonal allergies  J30.89 477.8       Orders Placed This Encounter    methylPREDNISolone (MEDROL DOSEPACK) 4 mg tablet     Sig: Complete as directed     Dispense:  1 Dose Pack     Refill:  0      Discussed presentation with patient and treatment recommendations. Advised on viral vs bacterial illness and following treatment plan developed. Will start on medrol dose pack to decrease inflammation- discouraged NSAIDs during use. Advised on mech of action and potential side effects of medications. Encouraged to push fluids, tylenol as needed for pain, warm salt water gargles, OTC mucinex etc.   Continue daily allergy medications as previously prescribed/recommended. Defer COVID testing at this time due to lack of exposure and new onset of symptoms.      The patient is to follow up with PCP PRN. If signs and symptoms become worse the pt is to go to the ER.      Signed By: Adela Oakes NP     October 20, 2021

## 2022-01-07 ENCOUNTER — OFFICE VISIT (OUTPATIENT)
Dept: INTERNAL MEDICINE CLINIC | Age: 57
End: 2022-01-07
Payer: MEDICAID

## 2022-01-07 VITALS
HEIGHT: 68 IN | OXYGEN SATURATION: 98 % | WEIGHT: 233 LBS | RESPIRATION RATE: 16 BRPM | HEART RATE: 63 BPM | DIASTOLIC BLOOD PRESSURE: 64 MMHG | SYSTOLIC BLOOD PRESSURE: 95 MMHG | TEMPERATURE: 97.3 F | BODY MASS INDEX: 35.31 KG/M2

## 2022-01-07 DIAGNOSIS — E78.2 MIXED HYPERLIPIDEMIA: ICD-10-CM

## 2022-01-07 DIAGNOSIS — J30.2 SEASONAL ALLERGIC RHINITIS, UNSPECIFIED TRIGGER: ICD-10-CM

## 2022-01-07 DIAGNOSIS — R73.02 IGT (IMPAIRED GLUCOSE TOLERANCE): ICD-10-CM

## 2022-01-07 DIAGNOSIS — R51.9 NOCTURNAL HEADACHES: Primary | ICD-10-CM

## 2022-01-07 DIAGNOSIS — E55.9 VITAMIN D DEFICIENCY: ICD-10-CM

## 2022-01-07 DIAGNOSIS — F41.9 ANXIETY: ICD-10-CM

## 2022-01-07 DIAGNOSIS — E03.4 HYPOTHYROIDISM DUE TO ACQUIRED ATROPHY OF THYROID: ICD-10-CM

## 2022-01-07 PROCEDURE — 99214 OFFICE O/P EST MOD 30 MIN: CPT | Performed by: INTERNAL MEDICINE

## 2022-01-07 RX ORDER — LEVOCETIRIZINE DIHYDROCHLORIDE 5 MG/1
TABLET, FILM COATED ORAL
COMMUNITY
Start: 2021-12-13

## 2022-01-07 RX ORDER — ALPRAZOLAM 0.5 MG/1
0.5 TABLET ORAL
Qty: 15 TABLET | Refills: 0 | Status: SHIPPED | OUTPATIENT
Start: 2022-01-07 | End: 2022-08-11

## 2022-01-07 RX ORDER — ACETAMINOPHEN 500 MG
4000 TABLET ORAL DAILY
Qty: 180 CAPSULE | Refills: 3 | Status: SHIPPED | OUTPATIENT
Start: 2022-01-07

## 2022-01-07 RX ORDER — ALPRAZOLAM 0.5 MG/1
0.5 TABLET ORAL
Qty: 15 TABLET | Refills: 0 | Status: CANCELLED | OUTPATIENT
Start: 2022-01-07

## 2022-01-07 NOTE — PROGRESS NOTES
RM 16    Chief Complaint   Patient presents with    Follow-up     6 month follow up     Headache     reports at night when she lays down to sleep she gets really bad headaches that wake her up out of her sleep, only happens at night, would like to see neurologist        Visit Vitals  BP 95/64 (BP 1 Location: Left upper arm, BP Patient Position: Sitting, BP Cuff Size: Large adult)   Pulse 63   Temp 97.3 °F (36.3 °C) (Temporal)   Resp 16   Ht 5' 8\" (1.727 m)   Wt 233 lb (105.7 kg)   SpO2 98%   BMI 35.43 kg/m²       3 most recent PHQ Screens 7/15/2021   Little interest or pleasure in doing things Not at all   Feeling down, depressed, irritable, or hopeless Not at all   Total Score PHQ 2 0   Trouble falling or staying asleep, or sleeping too much -   Feeling tired or having little energy -   Poor appetite, weight loss, or overeating -   Feeling bad about yourself - or that you are a failure or have let yourself or your family down -   Trouble concentrating on things such as school, work, reading, or watching TV -   Moving or speaking so slowly that other people could have noticed; or the opposite being so fidgety that others notice -   Thoughts of being better off dead, or hurting yourself in some way -   PHQ 9 Score -   How difficult have these problems made it for you to do your work, take care of your home and get along with others -         1. Have you been to the ER, urgent care clinic since your last visit? Hospitalized since your last visit? No    2. Have you seen or consulted any other health care providers outside of the [address] [place] [address] since your last visit? Include any pap smears or colon screening.   Ajith Breath free and allergies Dr. Minh Gerardo Maintenance Due   Topic Date Due    Flu Vaccine (1) 09/01/2021    COVID-19 Vaccine (3 - Booster for Pfizer series) 11/15/2021       Learning Assessment 10/25/2019   PRIMARY LEARNER Patient   HIGHEST LEVEL OF EDUCATION - PRIMARY LEARNER  - BARRIERS PRIMARY LEARNER NONE   CO-LEARNER CAREGIVER -   PRIMARY LANGUAGE ENGLISH   LEARNER PREFERENCE PRIMARY DEMONSTRATION   LEARNING SPECIAL TOPICS -   ANSWERED BY patient   RELATIONSHIP SELF     Patient declines flu vaccine, has not had covid 19 booster     AVS  education, follow up, and recommendations provided and addressed with patient.   services used to advise patient no

## 2022-01-07 NOTE — PROGRESS NOTES
HPI:  established patient  Presents for f/u lipids, thyroid, etc    Left sided headaches  Awakens her at night  New     Taking and tolerating meds     Euthyroid     Pt had ENT eval and had balloon sinuplasty  Pt feeling better and no interim sinus infections. Past medical, Social, and Family history reviewed    Prior to Admission medications    Medication Sig Start Date End Date Taking? Authorizing Provider   azelastine (Astelin) 137 mcg (0.1 %) nasal spray 1 [place] by Both Nostrils route two (2) times a day. Use in each nostril as directed 7/15/21  Yes Josh Tanner MD   levothyroxine (SYNTHROID) 75 mcg tablet TAKE 1 TAB BY MOUTH DAILY (BEFORE BREAKFAST). TAKE 1 TABLET BY MOUTH EVERY DAY BEFORE BREAKFAST ON A EMPTY STOMACH 7/13/21  Yes Josh Tanner MD   sertraline (ZOLOFT) 100 mg tablet TAKE 2 TABLETS BY MOUTH EVERY DAY 7/13/21  Yes Josh Tanner MD   diclofenac EC (VOLTAREN) 75 mg EC tablet TAKE 1 TABLET BY MOUTH TWICE A DAY 3/10/21  Yes Provider, Marguerite   fluticasone propionate (FLONASE) 50 mcg/actuation nasal spray 2 Sprays by Both Nostrils route daily. 5/20/21  Yes Josh Tanner MD   ALPRAZolam (Xanax) 0.5 mg tablet Take 1 Tablet by mouth every eight (8) hours as needed for Anxiety for up to 15 doses. Max Daily Amount: 1.5 mg. 5/20/21  Yes Josh Tanner MD   pravastatin (PRAVACHOL) 40 mg tablet Take 1 Tablet by mouth nightly. 5/20/21  Yes Josh Tanner MD   albuterol (PROVENTIL HFA, VENTOLIN HFA, PROAIR HFA) 90 mcg/actuation inhaler Take 2 Puffs by inhalation every four (4) hours as needed for Wheezing. 2/3/21  Yes Josh Tanner MD   esomeprazole (NexIUM) 40 mg capsule Take 1 Cap by mouth daily. 1/11/21  Yes Josh Tanner MD   cholecalciferol (VITAMIN D3) (2,000 UNITS /50 MCG) cap capsule Take 4,000 Units by mouth daily. 7/10/20  Yes Josh Tanner MD   amitriptyline (ELAVIL) 50 mg tablet TAKE 1 TAB BY MOUTH NIGHTLY.  12/16/18  Yes Josh Tanner MD traMADol (ULTRAM) 50 mg tablet TAKE 2 TABLETS BY MOUTH EVERY 8 HOURS AS NEEDED FOR PAIN 7/25/18  Yes Fito Cruz MD   albuterol (PROVENTIL HFA, VENTOLIN HFA, PROAIR HFA) 90 mcg/actuation inhaler Take 2 Puffs by inhalation every four (4) hours as needed for Wheezing. 7/10/18  Yes MD Yola Thomas henry As directed to assist with ambulation 9/18/17  Yes Fito Cruz MD   levocetirizine Renae Dara) 5 mg tablet  12/13/21   Provider, Historical   methocarbamoL (ROBAXIN) 500 mg tablet Take 500 mg by mouth. Patient not taking: Reported on 10/20/2021 3/10/21   Provider, Historical   loratadine (Claritin) 10 mg tablet Take 1 Tablet by mouth daily. Patient not taking: Reported on 10/20/2021 5/20/21   Fito Cruz MD   polyethylene glycol [facility]) 17 gram/dose powder Take 34gm in 6-8 ounces juice or water 2-3 times daily as directed/needed for constipation. Patient not taking: Reported on 10/20/2021 5/20/21   Fito Cruz MD   pantoprazole (PROTONIX) 40 mg tablet Take 40 mg by mouth daily. Patient not taking: Reported on 10/20/2021    Provider, Historical   sucralfate (CARAFATE) 100 mg/mL suspension Take 10 mL by mouth Before breakfast, lunch, dinner and at bedtime. Patient not taking: Reported on 1/7/2022 10/29/20   Fito Cruz MD   tiZANidine (ZANAFLEX) 4 mg tablet Take 1 Tab by mouth three (3) times daily as needed for Muscle Spasm(s). Patient not taking: Reported on 7/15/2021 9/28/20   Fito Cruz MD   senna-docusate (Senna with Docusate Sodium) 8.6-50 mg per tablet Take 1 Tab by mouth daily. Indications: constipation  Patient not taking: Reported on 1/7/2022 9/28/20   Fito Cruz MD          ROS  Complete ROS reviewed and negative or stable except as noted in HPI. Physical Exam  Vitals and nursing note reviewed. Constitutional:       General: She is not in acute distress. HENT:      Head: Normocephalic and atraumatic.    Eyes:      General: No scleral icterus. Pupils: Pupils are equal, round, and reactive to light. Neck:      Vascular: No JVD. Cardiovascular:      Rate and Rhythm: Normal rate and regular rhythm. Heart sounds: Normal heart sounds. No murmur heard. No friction rub. No gallop. Pulmonary:      Effort: Pulmonary effort is normal. No respiratory distress. Breath sounds: Normal breath sounds. No wheezing or rales. Abdominal:      General: Bowel sounds are normal. There is no distension. Palpations: Abdomen is soft. Tenderness: There is no abdominal tenderness. Musculoskeletal:         General: Normal range of motion. Cervical back: Normal range of motion and neck supple. Lymphadenopathy:      Cervical: No cervical adenopathy. Skin:     General: Skin is warm. Findings: No rash. Neurological:      General: No focal deficit present. Mental Status: She is alert and oriented to person, place, and time. Mental status is at baseline. Motor: No abnormal muscle tone. Prior labs reviewed. Assessment/Plan:    ICD-10-CM ICD-9-CM    1. Nocturnal headaches  R51.9 784.0 REFERRAL TO NEUROLOGY   2. Anxiety  F41.9 300.00 ALPRAZolam (Xanax) 0.5 mg tablet   3. Seasonal allergic rhinitis, unspecified trigger  J30.2 477.9    4. Hypothyroidism due to acquired atrophy of thyroid  E03.4 244.8 TSH 3RD GENERATION     246.8 T4, FREE   5. Mixed hyperlipidemia  E78.2 272.2 CBC WITH AUTOMATED DIFF      CK      METABOLIC PANEL, COMPREHENSIVE      LIPID PANEL   6. Vitamin D deficiency  E55.9 268.9 VITAMIN D, 25 HYDROXY   7. IGT (impaired glucose tolerance)  R73.02 790.22 CBC WITH AUTOMATED DIFF      HEMOGLOBIN A1C WITH EAG     Follow-up and Dispositions    · Return in about 6 months (around 7/7/2022) for cholesterol, thyroid.         results and schedule of future studies reviewed with patient  reviewed diet, exercise and weight   cardiovascular risk and specific lipid/LDL goals reviewed  reviewed medications and side effects in detail    Ref neuro re: HAs  Continue current medications  Labs at lab  Refill limited xanax          An electronic signature was used to authenticate this note.   -- Cristel Mixon MD

## 2022-01-11 DIAGNOSIS — E03.4 HYPOTHYROIDISM DUE TO ACQUIRED ATROPHY OF THYROID: ICD-10-CM

## 2022-01-11 RX ORDER — LEVOTHYROXINE SODIUM 75 UG/1
TABLET ORAL
Qty: 90 TABLET | Refills: 1 | Status: SHIPPED | OUTPATIENT
Start: 2022-01-11 | End: 2022-08-03

## 2022-01-22 LAB
25(OH)D3+25(OH)D2 SERPL-MCNC: 22.1 NG/ML (ref 30–100)
ALBUMIN SERPL-MCNC: 4.7 G/DL (ref 3.8–4.9)
ALBUMIN/GLOB SERPL: 1.9 {RATIO} (ref 1.2–2.2)
ALP SERPL-CCNC: 69 IU/L (ref 44–121)
ALT SERPL-CCNC: 20 IU/L (ref 0–32)
AST SERPL-CCNC: 17 IU/L (ref 0–40)
BASOPHILS # BLD AUTO: 0 X10E3/UL (ref 0–0.2)
BASOPHILS NFR BLD AUTO: 1 %
BILIRUB SERPL-MCNC: 0.7 MG/DL (ref 0–1.2)
BUN SERPL-MCNC: 8 MG/DL (ref 6–24)
BUN/CREAT SERPL: 9 (ref 9–23)
CALCIUM SERPL-MCNC: 10.3 MG/DL (ref 8.7–10.2)
CHLORIDE SERPL-SCNC: 106 MMOL/L (ref 96–106)
CHOLEST SERPL-MCNC: 196 MG/DL (ref 100–199)
CK SERPL-CCNC: 100 U/L (ref 32–182)
CO2 SERPL-SCNC: 24 MMOL/L (ref 20–29)
CREAT SERPL-MCNC: 0.93 MG/DL (ref 0.57–1)
EOSINOPHIL # BLD AUTO: 0.1 X10E3/UL (ref 0–0.4)
EOSINOPHIL NFR BLD AUTO: 3 %
ERYTHROCYTE [DISTWIDTH] IN BLOOD BY AUTOMATED COUNT: 14 % (ref 11.7–15.4)
EST. AVERAGE GLUCOSE BLD GHB EST-MCNC: 120 MG/DL
GLOBULIN SER CALC-MCNC: 2.5 G/DL (ref 1.5–4.5)
GLUCOSE SERPL-MCNC: 92 MG/DL (ref 65–99)
HBA1C MFR BLD: 5.8 % (ref 4.8–5.6)
HCT VFR BLD AUTO: 43.1 % (ref 34–46.6)
HDLC SERPL-MCNC: 82 MG/DL
HGB BLD-MCNC: 14.6 G/DL (ref 11.1–15.9)
IMM GRANULOCYTES # BLD AUTO: 0 X10E3/UL (ref 0–0.1)
IMM GRANULOCYTES NFR BLD AUTO: 0 %
LDLC SERPL CALC-MCNC: 99 MG/DL (ref 0–99)
LYMPHOCYTES # BLD AUTO: 2.4 X10E3/UL (ref 0.7–3.1)
LYMPHOCYTES NFR BLD AUTO: 44 %
MCH RBC QN AUTO: 30.2 PG (ref 26.6–33)
MCHC RBC AUTO-ENTMCNC: 33.9 G/DL (ref 31.5–35.7)
MCV RBC AUTO: 89 FL (ref 79–97)
MONOCYTES # BLD AUTO: 0.4 X10E3/UL (ref 0.1–0.9)
MONOCYTES NFR BLD AUTO: 7 %
NEUTROPHILS # BLD AUTO: 2.4 X10E3/UL (ref 1.4–7)
NEUTROPHILS NFR BLD AUTO: 45 %
PLATELET # BLD AUTO: 175 X10E3/UL (ref 150–450)
POTASSIUM SERPL-SCNC: 4.2 MMOL/L (ref 3.5–5.2)
PROT SERPL-MCNC: 7.2 G/DL (ref 6–8.5)
RBC # BLD AUTO: 4.84 X10E6/UL (ref 3.77–5.28)
SODIUM SERPL-SCNC: 141 MMOL/L (ref 134–144)
T4 FREE SERPL-MCNC: 1.51 NG/DL (ref 0.82–1.77)
TRIGL SERPL-MCNC: 81 MG/DL (ref 0–149)
TSH SERPL-ACNC: 1.13 UIU/ML (ref 0.45–4.5)
VLDLC SERPL CALC-MCNC: 15 MG/DL (ref 5–40)
WBC # BLD AUTO: 5.4 X10E3/UL (ref 3.4–10.8)

## 2022-02-03 ENCOUNTER — TELEPHONE (OUTPATIENT)
Dept: INTERNAL MEDICINE CLINIC | Age: 57
End: 2022-02-03

## 2022-02-03 NOTE — TELEPHONE ENCOUNTER
Spoke with patient. She has not scheduled with neurologist, she believed that an MRI would determine why she has nocturnal headaches. Patient has been seen by dr Meghna Dumont and was referred to neurologist. Advised patient the next step to solving her headaches is to see the neurologist and if any imaging is needed the neurologist will order it for her. Patient stated understanding. Gave patient contact info for dr Nya Blake and she will call and set herself up an appointment .

## 2022-02-03 NOTE — TELEPHONE ENCOUNTER
----- Message from [facility] sent at 1/11/2022  1:32 PM EST -----  Subject: Message to Provider    QUESTIONS  Information for Provider? Pt needs MRI scheduled. please contact pt back   at convenience regarding appt.   ---------------------------------------------------------------------------  --------------  CALL BACK INFO  What is the best way for the office to contact you? OK to leave message on   voicemail  Preferred Call Back Phone Number? [phone]  ---------------------------------------------------------------------------  --------------  SCRIPT ANSWERS  Relationship to Patient? Third Party  Representative Name?  [employer]

## 2022-02-09 ENCOUNTER — OFFICE VISIT (OUTPATIENT)
Dept: INTERNAL MEDICINE CLINIC | Age: 57
End: 2022-02-09
Payer: MEDICAID

## 2022-02-09 VITALS
SYSTOLIC BLOOD PRESSURE: 112 MMHG | WEIGHT: 229 LBS | DIASTOLIC BLOOD PRESSURE: 77 MMHG | OXYGEN SATURATION: 98 % | BODY MASS INDEX: 34.82 KG/M2 | HEART RATE: 87 BPM

## 2022-02-09 DIAGNOSIS — N28.89 URETEROCELE: ICD-10-CM

## 2022-02-09 DIAGNOSIS — K21.9 GASTROESOPHAGEAL REFLUX DISEASE, UNSPECIFIED WHETHER ESOPHAGITIS PRESENT: ICD-10-CM

## 2022-02-09 DIAGNOSIS — R39.82 CHRONIC BLADDER PAIN: ICD-10-CM

## 2022-02-09 DIAGNOSIS — K59.00 CONSTIPATION, UNSPECIFIED CONSTIPATION TYPE: Primary | ICD-10-CM

## 2022-02-09 PROCEDURE — 99214 OFFICE O/P EST MOD 30 MIN: CPT | Performed by: INTERNAL MEDICINE

## 2022-02-09 RX ORDER — OMEPRAZOLE 20 MG/1
20 CAPSULE, DELAYED RELEASE ORAL
Qty: 30 CAPSULE | Refills: 6 | Status: SHIPPED | OUTPATIENT
Start: 2022-02-09

## 2022-02-09 RX ORDER — POLYETHYLENE GLYCOL 3350 17 G/17G
POWDER, FOR SOLUTION ORAL
Qty: 595 G | Refills: 5 | Status: SHIPPED | OUTPATIENT
Start: 2022-02-09

## 2022-02-09 NOTE — PROGRESS NOTES
A/P:  Dallin Martin is a 61 y.o. female, she presents today for:    1. Constipation, unspecified constipation type  -     psyllium husk, with sugar, (METAMUCIL FIBER) 3.4 gram packet; Take 1 Packet by mouth two (2) times a day., Normal, Disp-60 Packet, R-11  -     polyethylene glycol (MIRALAX) 17 gram/dose powder; Take 34gm in 6-8 ounces juice or water 2-3 times daily as directed/needed for constipation. , Normal, Disp-595 g, R-5  2. Chronic bladder pain  -     REFERRAL TO UROGYNECOLOGY  3. Ureterocele  -     REFERRAL TO UROGYNECOLOGY  4. Gastroesophageal reflux disease, unspecified whether esophagitis present  -     omeprazole (PRILOSEC) 20 mg capsule; Take 1 Capsule by mouth daily as needed (heartburn). , Normal, Disp-30 Capsule, R-6    constipation with pelvic floor dysfunction   - treat with fiber supplement and water. To aim for a type 4-5 stool. To use miralax as back up if not working.    - referral to urogyn    GERD: okay to continue low dose omeprazole. Follow-up and Dispositions    · Return if symptoms worsen or fail to improve. Future Appointments   Date Time Provider Virgie Marcano   3/15/2022  2:00 PM MD GREGORIA Guzman BS AMB   3/24/2022  3:00 PM MD CHELSIE Overton BS AMB       HPI    61year old woman with epigastric pain and constipation. Reports that she has pain in center low pelvis, Also left side flank pain. Was seen at [facility] - and had CT scan 2/7/2022 - no findings per patient - advised perahps she has interstitial cystitis. - screened for bladder infection. \"I have been going on like this for years\". Notes that she has had increased stress. Has increased pain in her lower abdomen when she eats - No pain with urination but after emptying bladders notes some pain. Constipation: taking mirlax most every day.      Brings copy of CT reading, Urinalysis, CBC, lipase, CMP      PMH/PSH: reviewed and updated  Sochx/Famhx: reviewed and updated     All: Allergies   Allergen Reactions    Grass Pollen Other (comments)     +allergy testing    House Dust Mite Other (comments)     +allergy testing - very sensitive    Mold Other (comments)     +allergy testing    Tree And Shrub Pollen Other (comments)     +allergy testing     Med:   Current Outpatient Medications   Medication Sig    levothyroxine (SYNTHROID) 75 mcg tablet TAKE 1 TABLET BY MOUTH EVERY DAY BEFORE BREAKFAST ON AN EMPTY STOMACH    levocetirizine (XYZAL) 5 mg tablet     cholecalciferol (VITAMIN D3) (2,000 UNITS /50 MCG) cap capsule Take 2 Capsules by mouth daily.  ALPRAZolam (Xanax) 0.5 mg tablet Take 1 Tablet by mouth every eight (8) hours as needed for Anxiety for up to 15 doses. Max Daily Amount: 1.5 mg.    azelastine (Astelin) 137 mcg (0.1 %) nasal spray 1 [place] by Both Nostrils route two (2) times a day. Use in each nostril as directed    sertraline (ZOLOFT) 100 mg tablet TAKE 2 TABLETS BY MOUTH EVERY DAY    methocarbamoL (ROBAXIN) 500 mg tablet Take 500 mg by mouth.  diclofenac EC (VOLTAREN) 75 mg EC tablet TAKE 1 TABLET BY MOUTH TWICE A DAY    fluticasone propionate (FLONASE) 50 mcg/actuation nasal spray 2 Sprays by Both Nostrils route daily.  loratadine (Claritin) 10 mg tablet Take 1 Tablet by mouth daily.  polyethylene glycol (MIRALAX) 17 gram/dose powder Take 34gm in 6-8 ounces juice or water 2-3 times daily as directed/needed for constipation.  pravastatin (PRAVACHOL) 40 mg tablet Take 1 Tablet by mouth nightly.  pantoprazole (PROTONIX) 40 mg tablet Take 40 mg by mouth daily.  albuterol (PROVENTIL HFA, VENTOLIN HFA, PROAIR HFA) 90 mcg/actuation inhaler Take 2 Puffs by inhalation every four (4) hours as needed for Wheezing.  esomeprazole (NexIUM) 40 mg capsule Take 1 Cap by mouth daily.  sucralfate (CARAFATE) 100 mg/mL suspension Take 10 mL by mouth Before breakfast, lunch, dinner and at bedtime.     tiZANidine (ZANAFLEX) 4 mg tablet Take 1 Tab by mouth three (3) times daily as needed for Muscle Spasm(s).  senna-docusate (Senna with Docusate Sodium) 8.6-50 mg per tablet Take 1 Tab by mouth daily. Indications: constipation    amitriptyline (ELAVIL) 50 mg tablet TAKE 1 TAB BY MOUTH NIGHTLY.  traMADol (ULTRAM) 50 mg tablet TAKE 2 TABLETS BY MOUTH EVERY 8 HOURS AS NEEDED FOR PAIN    albuterol (PROVENTIL HFA, VENTOLIN HFA, PROAIR HFA) 90 mcg/actuation inhaler Take 2 Puffs by inhalation every four (4) hours as needed for Wheezing.  Cane henry As directed to assist with ambulation     No current facility-administered medications for this visit. ROS pertinent for the following:  ROS    PE:  Blood pressure 112/77, pulse 87, weight 229 lb (103.9 kg), last menstrual period 10/23/2016, SpO2 98 %. Body mass index is 34.82 kg/m². Physical Exam  Vitals and nursing note reviewed. Constitutional:       General: She is not in acute distress. Appearance: Normal appearance. HENT:      Head: Normocephalic. Eyes:      Conjunctiva/sclera: Conjunctivae normal.      Pupils: Pupils are equal, round, and reactive to light. Cardiovascular:      Rate and Rhythm: Normal rate and regular rhythm. Pulmonary:      Effort: Pulmonary effort is normal.   Musculoskeletal:      Cervical back: Neck supple. Right lower leg: No edema. Left lower leg: No edema. Skin:     Findings: No rash. Neurological:      Mental Status: She is alert and oriented to person, place, and time. Labs:   See addendum for interpretation of labs resulting after time of visit. No results found for any visits on 02/09/22. She was given AVS and expressed understanding with the diagnosis and plan as discussed. An electronic signature was used to authenticate this note.   -- Carlos A Palencia MD

## 2022-02-09 NOTE — PROGRESS NOTES
Chief Complaint   Patient presents with    Epigastric Pain    Constipation     Visit Vitals  /77   Pulse 87   Wt 229 lb (103.9 kg)   LMP 10/23/2016 (Approximate)   SpO2 98%   BMI 34.82 kg/m²     1. Have you been to the ER, urgent care clinic since your last visit? Hospitalized since your last visit?no    2. Have you seen or consulted any other health care providers outside of the [address] [place] [address] since your last visit? Include any pap smears or colon screening.  no

## 2022-02-14 ENCOUNTER — VIRTUAL VISIT (OUTPATIENT)
Dept: INTERNAL MEDICINE CLINIC | Age: 57
End: 2022-02-14
Payer: MEDICAID

## 2022-02-14 ENCOUNTER — NURSE TRIAGE (OUTPATIENT)
Dept: OTHER | Facility: CLINIC | Age: 57
End: 2022-02-14

## 2022-02-14 DIAGNOSIS — K21.9 GASTROESOPHAGEAL REFLUX DISEASE, UNSPECIFIED WHETHER ESOPHAGITIS PRESENT: ICD-10-CM

## 2022-02-14 DIAGNOSIS — K59.00 CONSTIPATION, UNSPECIFIED CONSTIPATION TYPE: ICD-10-CM

## 2022-02-14 DIAGNOSIS — R44.2 HALLUCINATION, HYPNOPOMPIC: Primary | ICD-10-CM

## 2022-02-14 DIAGNOSIS — R73.02 IGT (IMPAIRED GLUCOSE TOLERANCE): ICD-10-CM

## 2022-02-14 DIAGNOSIS — F41.9 ANXIETY: ICD-10-CM

## 2022-02-14 DIAGNOSIS — E03.4 HYPOTHYROIDISM DUE TO ACQUIRED ATROPHY OF THYROID: ICD-10-CM

## 2022-02-14 PROCEDURE — 99214 OFFICE O/P EST MOD 30 MIN: CPT | Performed by: INTERNAL MEDICINE

## 2022-02-14 RX ORDER — SERTRALINE HYDROCHLORIDE 100 MG/1
100 TABLET, FILM COATED ORAL DAILY
Qty: 90 TABLET | Refills: 1 | Status: SHIPPED | OUTPATIENT
Start: 2022-02-14

## 2022-02-14 NOTE — TELEPHONE ENCOUNTER
Received call from Daysi Yo at [facility] with Red Flag Complaint. Subjective: Caller states \"When I open my eyes at night I see little images on the wall. One looks like a flower pot and two rachel bears. \"     Current Symptoms: Seeing \"images\" at night    Reports images look like glass    Denies - blurred vision / headache    Reports seeing optometrist 2 days ago     Denies thoughts of self harm or harming other. Denies - alcohol / caffeine / drug use. Onset: 1 month ago       Pain Severity: Denies; Temperature: Denies      What has been tried: Nothing    Recommended disposition: Seen in the office within 3 days. Care advice provided, patient verbalizes understanding; denies any other questions or concerns; instructed to call back for any new or worsening symptoms. Writer provided warm transfer to [facility] for appointment scheduling    Attention Provider: Thank you for allowing me to participate in the care of your patient. The patient was connected to triage in response to information provided to the [facility]. Please do not respond through this encounter as the response is not directed to a shared pool.         Reason for Disposition   Patient wants to be seen    Protocols used: ANXIETY AND PANIC ATTACK-ADULT-[place]

## 2022-02-14 NOTE — PROGRESS NOTES
Valarie Diaz (: 1965) is a 61 y.o. female, established patient, here for evaluation of the following chief complaint(s)--see below:    Valarie Diaz is a 61 y.o. female who was seen by synchronous (real-time) audio-video technology on 2022. Consent: Valarie Diaz, who was seen by synchronous (real-time) audio-video technology, and/or her healthcare decision maker, is aware that this patient-initiated, Telehealth encounter on 2022 is a billable service, with coverage as determined by her insurance carrier. She is aware that she may receive a bill and has provided verbal consent to proceed: Yes. I was in the office while conducting this encounter. Assessment & Plan:   Diagnoses and all orders for this visit:      ICD-10-CM ICD-9-CM    1. Hallucination, hypnopompic  R44.2 780.1    2. Anxiety  F41.9 300.00 sertraline (ZOLOFT) 100 mg tablet   3. Constipation, unspecified constipation type  K59.00 564.00    4. Hypothyroidism due to acquired atrophy of thyroid  E03.4 244.8      246.8    5. Gastroesophageal reflux disease, unspecified whether esophagitis present  K21.9 530.81    6. IGT (impaired glucose tolerance)  R73.02 790.22      Follow-up and Dispositions    · Return in about 5 months (around 2022), or if symptoms worsen or fail to improve, for thyroid, mood, etc.        results and schedule of future studies reviewed with patient  reviewed diet, exercise and weight   cardiovascular risk and specific lipid/LDL goals reviewed  reviewed medications and side effects in detail    reduce zoloft to 100 mg daily  Monitor mood and hallucinations - must weigh risk vs benefit of SSRI  Reviewed hypnopompic hallucinations dx with pt. Continue other care. AVS:  []  Available to patient in [employer] after visit signed. [x]  Mailed to patient after visit. []  Not sent to patient after visit.       Future Appointments   Date Time Provider Virgie Marcano   3/15/2022  2:00 PM MD GREGORIA Manzo BS AMB   3/24/2022  3:00 PM Edith Delaney MD [facility] BS AMB          Subjective:   Sukhdev Negrete was seen for:  Chief Complaint   Patient presents with   [place]     when she wakes up in the night she is seeing images of things on the wall. saw glass flowers or black specks and circles. Notes:  Hallucinations upon awakening    On zoloft at 200 mg    Vision good    No HAs    Nursing screenings reviewed by provider at visit. Past medical, Social, and Family history reviewed  Medications reviewed and updated. Allergies   Allergen Reactions    Grass Pollen Other (comments)     +allergy testing    House Dust Mite Other (comments)     +allergy testing - very sensitive    Mold Other (comments)     +allergy testing    Tree And Shrub Pollen Other (comments)     +allergy testing       Prior to Admission medications    Medication Sig Start Date End Date Taking? Authorizing Provider   psyllium husk, with sugar, (METAMUCIL FIBER) 3.4 gram packet Take 1 Packet by mouth two (2) times a day. 2/9/22  Yes Mariposa Hill MD   polyethylene glycol [facility]) 17 gram/dose powder Take 34gm in 6-8 ounces juice or water 2-3 times daily as directed/needed for constipation. 2/9/22  Yes Mariposa Hill MD   omeprazole (PRILOSEC) 20 mg capsule Take 1 Capsule by mouth daily as needed (heartburn). 2/9/22  Yes Mariposa Hill MD   levothyroxine (SYNTHROID) 75 mcg tablet TAKE 1 TABLET BY MOUTH EVERY DAY BEFORE BREAKFAST ON AN EMPTY STOMACH 1/11/22  Yes Jus Body MD   cholecalciferol (VITAMIN D3) (2,000 UNITS /50 MCG) cap capsule Take 2 Capsules by mouth daily. 1/7/22  Yes Jus Boyd MD   ALPRAZolam (Xanax) 0.5 mg tablet Take 1 Tablet by mouth every eight (8) hours as needed for Anxiety for up to 15 doses.  Max Daily Amount: 1.5 mg. 1/7/22  Yes Jus Boyd MD   azelastine (Astelin) 137 mcg (0.1 %) nasal spray 1 [place] by Both Nostrils route two (2) times a day. Use in each nostril as directed 7/15/21  Yes Anne Marie Gorman MD   sertraline (ZOLOFT) 100 mg tablet TAKE 2 TABLETS BY MOUTH EVERY DAY 7/13/21  Yes Anne Marie Gorman MD   fluticasone propionate (FLONASE) 50 mcg/actuation nasal spray 2 Sprays by Both Nostrils route daily. 5/20/21  Yes Anne Marie Gorman MD   pravastatin (PRAVACHOL) 40 mg tablet Take 1 Tablet by mouth nightly. 5/20/21  Yes Anne Marie Gorman MD   albuterol (PROVENTIL HFA, VENTOLIN HFA, PROAIR HFA) 90 mcg/actuation inhaler Take 2 Puffs by inhalation every four (4) hours as needed for Wheezing. 2/3/21  Yes Anne Marie Gorman MD   sucralfate (CARAFATE) 100 mg/mL suspension Take 10 mL by mouth Before breakfast, lunch, dinner and at bedtime. 10/29/20  Yes Anne Marie Gorman MD   senna-docusate (Senna with Docusate Sodium) 8.6-50 mg per tablet Take 1 Tab by mouth daily. Indications: constipation 9/28/20  Yes Anne Marie Gorman MD   amitriptyline (ELAVIL) 50 mg tablet TAKE 1 TAB BY MOUTH NIGHTLY. 12/16/18  Yes Anne Marie Gorman MD   traMADol (ULTRAM) 50 mg tablet TAKE 2 TABLETS BY MOUTH EVERY 8 HOURS AS NEEDED FOR PAIN 7/25/18  Yes Anne Marie Gorman MD   albuterol (PROVENTIL HFA, VENTOLIN HFA, PROAIR HFA) 90 mcg/actuation inhaler Take 2 Puffs by inhalation every four (4) hours as needed for Wheezing. 7/10/18  Yes MD Vikas Llanes As directed to assist with ambulation 9/18/17  Yes Anne Marie Gorman MD   levocetirizine Lynettebassem Culver) 5 mg tablet  12/13/21   Provider, Historical   methocarbamoL (ROBAXIN) 500 mg tablet Take 500 mg by mouth. Patient not taking: Reported on 2/14/2022 3/10/21   Provider, Historical   diclofenac EC (VOLTAREN) 75 mg EC tablet TAKE 1 TABLET BY MOUTH TWICE A DAY  Patient not taking: Reported on 2/14/2022 3/10/21   Provider, Historical   loratadine (Claritin) 10 mg tablet Take 1 Tablet by mouth daily.   Patient not taking: Reported on 2/14/2022 5/20/21   Anne Marie Gorman MD   tiZANidine (ZANAFLEX) 4 mg tablet Take 1 Tab by mouth three (3) times daily as needed for Muscle Spasm(s). Patient not taking: Reported on 2/14/2022 9/28/20   Fito Cruz MD         ROS     A complete ROS was performed and negative except as noted in HPI    PHYSICAL EXAMINATION:    Vital Signs:  Last menstrual period 10/23/2016. No flowsheet data found. Constitutional: [x] Appears well-developed and well-nourished [x] No apparent distress      Mental status: [x] Alert and awake  [x] Oriented [x] Able to follow commands       Eyes:   EOM    [x]  Normal      Sclera  [x]  Normal              Discharge [x]  None visible       HENT: [x] Normocephalic, atraumatic    [x] Mouth/Throat: Mucous membranes are moist    External Ears [x] Normal      Neck: [x] No visualized mass     Pulmonary/Chest: [x] Respiratory effort normal   [x] No visualized signs of difficulty breathing or respiratory distress    Musculoskeletal:  [x] Normal range of motion of neck    Neurological:        [x] No Facial Asymmetry (Cranial nerve 7 motor function) (limited exam due to video visit)          [x] No gaze palsy     Skin:        [x] No significant exanthematous lesions or discoloration noted on facial skin             Psychiatric:       [x] Normal Affect       Other pertinent observable physical exam findings:  None. We discussed the expected course, resolution and complications of the diagnosis(es) in detail. Medication risks, benefits, costs, interactions, and alternatives were discussed as indicated. I advised her to contact the office if her condition worsens, changes or fails to improve as anticipated. She expressed understanding with the diagnosis(es) and plan. Keyla Calvert is a 61 y.o. female who was evaluated by a video visit encounter for concerns as above. Keyla Calvert is being evaluated by a Virtual Visit (video visit) encounter to address concerns as mentioned above. A caregiver was present when appropriate.   Due to this being a TeleHealth encounter (During VQXPQ-87 public health emergency), evaluation of the following organ systems was limited: Vitals/Constitutional/EENT/Resp/CV/GI//MS/Neuro/Skin/Heme-Lymph-Imm. Pursuant to the emergency declaration under the [address] [place] [address], [address] [place] [address] and the [employer] Act, this Virtual Visit was conducted with patient's (and/or legal guardian's) consent, to reduce the patient's risk of exposure to COVID-19 and provide necessary medical care. The patient (and/or legal guardian) has also been advised to contact this office for worsening conditions or problems, and seek emergency medical treatment and/or call 911 if deemed necessary. Patient identification was verified at the start of the visit: YES. Services were provided through a video synchronous discussion virtually to substitute for in-person clinic visit. Patient was located at their individual home (or other location as per patient preference). Provider was located in medical office. An electronic signature was used to authenticate this note.   -- Scott Verde MD

## 2022-02-14 NOTE — PROGRESS NOTES
VV    Chief Complaint   Patient presents with    Hallucinations       There were no vitals taken for this visit. 3 most recent PHQ Screens 2/9/2022   Little interest or pleasure in doing things Not at all   Feeling down, depressed, irritable, or hopeless Not at all   Total Score PHQ 2 0   Trouble falling or staying asleep, or sleeping too much -   Feeling tired or having little energy -   Poor appetite, weight loss, or overeating -   Feeling bad about yourself - or that you are a failure or have let yourself or your family down -   Trouble concentrating on things such as school, work, reading, or watching TV -   Moving or speaking so slowly that other people could have noticed; or the opposite being so fidgety that others notice -   Thoughts of being better off dead, or hurting yourself in some way -   PHQ 9 Score -   How difficult have these problems made it for you to do your work, take care of your home and get along with others -         1. Have you been to the ER, urgent care clinic since your last visit? Hospitalized since your last visit? 2/7/22 [facility]GENCY [facility] for burning in bladder and left side. CT done. To nephrologist 2/23/22    2. Have you seen or consulted any other health care providers outside of the [address] [place] [address] since your last visit? Include any pap smears or colon screening. No    Health Maintenance Due   Topic Date Due    COVID-19 Vaccine (3 - Booster for Pfizer series) 10/15/2021       Learning Assessment 10/25/2019   PRIMARY LEARNER Patient   HIGHEST LEVEL OF EDUCATION - PRIMARY LEARNER  -   BARRIERS PRIMARY LEARNER NONE   CO-LEARNER CAREGIVER -   PRIMARY LANGUAGE ENGLISH   LEARNER PREFERENCE PRIMARY DEMONSTRATION   LEARNING SPECIAL TOPICS -   ANSWERED BY patient   RELATIONSHIP SELF         AVS  education, follow up, and recommendations provided and addressed with patient.   services used to advise patient -no

## 2022-02-19 DIAGNOSIS — K59.00 CONSTIPATION, UNSPECIFIED CONSTIPATION TYPE: ICD-10-CM

## 2022-02-20 RX ORDER — CETIRIZINE HYDROCHLORIDE, PSEUDOEPHEDRINE HYDROCHLORIDE 5; 120 MG/1; MG/1
TABLET, FILM COATED, EXTENDED RELEASE ORAL
Qty: 30 TABLET | Refills: 3 | Status: SHIPPED | OUTPATIENT
Start: 2022-02-20

## 2022-03-18 PROBLEM — K59.00 CONSTIPATION: Status: ACTIVE | Noted: 2018-09-23

## 2022-03-19 PROBLEM — M48.061 SPINAL STENOSIS, LUMBAR: Status: ACTIVE | Noted: 2018-04-09

## 2022-05-24 DIAGNOSIS — E78.2 MIXED HYPERLIPIDEMIA: ICD-10-CM

## 2022-05-24 RX ORDER — PRAVASTATIN SODIUM 40 MG/1
40 TABLET ORAL
Qty: 90 TABLET | Refills: 1 | Status: SHIPPED | OUTPATIENT
Start: 2022-05-24

## 2022-06-07 ENCOUNTER — TRANSCRIBE ORDER (OUTPATIENT)
Dept: SCHEDULING | Age: 57
End: 2022-06-07

## 2022-06-07 DIAGNOSIS — Z12.31 SCREENING MAMMOGRAM FOR HIGH-RISK PATIENT: Primary | ICD-10-CM

## 2022-07-12 ENCOUNTER — OFFICE VISIT (OUTPATIENT)
Dept: ORTHOPEDIC SURGERY | Age: 57
End: 2022-07-12
Payer: MEDICAID

## 2022-07-12 VITALS — HEIGHT: 69 IN | BODY MASS INDEX: 26.66 KG/M2 | WEIGHT: 180 LBS

## 2022-07-12 DIAGNOSIS — M51.36 LUMBAR DEGENERATIVE DISC DISEASE: ICD-10-CM

## 2022-07-12 DIAGNOSIS — M54.50 CHRONIC BILATERAL LOW BACK PAIN WITHOUT SCIATICA: Primary | ICD-10-CM

## 2022-07-12 DIAGNOSIS — Z98.1 S/P LUMBAR FUSION: ICD-10-CM

## 2022-07-12 DIAGNOSIS — G89.29 CHRONIC BILATERAL LOW BACK PAIN WITHOUT SCIATICA: Primary | ICD-10-CM

## 2022-07-12 PROCEDURE — 99203 OFFICE O/P NEW LOW 30 MIN: CPT | Performed by: ORTHOPAEDIC SURGERY

## 2022-07-12 NOTE — LETTER
7/13/2022    Patient: Sarai Raymond   YOB: 1965   Date of Visit: 7/12/2022     Elif George MD  [id number] [id number]  [address] [facility] [address]    Dear Elif George MD,      Thank you for referring Ms. Cynthia Zuniga to [facility] for evaluation. My notes for this consultation are attached. If you have questions, please do not hesitate to call me. I look forward to following your patient along with you.       Sincerely,    Andreas Orellana MD

## 2022-07-12 NOTE — PROGRESS NOTES
1. Have you been to the ER, urgent care clinic since your last visit? Hospitalized since your last visit? No    2. Have you seen or consulted any other health care providers outside of the [address] [place] [address] since your last visit? Include any pap smears or colon screening.  No    Chief Complaint   Patient presents with    Back Pain

## 2022-07-13 NOTE — PROGRESS NOTES
Cristina Larry (: 1965) is a 61 y.o. female, patient, here for evaluation of the following chief complaint(s):  Back Pain       ASSESSMENT/PLAN:    Below is the assessment and plan developed based on review of pertinent history, physical exam, labs, studies, and medications. She has some mechanical lower back pain. She has no radicular symptoms. She is 4 years out from her lumbar fusion at L4-5. She just wanted a checkup today. She is comfortable seeing the x-rays. She has mild decrease in the disc height at L5-S1 which probably correlates with intermittent lower back pain. She will continue with activities as tolerated. She will see us as needed. She will contact us if she like to start some outpatient physical therapy. She will continue with over-the-counter medications. 1. Chronic bilateral low back pain without sciatica  -     XR SPINE LUMB MIN 4 V; Future  2. S/P lumbar fusion  3. Lumbar degenerative disc disease      No follow-ups on file. SUBJECTIVE/OBJECTIVE:  Cristina Larry (: 1965) is a 61 y.o. female. No flowsheet data found. He is she comes in today for evaluation. She is approximate 4 years out from her lumbar laminectomy and fusion. She did have some mild episodes of lower back pain and just wanted to come into the checked today. She has no acute pain. She not taking any medications. He does not want therapy at this time. She has not had a follow-up in over 3 years. Imaging:    XR Results (most recent):  Results from Appointment encounter on 22    XR SPINE LUMB MIN 4 V    Narrative  4 views of the lumbar spine reviewed today. She has a stable L4-5 midline fusion with stable hardware noted. Slight increase in spondylosis at L5-S1. No instability on flexion-extension.                  MRI Results (most recent):    No recent MRI      Allergies   Allergen Reactions    Grass Pollen Other (comments)     +allergy testing   [employer] Mite Other (comments)     +allergy testing - very sensitive    Mold Other (comments)     +allergy testing    Tree And Shrub Pollen Other (comments)     +allergy testing       Current Outpatient Medications   Medication Sig    pravastatin (PRAVACHOL) 40 mg tablet Take 1 Tablet by mouth nightly.  Senna Plus 8.6-50 mg per tablet TAKE 1 TAB BY MOUTH DAILY. INDICATIONS: CONSTIPATION    sertraline (ZOLOFT) 100 mg tablet Take 1 Tablet by mouth daily.  psyllium husk, with sugar, (METAMUCIL FIBER) 3.4 gram packet Take 1 Packet by mouth two (2) times a day.  polyethylene glycol (MIRALAX) 17 gram/dose powder Take 34gm in 6-8 ounces juice or water 2-3 times daily as directed/needed for constipation.  omeprazole (PRILOSEC) 20 mg capsule Take 1 Capsule by mouth daily as needed (heartburn).  levothyroxine (SYNTHROID) 75 mcg tablet TAKE 1 TABLET BY MOUTH EVERY DAY BEFORE BREAKFAST ON AN EMPTY STOMACH    levocetirizine (XYZAL) 5 mg tablet     cholecalciferol (VITAMIN D3) (2,000 UNITS /50 MCG) cap capsule Take 2 Capsules by mouth daily.  ALPRAZolam (Xanax) 0.5 mg tablet Take 1 Tablet by mouth every eight (8) hours as needed for Anxiety for up to 15 doses. Max Daily Amount: 1.5 mg.    azelastine (Astelin) 137 mcg (0.1 %) nasal spray 1 [place] by Both Nostrils route two (2) times a day. Use in each nostril as directed    methocarbamoL (ROBAXIN) 500 mg tablet Take 500 mg by mouth.  diclofenac EC (VOLTAREN) 75 mg EC tablet TAKE 1 TABLET BY MOUTH TWICE A DAY    fluticasone propionate (FLONASE) 50 mcg/actuation nasal spray 2 Sprays by Both Nostrils route daily.  loratadine (Claritin) 10 mg tablet Take 1 Tablet by mouth daily.  albuterol (PROVENTIL HFA, VENTOLIN HFA, PROAIR HFA) 90 mcg/actuation inhaler Take 2 Puffs by inhalation every four (4) hours as needed for Wheezing.  sucralfate (CARAFATE) 100 mg/mL suspension Take 10 mL by mouth Before breakfast, lunch, dinner and at bedtime.     tiZANidine (ZANAFLEX) 4 mg tablet Take 1 Tab by mouth three (3) times daily as needed for Muscle Spasm(s).  amitriptyline (ELAVIL) 50 mg tablet TAKE 1 TAB BY MOUTH NIGHTLY.  traMADol (ULTRAM) 50 mg tablet TAKE 2 TABLETS BY MOUTH EVERY 8 HOURS AS NEEDED FOR PAIN    albuterol (PROVENTIL HFA, VENTOLIN HFA, PROAIR HFA) 90 mcg/actuation inhaler Take 2 Puffs by inhalation every four (4) hours as needed for Wheezing.  Cane henry As directed to assist with ambulation     No current facility-administered medications for this visit.        Past Medical History:   Diagnosis Date    Acid reflux     Adverse effect of anesthesia     woke up coughing    Allergic rhinitis     Anxiety     Arthritis     DDD (degenerative disc disease), lumbar     DJD (degenerative joint disease), lumbosacral     Gallstones 8/1/2016    GERD (gastroesophageal reflux disease)     History of nonchemical tubal occlusion     Esure devices    Hyperlipidemia 2/20/2014    Hypothyroidism     HYPO    IGT (impaired glucose tolerance)     Ill-defined condition     prolapse uterus/states thus her intestines has collpased a little bit    Psychiatric disorder     anxiety and depression    PUD (peptic ulcer disease)     no EGD    Routine gynecological examination     [facility]    Sinus disorder     Tinea pedis     Ureterocele     small, right        Past Surgical History:   Procedure Laterality Date    COLONOSCOPY N/A 9/13/2016    COLONOSCOPY / EGD  performed by Bin Morel MD at [address] HX GYN  10/12/2012    Esure    HX LAP CHOLECYSTECTOMY  10/06/2016    HX OTHER SURGICAL      spinal fusion     HX OTHER SURGICAL      balloon sinus plasty     HX TOTAL VAGINAL HYSTERECTOMY      [place] REMOVAL OF OVARIAN CYST(S)         Family History   Problem Relation Age of Onset    Hypertension Mother     Diabetes Mother     Heart Disease Mother     Heart Attack Mother     Cancer Father         lung    Other Sister cholecystectomy    Cancer Brother         lung    Diabetes Maternal Aunt     Cancer Paternal Uncle         lung    Diabetes Maternal Aunt     Diabetes Maternal Aunt     Diabetes Maternal Aunt     Diabetes Maternal Aunt     Diabetes Maternal Aunt     Heart Attack Daughter 32    Other Son         narcolepsy/GERD (part of esophagus removed d/t this)    Cancer Paternal Uncle         lung    Anesth Problems Neg Hx         Social History     Tobacco Use    Smoking status: Never Smoker    Smokeless tobacco: Never Used   Substance Use Topics    Alcohol use: No    Drug use: No        Review of Systems       Vitals:  Ht 5' 9\" (1.753 m)   Wt 180 lb (81.6 kg)   LMP 10/23/2016 (Approximate)   BMI 26.58 kg/m²    Body mass index is 26.58 kg/m². Ortho Exam       Integumentary  Assessment of Surgical Incision - healing and consistent with normal anticipated wound healing. Neurologic  Sensory  Light Touch - Intact - Globally. Overall Assessment of Muscle Strength and Tone reveals  Lower Extremities - Right Iliopsoas - 5/5. Left Iliopsoas - 5/5. Right Tibialis Anterior - 5/5. Left Tibialis Anterior - 5/5. Right Gastroc-Soleus - 5/5. Left Gastroc-Soleus - 5/5. Right EHL - 5/5. Left EHL - 5/5. General Assessment of Reflexes  Right Ankle - Clonus is not present. Left Ankle - Clonus is not present. Reflexes (Dermatomes)  2/2 Normal - Left Achilles (L5-S2), Left Knee (L2-4), Right Achilles (L5-S2) and Right Knee (L2-4). Musculoskeletal  Global Assessment  Examination of related systems reveals - well-developed, well-nourished, in no acute distress, alert and oriented x 3 and normal coordination. Spine/Ribs/Pelvis  Lumbosacral Spine - Examination of the lumbosacral spine reveals - no known fractures or deformities. Inspection and Palpation - Tenderness - moderate. Assessment of pain reveals the following findings - The pain is characterized as -minimal. Location - pain refers to lower back bilaterally. An electronic signature was used to authenticate this note.   -- Rene Marshall MD

## 2022-07-13 NOTE — PATIENT INSTRUCTIONS
Spondylolysis and Spondylolisthesis: Exercises  Introduction  Here are some examples of exercises for you to try. The exercises may be suggested for a condition or for rehabilitation. Start each exercise slowly. Ease off the exercises if you start to have pain. You will be told when to start these exercises and which ones will work best for you. How to do the exercises  Single knee-to-chest    1. Lie on your back with your knees bent and your feet flat on the floor. You can put a small pillow under your head and neck if it is more comfortable. 2. Bring one knee to your chest, keeping the other foot flat on the floor. 3. Keep your lower back pressed to the floor. Hold for 15 to 30 seconds. 4. Relax, and lower the knee to the starting position. 5. Repeat with the other leg. Repeat 2 to 4 times with each leg. 6. To get more stretch, put your other leg flat on the floor while pulling your knee to your chest.  Double knee-to-chest    1. Lie on your back with your knees bent and your feet flat on the floor. You can put a small pillow under your head and neck if it is more comfortable. 2. Bring both knees to your chest.  3. Keep your lower back pressed to the floor. Hold for 15 to 30 seconds. 4. Relax, and lower your knees to the starting position. 5. Repeat 2 to 4 times. Alternate arm and leg (bird dog) exercise    Do this exercise slowly. Try to keep your body straight at all times. 1. Start on the floor, on your hands and knees. 2. Tighten your belly muscles by pulling your belly button in toward your spine. Be sure you continue to breathe normally and do not hold your breath. 3. Raise one arm off the floor, and hold it straight out in front of you. Be careful not to let your shoulder drop down, because that will twist your trunk. 4. Hold for about 6 seconds, then lower your arm and switch to your other arm. 5. Repeat 8 to 12 times on each arm.   6. When you can do this exercise with ease and no pain, repeat steps 1 through 5. But this time do it with one leg raised off the floor, holding your leg straight out behind you. Be careful not to let your hip drop down, because that will twist your trunk. 7. When holding your leg straight out becomes easier, try raising your opposite arm at the same time, and repeat steps 1 through 5. Bridging    1. Lie on your back with both knees bent. Your knees should be bent about 90 degrees. 2. Then push your feet into the floor, squeeze your buttocks, and lift your hips off the floor until your shoulders, hips, and knees are all in a straight line. 3. Hold for about 6 seconds as you continue to breathe normally, and then slowly lower your hips back down to the floor and rest for up to 10 seconds. 4. Repeat 8 to 12 times. Curl-ups    1. Lie on the floor on your back with your knees bent at a 90-degree angle. Your feet should be flat on the floor, about 12 inches from your buttocks. 2. Cross your arms over your chest. If this bothers your neck, try putting your hands behind your neck (not your head), with your elbows spread apart. 3. Slowly tighten your belly muscles and raise your shoulder blades off the floor. 4. Keep your head in line with your body, and do not press your chin to your chest.  5. Hold this position for 1 or 2 seconds, then slowly lower yourself back down to the floor. 6. Repeat 8 to 12 times. Plank    Do this exercise slowly. Try to keep your body straight at all times, and do not let one hip drop lower than the other. 1. Lie on your stomach, resting your upper body on your forearms. 2. Tighten your belly muscles by pulling your belly button in toward your spine. 3. Keeping your knees on the floor, press down with your forearms to lift your upper body off the floor. 4. Hold for about 6 seconds, then lower your body to the floor. Rest for up to 10 seconds. 5. Repeat 8 to 12 times.   6. Over time, work up to holding for 15 to 30 seconds each time.  7. If this exercise is easy to do with your knees on the floor, try doing this exercise with your knees and legs straight, supported by your toes on the floor. Follow-up care is a key part of your treatment and safety. Be sure to make and go to all appointments, and call your doctor if you are having problems. It's also a good idea to know your test results and keep a list of the medicines you take. Where can you learn more? Go to http://www.horowitz.com/  Enter M245 in the search box to learn more about \"Spondylolysis and Spondylolisthesis: Exercises. \"  Current as of: July 1, 2021               Content Version: 13.2  © 2006-2022 Healthwise[employer] Inc[employer]. Care instructions adapted under license by [employer] (which disclaims liability or warranty for this information). If you have questions about a medical condition or this instruction, always ask your healthcare professional. [address] any warranty or liability for your use of this information.

## 2022-07-20 ENCOUNTER — NURSE TRIAGE (OUTPATIENT)
Dept: OTHER | Facility: CLINIC | Age: 57
End: 2022-07-20

## 2022-07-20 NOTE — TELEPHONE ENCOUNTER
Received call from Tete at [facility] with Red Flag Complaint. Subjective: Caller states \"hallucinations\"     Current Symptoms: heart racing and hallucinations after taking thyroid medicines in the morning, pt reports seeing black spots and spiders on wall after waking back up after taking medication, heart racing when she's anxious after taking pill, denies chest pain, denies SOB, denies dizziness    Onset: 5 months ago; unchanged    Associated Symptoms: NA    Pain Severity: 0/10; na; na    Temperature: denies     What has been tried: nothing     LMP: NA Pregnant: NA    Recommended disposition: See PCP within 3 Days    Care advice provided, patient verbalizes understanding; denies any other questions or concerns; instructed to call back for any new or worsening symptoms. Patient/Caller agrees with recommended disposition; writer provided warm transfer to Terell Holloway at [facility] for appointment scheduling    Attention Provider: Thank you for allowing me to participate in the care of your patient. The patient was connected to triage in response to information provided to the ECC. Please do not respond through this encounter as the response is not directed to a shared pool.         Reason for Disposition   History of hyperthyroidism or taking thyroid medication    Protocols used: Heart Rate and Heartbeat Questions-ADULT-[place]

## 2022-07-26 DIAGNOSIS — E03.4 HYPOTHYROIDISM DUE TO ACQUIRED ATROPHY OF THYROID: ICD-10-CM

## 2022-07-28 ENCOUNTER — VIRTUAL VISIT (OUTPATIENT)
Dept: INTERNAL MEDICINE CLINIC | Age: 57
End: 2022-07-28
Payer: MEDICAID

## 2022-07-28 DIAGNOSIS — E03.4 HYPOTHYROIDISM DUE TO ACQUIRED ATROPHY OF THYROID: Primary | ICD-10-CM

## 2022-07-28 DIAGNOSIS — H43.399 VITREOUS FLOATERS, UNSPECIFIED LATERALITY: ICD-10-CM

## 2022-07-28 DIAGNOSIS — R73.02 IGT (IMPAIRED GLUCOSE TOLERANCE): ICD-10-CM

## 2022-07-28 DIAGNOSIS — F41.9 ANXIETY: ICD-10-CM

## 2022-07-28 PROCEDURE — 99213 OFFICE O/P EST LOW 20 MIN: CPT | Performed by: NURSE PRACTITIONER

## 2022-07-31 NOTE — PROGRESS NOTES
Subjective  Fani Mosqueda is a 61 y.o. female. Fani Mosqueda, was evaluated through a synchronous (real-time) audio-video encounter. The patient (or guardian if applicable) is aware that this is a billable service. Verbal consent to proceed has been obtained. The visit was conducted pursuant to the emergency declaration under the [facility], [address] [place] [address] and the [employer] and [employer] Act. Patient identification was verified, and a caregiver was present when appropriate. Services were provided through a video synchronous discussion virtually to substitute for in-person encounter. --Jazz Espinoza NP on 7/31/2022 at 4:53 PM    An electronic signature was used to authenticate this note. HPI  For last 6- 7 months after she takes thyroid medication she sometimes wakes up seeing spots on wall   She feels nervous and has palpitations in morning   Hx of floaters, symptoms similar   Takes Alprazolam prn  Chronic lumbar pain 2/2 lumbar DDD.  She requests [facility] disability form renewal   Past Medical History:   Diagnosis Date    Acid reflux     Adverse effect of anesthesia     woke up coughing    Allergic rhinitis     Anxiety     Arthritis     DDD (degenerative disc disease), lumbar     DJD (degenerative joint disease), lumbosacral     Gallstones 8/1/2016    GERD (gastroesophageal reflux disease)     History of nonchemical tubal occlusion     Esure devices    Hyperlipidemia 2/20/2014    Hypothyroidism     HYPO    IGT (impaired glucose tolerance)     Ill-defined condition     prolapse uterus/states thus her intestines has collpased a little bit    Psychiatric disorder     anxiety and depression    PUD (peptic ulcer disease)     no EGD    Routine gynecological examination     [facility]    Sinus disorder     Tinea pedis     Ureterocele     small, right      Current Outpatient Medications   Medication Sig    sertraline (ZOLOFT) 100 mg tablet Take 1 Tablet by mouth daily. psyllium husk, with sugar, (METAMUCIL FIBER) 3.4 gram packet Take 1 Packet by mouth two (2) times a day. polyethylene glycol (MIRALAX) 17 gram/dose powder Take 34gm in 6-8 ounces juice or water 2-3 times daily as directed/needed for constipation. omeprazole (PRILOSEC) 20 mg capsule Take 1 Capsule by mouth daily as needed (heartburn). levothyroxine (SYNTHROID) 75 mcg tablet TAKE 1 TABLET BY MOUTH EVERY DAY BEFORE BREAKFAST ON AN EMPTY STOMACH    cholecalciferol (VITAMIN D3) (2,000 UNITS /50 MCG) cap capsule Take 2 Capsules by mouth daily. ALPRAZolam (Xanax) 0.5 mg tablet Take 1 Tablet by mouth every eight (8) hours as needed for Anxiety for up to 15 doses. Max Daily Amount: 1.5 mg.    azelastine (Astelin) 137 mcg (0.1 %) nasal spray 1 [place] by Both Nostrils route two (2) times a day. Use in each nostril as directed    fluticasone propionate (FLONASE) 50 mcg/actuation nasal spray 2 Sprays by Both Nostrils route daily. albuterol (PROVENTIL HFA, VENTOLIN HFA, PROAIR HFA) 90 mcg/actuation inhaler Take 2 Puffs by inhalation every four (4) hours as needed for Wheezing. pravastatin (PRAVACHOL) 40 mg tablet Take 1 Tablet by mouth nightly. Senna Plus 8.6-50 mg per tablet TAKE 1 TAB BY MOUTH DAILY. INDICATIONS: CONSTIPATION    levocetirizine (XYZAL) 5 mg tablet     methocarbamoL (ROBAXIN) 500 mg tablet Take 500 mg by mouth. diclofenac EC (VOLTAREN) 75 mg EC tablet TAKE 1 TABLET BY MOUTH TWICE A DAY    loratadine (Claritin) 10 mg tablet Take 1 Tablet by mouth daily. sucralfate (CARAFATE) 100 mg/mL suspension Take 10 mL by mouth Before breakfast, lunch, dinner and at bedtime. tiZANidine (ZANAFLEX) 4 mg tablet Take 1 Tab by mouth three (3) times daily as needed for Muscle Spasm(s).     amitriptyline (ELAVIL) 50 mg tablet TAKE 1 TAB BY MOUTH NIGHTLY.    traMADol (ULTRAM) 50 mg tablet TAKE 2 TABLETS BY MOUTH EVERY 8 HOURS AS NEEDED FOR PAIN albuterol (PROVENTIL HFA, VENTOLIN HFA, PROAIR HFA) 90 mcg/actuation inhaler Take 2 Puffs by inhalation every four (4) hours as needed for Wheezing. Cane henry As directed to assist with ambulation     No current facility-administered medications for this visit. Allergies   Allergen Reactions    Grass Pollen Other (comments)     +allergy testing    House Dust Mite Other (comments)     +allergy testing - very sensitive    Mold Other (comments)     +allergy testing    Tree And Shrub Pollen Other (comments)     +allergy testing      Past Surgical History:   Procedure Laterality Date    COLONOSCOPY N/A 9/13/2016    COLONOSCOPY / EGD  performed by Noralee Merlin, MD at [facility] ENDOSCOPY    HX GYN  10/12/2012    Esure    HX LAP CHOLECYSTECTOMY  10/06/2016    HX OTHER SURGICAL      spinal fusion     HX OTHER SURGICAL      balloon sinus plasty     HX TOTAL VAGINAL HYSTERECTOMY      SD REMOVAL OF OVARIAN CYST(S)          Review of Systems   Constitutional: Negative. HENT: Negative. Eyes:  Negative for blurred vision, double vision, photophobia, pain, discharge and redness. Respiratory: Negative. Cardiovascular: Negative. Neurological:  Negative for dizziness and headaches. Objective  Physical Exam  Constitutional:       General: She is not in acute distress. Appearance: Normal appearance. She is not ill-appearing. Neurological:      Mental Status: She is alert. Psychiatric:         Mood and Affect: Mood normal.         Thought Content: Thought content normal.        Assessment & Plan    ICD-10-CM ICD-9-CM    1. Hypothyroidism due to acquired atrophy of thyroid  E03.4 244.8 TSH 3RD GENERATION     246.8 T4, FREE      2. Anxiety  F41.9 300.00       3. IGT (impaired glucose tolerance)  Q73.84 930.05 METABOLIC PANEL, COMPREHENSIVE      HEMOGLOBIN A1C WITH EAG      4. Vitreous floaters, unspecified laterality  H43.399 379.24 CBC WITH AUTOMATED DIFF        Diagnoses and all orders for this visit:    1. Hypothyroidism due to acquired atrophy of thyroid  -     TSH 3RD GENERATION; Future  -     T4, FREE; Future    2. Anxiety    3. IGT (impaired glucose tolerance)  -     METABOLIC PANEL, COMPREHENSIVE; Future  -     HEMOGLOBIN A1C WITH EAG; Future    4. Vitreous floaters, unspecified laterality  -     CBC WITH AUTOMATED DIFF; Future    Chart review show she saw PCP with complaint February 2022.  Diagnosed with hallucinations  Patient advised to see ophthalmologist ASAP, lab visit for thyroid labs   Discussed other management plan after labs reviewed     lab results and schedule of future lab studies reviewed with patient  reviewed medications and side effects in detail  Kalie Wisdom, NP

## 2022-08-02 NOTE — TELEPHONE ENCOUNTER
Pt left a voice message following up on requested refill. [phone]    Last visit 07/28/2022 Virtual visit NP Poncho Uriostegui   Next appointment Nothing scheduled   Previous refill encounter(s)   01/11/2022 Synthroid #90 with 1 refill.      For Pharmacy Admin Tracking Only    Intervention Detail: New Rx: 1, reason: Patient Preference  Time Spent (min): 5      Requested Prescriptions     Pending Prescriptions Disp Refills    levothyroxine (SYNTHROID) 75 mcg tablet [Pharmacy Med Name: LEVOTHYROXINE 75 MCG TABLET] 90 Tablet 0     Sig: TAKE 1 TABLET BY MOUTH EVERY DAY BEFORE BREAKFAST ON AN EMPTY STOMACH

## 2022-08-03 DIAGNOSIS — E03.4 HYPOTHYROIDISM DUE TO ACQUIRED ATROPHY OF THYROID: ICD-10-CM

## 2022-08-03 RX ORDER — LEVOTHYROXINE SODIUM 75 UG/1
TABLET ORAL
Qty: 90 TABLET | Refills: 0 | Status: CANCELLED | OUTPATIENT
Start: 2022-08-03

## 2022-08-03 RX ORDER — LEVOTHYROXINE SODIUM 75 UG/1
TABLET ORAL
Qty: 90 TABLET | Refills: 0 | Status: SHIPPED | OUTPATIENT
Start: 2022-08-03 | End: 2022-08-05 | Stop reason: SDUPTHER

## 2022-08-03 NOTE — TELEPHONE ENCOUNTER
Duplicate request: Synthroid 75 mcg #90 was sent to [facility][id number] on 08/03/2022.      For Pharmacy Admin Tracking Only  Intervention Detail: Discontinued Rx: 1, reason: Duplicate Therapy  Time Spent (min): 5      Requested Prescriptions     Pending Prescriptions Disp Refills    levothyroxine (SYNTHROID) 75 mcg tablet 90 Tablet 0

## 2022-08-03 NOTE — TELEPHONE ENCOUNTER
Refill request(s) approved--levothyroxine--90-day supply with 0 refill(s). Refill protocol details (computer-generated) reviewed, as available.     Lab Results   Component Value Date/Time    TSH 1.130 01/21/2022 09:25 AM    T4, Free 1.51 01/21/2022 09:25 AM

## 2022-08-05 DIAGNOSIS — E03.4 HYPOTHYROIDISM DUE TO ACQUIRED ATROPHY OF THYROID: ICD-10-CM

## 2022-08-05 NOTE — TELEPHONE ENCOUNTER
Future Appointments:  Future Appointments   Date Time Provider Virgie Marcano   8/12/2022 10:00 AM LAB CPIM CPIM BS AMB   8/19/2022  3:00 PM [id number] [facility]        Last Appointment With Me:  7/28/2022     Requested Prescriptions     Pending Prescriptions Disp Refills    levothyroxine (SYNTHROID) 75 mcg tablet 90 Tablet 0

## 2022-08-08 ENCOUNTER — TELEPHONE (OUTPATIENT)
Dept: INTERNAL MEDICINE CLINIC | Age: 57
End: 2022-08-08

## 2022-08-08 DIAGNOSIS — E03.4 HYPOTHYROIDISM DUE TO ACQUIRED ATROPHY OF THYROID: ICD-10-CM

## 2022-08-08 DIAGNOSIS — J30.2 SEASONAL ALLERGIC RHINITIS, UNSPECIFIED TRIGGER: ICD-10-CM

## 2022-08-08 RX ORDER — LEVOTHYROXINE SODIUM 75 UG/1
TABLET ORAL
Qty: 90 TABLET | Refills: 0 | Status: CANCELLED | OUTPATIENT
Start: 2022-08-08

## 2022-08-08 RX ORDER — LEVOTHYROXINE SODIUM 75 UG/1
TABLET ORAL
Qty: 90 TABLET | Refills: 0 | Status: SHIPPED | OUTPATIENT
Start: 2022-08-08

## 2022-08-08 NOTE — TELEPHONE ENCOUNTER
Duplicate. My [facility] [place] refill levothyroxine earlier today.      Called patient and confirmed plan for completing labs    Jaison Parson MD

## 2022-08-08 NOTE — TELEPHONE ENCOUNTER
----- Message from Debbie Dhaliwal sent at 8/8/2022  8:31 AM EDT -----  Subject: Message to Provider    QUESTIONS  Information for Provider? Patient has several messages in to the office to   have her medications refilled due to DR. Sheron Marie not providing care any   longer. She is also looking for a new PCP and needs some other options. Please call.  ---------------------------------------------------------------------------  --------------  Jemima NDIAYE  [phone]; OK to leave message on voicemail  ---------------------------------------------------------------------------  --------------  SCRIPT ANSWERS  Relationship to Patient?  Self

## 2022-08-10 RX ORDER — AZELASTINE 1 MG/ML
SPRAY, METERED NASAL
Qty: 30 EACH | Refills: 5 | Status: SHIPPED | OUTPATIENT
Start: 2022-08-10

## 2022-08-11 ENCOUNTER — HOSPITAL ENCOUNTER (EMERGENCY)
Age: 57
Discharge: HOME | End: 2022-08-11
Attending: STUDENT IN AN ORGANIZED HEALTH CARE EDUCATION/TRAINING PROGRAM
Payer: MEDICAID

## 2022-08-11 VITALS
SYSTOLIC BLOOD PRESSURE: 125 MMHG | RESPIRATION RATE: 18 BRPM | OXYGEN SATURATION: 99 % | DIASTOLIC BLOOD PRESSURE: 82 MMHG | BODY MASS INDEX: 28.79 KG/M2 | HEART RATE: 77 BPM | HEIGHT: 68 IN | WEIGHT: 190 LBS | TEMPERATURE: 98.2 F

## 2022-08-11 DIAGNOSIS — R45.89 FEELING ANXIOUS: Primary | ICD-10-CM

## 2022-08-11 DIAGNOSIS — F41.9 ANXIETY: ICD-10-CM

## 2022-08-11 LAB — TSH SERPL DL<=0.05 MIU/L-ACNC: 1.28 UIU/ML (ref 0.36–3.74)

## 2022-08-11 PROCEDURE — 84443 ASSAY THYROID STIM HORMONE: CPT

## 2022-08-11 PROCEDURE — 99283 EMERGENCY DEPT VISIT LOW MDM: CPT

## 2022-08-11 PROCEDURE — 36415 COLL VENOUS BLD VENIPUNCTURE: CPT

## 2022-08-11 RX ORDER — NALOXONE HYDROCHLORIDE 4 MG/.1ML
SPRAY NASAL
Qty: 1 EACH | Refills: 0 | Status: SHIPPED | OUTPATIENT
Start: 2022-08-11

## 2022-08-11 RX ORDER — ALPRAZOLAM 0.5 MG/1
0.5 TABLET ORAL
Qty: 9 TABLET | Refills: 0 | Status: SHIPPED | OUTPATIENT
Start: 2022-08-11 | End: 2022-08-14

## 2022-08-11 NOTE — ED PROVIDER NOTES
EMERGENCY DEPARTMENT HISTORY AND PHYSICAL EXAM      Date: 8/11/2022  Patient Name: Nohemy Dong    History of Presenting Illness     Chief Complaint   Patient presents with    Anxiety       History Provided By: Patient    HPI: Nohemy Dong, 61 y.o. female with a significant past medical history of disease, anxiety, depression and other conditions as reviewed as listed below presents to the ED with anxious feeling. Patient reports that for the last several years every time she takes her thyroid medication in the morning she has approximately 8 minutes of feeling anxiety and jittery feeling usually remedied by Xanax which she ran out of her prescription last week. Last had her thyroid levels checked 8 months ago. She is also been prescribed Zoloft which she is not taking due to fear for allergic reaction and side effects as read on the packaging which she has had this prescription for several years. Specifically she was afraid of tongue swelling and difficulty breathing. She denies any hallucinations, suicidal homicidal ideations, tachycardia, chest pain, shortness of breath. States that her PCP is currently on hospice and she has been able able to locate a new doctor to obtain her prescription for Xanax. There are no other complaints, changes, or physical findings at this time. PCP: None    No current facility-administered medications on file prior to encounter. Current Outpatient Medications on File Prior to Encounter   Medication Sig Dispense Refill    azelastine (ASTELIN) 137 mcg (0.1 %) nasal spray USE 1 SPRAY INTO EACH NOSTRIL TWICE A DAY, USE IN EACH NOSTRIL AS DIRECTED 30 Each 5    levothyroxine (SYNTHROID) 75 mcg tablet TAKE 1 TABLET BY MOUTH EVERY DAY BEFORE BREAKFAST ON AN EMPTY STOMACH 90 Tablet 0    pravastatin (PRAVACHOL) 40 mg tablet Take 1 Tablet by mouth nightly. 90 Tablet 1    Senna Plus 8.6-50 mg per tablet TAKE 1 TAB BY MOUTH DAILY.  INDICATIONS: CONSTIPATION 30 Tablet 3 sertraline (ZOLOFT) 100 mg tablet Take 1 Tablet by mouth daily. 90 Tablet 1    psyllium husk, with sugar, (METAMUCIL FIBER) 3.4 gram packet Take 1 Packet by mouth two (2) times a day. 60 Packet 11    polyethylene glycol (MIRALAX) 17 gram/dose powder Take 34gm in 6-8 ounces juice or water 2-3 times daily as directed/needed for constipation. 595 g 5    omeprazole (PRILOSEC) 20 mg capsule Take 1 Capsule by mouth daily as needed (heartburn). 30 Capsule 6    levocetirizine (XYZAL) 5 mg tablet       cholecalciferol (VITAMIN D3) (2,000 UNITS /50 MCG) cap capsule Take 2 Capsules by mouth daily. 180 Capsule 3    [DISCONTINUED] ALPRAZolam (Xanax) 0.5 mg tablet Take 1 Tablet by mouth every eight (8) hours as needed for Anxiety for up to 15 doses. Max Daily Amount: 1.5 mg. 15 Tablet 0    methocarbamoL (ROBAXIN) 500 mg tablet Take 500 mg by mouth. diclofenac EC (VOLTAREN) 75 mg EC tablet TAKE 1 TABLET BY MOUTH TWICE A DAY      fluticasone propionate (FLONASE) 50 mcg/actuation nasal spray 2 Sprays by Both Nostrils route daily. 1 Bottle 5    loratadine (Claritin) 10 mg tablet Take 1 Tablet by mouth daily. 30 Tablet 5    albuterol (PROVENTIL HFA, VENTOLIN HFA, PROAIR HFA) 90 mcg/actuation inhaler Take 2 Puffs by inhalation every four (4) hours as needed for Wheezing. 1 Inhaler 0    sucralfate (CARAFATE) 100 mg/mL suspension Take 10 mL by mouth Before breakfast, lunch, dinner and at bedtime. 900 mL 1    tiZANidine (ZANAFLEX) 4 mg tablet Take 1 Tab by mouth three (3) times daily as needed for Muscle Spasm(s). 40 Tab 1    amitriptyline (ELAVIL) 50 mg tablet TAKE 1 TAB BY MOUTH NIGHTLY. 30 Tab 5    traMADol (ULTRAM) 50 mg tablet TAKE 2 TABLETS BY MOUTH EVERY 8 HOURS AS NEEDED FOR PAIN 60 Tab 2    albuterol (PROVENTIL HFA, VENTOLIN HFA, PROAIR HFA) 90 mcg/actuation inhaler Take 2 Puffs by inhalation every four (4) hours as needed for Wheezing.  1 Inhaler 3    Cane henry As directed to assist with ambulation 1 Device 1       Past History     Past Medical History:  Past Medical History:   Diagnosis Date    Acid reflux     Adverse effect of anesthesia     woke up coughing    Allergic rhinitis     Anxiety     Arthritis     DDD (degenerative disc disease), lumbar     DJD (degenerative joint disease), lumbosacral     Gallstones 8/1/2016    GERD (gastroesophageal reflux disease)     History of nonchemical tubal occlusion     Esure devices    Hyperlipidemia 2/20/2014    Hypothyroidism     HYPO    IGT (impaired glucose tolerance)     Ill-defined condition     prolapse uterus/states thus her intestines has collpased a little bit    Psychiatric disorder     anxiety and depression    PUD (peptic ulcer disease)     no EGD    Routine gynecological examination     [facility]    Sinus disorder     Tinea pedis     Ureterocele     small, right       Past Surgical History:  Past Surgical History:   Procedure Laterality Date    COLONOSCOPY N/A 9/13/2016    COLONOSCOPY / EGD  performed by Bin Morel MD at [facility] ENDOSCOPY    HX GYN  10/12/2012    Esure    HX LAP CHOLECYSTECTOMY  10/06/2016    HX OTHER SURGICAL      spinal fusion     HX OTHER SURGICAL      balloon sinus plasty     HX TOTAL VAGINAL HYSTERECTOMY      AR REMOVAL OF OVARIAN CYST(S)         Family History:  Family History   Problem Relation Age of Onset    Hypertension Mother     Diabetes Mother     Heart Disease Mother     Heart Attack Mother     Cancer Father         lung    Other Sister         cholecystectomy    Cancer Brother         lung    Diabetes Maternal Aunt     Cancer Paternal Uncle         lung    Diabetes Maternal Aunt     Diabetes Maternal Aunt     Diabetes Maternal Aunt     Diabetes Maternal Aunt     Diabetes Maternal Aunt     Heart Attack Daughter 32    Other Son         narcolepsy/GERD (part of esophagus removed d/t this)    Cancer Paternal Uncle         lung    Anesth Problems Neg Hx        Social History:  Social History     Tobacco Use    Smoking status: Never Smokeless tobacco: Never   Substance Use Topics    Alcohol use: No    Drug use: No       Allergies: Allergies   Allergen Reactions    Grass Pollen Other (comments)     +allergy testing    House Dust Mite Other (comments)     +allergy testing - very sensitive    Mold Other (comments)     +allergy testing    Tree And Shrub Pollen Other (comments)     +allergy testing       Review of Systems   Review of Systems   Constitutional: Negative. HENT: Negative. Respiratory: Negative. Cardiovascular: Negative. Gastrointestinal: Negative. Genitourinary: Negative. Musculoskeletal: Negative. Neurological: Negative. Hematological: Negative. Psychiatric/Behavioral:  The patient is nervous/anxious. All other systems reviewed and are negative. Physical Exam   Physical Exam  Vitals and nursing note reviewed. Constitutional:       General: She is not in acute distress. Appearance: Normal appearance. She is obese. She is not ill-appearing, toxic-appearing or diaphoretic. HENT:      Head: Normocephalic. Mouth/Throat:      Mouth: Mucous membranes are moist.   Eyes:      Conjunctiva/sclera: Conjunctivae normal.   Cardiovascular:      Rate and Rhythm: Normal rate. Pulses: Normal pulses. Pulmonary:      Effort: Pulmonary effort is normal. No respiratory distress. Abdominal:      Tenderness: There is no abdominal tenderness. Musculoskeletal:         General: Normal range of motion. Cervical back: Normal range of motion and neck supple. Skin:     General: Skin is warm and dry. Capillary Refill: Capillary refill takes less than 2 seconds. Neurological:      General: No focal deficit present. Mental Status: She is alert and oriented to person, place, and time.    Psychiatric:         Mood and Affect: Mood normal.         Behavior: Behavior normal.     Lab and Diagnostic Study Results   Labs -     Recent Results (from the past 12 hour(s))   TSH 3RD GENERATION Collection Time: 08/11/22 11:24 AM   Result Value Ref Range    TSH 1.28 0.36 - 3.74 uIU/mL     Medical Decision Making and ED Course   - I am the first provider for this patient. I reviewed the vital signs, available nursing notes, past medical history, past surgical history, family history and social history. - Initial assessment performed. The patients presenting problems have been discussed, and they are in agreement with the care plan formulated and outlined with them. I have encouraged them to ask questions as they arise throughout their visit. Vital Signs-Reviewed the patient's vital signs. Patient Vitals for the past 12 hrs:   Temp Pulse Resp BP SpO2   08/11/22 0955 98.2 °F (36.8 °C) 77 18 125/82 99 %       Differential Diagnosis & Medical Decision Making Provider Note:   39-year-old female presents with feelings of anxiety consistent with her history as reported in HPI. Differential diagnoses include anxiety, acute psychosis, medication reaction, medication noncompliance, subtherapeutic thyroid medication, hyperthyroidism. Check thyroid level and review PDMP with plan for discharge and refill of Xanax while PCP referral is given as well as education and strict return precautions if no abnormality in thyroid level. ED Course:   ED Course as of 08/11/22 1252   Thu Aug 11, 2022   1251 TSH 3RD GENERATION:    TSH 1.28 [KR]      ED Course User Index  [KR] Jalen Yap NP       Disposition   Disposition: Condition stable  DC- Adult Discharges: All of the diagnostic tests were reviewed and questions answered. Diagnosis, care plan and treatment options were discussed. The patient understands the instructions and will follow up as directed. The patients results have been reviewed with them. They have been counseled regarding their diagnosis.   The patient verbally convey understanding and agreement of the signs, symptoms, diagnosis, treatment and prognosis and additionally agrees to follow up as recommended with their PCP in 24 - 48 hours. They also agree with the care-plan and convey that all of their questions have been answered. I have also put together some discharge instructions for them that include: 1) educational information regarding their diagnosis, 2) how to care for their diagnosis at home, as well a 3) list of reasons why they would want to return to the ED prior to their follow-up appointment, should their condition change. Discharged    DISCHARGE PLAN:  1. Current Discharge Medication List        CONTINUE these medications which have NOT CHANGED    Details   azelastine (ASTELIN) 137 mcg (0.1 %) nasal spray USE 1 SPRAY INTO EACH NOSTRIL TWICE A DAY, USE IN EACH NOSTRIL AS DIRECTED  Qty: 30 Each, Refills: 5    Associated Diagnoses: Seasonal allergic rhinitis, unspecified trigger      levothyroxine (SYNTHROID) 75 mcg tablet TAKE 1 TABLET BY MOUTH EVERY DAY BEFORE BREAKFAST ON AN EMPTY STOMACH  Qty: 90 Tablet, Refills: 0    Associated Diagnoses: Hypothyroidism due to acquired atrophy of thyroid      pravastatin (PRAVACHOL) 40 mg tablet Take 1 Tablet by mouth nightly. Qty: 90 Tablet, Refills: 1    Associated Diagnoses: Mixed hyperlipidemia      Senna Plus 8.6-50 mg per tablet TAKE 1 TAB BY MOUTH DAILY. INDICATIONS: CONSTIPATION  Qty: 30 Tablet, Refills: 3    Associated Diagnoses: Constipation, unspecified constipation type      sertraline (ZOLOFT) 100 mg tablet Take 1 Tablet by mouth daily. Qty: 90 Tablet, Refills: 1    Associated Diagnoses: Anxiety      psyllium husk, with sugar, (METAMUCIL FIBER) 3.4 gram packet Take 1 Packet by mouth two (2) times a day. Qty: 60 Packet, Refills: 11    Associated Diagnoses: Constipation, unspecified constipation type      polyethylene glycol (MIRALAX) 17 gram/dose powder Take 34gm in 6-8 ounces juice or water 2-3 times daily as directed/needed for constipation.   Qty: 595 g, Refills: 5    Associated Diagnoses: Constipation, unspecified constipation type omeprazole (PRILOSEC) 20 mg capsule Take 1 Capsule by mouth daily as needed (heartburn). Qty: 30 Capsule, Refills: 6    Associated Diagnoses: Gastroesophageal reflux disease, unspecified whether esophagitis present      levocetirizine (XYZAL) 5 mg tablet       cholecalciferol (VITAMIN D3) (2,000 UNITS /50 MCG) cap capsule Take 2 Capsules by mouth daily. Qty: 180 Capsule, Refills: 3      ALPRAZolam (Xanax) 0.5 mg tablet Take 1 Tablet by mouth every eight (8) hours as needed for Anxiety for up to 15 doses. Max Daily Amount: 1.5 mg.  Qty: 15 Tablet, Refills: 0    Associated Diagnoses: Anxiety      methocarbamoL (ROBAXIN) 500 mg tablet Take 500 mg by mouth. diclofenac EC (VOLTAREN) 75 mg EC tablet TAKE 1 TABLET BY MOUTH TWICE A DAY      fluticasone propionate (FLONASE) 50 mcg/actuation nasal spray 2 Sprays by Both Nostrils route daily. Qty: 1 Bottle, Refills: 5    Associated Diagnoses: Seasonal allergic rhinitis, unspecified trigger      loratadine (Claritin) 10 mg tablet Take 1 Tablet by mouth daily. Qty: 30 Tablet, Refills: 5    Associated Diagnoses: Seasonal allergic rhinitis, unspecified trigger      !! albuterol (PROVENTIL HFA, VENTOLIN HFA, PROAIR HFA) 90 mcg/actuation inhaler Take 2 Puffs by inhalation every four (4) hours as needed for Wheezing. Qty: 1 Inhaler, Refills: 0    Associated Diagnoses: Wheezing      sucralfate (CARAFATE) 100 mg/mL suspension Take 10 mL by mouth Before breakfast, lunch, dinner and at bedtime. Qty: 900 mL, Refills: 1      tiZANidine (ZANAFLEX) 4 mg tablet Take 1 Tab by mouth three (3) times daily as needed for Muscle Spasm(s). Qty: 40 Tab, Refills: 1    Associated Diagnoses: Muscle spasms of both lower extremities      amitriptyline (ELAVIL) 50 mg tablet TAKE 1 TAB BY MOUTH NIGHTLY.   Qty: 30 Tab, Refills: 5    Associated Diagnoses: Irritable bladder; Lumbar radiculopathy      traMADol (ULTRAM) 50 mg tablet TAKE 2 TABLETS BY MOUTH EVERY 8 HOURS AS NEEDED FOR PAIN  Qty: 60 Tab, Refills: 2    Comments: Not to exceed 4 additional fills before 09/27/2018. Associated Diagnoses: Lumbar radiculopathy      !! albuterol (PROVENTIL HFA, VENTOLIN HFA, PROAIR HFA) 90 mcg/actuation inhaler Take 2 Puffs by inhalation every four (4) hours as needed for Wheezing. Qty: 1 Inhaler, Refills: 3      Cane henry As directed to assist with ambulation  Qty: 1 Device, Refills: 1    Associated Diagnoses: Chronic low back pain with left-sided sciatica, unspecified back pain laterality       ! ! - Potential duplicate medications found. Please discuss with provider. 2.   Follow-up Information       Follow up With Specialties Details Why Contact Info    Trey John MD Family Medicine  PCP referral for follow up and to establish routine regular medical care [address] [facility]  [phone]            3. Return to ED if worse   4. Current Discharge Medication List        START taking these medications    Details   naloxone (NARCAN) 4 mg/actuation nasal spray Use 1 spray intranasally, then discard. Repeat with new spray every 2 min as needed for opioid overdose symptoms, alternating nostrils. Qty: 1 Each, Refills: 0  Start date: 8/11/2022           CONTINUE these medications which have CHANGED    Details   ALPRAZolam (Xanax) 0.5 mg tablet Take 1 Tablet by mouth every eight (8) hours as needed for Anxiety for up to 3 days. Max Daily Amount: 1.5 mg.  Qty: 9 Tablet, Refills: 0  Start date: 8/11/2022, End date: 8/14/2022    Associated Diagnoses: Anxiety             Diagnosis/Clinical Impression     Clinical Impression:   1. Feeling anxious    2. Anxiety        Attestations: Andressa Gr NP, am the primary clinician of record. Please note that this dictation was completed with [employer], the [employer] voice recognition software.   Quite often unanticipated grammatical, syntax, homophones, and other interpretive errors are inadvertently transcribed by the computer software. Please disregard these errors. Please excuse any errors that have escaped final proofreading. Thank you.

## 2022-08-19 ENCOUNTER — HOSPITAL ENCOUNTER (OUTPATIENT)
Dept: MAMMOGRAPHY | Age: 57
Discharge: HOME | End: 2022-08-19
Attending: INTERNAL MEDICINE
Payer: MEDICAID

## 2022-08-19 DIAGNOSIS — Z12.31 SCREENING MAMMOGRAM FOR HIGH-RISK PATIENT: ICD-10-CM

## 2022-08-19 PROCEDURE — 77063 BREAST TOMOSYNTHESIS BI: CPT

## 2022-09-17 LAB
CREATININE, EXTERNAL: 0.91
HBA1C MFR BLD HPLC: 5.8 %
LDL-C, EXTERNAL: 126
TOTAL CHOLESTEROL, NCHOLT: 226

## 2022-10-13 ENCOUNTER — TELEPHONE (OUTPATIENT)
Dept: INTERNAL MEDICINE CLINIC | Age: 57
End: 2022-10-13

## 2022-10-13 NOTE — TELEPHONE ENCOUNTER
Per Dr. Leia Lai; pt was advised to take 20 mg of Pravastatin. She doubts that is the cause of the hallucinations. Pt notified. Pt had no other concerns.   Shayy Morgan

## 2022-10-13 NOTE — TELEPHONE ENCOUNTER
Patient would like Pravastatin 40mg reduced as she is having a hard time sleeping and having hallucinations.

## 2022-11-01 RX ORDER — ALPRAZOLAM 0.5 MG/1
0.5 TABLET ORAL
COMMUNITY

## 2022-11-06 DIAGNOSIS — E03.4 HYPOTHYROIDISM DUE TO ACQUIRED ATROPHY OF THYROID: ICD-10-CM

## 2022-11-07 RX ORDER — LEVOTHYROXINE SODIUM 75 UG/1
TABLET ORAL
Qty: 90 TABLET | Refills: 0 | Status: SHIPPED | OUTPATIENT
Start: 2022-11-07 | End: 2022-11-28

## 2022-11-25 DIAGNOSIS — E03.4 HYPOTHYROIDISM DUE TO ACQUIRED ATROPHY OF THYROID: ICD-10-CM

## 2022-11-28 RX ORDER — LEVOTHYROXINE SODIUM 75 UG/1
TABLET ORAL
Qty: 90 TABLET | Refills: 0 | Status: SHIPPED | OUTPATIENT
Start: 2022-11-28

## 2022-12-09 DIAGNOSIS — E78.2 MIXED HYPERLIPIDEMIA: ICD-10-CM

## 2022-12-09 RX ORDER — PRAVASTATIN SODIUM 40 MG/1
40 TABLET ORAL
Qty: 90 TABLET | Refills: 1 | Status: SHIPPED | OUTPATIENT
Start: 2022-12-09

## 2022-12-09 RX ORDER — ACETAMINOPHEN 500 MG
4000 TABLET ORAL DAILY
Qty: 180 CAPSULE | Refills: 3 | Status: SHIPPED | OUTPATIENT
Start: 2022-12-09

## 2022-12-14 RX ORDER — ACETAMINOPHEN 500 MG
4000 TABLET ORAL DAILY
Qty: 180 CAPSULE | Refills: 3 | Status: SHIPPED | OUTPATIENT
Start: 2022-12-14

## 2022-12-14 NOTE — TELEPHONE ENCOUNTER
Requested Prescriptions     Pending Prescriptions Disp Refills    cholecalciferol (VITAMIN D3) (2,000 UNITS /50 MCG) cap capsule 180 Capsule 3     Sig: Take 2 Capsules by mouth daily.

## 2022-12-22 PROBLEM — K29.00 ACUTE GASTRITIS: Status: ACTIVE | Noted: 2022-12-22

## 2022-12-22 PROBLEM — J32.9 SINUSITIS: Status: ACTIVE | Noted: 2022-12-22

## 2022-12-22 PROBLEM — B35.3 TINEA PEDIS: Status: ACTIVE | Noted: 2022-12-22

## 2022-12-22 PROBLEM — J02.9 VIRAL PHARYNGITIS: Status: ACTIVE | Noted: 2022-12-22

## 2022-12-22 PROBLEM — R69 ILL-DEFINED CONDITION: Status: ACTIVE | Noted: 2022-12-22

## 2022-12-22 PROBLEM — J30.9 ALLERGIC RHINITIS: Status: ACTIVE | Noted: 2022-12-22

## 2022-12-22 PROBLEM — S60.032A: Status: ACTIVE | Noted: 2022-12-22

## 2022-12-22 PROBLEM — K29.70 GASTRITIS: Status: ACTIVE | Noted: 2022-12-22

## 2022-12-22 PROBLEM — Z98.890 HISTORY OF NONCHEMICAL TUBAL OCCLUSION: Status: ACTIVE | Noted: 2022-12-22

## 2022-12-22 PROBLEM — K21.9 GASTROESOPHAGEAL REFLUX DISEASE: Status: ACTIVE | Noted: 2022-12-22

## 2022-12-22 PROBLEM — T41.45XA ADVERSE EFFECT OF ANESTHESIA: Status: ACTIVE | Noted: 2022-12-22

## 2022-12-22 PROBLEM — J34.9 SINUS DISORDER: Status: ACTIVE | Noted: 2022-12-22

## 2022-12-22 PROBLEM — E86.0 DEHYDRATION: Status: ACTIVE | Noted: 2022-12-22

## 2022-12-22 PROBLEM — F99 PSYCHIATRIC DISORDER: Status: ACTIVE | Noted: 2022-12-22

## 2022-12-22 PROBLEM — M54.50 BACK PAIN AT L4-L5 LEVEL: Status: ACTIVE | Noted: 2022-12-22

## 2022-12-22 PROBLEM — M25.569 KNEE PAIN: Status: ACTIVE | Noted: 2022-12-22

## 2022-12-22 PROBLEM — J06.9 UPPER RESPIRATORY INFECTION: Status: ACTIVE | Noted: 2022-12-22

## 2022-12-22 PROBLEM — R10.9 CHRONIC ABDOMINAL PAIN: Status: ACTIVE | Noted: 2022-12-22

## 2022-12-22 PROBLEM — K21.9 ACID REFLUX: Status: ACTIVE | Noted: 2022-12-22

## 2022-12-22 PROBLEM — G62.9 NEUROPATHY: Status: ACTIVE | Noted: 2022-12-22

## 2022-12-22 PROBLEM — R30.0 DYSURIA: Status: ACTIVE | Noted: 2022-12-22

## 2022-12-22 PROBLEM — Z01.419 ROUTINE GYNECOLOGICAL EXAMINATION: Status: ACTIVE | Noted: 2022-12-22

## 2022-12-22 PROBLEM — G89.29 CHRONIC ABDOMINAL PAIN: Status: ACTIVE | Noted: 2022-12-22

## 2022-12-27 ENCOUNTER — OFFICE VISIT (OUTPATIENT)
Dept: INTERNAL MEDICINE CLINIC | Age: 57
End: 2022-12-27
Payer: MEDICAID

## 2022-12-27 VITALS
HEIGHT: 69 IN | TEMPERATURE: 98.7 F | OXYGEN SATURATION: 98 % | DIASTOLIC BLOOD PRESSURE: 80 MMHG | WEIGHT: 210.25 LBS | RESPIRATION RATE: 16 BRPM | HEART RATE: 64 BPM | SYSTOLIC BLOOD PRESSURE: 110 MMHG | BODY MASS INDEX: 31.14 KG/M2

## 2022-12-27 DIAGNOSIS — E78.00 HYPERCHOLESTEREMIA: ICD-10-CM

## 2022-12-27 DIAGNOSIS — R44.1 VISUAL HALLUCINATION: ICD-10-CM

## 2022-12-27 DIAGNOSIS — R73.09 ABNORMAL BLOOD SUGAR: ICD-10-CM

## 2022-12-27 DIAGNOSIS — M25.512 ACUTE PAIN OF LEFT SHOULDER: ICD-10-CM

## 2022-12-27 DIAGNOSIS — K59.00 CONSTIPATION, UNSPECIFIED CONSTIPATION TYPE: ICD-10-CM

## 2022-12-27 DIAGNOSIS — K21.9 GASTROESOPHAGEAL REFLUX DISEASE WITHOUT ESOPHAGITIS: ICD-10-CM

## 2022-12-27 DIAGNOSIS — F41.9 ANXIETY: ICD-10-CM

## 2022-12-27 DIAGNOSIS — E03.9 ACQUIRED HYPOTHYROIDISM: Primary | ICD-10-CM

## 2022-12-27 PROCEDURE — 99214 OFFICE O/P EST MOD 30 MIN: CPT | Performed by: INTERNAL MEDICINE

## 2022-12-27 RX ORDER — FAMOTIDINE 40 MG/1
40 TABLET, FILM COATED ORAL DAILY
Qty: 90 TABLET | Refills: 1 | Status: SHIPPED | OUTPATIENT
Start: 2022-12-27

## 2022-12-27 RX ORDER — DICLOFENAC SODIUM 10 MG/G
2 GEL TOPICAL 4 TIMES DAILY
COMMUNITY
Start: 2022-11-15

## 2022-12-27 RX ORDER — ACETAMINOPHEN 500 MG
2000 TABLET ORAL DAILY
Qty: 90 CAPSULE | Refills: 3 | Status: SHIPPED | OUTPATIENT
Start: 2022-12-27

## 2022-12-27 RX ORDER — POLYETHYLENE GLYCOL 3350 17 G/17G
POWDER, FOR SOLUTION ORAL
Qty: 595 G | Refills: 5 | Status: CANCELLED | OUTPATIENT
Start: 2022-12-27

## 2022-12-27 RX ORDER — AMOXICILLIN 250 MG
CAPSULE ORAL
Qty: 30 TABLET | Refills: 3 | Status: CANCELLED | OUTPATIENT
Start: 2022-12-27

## 2022-12-27 NOTE — TELEPHONE ENCOUNTER
Rx for Vitamin D3 2000 international units, insurance only covers 1 capsule per day. Do you want to change the strength? Please advise.  Dione Mayo

## 2022-12-27 NOTE — PROGRESS NOTES
[address] [facility]  [address]  [facility] [place], [address] [facility]  Phone: [phone] (: 1965) is a 61 y.o. female, established patient, here for evaluation of the following chief complaint(s):  Follow Up Chronic Condition         SUBJECTIVE/OBJECTIVE:  HPI:  Sreedhar Olvera is being seen today for follow up. She states she is having issues with seeing things on the wall when she wakes up. She is concerned about one of the effects of her medications. She states she is having left arm/shoulder pain. She did have X-ray done at Neurologist and states it was negative. She has tried taking OTC Tylenol and states it helps some but then the pain starts back. She has tried doing exercises. Patient states she is getting surgery for prolapsed bladder on February. Patient states her acid reflux medicine is too big, so that she needs to change it so that she can swallow. Patient thinks seeing things could be coming from thyroid medicine. So she is taking it every other day. Lab review 22 showed blood sugar 92. Potassium 3.8. LFTs within normal limit. Serum protein electrophoresis no M spike. Total cholesterol was elevated at 226. . Hemoglobin A1c was 5.8.  TSH was 4.3. ESR was 17. PTH level was 32. Prior to Admission medications    Medication Sig Start Date End Date Taking? Authorizing Provider   famotidine (PEPCID) 40 mg tablet Take 1 Tablet by mouth daily. 22  Yes Tresa Henderson MD   cholecalciferol (VITAMIN D3) (2,000 UNITS /50 MCG) cap capsule Take 2 Capsules by mouth daily. 22  Yes Tresa Henderson MD   pravastatin (PRAVACHOL) 40 mg tablet Take 1 Tablet by mouth nightly.  22  Yes Tresa Henderson MD   levothyroxine (SYNTHROID) 75 mcg tablet TAKE 1 TABLET BY MOUTH EVERY DAY BEFORE BREAKFAST ON AN EMPTY STOMACH 22  Yes Lily De Anda NP   Senna Plus 8.6-50 mg per tablet TAKE 1 TAB BY MOUTH DAILY. INDICATIONS: CONSTIPATION 2/20/22  Yes Rod Melo MD   omeprazole (PRILOSEC) 20 mg capsule Take 1 Capsule by mouth daily as needed (heartburn). 2/9/22  Yes Elza Patten MD   fluticasone propionate [facility]) 50 mcg/actuation nasal spray 2 Sprays by Both Nostrils route daily. 5/20/21  Yes Rod Melo MD   diclofenac (VOLTAREN) 1 % gel Apply 2 g to affected area four (4) times daily.  11/15/22   Provider, Historical        Allergies   Allergen Reactions    Grass Pollen Other (comments)     +allergy testing    House Dust Mite Other (comments)     +allergy testing - very sensitive    Mold Other (comments)     +allergy testing    Tree And Shrub Pollen Other (comments)     +allergy testing        Past Medical History:   Diagnosis Date    Acid reflux     Adverse effect of anesthesia     woke up coughing    Allergic rhinitis     Anxiety     Arthritis     DDD (degenerative disc disease), lumbar     DJD (degenerative joint disease), lumbosacral     Gallstones 08/01/2016    GERD (gastroesophageal reflux disease)     History of nonchemical tubal occlusion     Esure devices    Hyperlipidemia 02/20/2014    Hypothyroidism     HYPO    IGT (impaired glucose tolerance)     Ill-defined condition     prolapse uterus/states thus her intestines has collpased a little bit    Psychiatric disorder     anxiety and depression    PUD (peptic ulcer disease)     no EGD    Routine gynecological examination     [facility]    Sinus disorder     Tinea pedis     Ureterocele     small, right    Vitamin D deficiency         Family History   Problem Relation Age of Onset    Hypertension Mother     Diabetes Mother     Heart Disease Mother     Heart Attack Mother     Cancer Father         lung    Other Sister         cholecystectomy    Cancer Brother         lung    Diabetes Maternal Aunt     Cancer Paternal Uncle         lung    Diabetes Maternal Aunt     Diabetes Maternal Aunt     Diabetes Maternal Aunt Diabetes Maternal Aunt     Diabetes Maternal Aunt     Heart Attack Daughter 32    Other Son         narcolepsy/GERD (part of esophagus removed d/t this)    Cancer Paternal Uncle         lung    Anesth Problems Neg Hx         Past Surgical History:   Procedure Laterality Date    COLONOSCOPY N/A 9/13/2016    COLONOSCOPY / EGD  performed by Chanel Nath MD at [facility] ENDOSCOPY    HX GYN  10/12/2012    Esure    HX LAP CHOLECYSTECTOMY  10/06/2016    HX OTHER SURGICAL      spinal fusion     HX OTHER SURGICAL      balloon sinus plasty     HX TOTAL VAGINAL HYSTERECTOMY      VT REMOVAL OF OVARIAN CYST(S)         Review of Systems  Constitutional:  Negative for chills and fever. HENT:  Negative for congestion, ear pain, nosebleeds, sinus pain, sore throat and tinnitus. Eyes:  Negative for redness. Respiratory:  Negative for cough and shortness of breath. Cardiovascular:  Negative for chest pain and palpitations. Gastrointestinal:  Negative for abdominal pain, diarrhea, nausea and vomiting. Endocrine: Negative for cold intolerance and polyuria. Genitourinary:  Negative for dysuria and hematuria. Musculoskeletal:  Negative for back pain and neck pain. +ve left shoulder pain. Skin:  Negative for rash. Neurological:  Negative for dizziness and headaches. Psychiatric/Behavioral: visual hallucinations. /80 (BP 1 Location: Left upper arm, BP Patient Position: Sitting, BP Cuff Size: Large adult)   Pulse 64   Temp 98.7 °F (37.1 °C) (Tympanic)   Resp 16   Ht 5' 9\" (1.753 m)   Wt 210 lb 4 oz (95.4 kg)   LMP 10/23/2016 (Approximate)   SpO2 98%   BMI 31.05 kg/m²      Physical Exam  Vitals and nursing note reviewed. Constitutional:       General: Not in acute distress. Obese     Appearance: Normal appearance. HENT:      Head: Normocephalic and atraumatic. Right Ear: External ear normal.      Left Ear: External ear normal.   Eyes:      General: No scleral icterus.      Extraocular Movements: Extraocular movements intact. Conjunctiva/sclera: Conjunctivae normal.      Pupils: Pupils are equal, round, and reactive to light. Cardiovascular:      Rate and Rhythm: Regular rhythm. Heart sounds: Normal heart sounds. No murmur heard. Pulmonary:      Effort: Pulmonary effort is normal.      Breath sounds: Normal breath sounds. No wheezing or rales. Abdominal:      General: Bowel sounds are normal.      Palpations: Abdomen is soft. There is no mass. Tenderness: There is no abdominal tenderness. Musculoskeletal:         General: Left shoulder +ve restriction of abduction. Cervical back: Neck supple. Lymphadenopathy:      Cervical: No cervical adenopathy. Skin:     General: Skin is warm and dry. Findings: No rash. Neurological:      General: No focal deficit present. Mental Status: In good spirits    ASSESSMENT/PLAN:  Below is the assessment and plan developed based on review of pertinent history, physical exam, labs, studies, and medications. 1. Acquired hypothyroidism. Continue levothyroxine. 2. Anxiety  3. Constipation, unspecified constipation type. Can take MiraLAX along with the senna. 4. Abnormal blood sugar. Hemoglobin A1c 5.8. Advised low-carb diet and exercise. 5. Hypercholesteremia. Total cholesterol 226. . Continue pravastatin. 6. Visual hallucination. Etiology unclear. I am not sure it happened since she has been off Zoloft and amitriptyline which she has been taking before. Patient has appointment with a neurologist this coming Thursday. 7. Gastroesophageal reflux disease without esophagitis. Will discontinue omeprazole and put her on Pepcid 40 mg.  -     famotidine (PEPCID) 40 mg tablet; Take 1 Tablet by mouth daily. , Normal, Disp-90 Tablet, R-1  8. Acute pain of left shoulder. Seems like patient has adhesive capsulitis left shoulder. Patient already has seen orthopedics. Advised to continue physical therapy.       No follow-ups on file.    There are no Patient Instructions on file for this visit. Health Maintenance Due   Topic Date Due    Shingles Vaccine (1 of 2) Never done    COVID-19 Vaccine (3 - Booster for Pfizer series) 07/10/2021    Flu Vaccine (1) 08/01/2022        Aspects of this note may have been generated using voice recognition software. Despite editing, there may be unrecognized errors. An electronic signature was used to authenticate this note.   -- Pilo Lou MD

## 2022-12-27 NOTE — PROGRESS NOTES
Chief Complaint   Patient presents with    Follow Up Chronic Condition     1. Have you been to the ER, urgent care clinic since your last visit? Hospitalized since your last visit? No    2. Have you seen or consulted any other health care providers outside of the [address] [place] [address] since your last visit? Include any pap smears or colon screening.  Yes Where: Urologist

## 2023-01-05 ENCOUNTER — TELEPHONE (OUTPATIENT)
Dept: INTERNAL MEDICINE CLINIC | Age: 58
End: 2023-01-05

## 2023-01-05 NOTE — TELEPHONE ENCOUNTER
----- Message from Clyde Howard MD sent at 1/3/2023  5:54 PM EST -----  Please let patient know that A1c is 5.9.TSH is 4.8. Make sure she takes levothyroxine on empty stomach.

## 2023-01-21 PROBLEM — E86.0 DEHYDRATION: Status: RESOLVED | Noted: 2022-12-22 | Resolved: 2023-01-21

## 2023-01-21 PROBLEM — Z01.419 ROUTINE GYNECOLOGICAL EXAMINATION: Status: RESOLVED | Noted: 2022-12-22 | Resolved: 2023-01-21

## 2023-05-17 ENCOUNTER — TELEPHONE (OUTPATIENT)
Facility: CLINIC | Age: 58
End: 2023-05-17

## 2023-05-17 RX ORDER — LEVOTHYROXINE SODIUM 0.07 MG/1
TABLET ORAL
Qty: 90 TABLET | OUTPATIENT
Start: 2023-05-17

## 2023-05-17 RX ORDER — CEPHALEXIN 500 MG/1
500 CAPSULE ORAL 2 TIMES DAILY
COMMUNITY
Start: 2023-03-24

## 2023-05-17 RX ORDER — DOCUSATE SODIUM LIQUID 100 MG/10ML
LIQUID ORAL
COMMUNITY
Start: 2023-04-28

## 2023-05-17 RX ORDER — CEFUROXIME AXETIL 500 MG/1
500 TABLET ORAL 2 TIMES DAILY
COMMUNITY
Start: 2023-03-02

## 2023-05-17 RX ORDER — AZELASTINE HYDROCHLORIDE 137 UG/1
SPRAY, METERED NASAL
COMMUNITY
Start: 2023-05-03

## 2023-05-17 RX ORDER — ONDANSETRON 4 MG/1
TABLET, ORALLY DISINTEGRATING ORAL
COMMUNITY
Start: 2023-03-24

## 2023-05-17 RX ORDER — HYDROCODONE BITARTRATE AND ACETAMINOPHEN 7.5; 325 MG/1; MG/1
1 TABLET ORAL EVERY 6 HOURS PRN
COMMUNITY
Start: 2023-02-27

## 2023-05-17 RX ORDER — LEVOTHYROXINE SODIUM 0.07 MG/1
TABLET ORAL
Qty: 90 TABLET | Refills: 1 | Status: SHIPPED | OUTPATIENT
Start: 2023-05-17

## 2023-08-21 ENCOUNTER — HOSPITAL ENCOUNTER (OUTPATIENT)
Facility: HOSPITAL | Age: 58
Discharge: HOME | End: 2023-08-24
Payer: MEDICAID

## 2023-08-21 DIAGNOSIS — Z12.31 VISIT FOR SCREENING MAMMOGRAM: ICD-10-CM

## 2023-08-21 PROCEDURE — 77063 BREAST TOMOSYNTHESIS BI: CPT

## 2023-08-30 ENCOUNTER — HOSPITAL ENCOUNTER (EMERGENCY)
Facility: HOSPITAL | Age: 58
Discharge: HOME | End: 2023-08-30
Attending: EMERGENCY MEDICINE
Payer: MEDICAID

## 2023-08-30 VITALS
HEART RATE: 76 BPM | BODY MASS INDEX: 29.03 KG/M2 | HEIGHT: 69 IN | OXYGEN SATURATION: 96 % | SYSTOLIC BLOOD PRESSURE: 120 MMHG | RESPIRATION RATE: 20 BRPM | DIASTOLIC BLOOD PRESSURE: 71 MMHG | TEMPERATURE: 98.5 F | WEIGHT: 196 LBS

## 2023-08-30 DIAGNOSIS — Z20.822 PERSON UNDER INVESTIGATION FOR COVID-19: Primary | ICD-10-CM

## 2023-08-30 PROCEDURE — 99283 EMERGENCY DEPT VISIT LOW MDM: CPT

## 2023-08-30 PROCEDURE — 87635 SARS-COV-2 COVID-19 AMP PRB: CPT

## 2023-08-30 ASSESSMENT — PAIN - FUNCTIONAL ASSESSMENT: PAIN_FUNCTIONAL_ASSESSMENT: NONE - DENIES PAIN

## 2023-08-30 NOTE — ED NOTES
Discharge instructions given to patient by RN. Pt has been given counseling regarding at home treatment plan. Pt verbalizes understanding of need to seek further treatment if symptoms worsen. Pt ambulated off of unit in no signs of distress.       Juliet Manuel RN  08/30/23 4736

## 2023-08-31 LAB
SARS-COV-2 RNA RESP QL NAA+PROBE: ABNORMAL
SOURCE: ABNORMAL

## 2023-10-29 ENCOUNTER — HOSPITAL ENCOUNTER (EMERGENCY)
Facility: HOSPITAL | Age: 58
Discharge: HOME | End: 2023-10-29
Payer: MEDICAID

## 2023-10-29 ENCOUNTER — APPOINTMENT (OUTPATIENT)
Facility: HOSPITAL | Age: 58
End: 2023-10-29
Payer: MEDICAID

## 2023-10-29 VITALS
TEMPERATURE: 98.9 F | WEIGHT: 180 LBS | RESPIRATION RATE: 18 BRPM | SYSTOLIC BLOOD PRESSURE: 122 MMHG | HEIGHT: 69 IN | HEART RATE: 76 BPM | OXYGEN SATURATION: 98 % | BODY MASS INDEX: 26.66 KG/M2 | DIASTOLIC BLOOD PRESSURE: 98 MMHG

## 2023-10-29 DIAGNOSIS — R61 NIGHT SWEATS: Primary | ICD-10-CM

## 2023-10-29 LAB
ALBUMIN SERPL-MCNC: 3.8 G/DL (ref 3.5–5)
ALBUMIN/GLOB SERPL: 1 (ref 1.1–2.2)
ALP SERPL-CCNC: 74 U/L (ref 45–117)
ALT SERPL-CCNC: 21 U/L (ref 12–78)
ANION GAP SERPL CALC-SCNC: 10 MMOL/L (ref 5–15)
APPEARANCE UR: ABNORMAL
AST SERPL-CCNC: 17 U/L (ref 15–37)
BACTERIA URNS QL MICRO: NEGATIVE /HPF
BASOPHILS # BLD: 0.1 K/UL (ref 0–0.1)
BASOPHILS NFR BLD: 1 % (ref 0–1)
BILIRUB SERPL-MCNC: 0.5 MG/DL (ref 0.2–1)
BILIRUB UR QL: NEGATIVE
BUN SERPL-MCNC: 5 MG/DL (ref 6–20)
BUN/CREAT SERPL: 6 (ref 12–20)
CALCIUM SERPL-MCNC: 9.5 MG/DL (ref 8.5–10.1)
CHLORIDE SERPL-SCNC: 105 MMOL/L (ref 97–108)
CO2 SERPL-SCNC: 24 MMOL/L (ref 21–32)
COLOR UR: ABNORMAL
CREAT SERPL-MCNC: 0.86 MG/DL (ref 0.55–1.02)
DIFFERENTIAL METHOD BLD: ABNORMAL
EOSINOPHIL # BLD: 0.2 K/UL (ref 0–0.4)
EOSINOPHIL NFR BLD: 3 % (ref 0–7)
EPITH CASTS URNS QL MICRO: ABNORMAL /LPF
ERYTHROCYTE [DISTWIDTH] IN BLOOD BY AUTOMATED COUNT: 14.5 % (ref 11.5–14.5)
GLOBULIN SER CALC-MCNC: 3.9 G/DL (ref 2–4)
GLUCOSE SERPL-MCNC: 91 MG/DL (ref 65–100)
GLUCOSE UR STRIP.AUTO-MCNC: NEGATIVE MG/DL
HCT VFR BLD AUTO: 46 % (ref 35–47)
HGB BLD-MCNC: 15 G/DL (ref 11.5–16)
HGB UR QL STRIP: ABNORMAL
IMM GRANULOCYTES # BLD AUTO: 0 K/UL (ref 0–0.04)
IMM GRANULOCYTES NFR BLD AUTO: 0 % (ref 0–0.5)
KETONES UR QL STRIP.AUTO: NEGATIVE MG/DL
LEUKOCYTE ESTERASE UR QL STRIP.AUTO: ABNORMAL
LYMPHOCYTES # BLD: 2.1 K/UL (ref 0.8–3.5)
LYMPHOCYTES NFR BLD: 40 % (ref 12–49)
MCH RBC QN AUTO: 29.7 PG (ref 26–34)
MCHC RBC AUTO-ENTMCNC: 32.6 G/DL (ref 30–36.5)
MCV RBC AUTO: 91.1 FL (ref 80–99)
MONOCYTES # BLD: 0.4 K/UL (ref 0–1)
MONOCYTES NFR BLD: 7 % (ref 5–13)
NEUTS SEG # BLD: 2.6 K/UL (ref 1.8–8)
NEUTS SEG NFR BLD: 49 % (ref 32–75)
NITRITE UR QL STRIP.AUTO: NEGATIVE
NRBC # BLD: 0 K/UL (ref 0–0.01)
NRBC BLD-RTO: 0 PER 100 WBC
PH UR STRIP: 5.5 (ref 5–8)
PLATELET # BLD AUTO: 97 K/UL (ref 150–400)
POTASSIUM SERPL-SCNC: 3.5 MMOL/L (ref 3.5–5.1)
PROT SERPL-MCNC: 7.7 G/DL (ref 6.4–8.2)
PROT UR STRIP-MCNC: NEGATIVE MG/DL
RBC # BLD AUTO: 5.05 M/UL (ref 3.8–5.2)
RBC #/AREA URNS HPF: ABNORMAL /HPF (ref 0–5)
SODIUM SERPL-SCNC: 139 MMOL/L (ref 136–145)
SP GR UR REFRACTOMETRY: <1.005
TSH SERPL DL<=0.05 MIU/L-ACNC: 0.44 UIU/ML (ref 0.36–3.74)
URINE CULTURE IF INDICATED: ABNORMAL
UROBILINOGEN UR QL STRIP.AUTO: 0.2 EU/DL (ref 0.2–1)
WBC # BLD AUTO: 5.4 K/UL (ref 3.6–11)
WBC URNS QL MICRO: ABNORMAL /HPF (ref 0–4)

## 2023-10-29 PROCEDURE — 84443 ASSAY THYROID STIM HORMONE: CPT

## 2023-10-29 PROCEDURE — 81001 URINALYSIS AUTO W/SCOPE: CPT

## 2023-10-29 PROCEDURE — 71045 X-RAY EXAM CHEST 1 VIEW: CPT

## 2023-10-29 PROCEDURE — 36415 COLL VENOUS BLD VENIPUNCTURE: CPT

## 2023-10-29 PROCEDURE — 80053 COMPREHEN METABOLIC PANEL: CPT

## 2023-10-29 PROCEDURE — 99284 EMERGENCY DEPT VISIT MOD MDM: CPT

## 2023-10-29 PROCEDURE — 85025 COMPLETE CBC W/AUTO DIFF WBC: CPT

## 2023-10-29 ASSESSMENT — PAIN - FUNCTIONAL ASSESSMENT: PAIN_FUNCTIONAL_ASSESSMENT: NONE - DENIES PAIN

## 2023-10-29 NOTE — ED PROVIDER NOTES
[address] [facility] EMERGENCY DEPT  EMERGENCY DEPARTMENT ENCOUNTER       Pt Name: Nely Hall  MRN: [id number]  [address] [facility] 1965  Date of evaluation: 10/29/2023  Provider: ALEX Perez NP   PCP: Cheryle Nine, MD  Note Started: 6:14 PM 10/29/23     CHIEF COMPLAINT       Chief Complaint   Patient presents with    Sweats        HISTORY OF PRESENT ILLNESS: 1 or more elements      History Provided by: Patient   History is limited by: Nothing     Nely Hall is a 60 y.o. female who presents ambulatory emergency department. Patient reports sweats at night. States she also has some episodes of sweating in the day but they are worse at night. She states that she thinks she went through menopause. She states that she has had very dry skin. She admits to constipation. She also states she has in her left upper arm a tingling sensation denies injury. Denies tingling sensation in her lower arm or fingers. Denies neck pain. Denies ataxia denies visual difficulty. States she is on thyroid medication. Patient specifically denies chest pain or shortness of breath she has cough fever wheezing. She denies abdominal pain nausea vomiting diarrhea. She denies headache. She has no other complaints this time. Nursing Notes were all reviewed and agreed with or any disagreements were addressed in the HPI. REVIEW OF SYSTEMS      Review of Systems     Positives and Pertinent negatives as per HPI.     PAST HISTORY     Past Medical History:  Past Medical History:   Diagnosis Date    Acid reflux     Adverse effect of anesthesia     woke up coughing    Allergic rhinitis     Anxiety     Arthritis     DDD (degenerative disc disease), lumbar     DJD (degenerative joint disease), lumbosacral     Gallstones 08/01/2016    GERD (gastroesophageal reflux disease)     History of nonchemical tubal occlusion     Esure devices    Hyperlipidemia 02/20/2014    Hypothyroidism     HYPO    IGT (impaired glucose tolerance)

## 2023-10-29 NOTE — ED NOTES
Discharge instructions given to patient by PA/NP and RN. Pt has been given counseling regarding at home treatment plan. Pt verbalizes understanding of need to seek further treatment if symptoms worsen. Pt ambulated off of unit in no signs of distress.        Sherman Lake RN  10/29/23 4427

## 2024-02-25 ENCOUNTER — HOSPITAL ENCOUNTER (EMERGENCY)
Facility: HOSPITAL | Age: 59
Discharge: HOME | End: 2024-02-25
Attending: EMERGENCY MEDICINE
Payer: MEDICAID

## 2024-02-25 VITALS
DIASTOLIC BLOOD PRESSURE: 73 MMHG | TEMPERATURE: 98.3 F | BODY MASS INDEX: 26.66 KG/M2 | RESPIRATION RATE: 18 BRPM | HEART RATE: 93 BPM | HEIGHT: 69 IN | OXYGEN SATURATION: 100 % | SYSTOLIC BLOOD PRESSURE: 134 MMHG | WEIGHT: 180 LBS

## 2024-02-25 DIAGNOSIS — R04.0 EPISTAXIS: Primary | ICD-10-CM

## 2024-02-25 DIAGNOSIS — R03.0 ELEVATED BLOOD PRESSURE READING: ICD-10-CM

## 2024-02-25 PROCEDURE — 99282 EMERGENCY DEPT VISIT SF MDM: CPT

## 2024-02-25 PROCEDURE — 6370000000 HC RX 637 (ALT 250 FOR IP): Performed by: EMERGENCY MEDICINE

## 2024-02-25 RX ORDER — OXYMETAZOLINE HYDROCHLORIDE 0.05 G/100ML
2 SPRAY NASAL
Status: COMPLETED | OUTPATIENT
Start: 2024-02-25 | End: 2024-02-25

## 2024-02-25 RX ADMIN — OXYMETAZOLINE HYDROCHLORIDE 2 SPRAY: 0.05 SPRAY, METERED NASAL at 05:21

## 2024-02-25 ASSESSMENT — PAIN - FUNCTIONAL ASSESSMENT: PAIN_FUNCTIONAL_ASSESSMENT: 0-10

## 2024-02-25 ASSESSMENT — PAIN SCALES - GENERAL: PAINLEVEL_OUTOF10: 0

## 2024-02-25 NOTE — ED NOTES
Pt presents ambulatory to ED complaining of nose bleed. Pt is alert and oriented x 4, RR even and unlabored, skin is warm and dry. Assesment completed and pt updated on plan of care.       Emergency Department Nursing Plan of Care       The Nursing Plan of Care is developed from the Nursing assessment and Emergency Department Attending provider initial evaluation.  The plan of care may be reviewed in the “ED Provider note”.    The Plan of Care was developed with the following considerations:   Patient / Family readiness to learn indicated by:verbalized understanding  Persons(s) to be included in education: patient  Barriers to Learning/Limitations:None    Signed     Samson Galaviz RN    2/25/2024   5:05 AM

## 2024-02-25 NOTE — ED PROVIDER NOTES
DEPT  [address] [address]  [phone]    As needed    3.   Return to ED if worse     I am the Primary Clinician of Record.   Dulce Allen MD (electronically signed)    (Please note that parts of this dictation were completed with voice recognition software. Quite often unanticipated grammatical, syntax, homophones, and other interpretive errors are inadvertently transcribed by the computer software. Please disregards these errors. Please excuse any errors that have escaped final proofreading.)           Dulce Allen MD  02/25/24 0525       Dulce Allen MD  02/25/24 0586

## 2024-02-25 NOTE — ED TRIAGE NOTES
Pt woke from sleep to her L nostril bleeding approx a hr ago and it stopped a half hr ago. Pt denies any pain but has the taste of blood in her mouth. Pt states that when her nose was bleeding her mouth became very dry. Pt states the air is dry at home but does not use the Flonase or Azelastine anymore.

## 2024-02-25 NOTE — DISCHARGE INSTRUCTIONS
Home Nosebleed Instructions:  1)  Apply nasal saline spray, x2 sprays in each nostril, every 2-3 hours and as needed.  2)  Apply afrin nasal spray, [id number] in each nostril, twice a day for 2-3 days (NO MORE THAN 3 DAYS).  3) Vaseline or bacitracin dab to both nostrils twice daily and as needed  4) NO nose blowing/wiping/digital manipulation, NO hot showers/soups, NO strenuous activity/strainting/bending over, sneeze with mouth open  5) If nosebleed recurs, first apply pressure to soft part of nose for at least 20 minutes, gargle ice water, administer Afrin, repeat.

## 2024-06-11 ENCOUNTER — TRANSCRIBE ORDERS (OUTPATIENT)
Facility: HOSPITAL | Age: 59
End: 2024-06-11

## 2024-06-11 DIAGNOSIS — Z12.31 OTHER SCREENING MAMMOGRAM: Primary | ICD-10-CM

## 2024-07-09 ENCOUNTER — ANESTHESIA (OUTPATIENT)
Facility: HOSPITAL | Age: 59
End: 2024-07-09
Payer: MEDICAID

## 2024-07-09 ENCOUNTER — HOSPITAL ENCOUNTER (OUTPATIENT)
Facility: HOSPITAL | Age: 59
Setting detail: OUTPATIENT SURGERY
Discharge: HOME | End: 2024-07-09
Attending: SPECIALIST | Admitting: SPECIALIST
Payer: MEDICAID

## 2024-07-09 ENCOUNTER — ANESTHESIA EVENT (OUTPATIENT)
Facility: HOSPITAL | Age: 59
End: 2024-07-09
Payer: MEDICAID

## 2024-07-09 VITALS
HEIGHT: 69 IN | HEART RATE: 68 BPM | SYSTOLIC BLOOD PRESSURE: 124 MMHG | WEIGHT: 193 LBS | DIASTOLIC BLOOD PRESSURE: 76 MMHG | BODY MASS INDEX: 28.58 KG/M2 | OXYGEN SATURATION: 100 % | RESPIRATION RATE: 20 BRPM | TEMPERATURE: 98 F

## 2024-07-09 PROCEDURE — 3600007502: Performed by: SPECIALIST

## 2024-07-09 PROCEDURE — 3700000000 HC ANESTHESIA ATTENDED CARE: Performed by: SPECIALIST

## 2024-07-09 PROCEDURE — 3600007512: Performed by: SPECIALIST

## 2024-07-09 PROCEDURE — 2709999900 HC NON-CHARGEABLE SUPPLY: Performed by: SPECIALIST

## 2024-07-09 PROCEDURE — 2500000003 HC RX 250 WO HCPCS

## 2024-07-09 PROCEDURE — 3700000001 HC ADD 15 MINUTES (ANESTHESIA): Performed by: SPECIALIST

## 2024-07-09 PROCEDURE — 6370000000 HC RX 637 (ALT 250 FOR IP): Performed by: SPECIALIST

## 2024-07-09 PROCEDURE — 2580000003 HC RX 258: Performed by: SPECIALIST

## 2024-07-09 PROCEDURE — 7100000010 HC PHASE II RECOVERY - FIRST 15 MIN: Performed by: SPECIALIST

## 2024-07-09 PROCEDURE — 7100000011 HC PHASE II RECOVERY - ADDTL 15 MIN: Performed by: SPECIALIST

## 2024-07-09 PROCEDURE — 6360000002 HC RX W HCPCS

## 2024-07-09 RX ORDER — SODIUM CHLORIDE 9 MG/ML
25 INJECTION, SOLUTION INTRAVENOUS PRN
Status: DISCONTINUED | OUTPATIENT
Start: 2024-07-09 | End: 2024-07-09 | Stop reason: HOSPADM

## 2024-07-09 RX ORDER — LIDOCAINE HYDROCHLORIDE 20 MG/ML
INJECTION, SOLUTION EPIDURAL; INFILTRATION; INTRACAUDAL; PERINEURAL PRN
Status: DISCONTINUED | OUTPATIENT
Start: 2024-07-09 | End: 2024-07-09 | Stop reason: SDUPTHER

## 2024-07-09 RX ORDER — SODIUM CHLORIDE 0.9 % (FLUSH) 0.9 %
5-40 SYRINGE (ML) INJECTION PRN
Status: DISCONTINUED | OUTPATIENT
Start: 2024-07-09 | End: 2024-07-09 | Stop reason: HOSPADM

## 2024-07-09 RX ORDER — SODIUM CHLORIDE 0.9 % (FLUSH) 0.9 %
5-40 SYRINGE (ML) INJECTION EVERY 12 HOURS SCHEDULED
Status: DISCONTINUED | OUTPATIENT
Start: 2024-07-09 | End: 2024-07-09 | Stop reason: HOSPADM

## 2024-07-09 RX ORDER — SIMETHICONE 40MG/0.6ML
SUSPENSION, DROPS(FINAL DOSAGE FORM)(ML) ORAL PRN
Status: DISCONTINUED | OUTPATIENT
Start: 2024-07-09 | End: 2024-07-09 | Stop reason: ALTCHOICE

## 2024-07-09 RX ORDER — SODIUM CHLORIDE 9 MG/ML
INJECTION, SOLUTION INTRAVENOUS CONTINUOUS
Status: DISCONTINUED | OUTPATIENT
Start: 2024-07-09 | End: 2024-07-09 | Stop reason: HOSPADM

## 2024-07-09 RX ADMIN — LIDOCAINE HYDROCHLORIDE 45 MG: 20 INJECTION, SOLUTION EPIDURAL; INFILTRATION; INTRACAUDAL; PERINEURAL at 09:06

## 2024-07-09 RX ADMIN — PROPOFOL 50 MG: 10 INJECTION, EMULSION INTRAVENOUS at 09:06

## 2024-07-09 RX ADMIN — PROPOFOL 50 MG: 10 INJECTION, EMULSION INTRAVENOUS at 09:20

## 2024-07-09 RX ADMIN — PROPOFOL 50 MG: 10 INJECTION, EMULSION INTRAVENOUS at 09:08

## 2024-07-09 RX ADMIN — PROPOFOL 50 MG: 10 INJECTION, EMULSION INTRAVENOUS at 09:11

## 2024-07-09 RX ADMIN — PROPOFOL 50 MG: 10 INJECTION, EMULSION INTRAVENOUS at 09:14

## 2024-07-09 RX ADMIN — PROPOFOL 50 MG: 10 INJECTION, EMULSION INTRAVENOUS at 09:17

## 2024-07-09 RX ADMIN — SODIUM CHLORIDE: 9 INJECTION, SOLUTION INTRAVENOUS at 09:04

## 2024-07-09 ASSESSMENT — PAIN - FUNCTIONAL ASSESSMENT: PAIN_FUNCTIONAL_ASSESSMENT: NONE - DENIES PAIN

## 2024-07-09 NOTE — OP NOTE
[facility]  [address], [place] [address]                 Colonoscopy Procedure Note    Indications:   See Preoperative Diagnosis above  Referring Physician: Franco Loo MD  Anesthesia/Sedation: MAC anesthesia Propofol  Endoscopist:  Dr. Danyel Hare  Assistant:  Circulator: Fouzia Monge RN  Endoscopy Technician: Renetta Zeng  Preoperative diagnosis: Abdominal pain, unspecified abdominal location [R10.9]  Belching symptom [R14.2]  Change in bowel habits [R19.4]  Constipation, unspecified constipation type [K59.00]  Encounter for screening for other digestive system disorders [Z13.818]  Calculus of gallbladder without cholecystitis without obstruction [K80.20]  Gastritis, presence of bleeding unspecified, unspecified chronicity, unspecified gastritis type [K29.70]  Gastroesophageal reflux disease, unspecified whether esophagitis present [K21.9]  Hematochezia [K92.1]  Hypothyroidism, unspecified type [E03.9]  Left lower quadrant pain [R10.32]  Lower abdominal pain [R10.30]  Right sided abdominal pain [R10.9]  Right upper quadrant pain [R10.11]  Unexplained weight loss [R63.4]    Procedure in Detail:  Informed consent was obtained for the procedure, including sedation.  Risks of perforation, hemorrhage, adverse drug reaction, and aspiration were discussed. The patient was placed in the left lateral decubitus position.  Based on the pre-procedure assessment, including review of the patient's medical history, medications, allergies, and review of systems, she had been deemed to be an appropriate candidate for  sedation; she was therefore sedated with the medications listed above.   The patient was monitored continuously with ECG tracing, pulse oximetry, blood pressure monitoring, and direct observations.      A rectal examination was performed. The [id number] was inserted into the rectum and advanced under direct vision to the cecum, which was identified by the ileocecal

## 2024-07-09 NOTE — ANESTHESIA PRE PROCEDURE
Department of Anesthesiology  Preprocedure Note       Name:  Fatoumata AQUINO Cousins   Age:  59 y.o.  :  1965                                          MRN:  [id number]         Date:  2024      Surgeon: Surgeon(s):  Danyel Hare MD    Procedure: Procedure(s):  COLONOSCOPY    Medications prior to admission:   Prior to Admission medications    Medication Sig Start Date End Date Taking? Authorizing Provider   meloxicam (MOBIC) 15 MG tablet Take 1 tablet by mouth daily 24   Archie Jules DO   meloxicam (MOBIC) 15 MG tablet Take 1 tablet by mouth daily  Patient not taking: Reported on 23   Kwadwo Turner MD   Azelastine HCl 137 MCG/SPRAY SOLN  5/3/23   Nicki Lantigua MD   Docusate Sodium 100 MG/10ML LIQD TAKE 10 ML EVERY DAY BY ORAL ROUTE AS NEEDED.  Patient not taking: Reported on 2024   Nicki Lantigua MD   HYDROcodone-acetaminophen (NORCO) 7.5-325 MG per tablet Take 1 tablet by mouth every 6 hours as needed. Max Daily Amount: 4 tablets  Patient not taking: Reported on 2023   Nicki Lantigua MD   ondansetron (ZOFRAN-ODT) 4 MG disintegrating tablet TAKE 1 TABLET BY MOUTH EVERY6 HOURS AS NEEDED FOR NAUSEA  Patient not taking: Reported on 2023 3/24/23   Nicki Lantigua MD   levothyroxine (SYNTHROID) 75 MCG tablet TAKE 1 TABLET BY MOUTH EVERY DAY BEFORE BREAKFAST ON AN EMPTY STOMACH 23   Quinton Yun MD   Cholecalciferol 50 MCG (2000) CAPS Take 1 capsule by mouth daily 22   Automatic Reconciliation, Ar   famotidine (PEPCID) 40 MG tablet Take 1 tablet by mouth daily  Patient not taking: Reported on 22   Automatic Reconciliation, Ar   fluticasone (FLONASE) 50 MCG/ACT nasal spray 2 sprays by Nasal route daily 21   Automatic Reconciliation, Ar   omeprazole (PRILOSEC) 20 MG delayed release capsule Take 1 capsule by mouth daily as needed  Patient not taking: Reported on 2024

## 2024-07-09 NOTE — DISCHARGE INSTRUCTIONS
Fatoumata AQUINO Cousins  [id number]  1965    COLON DISCHARGE INSTRUCTIONS  Discomfort:  Redness at IV site- apply warm compress to area; if redness or soreness persist- contact your physician  There may be a slight amount of blood passed from the rectum  Gaseous discomfort- walking, belching will help relieve any discomfort    DIET:   High fiber diet.   - however -  remember your colon is empty and a heavy meal will produce gas.   Avoid these foods:  vegetables, fried / greasy foods, carbonated drinks for today.   You may not drink alcoholic beverages for at least 12 hours    MEDICATIONS:   Regarding Aspirin or Nonsteroidal medications, please see below.    ACTIVITY:  You may resume your normal daily activities it is recommended that you spend the remainder of the day resting -  avoid any strenuous activity.  You may not operate a vehicle for 12 hours  You may not engage in an occupation involving machinery or appliances for rest of today  Avoid making any critical decisions for at least 24 hour    CALL M.D.  ANY SIGN OF:  Increasing pain, nausea, vomiting  Abdominal distension (swelling)  New increased bleeding (oral or rectal)  Fever (chills)  Pain in chest area  Bloody discharge from nose or mouth  Shortness of breath    You may take Tylenol as needed for pain. You may  take any Advil, Aspirin, Ibuprofen, Motrin, Aleve, or Goody’s for 10 days,      Post procedure diagnosis: normal colon  Post-procedure recommendations: colon in 3 years    Follow-up Instructions:   Call Dr. Hare  Results of procedure / biopsy in 10 days if biopsies were done  Telephone #  [phone]

## 2024-07-09 NOTE — PROGRESS NOTES

## 2024-07-09 NOTE — ANESTHESIA POSTPROCEDURE EVALUATION
2            Anesthesia Type: MAC    Gee Phase I: Gee Score: 10    Gee Phase II: Gee Score: 10    Anesthesia Post Evaluation    Patient location during evaluation: PACU  Patient participation: complete - patient participated  Level of consciousness: awake and alert  Pain score: 0  Airway patency: patent  Nausea & Vomiting: no nausea and no vomiting  Cardiovascular status: blood pressure returned to baseline and hemodynamically stable  Respiratory status: acceptable and room air  Hydration status: euvolemic  Pain management: adequate    No notable events documented.

## 2024-07-09 NOTE — H&P
Pre-endoscopy H and P for Colonoscopy    The patient was seen and examined.Date of last colonoscopy: 2016, Polyps  No      The airway was assessed and documented.  The problem list, past medical history, and medications were reviewed.     Patient Active Problem List   Diagnosis    PUD (peptic ulcer disease)    Lumbar radiculopathy    Constipation    DDD (degenerative disc disease), lumbar    Anxiety    IGT (impaired glucose tolerance)    Spinal stenosis, lumbar    Tinnitus    Vitamin D deficiency    DJD (degenerative joint disease), lumbosacral    Hypothyroidism    Ureterocele    Hyperlipidemia    Back pain at L4-L5 level    Chronic abdominal pain    Contusion of left middle finger    Dysuria    Acute gastritis    Gastritis    Knee pain    Neuropathy    Nuclear senile cataract    Open angle with borderline findings, low risk, bilateral    Upper respiratory infection    Viral pharyngitis    Adverse effect of anesthesia    Allergic rhinitis    Arthritis    GERD (gastroesophageal reflux disease)    History of nonchemical tubal occlusion    Psychiatric disorder    Sinus disorder    Tinea pedis     Social History     Socioeconomic History    Marital status: Single     Spouse name: Not on file    Number of children: Not on file    Years of education: Not on file    Highest education level: Not on file   Occupational History    Not on file   Tobacco Use    Smoking status: Never    Smokeless tobacco: Never   Substance and Sexual Activity    Alcohol use: No    Drug use: No    Sexual activity: Not on file   Other Topics Concern    Not on file   Social History Narrative    Not on file     Social Determinants of Health     Financial Resource Strain: Low Risk  (12/27/2022)    Overall Financial Resource Strain (CARDIA)     Difficulty of Paying Living Expenses: Not hard at all   Food Insecurity: Not on file (12/27/2022)   Transportation Needs: Not on file   Physical Activity: Not on file   Stress: Not on file   Social Connections:

## 2024-07-26 ENCOUNTER — TELEPHONE (OUTPATIENT)
Age: 59
End: 2024-07-26

## 2024-07-26 ENCOUNTER — HOSPITAL ENCOUNTER (EMERGENCY)
Facility: HOSPITAL | Age: 59
Discharge: HOME | End: 2024-07-26
Payer: MEDICAID

## 2024-07-26 ENCOUNTER — APPOINTMENT (OUTPATIENT)
Facility: HOSPITAL | Age: 59
End: 2024-07-26
Payer: MEDICAID

## 2024-07-26 ENCOUNTER — OFFICE VISIT (OUTPATIENT)
Age: 59
End: 2024-07-26

## 2024-07-26 VITALS
DIASTOLIC BLOOD PRESSURE: 91 MMHG | SYSTOLIC BLOOD PRESSURE: 146 MMHG | HEART RATE: 87 BPM | TEMPERATURE: 98.5 F | OXYGEN SATURATION: 98 % | RESPIRATION RATE: 20 BRPM | WEIGHT: 195 LBS | BODY MASS INDEX: 28.8 KG/M2

## 2024-07-26 DIAGNOSIS — E03.9 HYPOTHYROIDISM, UNSPECIFIED TYPE: ICD-10-CM

## 2024-07-26 DIAGNOSIS — Z91.199 NO-SHOW FOR APPOINTMENT: Primary | ICD-10-CM

## 2024-07-26 DIAGNOSIS — F41.1 ANXIETY STATE: Primary | ICD-10-CM

## 2024-07-26 DIAGNOSIS — K59.00 CONSTIPATION, UNSPECIFIED CONSTIPATION TYPE: ICD-10-CM

## 2024-07-26 LAB
ALBUMIN SERPL-MCNC: 3.8 G/DL (ref 3.5–5)
ALBUMIN/GLOB SERPL: 1 (ref 1.1–2.2)
ALP SERPL-CCNC: 78 U/L (ref 45–117)
ALT SERPL-CCNC: 25 U/L (ref 12–78)
ANION GAP SERPL CALC-SCNC: 8 MMOL/L (ref 5–15)
APPEARANCE UR: CLEAR
AST SERPL-CCNC: 15 U/L (ref 15–37)
BACTERIA URNS QL MICRO: NEGATIVE /HPF
BASOPHILS # BLD: 0 K/UL (ref 0–0.1)
BASOPHILS NFR BLD: 1 % (ref 0–1)
BILIRUB SERPL-MCNC: 0.6 MG/DL (ref 0.2–1)
BILIRUB UR QL: NEGATIVE
BUN SERPL-MCNC: 4 MG/DL (ref 6–20)
BUN/CREAT SERPL: 5 (ref 12–20)
CALCIUM SERPL-MCNC: 9.4 MG/DL (ref 8.5–10.1)
CHLORIDE SERPL-SCNC: 104 MMOL/L (ref 97–108)
CO2 SERPL-SCNC: 31 MMOL/L (ref 21–32)
COLOR UR: ABNORMAL
CREAT SERPL-MCNC: 0.86 MG/DL (ref 0.55–1.02)
DIFFERENTIAL METHOD BLD: ABNORMAL
EKG ATRIAL RATE: 68 BPM
EKG DIAGNOSIS: NORMAL
EKG P AXIS: 57 DEGREES
EKG P-R INTERVAL: 178 MS
EKG Q-T INTERVAL: 402 MS
EKG QRS DURATION: 82 MS
EKG QTC CALCULATION (BAZETT): 427 MS
EKG R AXIS: -11 DEGREES
EKG T AXIS: 54 DEGREES
EKG VENTRICULAR RATE: 68 BPM
EOSINOPHIL # BLD: 0.1 K/UL (ref 0–0.4)
EOSINOPHIL NFR BLD: 1 % (ref 0–7)
EPITH CASTS URNS QL MICRO: ABNORMAL /LPF
ERYTHROCYTE [DISTWIDTH] IN BLOOD BY AUTOMATED COUNT: 14.5 % (ref 11.5–14.5)
GLOBULIN SER CALC-MCNC: 3.7 G/DL (ref 2–4)
GLUCOSE SERPL-MCNC: 93 MG/DL (ref 65–100)
GLUCOSE UR STRIP.AUTO-MCNC: NEGATIVE MG/DL
HCT VFR BLD AUTO: 43.5 % (ref 35–47)
HGB BLD-MCNC: 14.2 G/DL (ref 11.5–16)
HGB UR QL STRIP: ABNORMAL
IMM GRANULOCYTES # BLD AUTO: 0.1 K/UL (ref 0–0.04)
IMM GRANULOCYTES NFR BLD AUTO: 1 % (ref 0–0.5)
KETONES UR QL STRIP.AUTO: NEGATIVE MG/DL
LEUKOCYTE ESTERASE UR QL STRIP.AUTO: NEGATIVE
LYMPHOCYTES # BLD: 1.4 K/UL (ref 0.8–3.5)
LYMPHOCYTES NFR BLD: 30 % (ref 12–49)
MAGNESIUM SERPL-MCNC: 2 MG/DL (ref 1.6–2.4)
MCH RBC QN AUTO: 29.8 PG (ref 26–34)
MCHC RBC AUTO-ENTMCNC: 32.6 G/DL (ref 30–36.5)
MCV RBC AUTO: 91.4 FL (ref 80–99)
MONOCYTES # BLD: 0.4 K/UL (ref 0–1)
MONOCYTES NFR BLD: 9 % (ref 5–13)
NEUTS SEG # BLD: 2.7 K/UL (ref 1.8–8)
NEUTS SEG NFR BLD: 58 % (ref 32–75)
NITRITE UR QL STRIP.AUTO: NEGATIVE
NRBC # BLD: 0 K/UL (ref 0–0.01)
NRBC BLD-RTO: 0 PER 100 WBC
PH UR STRIP: 5.5 (ref 5–8)
PLATELET # BLD AUTO: 169 K/UL (ref 150–400)
PMV BLD AUTO: 12 FL (ref 8.9–12.9)
POTASSIUM SERPL-SCNC: 4 MMOL/L (ref 3.5–5.1)
PROT SERPL-MCNC: 7.5 G/DL (ref 6.4–8.2)
PROT UR STRIP-MCNC: NEGATIVE MG/DL
RBC # BLD AUTO: 4.76 M/UL (ref 3.8–5.2)
RBC #/AREA URNS HPF: ABNORMAL /HPF (ref 0–5)
SODIUM SERPL-SCNC: 143 MMOL/L (ref 136–145)
SP GR UR REFRACTOMETRY: <1.005
TSH SERPL DL<=0.05 MIU/L-ACNC: 0.93 UIU/ML (ref 0.36–3.74)
URINE CULTURE IF INDICATED: ABNORMAL
UROBILINOGEN UR QL STRIP.AUTO: 0.2 EU/DL (ref 0.2–1)
WBC # BLD AUTO: 4.6 K/UL (ref 3.6–11)
WBC URNS QL MICRO: ABNORMAL /HPF (ref 0–4)

## 2024-07-26 PROCEDURE — 99285 EMERGENCY DEPT VISIT HI MDM: CPT

## 2024-07-26 PROCEDURE — 83735 ASSAY OF MAGNESIUM: CPT

## 2024-07-26 PROCEDURE — 81001 URINALYSIS AUTO W/SCOPE: CPT

## 2024-07-26 PROCEDURE — 84443 ASSAY THYROID STIM HORMONE: CPT

## 2024-07-26 PROCEDURE — 93005 ELECTROCARDIOGRAM TRACING: CPT

## 2024-07-26 PROCEDURE — 80053 COMPREHEN METABOLIC PANEL: CPT

## 2024-07-26 PROCEDURE — 36415 COLL VENOUS BLD VENIPUNCTURE: CPT

## 2024-07-26 PROCEDURE — 85025 COMPLETE CBC W/AUTO DIFF WBC: CPT

## 2024-07-26 PROCEDURE — 6370000000 HC RX 637 (ALT 250 FOR IP)

## 2024-07-26 PROCEDURE — 74018 RADEX ABDOMEN 1 VIEW: CPT

## 2024-07-26 RX ORDER — ALPRAZOLAM 0.25 MG/1
0.5 TABLET ORAL ONCE
Status: COMPLETED | OUTPATIENT
Start: 2024-07-26 | End: 2024-07-26

## 2024-07-26 RX ORDER — HYDROXYZINE HYDROCHLORIDE 25 MG/1
25 TABLET, FILM COATED ORAL EVERY 8 HOURS PRN
Qty: 30 TABLET | Refills: 0 | Status: SHIPPED | OUTPATIENT
Start: 2024-07-26 | End: 2024-08-05

## 2024-07-26 RX ORDER — POLYETHYLENE GLYCOL 3350 17 G/17G
17 POWDER, FOR SOLUTION ORAL DAILY PRN
Qty: 510 G | Refills: 0 | Status: SHIPPED | OUTPATIENT
Start: 2024-07-26 | End: 2024-08-25

## 2024-07-26 RX ORDER — ALPRAZOLAM 0.5 MG/1
0.5 TABLET ORAL 3 TIMES DAILY PRN
Qty: 30 TABLET | Refills: 0 | Status: SHIPPED | OUTPATIENT
Start: 2024-07-26 | End: 2024-08-10

## 2024-07-26 RX ADMIN — ALPRAZOLAM 0.5 MG: 0.25 TABLET ORAL at 09:39

## 2024-07-26 NOTE — ED TRIAGE NOTES
Patient reports feeling jittery and on edge since Monday. She denies any new events or stressors in her life. She thinks hers thyroid may be off. She reports wanting her thyroid checked and A1c checked. She denies being a diabetic. She also reports constipation with last BM 2 days ago

## 2024-07-26 NOTE — ED NOTES
Discharge instructions were given to the patient by Adriane AYALA. The patient left the Emergency Department ambulatory, alert and oriented and in no acute distress with 3 prescriptions. The patient was encouraged to call or return to the ED for worsening issues or problems and was encouraged to schedule a follow up appointment for continuing care. The patient verbalized understanding of discharge instructions and prescriptions, all questions were answered. The patient has no further concerns at this time.

## 2024-07-26 NOTE — ED PROVIDER NOTES
[facility] EMERGENCY DEPT  EMERGENCY DEPARTMENT ENCOUNTER       Pt Name: Fatoumata Lopez  MRN: [id number]  Birthdate 1965  Date of evaluation: 7/26/2024  Provider: ALEX Cordoba - NATHALIE   PCP: Franco Loo MD  Note Started:  9:33 AM EDT 7/26/24     CHIEF COMPLAINT       Chief Complaint   Patient presents with   • Anxiety   • Constipation        HISTORY OF PRESENT ILLNESS: 1 or more elements      History From: Patient  HPI Limitations: None     Fatoumata Lopez is a 59 y.o. female past medical history of hyperlipidemia, thyroid disease, arthritis, anxiety, who presents complaining of dry mouth, hot flashes, feeling jittery, rapid heart rate and episodes of lightheadedness x 5 days.  Patient denies any triggers.  She denies any relieving or aggravating factors.  She denies new stressors.  She reports that the symptoms mimic her usual anxiety exacerbations, but states he has not had an anxiety attack for over a year.  Patient previously took a loprazolam 0.5 mg as needed for anxiety.  She is denying headache, chest pain, shortness of breath, fever, chills, nausea, vomiting or diarrhea.    Patient complains of constipation.  Reports that this is a recurrent problem.  Last bowel movement was 2 days ago.  Reports that she usually has Senokot at home but it gives her abdominal cramping so she stopped taking it.     Patient reports seeing her primary care 3 months ago and had all her labs done at that time.  She denies missed doses of her thyroid medication.      Nursing Notes were all reviewed and agreed with or any disagreements were addressed in the HPI.     REVIEW OF SYSTEMS      Review of Systems     Positives and Pertinent negatives as per HPI.    PAST HISTORY     Past Medical History:  Past Medical History:   Diagnosis Date   • Acid reflux    • Adverse effect of anesthesia     woke up coughing   • Allergic rhinitis    • Anxiety    • Arthritis    • DDD (degenerative disc disease), lumbar    • DJD

## 2024-07-26 NOTE — PROGRESS NOTES
Note entered/encounter closed for administrative reasons.    Future Appointments   Date Time Provider Department Center   8/22/2024  9:00 AM [facility] 3 [facility]   8/29/2024  3:20 PM Kwadwo Turner MD [facility] AMB       Pt did not confirm appt prior to no-show today.

## 2024-07-26 NOTE — DISCHARGE INSTRUCTIONS
Latest Reference Range & Units 07/26/24 10:05   Sodium 136 - 145 mmol/L 143   Potassium 3.5 - 5.1 mmol/L 4.0   Chloride 97 - 108 mmol/L 104   CARBON DIOXIDE 21 - 32 mmol/L 31   BUN,BUNPL 6 - 20 MG/DL 4 (L)   Creatinine 0.55 - 1.02 MG/DL 0.86   Bun/Cre 12 - 20   5 (L)   Anion Gap 5 - 15 mmol/L 8   Est, Glom Filt Rate >60 ml/min/1.73m2 77   Magnesium 1.6 - 2.4 mg/dL 2.0   Glucose 65 - 100 mg/dL 93   Calcium 8.5 - 10.1 MG/DL 9.4   Albumin/Globulin Ratio 1.1 - 2.2   1.0 (L)   Total Protein 6.4 - 8.2 g/dL 7.5   Albumin 3.5 - 5.0 g/dL 3.8   Globulin 2.0 - 4.0 g/dL 3.7   Alkaline Phosphatase 45 - 117 U/L 78   ALT 12 - 78 U/L 25   AST 15 - 37 U/L 15   Total Bilirubin 0.2 - 1.0 MG/DL 0.6   TSH, 3rd Generation 0.36 - 3.74 uIU/mL 0.93   WBC 3.6 - 11.0 K/uL 4.6   RBC 3.80 - 5.20 M/uL 4.76   Hemoglobin Quant 11.5 - 16.0 g/dL 14.2   Hematocrit 35.0 - 47.0 % 43.5   MCV 80.0 - 99.0 FL 91.4   MCH 26.0 - 34.0 PG 29.8   MCHC 30.0 - 36.5 g/dL 32.6   MPV 8.9 - 12.9 FL 12.0   RDW 11.5 - 14.5 % 14.5   Platelet Count 150 - 400 K/uL 169   Neutrophils % 32 - 75 % 58   Lymphocyte % 12 - 49 % 30   Monocytes % 5 - 13 % 9   Eosinophils % 0 - 7 % 1   Basophils % 0 - 1 % 1   Neutrophils Absolute 1.8 - 8.0 K/UL 2.7   Lymphocytes Absolute 0.8 - 3.5 K/UL 1.4   Monocytes Absolute 0.0 - 1.0 K/UL 0.4   Eosinophils Absolute 0.0 - 0.4 K/UL 0.1   Basophils Absolute 0.0 - 0.1 K/UL 0.0   Differential Type -   AUTOMATED   Immature Granulocytes % 0.0 - 0.5 % 1 (H)   Nucleated Red Blood Cells 0  WBC  0.00 - 0.01 K/uL 0.0  0.00   Immature Granulocytes Absolute 0.00 - 0.04 K/UL 0.1 (H)     Please see above copy of the labs from today.  Take these labs to your follow-up appointment with primary care in 5 to 7 days.

## 2024-07-26 NOTE — ED NOTES
Pt presents to ED ambulatory complaining of feeling jittery and on edge since Monday. Pt denies any new event or stressors in her life. Pt has HX of anxiety but has not taken her medication of Alprazolam, 0.5 mg PO. Pt also reports wanting her thyroid to be checked and A1c checked. Pt also reports lightheadedness, racing heart rate, and constipation with last BM 2 days ago. Pt denies  Pt is alert and oriented x 4, RR even and unlabored, skin is warm and dry. Assessment completed and pt updated on plan of care.  Call bell in reach.      Emergency Department Nursing Plan of Care       The Nursing Plan of Care is developed from the Nursing assessment and Emergency Department Attending provider initial evaluation.  The plan of care may be reviewed in the “ED Provider note”.    The Plan of Care was developed with the following considerations:   Patient / Family readiness to learn indicated by:verbalized understanding  Persons(s) to be included in education: patient  Barriers to Learning/Limitations:None    Signed

## 2024-08-22 ENCOUNTER — HOSPITAL ENCOUNTER (OUTPATIENT)
Facility: HOSPITAL | Age: 59
Discharge: HOME | End: 2024-08-22
Payer: MEDICAID

## 2024-08-22 VITALS — BODY MASS INDEX: 28.88 KG/M2 | HEIGHT: 69 IN | WEIGHT: 195 LBS

## 2024-08-22 DIAGNOSIS — Z12.31 OTHER SCREENING MAMMOGRAM: ICD-10-CM

## 2024-08-22 PROCEDURE — 77063 BREAST TOMOSYNTHESIS BI: CPT

## 2024-09-23 ENCOUNTER — HOSPITAL ENCOUNTER (EMERGENCY)
Facility: HOSPITAL | Age: 59
Discharge: HOME | End: 2024-09-23
Payer: MEDICAID

## 2024-09-23 VITALS
HEIGHT: 69 IN | SYSTOLIC BLOOD PRESSURE: 134 MMHG | HEART RATE: 75 BPM | OXYGEN SATURATION: 100 % | DIASTOLIC BLOOD PRESSURE: 74 MMHG | RESPIRATION RATE: 18 BRPM | WEIGHT: 202 LBS | BODY MASS INDEX: 29.92 KG/M2 | TEMPERATURE: 98.1 F

## 2024-09-23 DIAGNOSIS — R09.89 PHLEGM IN THROAT: Primary | ICD-10-CM

## 2024-09-23 PROCEDURE — 6370000000 HC RX 637 (ALT 250 FOR IP)

## 2024-09-23 PROCEDURE — 99283 EMERGENCY DEPT VISIT LOW MDM: CPT

## 2024-09-23 RX ORDER — GUAIFENESIN 600 MG/1
600 TABLET, EXTENDED RELEASE ORAL
Status: COMPLETED | OUTPATIENT
Start: 2024-09-23 | End: 2024-09-23

## 2024-09-23 RX ORDER — LIDOCAINE HYDROCHLORIDE 20 MG/ML
15 SOLUTION OROPHARYNGEAL
Status: COMPLETED | OUTPATIENT
Start: 2024-09-23 | End: 2024-09-23

## 2024-09-23 RX ORDER — GUAIFENESIN 600 MG/1
600 TABLET, EXTENDED RELEASE ORAL 2 TIMES DAILY
Qty: 30 TABLET | Refills: 0 | Status: SHIPPED | OUTPATIENT
Start: 2024-09-23 | End: 2024-10-08

## 2024-09-23 RX ADMIN — LIDOCAINE HYDROCHLORIDE 15 ML: 20 SOLUTION ORAL at 16:01

## 2024-09-23 RX ADMIN — GUAIFENESIN 600 MG: 600 TABLET ORAL at 16:01

## 2024-09-23 ASSESSMENT — PAIN DESCRIPTION - DESCRIPTORS: DESCRIPTORS: OTHER (COMMENT)

## 2024-09-23 ASSESSMENT — PAIN SCALES - GENERAL: PAINLEVEL_OUTOF10: 8

## 2024-09-23 ASSESSMENT — PAIN DESCRIPTION - LOCATION: LOCATION: MOUTH

## 2024-09-23 ASSESSMENT — PAIN - FUNCTIONAL ASSESSMENT: PAIN_FUNCTIONAL_ASSESSMENT: NONE - DENIES PAIN

## 2024-09-23 ASSESSMENT — ENCOUNTER SYMPTOMS: TROUBLE SWALLOWING: 1

## 2024-12-06 NOTE — TELEPHONE ENCOUNTER
General Surgery Consult Note      Referring Physician:  Argelia Hallman MD  Chief Complaint:    Chief Complaint   Patient presents with    Office Visit     Consult: ventral hernia     HISTORY OF PRESENT ILLNESS:  Lois Barriga is an 68 year old female whom I was consulted by Argelia Hallman MD for evaluation of an umbilical hernia.  In the fall, when her allergies were bothersome, she was doing a lot of coughing and sneezing.  She developed periumbilical pain.  She would have to hold the area with coughing and sneezing.  Certain pants were bothersome to wear. She did not really appreciate a bulge.  She has had an 80 lb intentional weight loss with Mounjaro.  It has not bothered for for the last few weeks and notes that her symptoms are currently manageable.  She did have an ultrasound done that confirmed a hernia.  She admits to mild constipation. She denies any straining with urination or chronic cough.      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    Sleep apnea    - Breast cancer   - Hypertension  - Type 2 DM  - EILEEN with CPAP (getting a sleep study to reassess if it has resolved with weight loss)    Past Surgical History:   Past Surgical History:   Procedure Laterality Date    Circulating tumor cells breast cancer      Fibula fracture surgery Left     Leg/ankle surgery proc unlisted Left     Melaris analysis Left     eye lid    Polypectomy      Uterine polyp    Skin cancer excision      neck and back   - Lumpectomy for a benign cyst  - Left partial mastectomy for breast cancer   - Cataract surgery    - No history of problems with anesthesia.  - No history of easy bleeding or bruising or blood clots.    Family History:  Mom:   Medication refill authorized. , DM  Dad:  , Gout, heavy drinker  Siblings:  6 Sisters - 2 with Crohn's, 2 with diabetes, 2 with EILEEN. One sister with RA. Oldest sister  of a metastatic cancer, unsure intra-abdominal type.  One sister with a PE. 1 Brother is DM, gout.  Children:  None.   Extended family on dad's side with diabetes.    Social History:  Lives: [place], alone.   Works:  Retired in  - still works 1 day per week, . 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   Tobacco:  Quit >25-30 years ago. Sporadic, with going out.  EtoH:  1x per month.  Illicit Drugs:  None  Exercise: Does not exercise regularly.    Allergies:   ALLERGIES:   Allergen Reactions    Cat Dander Other (See Comments)     Wheezes      Liraglutide Other (See Comments)     diarrhea    Statins MYALGIA     Medications:   Current Outpatient Medications   Medication Sig Dispense Refill    lisinopril (ZESTRIL) 20 MG tablet TAKE 1 TABLET BY MOUTH DAILY 30 tablet 0    tirzepatide (Mounjaro) 15 MG/0.5ML Solution Auto-injector Inject 15 mg under the skin once a week. 2 mL 5    tirzepatide (Mounjaro) 15 MG/0.5ML Solution Auto-injector Inject 15 mg into the skin 1 day a week. 2 mL 5    ezetimibe (ZETIA) 10 MG tablet Take 1 tablet by mouth daily. 90 tablet 3    OneTouch Ultra test strip TEST THREE TIMES DAILY      tirzepatide (Mounjaro) 7.5 MG/0.5ML Solution Pen-injector Inject 7.5 mg into the skin 1 day a week. (Patient not taking: Reported on 2024) 4 mL 3    Mounjaro 5 MG/0.5ML Solution Pen-injector ADMINISTER 5 MG UNDER THE SKIN 1 TIME A WEEK (Patient not taking: Reported on 2024)      cetirizine (ZyrTEC) 10 MG tablet Take 10 mg by mouth daily as needed.      vitamin B-12 (CYANOCOBALAMIN) 1000 MCG tablet Take 1,000 mcg by mouth daily.      Calcium Carbonate-Vit D-Min (CALCIUM 1200 PO)       Cholecalciferol (Vitamin D-3) 125 mcg (5,000 units) tablet       acetaminophen (TYLENOL) 325 MG tablet Take 650 mg by mouth every 4 hours as  needed for Pain.      DISPENSE Cpap, resmed base, mask tube, head strap, filters      Ascorbic Acid (vitamin C) 500 MG tablet Take 500 mg by mouth daily.      aspirin (Aspirin 81) 81 MG EC tablet Take 81 mg by mouth daily.      ibuprofen (MOTRIN) 200 MG tablet Take 800 mg by mouth every 4 hours as needed for Pain.      Echinacea 380 MG Cap Take by mouth daily as needed.      Omega 3 1200 MG Cap Take 1 capsule by mouth daily.      magnesium (RA Natural Magnesium) 250 MG tablet Take 250 mg by mouth daily.      Potassium 99 MG Tab Take 1 tablet by mouth daily.      Turmeric 500 MG Tab Take 500 mg by mouth as needed.      Zinc 50 MG Tab Take 50 mg by mouth daily as needed.      BENFOTIAMINE PO Take 300 mg by mouth in the morning and 300 mg in the evening.       No current facility-administered medications for this visit.       PHYSICAL EXAM:   Visit Vitals  /73   Pulse 72   Ht 5' 6\" (1.676 m)   Wt 85.2 kg (187 lb 13.3 oz)   BMI 30.32 kg/m²     GENERAL:  Alert, cooperative, no distress, appears stated age.  HEAD:  Normocephalic, without obvious abnormality, atraumatic.  EYES:  Sclerae are non-icteric.   NECK:  Supple, symmetrical, trachea midline.  LUNGS:  Clear to auscultation bilaterally, respirations unlabored, no wheezes, rales, or rhonchi.  HEART:  Regular rate and rhythm, S1 and S2 normal, no murmur, rub or gallop.  ABDOMEN:  Soft, non-tender, no masses, no hepatosplenomegaly. Unable to completely feel the fascial defect at the umbilicus, but can feel an edge. No significant bulge.  Tenderness at the fascial defect.  EXTREMITIES:  upper and lower extremities are bilaterally normal, atraumatic, no cyanosis or edema.   PULSES:  2+ radial and pedal pulses bilaterally.  SKIN:  Skin color, texture, turgor normal.  NEUROLOGIC:  Cranial nerves II-XII intact.    PSYCHIATRIC:  Good mood and appropriate affect.    LABORATORY DATA:   Lab Results   Component Value Date    WBC 6.1 11/24/2023    RBC 4.66 11/24/2023    HGB  13.9 11/24/2023    HCT 41.2 11/24/2023     11/24/2023      Lab Results   Component Value Date    SODIUM 141 10/24/2024    POTASSIUM 4.2 10/24/2024    GLUCOSE 123 (H) 10/24/2024    CALCIUM 9.3 10/24/2024    CO2 27 10/24/2024    CHLORIDE 109 10/24/2024    BUN 16 10/24/2024    CREATININE 1.09 (H) 10/24/2024    TOTPROTEIN 6.6 10/24/2024    ALBUMIN 4.0 10/24/2024    BILIRUBIN 0.8 10/24/2024    AST 8 10/24/2024    ALKPT 54 10/24/2024    ANIONGAP 9 10/24/2024    AGR 1.5 10/24/2024        IMAGING STUDIES:   US SOFT TISSUE ABDOMINAL WALL (1/14/2024) - images personally reviewed.  FINDINGS:  Small 1.5 x 1 cm fat-containing umbilical hernia corresponds to prior  11/24/2023 CT and lies at the base of tiny left midline gap and minor  rectus diastases. No abnormal color flow, vascularity, or fluid collection.      IMPRESSION:  1. Similar 1.5 x 1 cm fat-containing umbilical hernia.    CT ABDOMEN PELVIS W CONTRAST (11/24/23) - images personally reviewed.   FINDINGS:  The lung bases are clear.     Osseous structures and subcutaneous tissues:  No fractures or aggressive  osseous lesions identified.     ABDOMEN:  Liver:  Unremarkable.     Biliary system: Unremarkable.     Spleen:  Unremarkable.     Pancreas:  Unremarkable.     Adrenal glands:  Unremarkable.     Kidneys: There are no renal stones or hydronephrosis. No renal lesions are  seen.     Bowel:  No pathologic bowel dilatation. No abnormal bowel thickening.     Appendix: Normal.     Retroperitoneum/mesentery:  No mesenteric or retroperitoneal  lymphadenopathy.     Vascular: The abdominal aorta is normal in caliber with no evidence of  aneurysmal dilatation.      Ascites: None.     PELVIS:  Masses: None.     Urinary bladder: Normal.     Reproductive organs: Within normal limits.     Other: None.      IMPRESSION:  1. No acute abdominopelvic process.    ASSESSMENT:  Umbilical hernia, recently symptomatic    PLAN:  The anatomy and natural history of hernias was discussed.  Open and robotic approaches were discussed including the pros and cons of each approach.  I recommend a robotic repair with mesh.   We reviewed the risks of surgery including but not limited to recurrence, use of prosthetic materials (mesh) and the risk of infection, damage to adjacent structures, and the risks of general anesthesia.  The anticipated postoperative course and restrictions were also reviewed including no lifting, pushing or pulling >10-15 pounds for 4 weeks.     With respect to the hernia, we discussed possibility of incarceration, strangulation, enlargement in size over time, and the risk of emergency surgery in the face of strangulation.  The patient was counseled on the signs and symptoms to look for and instructed to call the office during regular hours or to seek care in an emergency department after hours.     Since her symptoms have improved, she would like to hold off on surgery for now. She may consider having it done electively at spring break or summer vacation. She will call if she wants to proceed.    Lois was provided with the [facility] Hernia Booklet.       Bessy Jeronimo MD    CC:  Documentation to Argelia Hallman MD.

## 2025-02-07 ENCOUNTER — OFFICE VISIT (OUTPATIENT)
Age: 60
End: 2025-02-07
Payer: MEDICAID

## 2025-02-07 VITALS
WEIGHT: 200.2 LBS | OXYGEN SATURATION: 96 % | BODY MASS INDEX: 29.56 KG/M2 | TEMPERATURE: 99.2 F | SYSTOLIC BLOOD PRESSURE: 122 MMHG | HEART RATE: 61 BPM | DIASTOLIC BLOOD PRESSURE: 82 MMHG

## 2025-02-07 DIAGNOSIS — E66.3 OVERWEIGHT (BMI 25.0-29.9): ICD-10-CM

## 2025-02-07 DIAGNOSIS — Z76.89 ESTABLISHING CARE WITH NEW DOCTOR, ENCOUNTER FOR: Primary | ICD-10-CM

## 2025-02-07 DIAGNOSIS — E55.9 VITAMIN D DEFICIENCY: ICD-10-CM

## 2025-02-07 DIAGNOSIS — K21.9 GASTROESOPHAGEAL REFLUX DISEASE WITHOUT ESOPHAGITIS: ICD-10-CM

## 2025-02-07 DIAGNOSIS — E78.2 MIXED HYPERLIPIDEMIA: ICD-10-CM

## 2025-02-07 DIAGNOSIS — E03.9 ACQUIRED HYPOTHYROIDISM: ICD-10-CM

## 2025-02-07 DIAGNOSIS — J30.89 CHRONIC NON-SEASONAL ALLERGIC RHINITIS: ICD-10-CM

## 2025-02-07 DIAGNOSIS — F41.1 GENERALIZED ANXIETY DISORDER: ICD-10-CM

## 2025-02-07 DIAGNOSIS — R73.03 PREDIABETES: ICD-10-CM

## 2025-02-07 DIAGNOSIS — N95.1 VASOMOTOR SYMPTOMS DUE TO MENOPAUSE: ICD-10-CM

## 2025-02-07 DIAGNOSIS — K59.01 SLOW TRANSIT CONSTIPATION: ICD-10-CM

## 2025-02-07 PROBLEM — J02.9 VIRAL PHARYNGITIS: Status: RESOLVED | Noted: 2022-12-22 | Resolved: 2025-02-07

## 2025-02-07 PROBLEM — J06.9 UPPER RESPIRATORY INFECTION: Status: RESOLVED | Noted: 2022-12-22 | Resolved: 2025-02-07

## 2025-02-07 PROBLEM — Z90.49 HISTORY OF CHOLECYSTECTOMY: Status: ACTIVE | Noted: 2023-02-17

## 2025-02-07 PROBLEM — Z90.710 H/O: HYSTERECTOMY: Status: ACTIVE | Noted: 2023-02-17

## 2025-02-07 PROBLEM — K80.20 GALLSTONE: Status: RESOLVED | Noted: 2025-02-07 | Resolved: 2025-02-07

## 2025-02-07 PROBLEM — Z98.1 HISTORY OF LUMBAR FUSION: Status: ACTIVE | Noted: 2023-02-17

## 2025-02-07 PROCEDURE — 99214 OFFICE O/P EST MOD 30 MIN: CPT | Performed by: STUDENT IN AN ORGANIZED HEALTH CARE EDUCATION/TRAINING PROGRAM

## 2025-02-07 RX ORDER — POLYETHYLENE GLYCOL 3350 17 G/17G
17 POWDER, FOR SOLUTION ORAL DAILY PRN
Qty: 510 G | Refills: 5 | Status: SHIPPED | OUTPATIENT
Start: 2025-02-07

## 2025-02-07 RX ORDER — FLUTICASONE PROPIONATE 50 MCG
2 SPRAY, SUSPENSION (ML) NASAL DAILY
Qty: 16 G | Refills: 5 | Status: SHIPPED | OUTPATIENT
Start: 2025-02-07

## 2025-02-07 RX ORDER — LEVOTHYROXINE SODIUM 75 UG/1
TABLET ORAL
Qty: 90 TABLET | Refills: 1 | Status: SHIPPED | OUTPATIENT
Start: 2025-02-07

## 2025-02-07 RX ORDER — VENLAFAXINE HYDROCHLORIDE 37.5 MG/1
37.5 CAPSULE, EXTENDED RELEASE ORAL DAILY
Qty: 30 CAPSULE | Refills: 5 | Status: SHIPPED | OUTPATIENT
Start: 2025-02-07

## 2025-02-07 RX ORDER — AZELASTINE HYDROCHLORIDE 137 UG/1
1 SPRAY, METERED NASAL DAILY
Qty: 30 ML | Refills: 5 | Status: SHIPPED | OUTPATIENT
Start: 2025-02-07

## 2025-02-07 SDOH — ECONOMIC STABILITY: FOOD INSECURITY: WITHIN THE PAST 12 MONTHS, YOU WORRIED THAT YOUR FOOD WOULD RUN OUT BEFORE YOU GOT MONEY TO BUY MORE.: SOMETIMES TRUE

## 2025-02-07 ASSESSMENT — PATIENT HEALTH QUESTIONNAIRE - PHQ9
SUM OF ALL RESPONSES TO PHQ QUESTIONS 1-9: 0
2. FEELING DOWN, DEPRESSED OR HOPELESS: NOT AT ALL
SUM OF ALL RESPONSES TO PHQ QUESTIONS 1-9: 0
1. LITTLE INTEREST OR PLEASURE IN DOING THINGS: NOT AT ALL
SUM OF ALL RESPONSES TO PHQ9 QUESTIONS 1 & 2: 0

## 2025-02-07 ASSESSMENT — ENCOUNTER SYMPTOMS
BACK PAIN: 1
VOMITING: 0
BLOOD IN STOOL: 0
NAUSEA: 0
DIARRHEA: 0
ABDOMINAL PAIN: 0
SHORTNESS OF BREATH: 0
COUGH: 0
CONSTIPATION: 0

## 2025-02-07 NOTE — PROGRESS NOTES
Fatoumata Lopez (:  1965) is a 60 y.o. female, New patient, here for evaluation of the following chief complaint(s):  New Patient (She has a constipation .)    Established to practice. New patient to this provider.    Subjective   SUBJECTIVE/OBJECTIVE:  Patient presents today to establish care with me    Patient does have prior PCP  Patient's prior PCP is Dr. Segal    Chronic problems:    Hypothyroidism - is on Synthroid 75 mcg . No symptoms other than hot flashes, but this could be related to menopause s    Hyperlipidemia - in the past, was on pravastatin, not currently taking. Last levels in  were normal, but she may have been on medication then    Prediabetes - A1c 5.9    Allergies - non seasonal and seasonal - was on nasal sprays - want to restart    Acid reflux - Sees a GI, but has had worse symptoms and OTC medications are not working. Has not tried PPI    Chronic back pain - sees specialist - has upcoming appointment    Vitamin D deficiency - noted in the past, not on supplementation    Anxiety, has been worse recently, not on medication    Overweight - BMI 29.56    Preventative care:  Health Maintenance Due   Topic Date Due   • A1C test (Diabetic or Prediabetic)  2023   • Lipids  2023          Past Medical History:   Diagnosis Date   • Acid reflux    • Adverse effect of anesthesia     woke up coughing   • Allergic rhinitis    • Anxiety    • Arthritis    • DDD (degenerative disc disease), lumbar    • DJD (degenerative joint disease), lumbosacral    • Gallstone 2025   • Gallstones 2016   • GERD (gastroesophageal reflux disease)    • History of nonchemical tubal occlusion     Esure devices   • Hyperlipidemia 2014   • Hypothyroidism     HYPO   • IGT (impaired glucose tolerance)    • Ill-defined condition     prolapse uterus/states thus her intestines has collpased a little bit   • Psychiatric disorder     anxiety and depression   • PUD (peptic ulcer disease)     no EGD

## 2025-02-07 NOTE — PATIENT INSTRUCTIONS
[place], [place], [place], [place], [place] and [place].  Website: [email or web address]  To Apply by phone: [phone]    [place] [employer] (PSR) area:   [facility] [place], [place], [place], [place], [facility], and [facility].   Website: [email or web address]  For More Information: Call [phone] or email [email or web address]    Meals on [facility]:   Website: [email or web address]  To Apply Online: [email or web address]  To Apply by phone: [phone]    Meals on [facility]:    To Apply by Phone: [phone]    Other Resources:     [employer]   What they offer: “[employer] is a nonprofit working together to build healthy communities by growing and sharing healthy food.  The food we grow is distributed through our network of programs and partnerships in communities where access to healthy food is limited.”   Website: https://[employer]/find-fresh-food/  Phone: 417.587.4255   Cash, cards, and SNAP/EBT accepted        [employer] [place] [employer]  What they offer: A nutrition midday meal and interaction with fellow community members.  There are 20 [place] [employer] through the region including [facility], and the [facility] of [place], [place], [place], [facility], and [place]  Website: [email or web address]  Phone: [phone]  Visit the website above to apply or call [employer] directly to have an application mailed to you.    Donation based fee system for meals   Days and times vary depending on location, but most are open from 9:30AM-1:00PM, 4 days per week, and are closed on Saturday, Sunday, and major holidays

## 2025-02-07 NOTE — PROGRESS NOTES
RM 14    Chief Complaint   Patient presents with    New Patient       Vitals:    02/07/25 0823   BP: 122/82   Site: Left Upper Arm   Position: Sitting   Pulse: 61   Temp: 99.2 °F (37.3 °C)   TempSrc: Oral   SpO2: 96%   Weight: 90.8 kg (200 lb 3.2 oz)        \"Have you been to the ER, urgent care clinic since your last visit?  Hospitalized since your last visit?\"    NO    “Have you seen or consulted any other health care providers outside of [facility] since your last visit?”    No            Click Here for Release of Records Request   AVS  education, follow up, and recommendations provided and addressed with patient.  services used to advise patient  No

## 2025-02-08 LAB
25(OH)D3 SERPL-MCNC: 23.2 NG/ML (ref 30–100)
ALBUMIN SERPL-MCNC: 4.1 G/DL (ref 3.5–5)
ALBUMIN/GLOB SERPL: 1.1 (ref 1.1–2.2)
ALP SERPL-CCNC: 97 U/L (ref 45–117)
ALT SERPL-CCNC: 25 U/L (ref 12–78)
ANION GAP SERPL CALC-SCNC: 6 MMOL/L (ref 2–12)
AST SERPL-CCNC: 7 U/L (ref 15–37)
BASOPHILS # BLD: 0.07 K/UL (ref 0–0.1)
BASOPHILS NFR BLD: 1.5 % (ref 0–1)
BILIRUB SERPL-MCNC: 0.5 MG/DL (ref 0.2–1)
BUN SERPL-MCNC: 8 MG/DL (ref 6–20)
BUN/CREAT SERPL: 9 (ref 12–20)
CALCIUM SERPL-MCNC: 10.2 MG/DL (ref 8.5–10.1)
CHLORIDE SERPL-SCNC: 107 MMOL/L (ref 97–108)
CHOLEST SERPL-MCNC: 292 MG/DL
CO2 SERPL-SCNC: 27 MMOL/L (ref 21–32)
CREAT SERPL-MCNC: 0.85 MG/DL (ref 0.55–1.02)
DIFFERENTIAL METHOD BLD: ABNORMAL
EOSINOPHIL # BLD: 0.13 K/UL (ref 0–0.4)
EOSINOPHIL NFR BLD: 2.7 % (ref 0–7)
ERYTHROCYTE [DISTWIDTH] IN BLOOD BY AUTOMATED COUNT: 14.3 % (ref 11.5–14.5)
EST. AVERAGE GLUCOSE BLD GHB EST-MCNC: 114 MG/DL
GLOBULIN SER CALC-MCNC: 3.7 G/DL (ref 2–4)
GLUCOSE SERPL-MCNC: 86 MG/DL (ref 65–100)
HBA1C MFR BLD: 5.6 % (ref 4–5.6)
HCT VFR BLD AUTO: 44.8 % (ref 35–47)
HDLC SERPL-MCNC: 99 MG/DL
HDLC SERPL: 2.9 (ref 0–5)
HGB BLD-MCNC: 14.4 G/DL (ref 11.5–16)
IMM GRANULOCYTES # BLD AUTO: 0.03 K/UL (ref 0–0.04)
IMM GRANULOCYTES NFR BLD AUTO: 0.6 % (ref 0–0.5)
LDLC SERPL CALC-MCNC: 174 MG/DL (ref 0–100)
LYMPHOCYTES # BLD: 1.69 K/UL (ref 0.8–3.5)
LYMPHOCYTES NFR BLD: 35.3 % (ref 12–49)
MCH RBC QN AUTO: 29.4 PG (ref 26–34)
MCHC RBC AUTO-ENTMCNC: 32.1 G/DL (ref 30–36.5)
MCV RBC AUTO: 91.4 FL (ref 80–99)
MONOCYTES # BLD: 0.36 K/UL (ref 0–1)
MONOCYTES NFR BLD: 7.5 % (ref 5–13)
NEUTS SEG # BLD: 2.51 K/UL (ref 1.8–8)
NEUTS SEG NFR BLD: 52.4 % (ref 32–75)
NRBC # BLD: 0 K/UL (ref 0–0.01)
NRBC BLD-RTO: 0 PER 100 WBC
PLATELET # BLD AUTO: 190 K/UL (ref 150–400)
PMV BLD AUTO: 12.2 FL (ref 8.9–12.9)
POTASSIUM SERPL-SCNC: 4 MMOL/L (ref 3.5–5.1)
PROT SERPL-MCNC: 7.8 G/DL (ref 6.4–8.2)
RBC # BLD AUTO: 4.9 M/UL (ref 3.8–5.2)
SODIUM SERPL-SCNC: 140 MMOL/L (ref 136–145)
T4 FREE SERPL-MCNC: 1.1 NG/DL (ref 0.8–1.5)
TRIGL SERPL-MCNC: 95 MG/DL
TSH SERPL DL<=0.05 MIU/L-ACNC: 1.14 UIU/ML (ref 0.36–3.74)
VLDLC SERPL CALC-MCNC: 19 MG/DL
WBC # BLD AUTO: 4.8 K/UL (ref 3.6–11)

## 2025-02-10 DIAGNOSIS — E55.9 VITAMIN D DEFICIENCY: ICD-10-CM

## 2025-02-10 DIAGNOSIS — E78.2 MIXED HYPERLIPIDEMIA: Primary | ICD-10-CM

## 2025-02-10 RX ORDER — PRAVASTATIN SODIUM 40 MG
40 TABLET ORAL NIGHTLY
Qty: 90 TABLET | Refills: 3 | Status: SHIPPED | OUTPATIENT
Start: 2025-02-10

## 2025-02-12 ENCOUNTER — TELEPHONE (OUTPATIENT)
Age: 60
End: 2025-02-12

## 2025-02-12 ENCOUNTER — TRANSCRIBE ORDERS (OUTPATIENT)
Facility: HOSPITAL | Age: 60
End: 2025-02-12

## 2025-02-12 DIAGNOSIS — Z12.31 VISIT FOR SCREENING MAMMOGRAM: Primary | ICD-10-CM

## 2025-02-12 NOTE — TELEPHONE ENCOUNTER
I called a pt for her blood test result ,Recommendations ,she started to take her vitD and pravastatin right away, no more questions.

## 2025-02-24 ENCOUNTER — TELEPHONE (OUTPATIENT)
Age: 60
End: 2025-02-24

## 2025-02-24 NOTE — TELEPHONE ENCOUNTER
Patient states that her Pravastatin is causing her joints to lock up and want to know if she should continue to take it

## 2025-02-27 ENCOUNTER — TELEPHONE (OUTPATIENT)
Age: 60
End: 2025-02-27
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
## 2025-02-28 ENCOUNTER — TELEPHONE (OUTPATIENT)
Age: 60
End: 2025-02-28

## 2025-02-28 NOTE — TELEPHONE ENCOUNTER
I called a pt for her massage,stop her pravastatin ,sent high dose Vit D for one  month and restart pravastatin again after.

## 2025-03-26 ENCOUNTER — APPOINTMENT (OUTPATIENT)
Facility: HOSPITAL | Age: 60
End: 2025-03-26
Payer: MEDICAID

## 2025-03-26 ENCOUNTER — HOSPITAL ENCOUNTER (EMERGENCY)
Facility: HOSPITAL | Age: 60
Discharge: HOME | End: 2025-03-26
Attending: EMERGENCY MEDICINE
Payer: MEDICAID

## 2025-03-26 VITALS
OXYGEN SATURATION: 98 % | RESPIRATION RATE: 18 BRPM | SYSTOLIC BLOOD PRESSURE: 140 MMHG | HEIGHT: 69 IN | HEART RATE: 69 BPM | WEIGHT: 195.11 LBS | DIASTOLIC BLOOD PRESSURE: 82 MMHG | BODY MASS INDEX: 28.9 KG/M2 | TEMPERATURE: 97.7 F

## 2025-03-26 DIAGNOSIS — R10.2 SUPRAPUBIC ABDOMINAL PAIN: ICD-10-CM

## 2025-03-26 DIAGNOSIS — R30.0 DYSURIA: Primary | ICD-10-CM

## 2025-03-26 LAB
ALBUMIN SERPL-MCNC: 3.9 G/DL (ref 3.5–5)
ALBUMIN/GLOB SERPL: 0.9 (ref 1.1–2.2)
ALP SERPL-CCNC: 83 U/L (ref 45–117)
ALT SERPL-CCNC: 25 U/L (ref 12–78)
ANION GAP SERPL CALC-SCNC: 2 MMOL/L (ref 2–12)
APPEARANCE UR: CLEAR
AST SERPL-CCNC: 30 U/L (ref 15–37)
BACTERIA URNS QL MICRO: NEGATIVE /HPF
BASOPHILS # BLD: 0.04 K/UL (ref 0–0.1)
BASOPHILS NFR BLD: 0.9 % (ref 0–1)
BILIRUB SERPL-MCNC: 0.9 MG/DL (ref 0.2–1)
BILIRUB UR QL: NEGATIVE
BUN SERPL-MCNC: 8 MG/DL (ref 6–20)
BUN/CREAT SERPL: 8 (ref 12–20)
CALCIUM SERPL-MCNC: 9.8 MG/DL (ref 8.5–10.1)
CHLORIDE SERPL-SCNC: 108 MMOL/L (ref 97–108)
CO2 SERPL-SCNC: 28 MMOL/L (ref 21–32)
COLOR UR: ABNORMAL
CREAT SERPL-MCNC: 0.97 MG/DL (ref 0.55–1.02)
DIFFERENTIAL METHOD BLD: NORMAL
EOSINOPHIL # BLD: 0.1 K/UL (ref 0–0.4)
EOSINOPHIL NFR BLD: 2.1 % (ref 0–7)
EPITH CASTS URNS QL MICRO: ABNORMAL /LPF
ERYTHROCYTE [DISTWIDTH] IN BLOOD BY AUTOMATED COUNT: 14.3 % (ref 11.5–14.5)
GLOBULIN SER CALC-MCNC: 4.3 G/DL (ref 2–4)
GLUCOSE SERPL-MCNC: 91 MG/DL (ref 65–100)
GLUCOSE UR STRIP.AUTO-MCNC: NEGATIVE MG/DL
HCT VFR BLD AUTO: 44.5 % (ref 35–47)
HGB BLD-MCNC: 14.6 G/DL (ref 11.5–16)
HGB UR QL STRIP: ABNORMAL
HYALINE CASTS URNS QL MICRO: ABNORMAL /LPF (ref 0–2)
IMM GRANULOCYTES # BLD AUTO: 0.01 K/UL (ref 0–0.04)
IMM GRANULOCYTES NFR BLD AUTO: 0.2 % (ref 0–0.5)
KETONES UR QL STRIP.AUTO: ABNORMAL MG/DL
LEUKOCYTE ESTERASE UR QL STRIP.AUTO: NEGATIVE
LIPASE SERPL-CCNC: 27 U/L (ref 13–75)
LYMPHOCYTES # BLD: 1.73 K/UL (ref 0.8–3.5)
LYMPHOCYTES NFR BLD: 37 % (ref 12–49)
MCH RBC QN AUTO: 29.9 PG (ref 26–34)
MCHC RBC AUTO-ENTMCNC: 32.8 G/DL (ref 30–36.5)
MCV RBC AUTO: 91.2 FL (ref 80–99)
MONOCYTES # BLD: 0.38 K/UL (ref 0–1)
MONOCYTES NFR BLD: 8.1 % (ref 5–13)
NEUTS SEG # BLD: 2.42 K/UL (ref 1.8–8)
NEUTS SEG NFR BLD: 51.7 % (ref 32–75)
NITRITE UR QL STRIP.AUTO: NEGATIVE
NRBC # BLD: 0 K/UL (ref 0–0.01)
NRBC BLD-RTO: 0 PER 100 WBC
PH UR STRIP: 6 (ref 5–8)
PLATELET # BLD AUTO: 180 K/UL (ref 150–400)
PMV BLD AUTO: 11.6 FL (ref 8.9–12.9)
POTASSIUM SERPL-SCNC: 5.2 MMOL/L (ref 3.5–5.1)
PROT SERPL-MCNC: 8.2 G/DL (ref 6.4–8.2)
PROT UR STRIP-MCNC: NEGATIVE MG/DL
RBC # BLD AUTO: 4.88 M/UL (ref 3.8–5.2)
RBC #/AREA URNS HPF: ABNORMAL /HPF (ref 0–5)
SODIUM SERPL-SCNC: 138 MMOL/L (ref 136–145)
SP GR UR REFRACTOMETRY: 1.02
URINE CULTURE IF INDICATED: ABNORMAL
UROBILINOGEN UR QL STRIP.AUTO: 0.2 EU/DL (ref 0.2–1)
WBC # BLD AUTO: 4.7 K/UL (ref 3.6–11)
WBC URNS QL MICRO: ABNORMAL /HPF (ref 0–4)

## 2025-03-26 PROCEDURE — 85025 COMPLETE CBC W/AUTO DIFF WBC: CPT

## 2025-03-26 PROCEDURE — 74177 CT ABD & PELVIS W/CONTRAST: CPT

## 2025-03-26 PROCEDURE — 96374 THER/PROPH/DIAG INJ IV PUSH: CPT

## 2025-03-26 PROCEDURE — 6360000004 HC RX CONTRAST MEDICATION: Performed by: EMERGENCY MEDICINE

## 2025-03-26 PROCEDURE — 80053 COMPREHEN METABOLIC PANEL: CPT

## 2025-03-26 PROCEDURE — 36415 COLL VENOUS BLD VENIPUNCTURE: CPT

## 2025-03-26 PROCEDURE — 83690 ASSAY OF LIPASE: CPT

## 2025-03-26 PROCEDURE — 99285 EMERGENCY DEPT VISIT HI MDM: CPT

## 2025-03-26 PROCEDURE — 6360000002 HC RX W HCPCS: Performed by: EMERGENCY MEDICINE

## 2025-03-26 PROCEDURE — 81001 URINALYSIS AUTO W/SCOPE: CPT

## 2025-03-26 PROCEDURE — 6370000000 HC RX 637 (ALT 250 FOR IP): Performed by: EMERGENCY MEDICINE

## 2025-03-26 RX ORDER — HYOSCYAMINE SULFATE 0.125 MG
0.12 TABLET,DISINTEGRATING ORAL
Status: COMPLETED | OUTPATIENT
Start: 2025-03-26 | End: 2025-03-26

## 2025-03-26 RX ORDER — HYOSCYAMINE SULFATE 0.12 MG/1
0.12 TABLET SUBLINGUAL EVERY 4 HOURS PRN
Qty: 28 TABLET | Refills: 0 | Status: SHIPPED | OUTPATIENT
Start: 2025-03-26

## 2025-03-26 RX ORDER — IOPAMIDOL 755 MG/ML
100 INJECTION, SOLUTION INTRAVASCULAR
Status: COMPLETED | OUTPATIENT
Start: 2025-03-26 | End: 2025-03-26

## 2025-03-26 RX ORDER — KETOROLAC TROMETHAMINE 30 MG/ML
15 INJECTION, SOLUTION INTRAMUSCULAR; INTRAVENOUS
Status: COMPLETED | OUTPATIENT
Start: 2025-03-26 | End: 2025-03-26

## 2025-03-26 RX ORDER — PHENAZOPYRIDINE HYDROCHLORIDE 100 MG/1
100 TABLET, FILM COATED ORAL
Status: COMPLETED | OUTPATIENT
Start: 2025-03-26 | End: 2025-03-26

## 2025-03-26 RX ADMIN — HYOSCYAMINE SULFATE 0.12 MG: 0.12 TABLET, ORALLY DISINTEGRATING ORAL at 11:07

## 2025-03-26 RX ADMIN — IOPAMIDOL 100 ML: 755 INJECTION, SOLUTION INTRAVENOUS at 09:29

## 2025-03-26 RX ADMIN — PHENAZOPYRIDINE 100 MG: 100 TABLET ORAL at 09:03

## 2025-03-26 RX ADMIN — KETOROLAC TROMETHAMINE 15 MG: 30 INJECTION, SOLUTION INTRAMUSCULAR at 09:03

## 2025-03-26 ASSESSMENT — PAIN DESCRIPTION - PAIN TYPE: TYPE: ACUTE PAIN

## 2025-03-26 ASSESSMENT — PAIN SCALES - GENERAL
PAINLEVEL_OUTOF10: 8
PAINLEVEL_OUTOF10: 8

## 2025-03-26 ASSESSMENT — PAIN DESCRIPTION - ORIENTATION: ORIENTATION: MID;LOWER

## 2025-03-26 ASSESSMENT — PAIN - FUNCTIONAL ASSESSMENT: PAIN_FUNCTIONAL_ASSESSMENT: PREVENTS OR INTERFERES SOME ACTIVE ACTIVITIES AND ADLS

## 2025-03-26 ASSESSMENT — PAIN DESCRIPTION - LOCATION: LOCATION: ABDOMEN;PELVIS

## 2025-03-26 ASSESSMENT — PAIN DESCRIPTION - DESCRIPTORS: DESCRIPTORS: BURNING

## 2025-03-26 NOTE — ED PROVIDER NOTES
[facility] EMERGENCY DEPARTMENT  EMERGENCY DEPARTMENT ENCOUNTER       Pt Name: Fatoumata Lopez  MRN: [id number]  Birthdate 1965  Date of evaluation: 3/26/2025  Provider: Pavan Messer MD   PCP: Jennifer Dueñas MD  Note Started: 8:43 AM EDT 3/26/25     CHIEF COMPLAINT       Chief Complaint   Patient presents with    Dysuria     Patient ambulatory into triage c/o dysuria, lower abdominal burning and nausea x1 week.         HISTORY OF PRESENT ILLNESS: 1 or more elements      History From: patient, History limited by: none     Fatoumata Lopez is a 60 y.o. female with a history of peptic ulcer disease, anxiety, GERD and previous hysterectomy/cholecystectomy presents to the Emergency Department with a chief complaint of dysuria and suprapubic pain.    Reports symptoms began 2 weeks ago, was treated with Macrobid by OB/GYN.  Reports ongoing suprapubic pain and feeling as though it is \"overall.\"  Denies chest pain or shortness of breath.  Reports nausea.  No vomiting.  Does report constipation.       Please See MDM for Additional Details of the HPI/PMH  Nursing Notes were all reviewed and agreed with or any disagreements were addressed in the HPI.     REVIEW OF SYSTEMS        Positives and Pertinent negatives as per HPI.    PAST HISTORY     Past Medical History:  Past Medical History:   Diagnosis Date    Acid reflux     Adverse effect of anesthesia     woke up coughing    Allergic rhinitis     Anxiety     Arthritis     DDD (degenerative disc disease), lumbar     DJD (degenerative joint disease), lumbosacral     Gallstone 02/07/2025    Gallstones 08/01/2016    GERD (gastroesophageal reflux disease)     History of nonchemical tubal occlusion     Esure devices    Hyperlipidemia 02/20/2014    Hypothyroidism     HYPO    IGT (impaired glucose tolerance)     Ill-defined condition     prolapse uterus/states thus her intestines has collpased a little bit    Psychiatric disorder     anxiety and depression    PUD (peptic

## 2025-03-26 NOTE — DISCHARGE INSTRUCTIONS
Drink plenty of fluids, take Azo, follow-up with primary care doctor. Hyoscyamine for cramping. Return for new or worsening symptoms.

## 2025-04-10 ENCOUNTER — HOSPITAL ENCOUNTER (EMERGENCY)
Facility: HOSPITAL | Age: 60
Discharge: HOME | End: 2025-04-10
Attending: STUDENT IN AN ORGANIZED HEALTH CARE EDUCATION/TRAINING PROGRAM
Payer: MEDICAID

## 2025-04-10 ENCOUNTER — APPOINTMENT (OUTPATIENT)
Facility: HOSPITAL | Age: 60
End: 2025-04-10
Payer: MEDICAID

## 2025-04-10 VITALS
RESPIRATION RATE: 23 BRPM | HEART RATE: 86 BPM | SYSTOLIC BLOOD PRESSURE: 121 MMHG | TEMPERATURE: 98.8 F | OXYGEN SATURATION: 94 % | DIASTOLIC BLOOD PRESSURE: 68 MMHG

## 2025-04-10 DIAGNOSIS — F12.90 USE OF CANNABINOID EDIBLES: Primary | ICD-10-CM

## 2025-04-10 DIAGNOSIS — R55 NEAR SYNCOPE: ICD-10-CM

## 2025-04-10 LAB
ALBUMIN SERPL-MCNC: 3.8 G/DL (ref 3.5–5)
ALBUMIN/GLOB SERPL: 1 (ref 1.1–2.2)
ALP SERPL-CCNC: 92 U/L (ref 45–117)
ALT SERPL-CCNC: 21 U/L (ref 12–78)
ANION GAP SERPL CALC-SCNC: 5 MMOL/L (ref 2–12)
APPEARANCE UR: CLEAR
AST SERPL-CCNC: 14 U/L (ref 15–37)
BACTERIA URNS QL MICRO: NEGATIVE /HPF
BASOPHILS # BLD: 0.04 K/UL (ref 0–0.1)
BASOPHILS NFR BLD: 0.7 % (ref 0–1)
BILIRUB SERPL-MCNC: 0.5 MG/DL (ref 0.2–1)
BILIRUB UR QL: NEGATIVE
BUN SERPL-MCNC: 7 MG/DL (ref 6–20)
BUN/CREAT SERPL: 7 (ref 12–20)
CALCIUM SERPL-MCNC: 9.6 MG/DL (ref 8.5–10.1)
CHLORIDE SERPL-SCNC: 107 MMOL/L (ref 97–108)
CO2 SERPL-SCNC: 28 MMOL/L (ref 21–32)
COLOR UR: NORMAL
CREAT SERPL-MCNC: 1 MG/DL (ref 0.55–1.02)
DIFFERENTIAL METHOD BLD: NORMAL
EKG ATRIAL RATE: 106 BPM
EKG DIAGNOSIS: NORMAL
EKG P AXIS: 60 DEGREES
EKG P-R INTERVAL: 192 MS
EKG Q-T INTERVAL: 336 MS
EKG QRS DURATION: 92 MS
EKG QTC CALCULATION (BAZETT): 446 MS
EKG R AXIS: 0 DEGREES
EKG T AXIS: 129 DEGREES
EKG VENTRICULAR RATE: 106 BPM
EOSINOPHIL # BLD: 0.11 K/UL (ref 0–0.4)
EOSINOPHIL NFR BLD: 1.9 % (ref 0–7)
EPITH CASTS URNS QL MICRO: NORMAL /LPF
ERYTHROCYTE [DISTWIDTH] IN BLOOD BY AUTOMATED COUNT: 14.3 % (ref 11.5–14.5)
GLOBULIN SER CALC-MCNC: 3.7 G/DL (ref 2–4)
GLUCOSE SERPL-MCNC: 134 MG/DL (ref 65–100)
GLUCOSE UR STRIP.AUTO-MCNC: NEGATIVE MG/DL
HCT VFR BLD AUTO: 42.2 % (ref 35–47)
HGB BLD-MCNC: 13.6 G/DL (ref 11.5–16)
HGB UR QL STRIP: NEGATIVE
HYALINE CASTS URNS QL MICRO: NORMAL /LPF (ref 0–2)
IMM GRANULOCYTES # BLD AUTO: 0.01 K/UL (ref 0–0.04)
IMM GRANULOCYTES NFR BLD AUTO: 0.2 % (ref 0–0.5)
KETONES UR QL STRIP.AUTO: NEGATIVE MG/DL
LEUKOCYTE ESTERASE UR QL STRIP.AUTO: NEGATIVE
LYMPHOCYTES # BLD: 2.03 K/UL (ref 0.8–3.5)
LYMPHOCYTES NFR BLD: 35.2 % (ref 12–49)
MCH RBC QN AUTO: 29.8 PG (ref 26–34)
MCHC RBC AUTO-ENTMCNC: 32.2 G/DL (ref 30–36.5)
MCV RBC AUTO: 92.3 FL (ref 80–99)
MONOCYTES # BLD: 0.47 K/UL (ref 0–1)
MONOCYTES NFR BLD: 8.1 % (ref 5–13)
NEUTS SEG # BLD: 3.11 K/UL (ref 1.8–8)
NEUTS SEG NFR BLD: 53.9 % (ref 32–75)
NITRITE UR QL STRIP.AUTO: NEGATIVE
NRBC # BLD: 0 K/UL (ref 0–0.01)
NRBC BLD-RTO: 0 PER 100 WBC
PH UR STRIP: 6.5 (ref 5–8)
PLATELET # BLD AUTO: 195 K/UL (ref 150–400)
PMV BLD AUTO: 11.1 FL (ref 8.9–12.9)
POTASSIUM SERPL-SCNC: 3.3 MMOL/L (ref 3.5–5.1)
PROT SERPL-MCNC: 7.5 G/DL (ref 6.4–8.2)
PROT UR STRIP-MCNC: NEGATIVE MG/DL
RBC # BLD AUTO: 4.57 M/UL (ref 3.8–5.2)
RBC #/AREA URNS HPF: NORMAL /HPF (ref 0–5)
SODIUM SERPL-SCNC: 140 MMOL/L (ref 136–145)
SP GR UR REFRACTOMETRY: <1.005
TROPONIN I SERPL HS-MCNC: 50 NG/L (ref 0–51)
TSH SERPL DL<=0.05 MIU/L-ACNC: 1.21 UIU/ML (ref 0.36–3.74)
URINE CULTURE IF INDICATED: NORMAL
UROBILINOGEN UR QL STRIP.AUTO: 0.2 EU/DL (ref 0.2–1)
WBC # BLD AUTO: 5.8 K/UL (ref 3.6–11)
WBC URNS QL MICRO: NORMAL /HPF (ref 0–4)

## 2025-04-10 PROCEDURE — 81001 URINALYSIS AUTO W/SCOPE: CPT

## 2025-04-10 PROCEDURE — 99284 EMERGENCY DEPT VISIT MOD MDM: CPT

## 2025-04-10 PROCEDURE — 2580000003 HC RX 258: Performed by: STUDENT IN AN ORGANIZED HEALTH CARE EDUCATION/TRAINING PROGRAM

## 2025-04-10 PROCEDURE — 93005 ELECTROCARDIOGRAM TRACING: CPT

## 2025-04-10 PROCEDURE — 80053 COMPREHEN METABOLIC PANEL: CPT

## 2025-04-10 PROCEDURE — 70450 CT HEAD/BRAIN W/O DYE: CPT

## 2025-04-10 PROCEDURE — 36415 COLL VENOUS BLD VENIPUNCTURE: CPT

## 2025-04-10 PROCEDURE — 84484 ASSAY OF TROPONIN QUANT: CPT

## 2025-04-10 PROCEDURE — 84443 ASSAY THYROID STIM HORMONE: CPT

## 2025-04-10 PROCEDURE — 85025 COMPLETE CBC W/AUTO DIFF WBC: CPT

## 2025-04-10 RX ORDER — 0.9 % SODIUM CHLORIDE 0.9 %
500 INTRAVENOUS SOLUTION INTRAVENOUS ONCE
Status: COMPLETED | OUTPATIENT
Start: 2025-04-10 | End: 2025-04-10

## 2025-04-10 RX ADMIN — SODIUM CHLORIDE 500 ML: 0.9 INJECTION, SOLUTION INTRAVENOUS at 16:39

## 2025-04-10 ASSESSMENT — PAIN SCALES - GENERAL: PAINLEVEL_OUTOF10: 0

## 2025-04-10 NOTE — ED PROVIDER NOTES
[facility] EMERGENCY DEPARTMENT  EMERGENCY DEPARTMENT ENCOUNTER       Pt Name: Fatoumata Lopez  MRN: [id number]  Birthdate 1965  Date of evaluation: 4/10/2025  Provider: Milton Brower MD   PCP: Jennifer Dueñas MD  Note Started: 12:13 PM 4/10/25     CHIEF COMPLAINT       Chief Complaint   Patient presents with    Loss of Consciousness        HISTORY OF PRESENT ILLNESS: 1 or more elements      History From: Patient and patient's family  None     Fatoumata Lopez is a 60 y.o. female who presents to the ED with near syncope, dizziness, confusion.  Symptoms began about 1 hours prior to arrival. Patient denies complete loss of consciousness, chest pain, palpitations, SOB.      Nursing Notes were all reviewed and agreed with or any disagreements were addressed in the HPI.     REVIEW OF SYSTEMS      Review of Systems     Positives and Pertinent negatives as per HPI.    PAST HISTORY     Past Medical History:  Past Medical History:   Diagnosis Date    Acid reflux     Adverse effect of anesthesia     woke up coughing    Allergic rhinitis     Anxiety     Arthritis     DDD (degenerative disc disease), lumbar     DJD (degenerative joint disease), lumbosacral     Gallstone 02/07/2025    Gallstones 08/01/2016    GERD (gastroesophageal reflux disease)     History of nonchemical tubal occlusion     Esure devices    Hyperlipidemia 02/20/2014    Hypothyroidism     HYPO    IGT (impaired glucose tolerance)     Ill-defined condition     prolapse uterus/states thus her intestines has collpased a little bit    Psychiatric disorder     anxiety and depression    PUD (peptic ulcer disease)     no EGD    Routine gynecological examination     [facility]    Sinus disorder     Tinea pedis     Ureterocele     small, right    Vitamin D deficiency          Past Surgical History:  Past Surgical History:   Procedure Laterality Date    CHOLECYSTECTOMY, LAPAROSCOPIC  10/06/2016    COLONOSCOPY N/A 9/13/2016    COLONOSCOPY / EGD   disregards these errors. Please excuse any errors that have escaped final proofreading.)

## 2025-04-10 NOTE — ED NOTES
Discharge medications reviewed with the patient and sister and appropriate educational materials and side effects teaching were provided.

## 2025-04-10 NOTE — DISCHARGE INSTRUCTIONS
Thank You!    It was a pleasure taking care of you in our Emergency Department today. We know that when you come to our Emergency Department, you are entrusting us with your health, comfort, and safety. Our physicians and nurses honor that trust, and truly appreciate the opportunity to care for you and your loved ones.      We also value your feedback. If you receive a survey about your Emergency Department experience today, please fill it out.  We care about our patients' feedback, and we listen to what you have to say.  Thank you.    Milton Brower MD  ________________________________________________________________________  I have included a copy of your lab results and/or radiologic studies from today's visit so you can have them easily available at your follow-up visit. We hope you feel better and please do not hesitate to contact the ED if you have any questions at all!    Recent Results (from the past 12 hours)   EKG 12 Lead    Collection Time: 04/10/25  3:51 PM   Result Value Ref Range    Ventricular Rate 106 BPM    Atrial Rate 106 BPM    P-R Interval 192 ms    QRS Duration 92 ms    Q-T Interval 336 ms    QTc Calculation (Bazett) 446 ms    P Axis 60 degrees    R Axis 0 degrees    T Axis 129 degrees    Diagnosis       Sinus tachycardia  T wave abnormality, consider lateral ischemia  When compared with ECG of 26-JUL-2024 09:40,  Vent. rate has increased BY  38 BPM  Inverted T waves have replaced nonspecific T wave abnormality in Lateral   leads     CBC with Auto Differential    Collection Time: 04/10/25  4:12 PM   Result Value Ref Range    WBC 5.8 3.6 - 11.0 K/uL    RBC 4.57 3.80 - 5.20 M/uL    Hemoglobin 13.6 11.5 - 16.0 g/dL    Hematocrit 42.2 35.0 - 47.0 %    MCV 92.3 80.0 - 99.0 FL    MCH 29.8 26.0 - 34.0 PG    MCHC 32.2 30.0 - 36.5 g/dL    RDW 14.3 11.5 - 14.5 %    Platelets 195 150 - 400 K/uL    MPV 11.1 8.9 - 12.9 FL    Nucleated RBCs 0.0 0  WBC    nRBC 0.00 0.00 - 0.01 K/uL    Neutrophils %

## 2025-05-04 ENCOUNTER — HOSPITAL ENCOUNTER (EMERGENCY)
Facility: HOSPITAL | Age: 60
Discharge: HOME | End: 2025-05-04
Attending: EMERGENCY MEDICINE
Payer: MEDICAID

## 2025-05-04 VITALS
WEIGHT: 195 LBS | RESPIRATION RATE: 16 BRPM | OXYGEN SATURATION: 99 % | HEART RATE: 88 BPM | BODY MASS INDEX: 28.88 KG/M2 | DIASTOLIC BLOOD PRESSURE: 76 MMHG | SYSTOLIC BLOOD PRESSURE: 154 MMHG | HEIGHT: 69 IN | TEMPERATURE: 98.8 F

## 2025-05-04 DIAGNOSIS — K29.00 ACUTE GASTRITIS WITHOUT HEMORRHAGE, UNSPECIFIED GASTRITIS TYPE: Primary | ICD-10-CM

## 2025-05-04 DIAGNOSIS — R13.19 ESOPHAGEAL DYSPHAGIA: ICD-10-CM

## 2025-05-04 DIAGNOSIS — R09.A2 GLOBUS SENSATION: ICD-10-CM

## 2025-05-04 DIAGNOSIS — K21.9 GASTROESOPHAGEAL REFLUX DISEASE WITHOUT ESOPHAGITIS: ICD-10-CM

## 2025-05-04 LAB
APPEARANCE UR: CLEAR
BACTERIA URNS QL MICRO: ABNORMAL /HPF
BILIRUB UR QL: NEGATIVE
COLOR UR: ABNORMAL
EPITH CASTS URNS QL MICRO: ABNORMAL /LPF
GLUCOSE UR STRIP.AUTO-MCNC: NEGATIVE MG/DL
HGB UR QL STRIP: ABNORMAL
KETONES UR QL STRIP.AUTO: NEGATIVE MG/DL
LEUKOCYTE ESTERASE UR QL STRIP.AUTO: ABNORMAL
NITRITE UR QL STRIP.AUTO: NEGATIVE
PH UR STRIP: 6.5 (ref 5–8)
PROT UR STRIP-MCNC: NEGATIVE MG/DL
RBC #/AREA URNS HPF: ABNORMAL /HPF (ref 0–5)
SP GR UR REFRACTOMETRY: <1.005
URINE CULTURE IF INDICATED: ABNORMAL
UROBILINOGEN UR QL STRIP.AUTO: 0.2 EU/DL (ref 0.2–1)
WBC URNS QL MICRO: ABNORMAL /HPF (ref 0–4)

## 2025-05-04 PROCEDURE — 99284 EMERGENCY DEPT VISIT MOD MDM: CPT

## 2025-05-04 PROCEDURE — 93005 ELECTROCARDIOGRAM TRACING: CPT | Performed by: EMERGENCY MEDICINE

## 2025-05-04 PROCEDURE — 6370000000 HC RX 637 (ALT 250 FOR IP): Performed by: EMERGENCY MEDICINE

## 2025-05-04 PROCEDURE — 81001 URINALYSIS AUTO W/SCOPE: CPT

## 2025-05-04 RX ORDER — FAMOTIDINE 20 MG/1
20 TABLET, FILM COATED ORAL ONCE
Status: COMPLETED | OUTPATIENT
Start: 2025-05-04 | End: 2025-05-04

## 2025-05-04 RX ORDER — SUCRALFATE 1 G/1
1 TABLET ORAL ONCE
Status: COMPLETED | OUTPATIENT
Start: 2025-05-04 | End: 2025-05-04

## 2025-05-04 RX ORDER — PANTOPRAZOLE SODIUM 40 MG/1
40 TABLET, DELAYED RELEASE ORAL
Qty: 30 TABLET | Refills: 0 | Status: SHIPPED | OUTPATIENT
Start: 2025-05-04

## 2025-05-04 RX ORDER — DICYCLOMINE HYDROCHLORIDE 10 MG/5ML
20 SOLUTION ORAL
Qty: 200 ML | Refills: 0 | Status: SHIPPED | OUTPATIENT
Start: 2025-05-04

## 2025-05-04 RX ORDER — SUCRALFATE ORAL 1 G/10ML
1 SUSPENSION ORAL EVERY 8 HOURS PRN
Qty: 200 ML | Refills: 0 | Status: SHIPPED | OUTPATIENT
Start: 2025-05-04

## 2025-05-04 RX ADMIN — FAMOTIDINE 20 MG: 20 TABLET, FILM COATED ORAL at 00:47

## 2025-05-04 RX ADMIN — ANTACID/ANTIFLATULENT 1 EACH: 380; 550; 10; 10 GRANULE, EFFERVESCENT ORAL at 01:34

## 2025-05-04 RX ADMIN — LIDOCAINE HYDROCHLORIDE 40 ML: 20 SOLUTION ORAL at 00:46

## 2025-05-04 RX ADMIN — SUCRALFATE 1 G: 1 TABLET ORAL at 00:47

## 2025-05-04 ASSESSMENT — PAIN SCALES - GENERAL: PAINLEVEL_OUTOF10: 9

## 2025-05-04 ASSESSMENT — PAIN DESCRIPTION - LOCATION: LOCATION: ABDOMEN

## 2025-05-04 ASSESSMENT — PAIN - FUNCTIONAL ASSESSMENT: PAIN_FUNCTIONAL_ASSESSMENT: 0-10

## 2025-05-04 ASSESSMENT — PAIN DESCRIPTION - ORIENTATION: ORIENTATION: LOWER

## 2025-05-04 ASSESSMENT — PAIN DESCRIPTION - DESCRIPTORS: DESCRIPTORS: BURNING

## 2025-05-04 NOTE — ED TRIAGE NOTES
Pt presents to ED with c/o lower abdominal \"burning\" that started around 30 minutes PTA. Pt reports taking Mylanta with no relief. Pt denies N/V or epigastric pain.

## 2025-05-04 NOTE — ED PROVIDER NOTES
patient's ED visit today.    I reviewed our EMR for any past records that may contribute to the patient's current condition, including their past medical, surgical, social and family history. This also includes their most recent ED visits, previous hospitalizations and prior diagnostic data. I have reviewed and summarized the most pertinent findings in my HPI and MDM.    Vital Signs Reviewed:  Patient Vitals for the past 24 hrs:   Temp Pulse Resp BP SpO2   05/04/25 0050 -- 84 -- -- --   05/04/25 0033 98.8 °F (37.1 °C) 84 18 (!) 166/89 99 %     Pulse Oximetry Analysis: 99% on RA with good pleth    Cardiac Monitor:   Rate: 84 bpm  The cardiac monitor revealed the following rhythm as interpreted by me: Normal Sinus Rhythm  Cardiac and pulse ox monitoring were ordered to monitor patient for signs of cardiac dysrhythmia, which they are at risk for based on their history and/or risk for cardiovascular disease and/or metabolic abnormalities.     Records Reviewed: Nursing Notes, Old Medical Records, Previous electrocardiograms, Previous Radiology Studies and Previous Laboratory Studies, EMS reports    DIFFERENTIAL DIAGNOSIS AND PLAN:  60 y.o. female with past medical history as documented below presents with signs and symptoms consistent with dyspepsia versus GERD versus gastritis.  No concern for food impaction.  Patient tolerating secretions, no red flags such as weight loss, hematemesis, etc. vital signs stable with currently a non-peritoneal exam.  EKG reassuring.    DDx includes: likely viral URI vs gastritis vs food-borne illness, UTI     I considered bloodwork (including CBC, CMP, Mg, etc), but no bloodwork indicated as I considered but do not suspect severe anemia, electrolyte abnormalities (including hypokalemia, hyperkalemia, hyponatremia, hypernatremia, hypoglycemia, hyperglycemia, hypomagnesemia, etc), uremia/renal dysfunction     I considered U/S RUQ, but no U/S RUQ indicated as I considered but do not suspect

## 2025-05-04 NOTE — ED NOTES
Pt presents ambulatory to ED complaining of burning sensation that started suddenly tonight. Pt is alert and oriented x 4, RR even and unlabored, skin is warm and dry. Assesment completed and pt updated on plan of care.       Emergency Department Nursing Plan of Care       The Nursing Plan of Care is developed from the Nursing assessment and Emergency Department Attending provider initial evaluation.  The plan of care may be reviewed in the “ED Provider note”.    The Plan of Care was developed with the following considerations:   Patient / Family readiness to learn indicated by:verbalized understanding  Persons(s) to be included in education: patient  Barriers to Learning/Limitations:None    Signed     DILAN JEFFREY RN    5/4/2025   12:51 AM

## 2025-05-06 LAB
EKG ATRIAL RATE: 77 BPM
EKG DIAGNOSIS: NORMAL
EKG P AXIS: 29 DEGREES
EKG P-R INTERVAL: 178 MS
EKG Q-T INTERVAL: 360 MS
EKG QRS DURATION: 104 MS
EKG QTC CALCULATION (BAZETT): 407 MS
EKG R AXIS: -14 DEGREES
EKG T AXIS: 58 DEGREES
EKG VENTRICULAR RATE: 77 BPM

## 2025-05-06 PROCEDURE — 93010 ELECTROCARDIOGRAM REPORT: CPT | Performed by: SPECIALIST

## 2025-05-07 ENCOUNTER — OFFICE VISIT (OUTPATIENT)
Age: 60
End: 2025-05-07
Payer: MEDICAID

## 2025-05-07 VITALS
SYSTOLIC BLOOD PRESSURE: 116 MMHG | TEMPERATURE: 97.4 F | HEART RATE: 55 BPM | DIASTOLIC BLOOD PRESSURE: 74 MMHG | WEIGHT: 196 LBS | OXYGEN SATURATION: 97 % | BODY MASS INDEX: 28.94 KG/M2

## 2025-05-07 DIAGNOSIS — R10.30 LOWER ABDOMINAL PAIN: ICD-10-CM

## 2025-05-07 DIAGNOSIS — E03.9 ACQUIRED HYPOTHYROIDISM: ICD-10-CM

## 2025-05-07 DIAGNOSIS — E78.2 MIXED HYPERLIPIDEMIA: ICD-10-CM

## 2025-05-07 DIAGNOSIS — R73.03 PREDIABETES: ICD-10-CM

## 2025-05-07 DIAGNOSIS — E66.3 OVERWEIGHT (BMI 25.0-29.9): ICD-10-CM

## 2025-05-07 DIAGNOSIS — F41.1 GENERALIZED ANXIETY DISORDER: ICD-10-CM

## 2025-05-07 DIAGNOSIS — K59.01 SLOW TRANSIT CONSTIPATION: ICD-10-CM

## 2025-05-07 DIAGNOSIS — N95.1 VASOMOTOR SYMPTOMS DUE TO MENOPAUSE: ICD-10-CM

## 2025-05-07 DIAGNOSIS — E55.9 VITAMIN D DEFICIENCY: ICD-10-CM

## 2025-05-07 DIAGNOSIS — R30.0 DYSURIA: Primary | ICD-10-CM

## 2025-05-07 DIAGNOSIS — J30.89 CHRONIC NON-SEASONAL ALLERGIC RHINITIS: ICD-10-CM

## 2025-05-07 DIAGNOSIS — K21.9 GASTROESOPHAGEAL REFLUX DISEASE WITHOUT ESOPHAGITIS: ICD-10-CM

## 2025-05-07 PROBLEM — N95.2 ATROPHIC VAGINITIS: Status: ACTIVE | Noted: 2021-07-23

## 2025-05-07 PROBLEM — N81.10 FEMALE CYSTOCELE: Status: ACTIVE | Noted: 2017-09-27

## 2025-05-07 PROBLEM — R31.29 MICROSCOPIC HEMATURIA: Status: RESOLVED | Noted: 2017-09-27 | Resolved: 2025-05-07

## 2025-05-07 LAB
BILIRUBIN, URINE, POC: NEGATIVE
BLOOD URINE, POC: ABNORMAL
GLUCOSE URINE, POC: NEGATIVE
KETONES, URINE, POC: NEGATIVE
LEUKOCYTE ESTERASE, URINE, POC: NEGATIVE
NITRITE, URINE, POC: NEGATIVE
PH, URINE, POC: 6.1 (ref 4.6–8)
PROTEIN,URINE, POC: NEGATIVE
SPECIFIC GRAVITY, URINE, POC: 1.01 (ref 1–1.03)
URINALYSIS CLARITY, POC: CLEAR
URINALYSIS COLOR, POC: ABNORMAL
UROBILINOGEN, POC: NORMAL MG/DL

## 2025-05-07 PROCEDURE — 99214 OFFICE O/P EST MOD 30 MIN: CPT | Performed by: STUDENT IN AN ORGANIZED HEALTH CARE EDUCATION/TRAINING PROGRAM

## 2025-05-07 PROCEDURE — G2211 COMPLEX E/M VISIT ADD ON: HCPCS | Performed by: STUDENT IN AN ORGANIZED HEALTH CARE EDUCATION/TRAINING PROGRAM

## 2025-05-07 PROCEDURE — PBSHW AMB POC URINALYSIS DIP STICK AUTO W/ MICRO: Performed by: STUDENT IN AN ORGANIZED HEALTH CARE EDUCATION/TRAINING PROGRAM

## 2025-05-07 PROCEDURE — 81001 URINALYSIS AUTO W/SCOPE: CPT | Performed by: STUDENT IN AN ORGANIZED HEALTH CARE EDUCATION/TRAINING PROGRAM

## 2025-05-07 RX ORDER — ALBUTEROL SULFATE 90 UG/1
2 INHALANT RESPIRATORY (INHALATION) PRN
COMMUNITY

## 2025-05-07 RX ORDER — ASPIRIN 81 MG
100 TABLET, DELAYED RELEASE (ENTERIC COATED) ORAL 2 TIMES DAILY
Qty: 60 TABLET | Refills: 5 | Status: SHIPPED | OUTPATIENT
Start: 2025-05-07

## 2025-05-07 RX ORDER — ROSUVASTATIN CALCIUM 20 MG/1
20 TABLET, COATED ORAL DAILY
Qty: 90 TABLET | Refills: 1 | Status: SHIPPED | OUTPATIENT
Start: 2025-05-07

## 2025-05-07 SDOH — ECONOMIC STABILITY: FOOD INSECURITY: WITHIN THE PAST 12 MONTHS, THE FOOD YOU BOUGHT JUST DIDN'T LAST AND YOU DIDN'T HAVE MONEY TO GET MORE.: NEVER TRUE

## 2025-05-07 SDOH — ECONOMIC STABILITY: FOOD INSECURITY: WITHIN THE PAST 12 MONTHS, YOU WORRIED THAT YOUR FOOD WOULD RUN OUT BEFORE YOU GOT MONEY TO BUY MORE.: NEVER TRUE

## 2025-05-07 ASSESSMENT — PATIENT HEALTH QUESTIONNAIRE - PHQ9
SUM OF ALL RESPONSES TO PHQ QUESTIONS 1-9: 0
SUM OF ALL RESPONSES TO PHQ QUESTIONS 1-9: 0
1. LITTLE INTEREST OR PLEASURE IN DOING THINGS: NOT AT ALL
SUM OF ALL RESPONSES TO PHQ QUESTIONS 1-9: 0
SUM OF ALL RESPONSES TO PHQ QUESTIONS 1-9: 0
2. FEELING DOWN, DEPRESSED OR HOPELESS: NOT AT ALL

## 2025-05-07 ASSESSMENT — ENCOUNTER SYMPTOMS
NAUSEA: 0
ABDOMINAL PAIN: 1
VOMITING: 0
CONSTIPATION: 1
SHORTNESS OF BREATH: 0
COUGH: 0
DIARRHEA: 0
BACK PAIN: 1
BLOOD IN STOOL: 0

## 2025-05-07 NOTE — PROGRESS NOTES
RM 14    Chief Complaint   Patient presents with    Follow-up     She stated she has a side effect from a chlosterol medicine as make her drawsyness.she was in ER for acid Reflux 1 week ago.       There were no vitals filed for this visit.       \"Have you been to the ER, urgent care clinic since your last visit?  Hospitalized since your last visit?\"    NO    “Have you seen or consulted any other health care providers outside of [facility] since your last visit?”    NO            Click Here for Release of Records Request   AVS  education, follow up, and recommendations provided and addressed with patient.  services used to advise patient No  
lifestyle changes reviewed. Follow-up at next visit.  Constipation: Chronic and not optimally controlled despite being on miralax. Will increase fiber intake and recommend use of docusate 100 mg BID. Follow-up at next visit.  Lower abdominal pain: Chronic. This may be related to problems above, in particular constipation. Treat constipation. Referral placed to GI as she is uncertain who her GI doctor was and even if she has one. Follow-up at next visit.   Hypothyroidism: Chronic and controlled. Continue thyroid supplementation. Changes: no. Last TSH and FT4 were normal, and patient is not due at this time. Follow-up 3-6 months.  Prediabetes: Chronic and stable. Diet and lifestyle changes reviewed. A1c test not due at this time.   Hyperlipidemia: Chronic and stable. Lifestyle/dietary changes reviewed. Switch to Crestor due to perceived side effect of Pravastatin. Last lipid panel reviewed. Patient is not due at this time.  Allergies: Chronic and not optimally controlled. Continue nasal sprays. Follow-up in 3 months  Vitamin D deficiency: Chronic and stable.  Continue current supplemntation.  Generalized anxiety disorder: Chronic and controlled. Continue Effexor 37.5 mg daily as this can also help with hot flashes and history of neuropathy. Follow-up in 3 months.  Vasomotor symptoms due to menopause: Chronic and stable. Continue Effexor per above. Follow-up in 3 months  Overweight: Chronic and stable. Reviewed over BMI and weight with the patient today. Reviewed over diet and lifestyle changes including decreasing caloric intake and increasing physical activity. Follow-up at next visit.      Return in about 3 months (around 8/7/2025) for Constipation, cholesterol.         An electronic signature was used to authenticate this note.    --Jennifer Dueñas MD

## 2025-05-15 ENCOUNTER — HOSPITAL ENCOUNTER (OUTPATIENT)
Facility: HOSPITAL | Age: 60
Discharge: HOME | End: 2025-05-18
Attending: ORTHOPAEDIC SURGERY

## 2025-05-15 DIAGNOSIS — M54.50 CHRONIC BILATERAL LOW BACK PAIN WITHOUT SCIATICA: ICD-10-CM

## 2025-05-15 DIAGNOSIS — Z98.1 S/P SPINAL FUSION: ICD-10-CM

## 2025-05-15 DIAGNOSIS — G89.29 CHRONIC BILATERAL LOW BACK PAIN WITHOUT SCIATICA: ICD-10-CM

## 2025-06-06 ENCOUNTER — TELEPHONE (OUTPATIENT)
Age: 60
End: 2025-06-06

## 2025-06-09 ENCOUNTER — TELEPHONE (OUTPATIENT)
Age: 60
End: 2025-06-09

## 2025-06-10 NOTE — TELEPHONE ENCOUNTER
Future Appointments:  Future Appointments   Date Time Provider Department Center   8/7/2025  8:30 AM Jennifer Dueñas MD [facility] BS ECC DEP   8/25/2025 10:00 AM [facility] 3 [id number]        Last Appointment With Me:  5/7/2025     Pt request a Refill for rosuvastatin 20 mg .

## 2025-06-13 ENCOUNTER — TELEPHONE (OUTPATIENT)
Age: 60
End: 2025-06-13

## 2025-08-07 ENCOUNTER — OFFICE VISIT (OUTPATIENT)
Age: 60
End: 2025-08-07
Payer: MEDICAID

## 2025-08-07 VITALS
BODY MASS INDEX: 27.7 KG/M2 | SYSTOLIC BLOOD PRESSURE: 124 MMHG | TEMPERATURE: 98.9 F | WEIGHT: 187.6 LBS | DIASTOLIC BLOOD PRESSURE: 80 MMHG | HEART RATE: 54 BPM | OXYGEN SATURATION: 98 %

## 2025-08-07 DIAGNOSIS — E03.9 ACQUIRED HYPOTHYROIDISM: ICD-10-CM

## 2025-08-07 DIAGNOSIS — R73.03 PREDIABETES: ICD-10-CM

## 2025-08-07 DIAGNOSIS — E55.9 VITAMIN D DEFICIENCY: ICD-10-CM

## 2025-08-07 DIAGNOSIS — J30.89 CHRONIC NON-SEASONAL ALLERGIC RHINITIS: ICD-10-CM

## 2025-08-07 DIAGNOSIS — K21.9 GASTROESOPHAGEAL REFLUX DISEASE WITHOUT ESOPHAGITIS: ICD-10-CM

## 2025-08-07 DIAGNOSIS — N95.1 VASOMOTOR SYMPTOMS DUE TO MENOPAUSE: ICD-10-CM

## 2025-08-07 DIAGNOSIS — F41.1 GENERALIZED ANXIETY DISORDER: ICD-10-CM

## 2025-08-07 DIAGNOSIS — E78.2 MIXED HYPERLIPIDEMIA: ICD-10-CM

## 2025-08-07 DIAGNOSIS — K58.1 IRRITABLE BOWEL SYNDROME WITH PREDOMINANT CONSTIPATION: ICD-10-CM

## 2025-08-07 DIAGNOSIS — R30.0 DYSURIA: Primary | ICD-10-CM

## 2025-08-07 DIAGNOSIS — E66.3 OVERWEIGHT (BMI 25.0-29.9): ICD-10-CM

## 2025-08-07 DIAGNOSIS — H93.13 TINNITUS OF BOTH EARS: ICD-10-CM

## 2025-08-07 PROCEDURE — 99214 OFFICE O/P EST MOD 30 MIN: CPT | Performed by: STUDENT IN AN ORGANIZED HEALTH CARE EDUCATION/TRAINING PROGRAM

## 2025-08-07 RX ORDER — LEVOTHYROXINE SODIUM 75 UG/1
TABLET ORAL
Qty: 90 TABLET | Refills: 1 | Status: SHIPPED | OUTPATIENT
Start: 2025-08-07

## 2025-08-07 RX ORDER — PAROXETINE 7.5 MG/1
7.5 CAPSULE ORAL NIGHTLY
Qty: 30 CAPSULE | Refills: 5 | Status: SHIPPED | OUTPATIENT
Start: 2025-08-07

## 2025-08-07 RX ORDER — ASPIRIN 81 MG
100 TABLET, DELAYED RELEASE (ENTERIC COATED) ORAL 2 TIMES DAILY
Qty: 60 TABLET | Refills: 5 | Status: SHIPPED | OUTPATIENT
Start: 2025-08-07

## 2025-08-07 ASSESSMENT — PATIENT HEALTH QUESTIONNAIRE - PHQ9
1. LITTLE INTEREST OR PLEASURE IN DOING THINGS: NOT AT ALL
SUM OF ALL RESPONSES TO PHQ QUESTIONS 1-9: 0
SUM OF ALL RESPONSES TO PHQ QUESTIONS 1-9: 0
2. FEELING DOWN, DEPRESSED OR HOPELESS: NOT AT ALL
SUM OF ALL RESPONSES TO PHQ QUESTIONS 1-9: 0
SUM OF ALL RESPONSES TO PHQ QUESTIONS 1-9: 0

## 2025-08-07 ASSESSMENT — ENCOUNTER SYMPTOMS
DIARRHEA: 0
BLOOD IN STOOL: 0
SHORTNESS OF BREATH: 0
VOMITING: 0
CONSTIPATION: 1
BACK PAIN: 1
ABDOMINAL PAIN: 0
NAUSEA: 0
COUGH: 0

## 2025-08-08 DIAGNOSIS — E55.9 VITAMIN D DEFICIENCY: ICD-10-CM

## 2025-08-08 LAB
25(OH)D3 SERPL-MCNC: 43.5 NG/ML (ref 30–100)
ALBUMIN SERPL-MCNC: 4.6 G/DL (ref 3.5–5.2)
ALBUMIN/GLOB SERPL: 1.6 (ref 1.1–2.2)
ALP SERPL-CCNC: 89 U/L (ref 35–104)
ALT SERPL-CCNC: 22 U/L (ref 10–35)
ANION GAP SERPL CALC-SCNC: 13 MMOL/L (ref 2–14)
AST SERPL-CCNC: 21 U/L (ref 10–35)
BILIRUB SERPL-MCNC: 0.6 MG/DL (ref 0–1.2)
BUN SERPL-MCNC: 5 MG/DL (ref 8–23)
CALCIUM SERPL-MCNC: 10.4 MG/DL (ref 8.8–10.2)
CHLORIDE SERPL-SCNC: 105 MMOL/L (ref 98–107)
CHOLEST SERPL-MCNC: 182 MG/DL (ref 0–200)
CO2 SERPL-SCNC: 23 MMOL/L (ref 20–29)
CREAT SERPL-MCNC: 0.73 MG/DL (ref 0.6–1)
EST. AVERAGE GLUCOSE BLD GHB EST-MCNC: 114 MG/DL
GLOBULIN SER CALC-MCNC: 2.8 G/DL (ref 2–4)
GLUCOSE SERPL-MCNC: 90 MG/DL (ref 65–100)
HBA1C MFR BLD: 5.6 % (ref 4–5.6)
HDLC SERPL-MCNC: 107 MG/DL (ref 40–60)
HDLC SERPL: 1.7 (ref 0–5)
LDLC SERPL CALC-MCNC: 60 MG/DL (ref 0–100)
POTASSIUM SERPL-SCNC: 4.3 MMOL/L (ref 3.5–5.1)
PROT SERPL-MCNC: 7.5 G/DL (ref 6.4–8.3)
SODIUM SERPL-SCNC: 142 MMOL/L (ref 136–145)
T4 FREE SERPL-MCNC: 1.4 NG/DL (ref 0.9–1.6)
TRIGL SERPL-MCNC: 74 MG/DL (ref 0–150)
TSH, 3RD GENERATION: 0.59 UIU/ML (ref 0.27–4.2)
VLDLC SERPL CALC-MCNC: 15 MG/DL

## 2025-08-25 ENCOUNTER — HOSPITAL ENCOUNTER (OUTPATIENT)
Facility: HOSPITAL | Age: 60
Discharge: HOME | End: 2025-08-28
Attending: STUDENT IN AN ORGANIZED HEALTH CARE EDUCATION/TRAINING PROGRAM
Payer: MEDICAID

## 2025-08-25 DIAGNOSIS — Z12.31 VISIT FOR SCREENING MAMMOGRAM: ICD-10-CM

## 2025-08-25 PROCEDURE — 77063 BREAST TOMOSYNTHESIS BI: CPT

## 2025-08-29 ENCOUNTER — TRANSCRIBE ORDERS (OUTPATIENT)
Facility: HOSPITAL | Age: 60
End: 2025-08-29

## 2025-08-29 DIAGNOSIS — Z13.820 SCREENING FOR OSTEOPOROSIS: Primary | ICD-10-CM

## (undated) DEVICE — INFECTION CONTROL KIT SYS

## (undated) DEVICE — LAMINECTOMY RICHMOND-LF: Brand: MEDLINE INDUSTRIES, INC.

## (undated) DEVICE — STERILE POLYISOPRENE POWDER-FREE SURGICAL GLOVES WITH EMOLLIENT COATING: Brand: PROTEXIS

## (undated) DEVICE — SUPPLEMENT DIGESTIVE H2O SOL GI-EASE

## (undated) DEVICE — TUBING IRRIG L77IN DIA0.241IN L BOR FOR CYSTO W/ NVENT

## (undated) DEVICE — STERILE POLYISOPRENE POWDER-FREE SURGICAL GLOVES: Brand: PROTEXIS

## (undated) DEVICE — LEGGINGS: Brand: CONVERTORS

## (undated) DEVICE — GOWN,SIRUS,NONRNF,SETINSLV,XL,20/CS: Brand: MEDLINE

## (undated) DEVICE — TIP SUCT BLU PLAS BLB W/O CTRL VENT YANK

## (undated) DEVICE — TRAY CATH 16F URIN MTR LTX -- CONVERT TO ITEM 363111

## (undated) DEVICE — DRAPE,REIN 53X77,STERILE: Brand: MEDLINE

## (undated) DEVICE — SOLUTION IRRIG 3000ML 0.9% SOD CHL FLX CONT 0797208] ICU MEDICAL INC]

## (undated) DEVICE — SURGICAL PROCEDURE PACK BASIN MAJ SET CUST NO CAUT

## (undated) DEVICE — ABDOMINAL PAD: Brand: DERMACEA

## (undated) DEVICE — SUTURE ABSRB BRAID COAT UD OS-6 NO 1 27IN VCRL J535H

## (undated) DEVICE — TRAY CATH 16F DRN BG LTX -- CONVERT TO ITEM 363158

## (undated) DEVICE — SOLUTION IV 250ML 0.9% SOD CHL CLR INJ FLX BG CONT PRT CLSR

## (undated) DEVICE — PILLOW POS AD L7IN R FOAM HD REST INTUB SLOT DISP

## (undated) DEVICE — Device

## (undated) DEVICE — PAD SANIT NPKN 4IN GRD

## (undated) DEVICE — SUTURE CAPIO MONODEK SZ 0 L48IN ABSRB 1/2 CIR DBL ARMED 833137

## (undated) DEVICE — TRAY PREP DRY W/ PREM GLV 2 APPL 6 SPNG 2 UNDPD 1 OVERWRAP

## (undated) DEVICE — DEVON™ KNEE AND BODY STRAP 60" X 3" (1.5 M X 7.6 CM): Brand: DEVON

## (undated) DEVICE — BONE WAX WHITE: Brand: BONE WAX WHITE

## (undated) DEVICE — NEEDLE HYPO 22GA L1.5IN BLK S STL HUB POLYPR SHLD REG BVL

## (undated) DEVICE — SOLUTION IRRIG 1000ML H2O STRL BLT

## (undated) DEVICE — TOOL 14BA50 LEGEND 14CM 5MM BA: Brand: MIDAS REX ™

## (undated) DEVICE — KENDALL SCD EXPRESS SLEEVES, KNEE LENGTH, MEDIUM: Brand: KENDALL SCD

## (undated) DEVICE — HOOK RETRCT L5MM E SHRP SELF RET SYS LONE STAR

## (undated) DEVICE — COVER,TABLE,HEAVY DUTY,60"X90",STRL: Brand: MEDLINE

## (undated) DEVICE — DRAPE C-ARMOUR C-ARM KIT --

## (undated) DEVICE — SUTURE VCRL SZ 2-0 L27IN ABSRB UD L26MM SH 1/2 CIR J417H

## (undated) DEVICE — RING RETRCTR FIG 8 32.5X18.3CM -- PLAS STRL W/2 CATH CLIPS

## (undated) DEVICE — X-RAY SPONGES,16 PLY: Brand: DERMACEA

## (undated) DEVICE — GAUZE SPONGES,12 PLY: Brand: CURITY

## (undated) DEVICE — DRAIN KT WND 10FR RND 400ML --

## (undated) DEVICE — KIT CLMP DISPOSABLE

## (undated) DEVICE — SPONGE LAP 18X18IN STRL -- 5/PK

## (undated) DEVICE — ROCKER SWITCH PENCIL BLADE ELECTRODE, HOLSTER: Brand: EDGE

## (undated) DEVICE — SOLUTION IRRIGATION H2O 0797305] ICU MEDICAL INC]

## (undated) DEVICE — Z INACTIVE USE 2527070 DRAPE SURG W40XL44IN UNDERBUTTOCK SMS POLYPR W/ PCH BK DISP

## (undated) DEVICE — SUTURE VCRL 2-0 L27IN ABSRB UD CP-2 L26MM 1/2 CIR REV CUT J869H

## (undated) DEVICE — MEDI-VAC NON-CONDUCTIVE SUCTION TUBING: Brand: CARDINAL HEALTH

## (undated) DEVICE — REM POLYHESIVE ADULT PATIENT RETURN ELECTRODE: Brand: VALLEYLAB

## (undated) DEVICE — SUTURE VCRL SZ 0 L18IN ABSRB VLT L26MM CT-2 1/2 CIR J727D

## (undated) DEVICE — PREP SKN PREVAIL 40ML APPL --

## (undated) DEVICE — HANDPIECE SET WITH BONE CLEANING TIP AND SUCTION TUBE: Brand: INTERPULSE

## (undated) DEVICE — PREP PAD BNS: Brand: CONVERTORS

## (undated) DEVICE — 1200 GUARD II KIT W/5MM TUBE W/O VAC TUBE: Brand: GUARDIAN

## (undated) DEVICE — LAVH/LAPAROSCOPY DRAPE: Brand: CONVERTORS

## (undated) DEVICE — SINGLE BASIN: Brand: CARDINAL HEALTH

## (undated) DEVICE — HANDLE LT SNAP ON ULT DURABLE LENS FOR TRUMPF ALC DISPOSABLE

## (undated) DEVICE — BIPOLAR IRRIGATOR INTEGRATED TUBING AND BIPOLAR CORD SET, DISPOSABLE

## (undated) DEVICE — PERI/GYN PACK: Brand: CONVERTORS